# Patient Record
Sex: FEMALE | Race: WHITE | NOT HISPANIC OR LATINO | URBAN - METROPOLITAN AREA
[De-identification: names, ages, dates, MRNs, and addresses within clinical notes are randomized per-mention and may not be internally consistent; named-entity substitution may affect disease eponyms.]

---

## 2018-03-03 ENCOUNTER — DOCUMENTATION (OUTPATIENT)
Dept: ORTHOPEDICS | Facility: HOSPITAL | Age: 68
End: 2018-03-03

## 2018-03-03 PROBLEM — D64.9 ANEMIA: Status: ACTIVE | Noted: 2018-03-03

## 2018-03-03 PROBLEM — E78.5 DYSLIPIDEMIA: Status: ACTIVE | Noted: 2018-03-03

## 2018-03-03 PROBLEM — R73.03 PREDIABETES: Status: ACTIVE | Noted: 2018-03-03

## 2018-03-03 PROBLEM — Z96.652 HISTORY OF TOTAL KNEE ARTHROPLASTY, LEFT: Status: ACTIVE | Noted: 2018-03-03

## 2018-03-03 PROBLEM — K59.00 CONSTIPATION: Status: ACTIVE | Noted: 2018-03-03

## 2018-03-03 PROBLEM — R52 PAIN MANAGEMENT: Status: ACTIVE | Noted: 2018-03-03

## 2018-03-03 NOTE — PROGRESS NOTES
POD #1  LEFT TKA    VSS    LLE:  INCISION CDI  NVI  NO CALF TENDERNESS      S/P LEFT TKA    WBAT   PT/OT

## 2019-01-24 ENCOUNTER — OFFICE VISIT (OUTPATIENT)
Dept: CARDIOLOGY | Facility: CLINIC | Age: 69
End: 2019-01-24
Payer: MEDICARE

## 2019-01-24 VITALS
DIASTOLIC BLOOD PRESSURE: 82 MMHG | WEIGHT: 224 LBS | HEART RATE: 76 BPM | OXYGEN SATURATION: 95 % | BODY MASS INDEX: 38.45 KG/M2 | SYSTOLIC BLOOD PRESSURE: 132 MMHG

## 2019-01-24 DIAGNOSIS — I10 HYPERTENSION, UNSPECIFIED TYPE: Primary | ICD-10-CM

## 2019-01-24 PROCEDURE — 93000 ELECTROCARDIOGRAM COMPLETE: CPT | Performed by: INTERNAL MEDICINE

## 2019-01-24 PROCEDURE — 99203 OFFICE O/P NEW LOW 30 MIN: CPT | Performed by: INTERNAL MEDICINE

## 2019-01-24 RX ORDER — ATORVASTATIN CALCIUM 20 MG/1
20 TABLET, FILM COATED ORAL
COMMUNITY
Start: 2019-01-15 | End: 2019-01-24 | Stop reason: SDUPTHER

## 2019-01-24 RX ORDER — AMOXICILLIN 500 MG/1
CAPSULE ORAL
Refills: 6 | COMMUNITY
Start: 2018-12-10 | End: 2019-01-24 | Stop reason: ALTCHOICE

## 2019-01-24 ASSESSMENT — ENCOUNTER SYMPTOMS
WHEEZING: 0
FALLS: 0
BRUISES/BLEEDS EASILY: 0
SHORTNESS OF BREATH: 0
MYALGIAS: 0
FEVER: 0
ORTHOPNEA: 0
DYSPNEA ON EXERTION: 0
LIGHT-HEADEDNESS: 0
FOCAL WEAKNESS: 0
DIZZINESS: 0
BRIEF PARALYSIS: 0
ALTERED MENTAL STATUS: 0
HEMATURIA: 0
FREQUENCY: 0
DEPRESSION: 0
NEAR-SYNCOPE: 0
LOSS OF BALANCE: 0
VOMITING: 0
IRREGULAR HEARTBEAT: 0
DOUBLE VISION: 0
COUGH: 0
CLAUDICATION: 0
HEMATOCHEZIA: 0
NAUSEA: 0
DIAPHORESIS: 0
JOINT SWELLING: 0
BLURRED VISION: 0
PND: 0
SNORING: 0
SYNCOPE: 0
PALPITATIONS: 0
HEMATEMESIS: 0
ODYNOPHAGIA: 0
SPUTUM PRODUCTION: 0
SLEEP DISTURBANCES DUE TO BREATHING: 0
DIARRHEA: 0
HEADACHES: 0
WEAKNESS: 0
WEIGHT LOSS: 0
ABDOMINAL PAIN: 0
NIGHT SWEATS: 0
WEIGHT GAIN: 0
NERVOUS/ANXIOUS: 1
HEMOPTYSIS: 0
MUSCLE CRAMPS: 0

## 2019-01-24 NOTE — LETTER
2019     ALEIDA Toledo C  [address] [place] [address]    Patient: Taylor Sutton   YOB: 1950   Date of Visit: 2019       Dear Dr. Mullen:    Thank you for referring Taylor Sutton to me for evaluation. Below are my notes for this consultation.    If you have questions, please do not hesitate to call me. I look forward to following your patient along with you.         Sincerely,        Александр Serrato DO        CC: No Recipients  Александр Serrato DO  2019  4:22 PM  Sign at close encounter  Cardiology Consult Outpatient      Taylor Sutton 68 y.o. female who has a history of dyslipidemia on statin.  She has had elevated blood pressures for several weeks.  Her friend takes her blood pressure at home.  The patient denies chest pain, dyspnea, orthopnea, paroxysmal nocturnal dyspnea lower extremity edema.  She snores but she does not think she has sleep apnea.  She feels moderately well rested after an evening of sleep.  She understands she is overweight.  She has not exercised in months because of work.    Past medical history: Dyslipidemia       Past Surgical History:   Procedure Laterality Date   •  SECTION     • KNEE SURGERY         Patient has no known allergies.    Current Outpatient Prescriptions   Medication Sig Dispense Refill   • atorvastatin (LIPITOR) 20 mg tablet Take 20 mg by mouth nightly.       No current facility-administered medications for this visit.        Social History: No alcohol or tobacco     Social History   • Marital status: Single     Spouse name: N/A   • Number of children: N/A   • Years of education: N/A     Social History Main Topics   • Smoking status: Never Smoker   • Smokeless tobacco: Never Used   • Alcohol use No   • Drug use: Unknown   • Sexual activity: Not Asked     Other Topics Concern   • None     Social History Narrative   • None       History reviewed. No pertinent family history.    Review of Systems    Constitution: Negative for diaphoresis, fever, weakness, malaise/fatigue, night sweats, weight gain and weight loss.   HENT: Negative for odynophagia.    Eyes: Negative for blurred vision, double vision and visual disturbance.   Cardiovascular: Negative for chest pain, claudication, dyspnea on exertion, irregular heartbeat, leg swelling, near-syncope, orthopnea, palpitations, paroxysmal nocturnal dyspnea and syncope.   Respiratory: Negative for cough, hemoptysis, shortness of breath, sleep disturbances due to breathing, snoring, sputum production and wheezing.    Endocrine: Negative for polyuria.   Hematologic/Lymphatic: Negative for bleeding problem. Does not bruise/bleed easily.   Skin: Negative for rash.   Musculoskeletal: Negative for falls, joint pain, joint swelling, muscle cramps, muscle weakness and myalgias.   Gastrointestinal: Negative for abdominal pain, diarrhea, hematemesis, hematochezia, melena, nausea and vomiting.   Genitourinary: Negative for frequency, hematuria and nocturia.   Neurological: Negative for brief paralysis, dizziness, focal weakness, headaches, light-headedness and loss of balance.   Psychiatric/Behavioral: Negative for altered mental status and depression. 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        Objective     Vitals:    01/24/19 1547   BP: 132/82   Pulse:    SpO2:        Physical Exam   Constitutional: She is oriented to person, place, and time. She appears well-developed. No distress.   Obese   HENT:   Head: Normocephalic.   Eyes: Conjunctivae are normal. Pupils are equal, round, and reactive to light.   Neck: Neck supple. No JVD present. No tracheal deviation present. No thyromegaly present.   Cardiovascular: Normal rate, regular rhythm and normal heart sounds.  Exam reveals no gallop and no friction rub.    No murmur heard.  Pulmonary/Chest: Breath sounds normal. No  stridor. No respiratory distress. She has no wheezes. She has no rales. She exhibits no tenderness.   Abdominal: Bowel sounds are normal. She exhibits no distension and no mass. There is no tenderness. There is no rebound and no guarding.   Musculoskeletal: Normal range of motion. She exhibits no edema, tenderness or deformity.   Neurological: She is alert and oriented to person, place, and time. No cranial nerve deficit.   Skin: Skin is warm and dry. No rash noted. She is not diaphoretic. No erythema. No pallor.   Psychiatric: She has a normal mood and affect. Her behavior is normal. Judgment normal.          Labs   Lab Results   Component Value Date    WBC 8.11 03/03/2018    HGB 12.2 03/03/2018    HCT 37.9 03/03/2018     03/03/2018    ALT 18 02/26/2018    AST 17 02/26/2018     03/03/2018    K 3.8 03/03/2018     03/03/2018    CREATININE 0.4 (L) 03/03/2018    BUN 11 03/03/2018    CO2 28 03/03/2018    INR 0.9 02/26/2018    HGBA1C 5.9 (H) 02/26/2018       Imaging  Not applicable    ECG   sinus rhythm  Borderline low voltage      Assessment/Plan     Problem List Items Addressed This Visit     Hypertension - Primary      Blood pressure after 15 minutes of rest was 132/82 and 135/85 in the right arm.  This is not significantly elevated.  I do appreciate that she has elevated pressures at home as taken by her close friend.  Perhaps this is during a stressful time or perhaps the cough is a small cuff.  I suspect the patient does have pre-hypertension.  PLAN:  - I recommended exercise routine and diet.    - Patient does snore but does not think she has sleep apnea.  This might be something to pursue in the future with a sleep study.  -There are no other alarming features are concerns.  -Routine labs including CBC, CMP, TSH, CMP no need for extensive secondary workup at this time.  -With the patient keep a diary of 3-4 blood pressure per week taken at rest.  -Follow-up in 3 months         Relevant Orders     ECG 12 lead (Completed)    CBC and Differential    Lipid panel    Comprehensive metabolic panel    TSH w reflex FT4              Александр Sigala,   1/24/2019

## 2019-01-24 NOTE — PROGRESS NOTES
Cardiology Consult Outpatient      Taylor Sutton 68 y.o. female who has a history of dyslipidemia on statin.  She has had elevated blood pressures for several weeks.  Her friend takes her blood pressure at home.  The patient denies chest pain, dyspnea, orthopnea, paroxysmal nocturnal dyspnea lower extremity edema.  She snores but she does not think she has sleep apnea.  She feels moderately well rested after an evening of sleep.  She understands she is overweight.  She has not exercised in months because of work.    Past medical history: Dyslipidemia       Past Surgical History:   Procedure Laterality Date   •  SECTION     • KNEE SURGERY         Patient has no known allergies.    Current Outpatient Prescriptions   Medication Sig Dispense Refill   • atorvastatin (LIPITOR) 20 mg tablet Take 20 mg by mouth nightly.       No current facility-administered medications for this visit.        Social History: No alcohol or tobacco     Social History   • Marital status: Single     Spouse name: N/A   • Number of children: N/A   • Years of education: N/A     Social History Main Topics   • Smoking status: Never Smoker   • Smokeless tobacco: Never Used   • Alcohol use No   • Drug use: Unknown   • Sexual activity: Not Asked     Other Topics Concern   • None     Social History Narrative   • None       History reviewed. No pertinent family history.    Review of Systems   Constitution: Negative for diaphoresis, fever, weakness, malaise/fatigue, night sweats, weight gain and weight loss.   HENT: Negative for odynophagia.    Eyes: Negative for blurred vision, double vision and visual disturbance.   Cardiovascular: Negative for chest pain, claudication, dyspnea on exertion, irregular heartbeat, leg swelling, near-syncope, orthopnea, palpitations, paroxysmal nocturnal dyspnea and syncope.   Respiratory: Negative for cough, hemoptysis, shortness of breath, sleep disturbances due to breathing, snoring, sputum production and  wheezing.    Endocrine: Negative for polyuria.   Hematologic/Lymphatic: Negative for bleeding problem. Does not bruise/bleed easily.   Skin: Negative for rash.   Musculoskeletal: Negative for falls, joint pain, joint swelling, muscle cramps, muscle weakness and myalgias.   Gastrointestinal: Negative for abdominal pain, diarrhea, hematemesis, hematochezia, melena, nausea and vomiting.   Genitourinary: Negative for frequency, hematuria and nocturia.   Neurological: Negative for brief paralysis, dizziness, focal weakness, headaches, light-headedness and loss of balance.   Psychiatric/Behavioral: Negative for altered mental status and depression. 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        Objective     Vitals:    01/24/19 1547   BP: 132/82   Pulse:    SpO2:        Physical Exam   Constitutional: She is oriented to person, place, and time. She appears well-developed. No distress.   Obese   HENT:   Head: Normocephalic.   Eyes: Conjunctivae are normal. Pupils are equal, round, and reactive to light.   Neck: Neck supple. No JVD present. No tracheal deviation present. No thyromegaly present.   Cardiovascular: Normal rate, regular rhythm and normal heart sounds.  Exam reveals no gallop and no friction rub.    No murmur heard.  Pulmonary/Chest: Breath sounds normal. No stridor. No respiratory distress. She has no wheezes. She has no rales. She exhibits no tenderness.   Abdominal: Bowel sounds are normal. She exhibits no distension and no mass. There is no tenderness. There is no rebound and no guarding.   Musculoskeletal: Normal range of motion. She exhibits no edema, tenderness or deformity.   Neurological: She is alert and oriented to person, place, and time. No cranial nerve deficit.   Skin: Skin is warm and dry. No rash noted. She is not diaphoretic. No erythema. No pallor.   Psychiatric: She has a normal mood and  affect. Her behavior is normal. Judgment normal.          Labs   Lab Results   Component Value Date    WBC 8.11 03/03/2018    HGB 12.2 03/03/2018    HCT 37.9 03/03/2018     03/03/2018    ALT 18 02/26/2018    AST 17 02/26/2018     03/03/2018    K 3.8 03/03/2018     03/03/2018    CREATININE 0.4 (L) 03/03/2018    BUN 11 03/03/2018    CO2 28 03/03/2018    INR 0.9 02/26/2018    HGBA1C 5.9 (H) 02/26/2018       Imaging  Not applicable    ECG   sinus rhythm  Borderline low voltage      Assessment/Plan     Problem List Items Addressed This Visit     Hypertension - Primary      Blood pressure after 15 minutes of rest was 132/82 and 135/85 in the right arm.  This is not significantly elevated.  I do appreciate that she has elevated pressures at home as taken by her close friend.  Perhaps this is during a stressful time or perhaps the cough is a small cuff.  I suspect the patient does have pre-hypertension.  PLAN:  - I recommended exercise routine and diet.    - Patient does snore but does not think she has sleep apnea.  This might be something to pursue in the future with a sleep study.  -There are no other alarming features are concerns.  -Routine labs including CBC, CMP, TSH, CMP no need for extensive secondary workup at this time.  -With the patient keep a diary of 3-4 blood pressure per week taken at rest.  -Follow-up in 3 months         Relevant Orders    ECG 12 lead (Completed)    CBC and Differential    Lipid panel    Comprehensive metabolic panel    TSH w reflex FT4              Александр Sigala,   1/24/2019

## 2019-01-24 NOTE — ASSESSMENT & PLAN NOTE
Blood pressure after 15 minutes of rest was 132/82 and 135/85 in the right arm.  This is not significantly elevated.  I do appreciate that she has elevated pressures at home as taken by her close friend.  Perhaps this is during a stressful time or perhaps the cough is a small cuff.  I suspect the patient does have pre-hypertension..  - I recommended exercise routine and diet.    - Patient does snore but does not think she has sleep apnea.  This might be something to pursue in the future with a sleep study.  -There are no other alarming features are concerns.  -Routine labs including CBC, CMP, TSH, CMP no need for extensive secondary workup at this time.

## 2019-01-29 LAB
ALBUMIN SERPL-MCNC: 4.2 G/DL (ref 3.6–5.1)
ALBUMIN/GLOB SERPL: 1.7 (CALC) (ref 1–2.5)
ALP SERPL-CCNC: 107 U/L (ref 33–130)
ALT SERPL-CCNC: 11 U/L (ref 6–29)
AST SERPL-CCNC: 11 U/L (ref 10–35)
BASOPHILS # BLD AUTO: 30 CELLS/UL (ref 0–200)
BASOPHILS NFR BLD AUTO: 0.5 %
BILIRUB SERPL-MCNC: 0.8 MG/DL (ref 0.2–1.2)
BUN SERPL-MCNC: 15 MG/DL (ref 7–25)
BUN/CREAT SERPL: ABNORMAL (CALC) (ref 6–22)
CALCIUM SERPL-MCNC: 9.4 MG/DL (ref 8.6–10.4)
CHLORIDE SERPL-SCNC: 104 MMOL/L (ref 98–110)
CHOLEST SERPL-MCNC: 190 MG/DL
CHOLEST/HDLC SERPL: 3.3 (CALC)
CO2 SERPL-SCNC: 29 MMOL/L (ref 20–32)
CREAT SERPL-MCNC: 0.61 MG/DL (ref 0.5–0.99)
EOSINOPHIL # BLD AUTO: 89 CELLS/UL (ref 15–500)
EOSINOPHIL NFR BLD AUTO: 1.5 %
ERYTHROCYTE [DISTWIDTH] IN BLOOD BY AUTOMATED COUNT: 12.7 % (ref 11–15)
GLOBULIN SER CALC-MCNC: 2.5 G/DL (CALC) (ref 1.9–3.7)
GLUCOSE SERPL-MCNC: 108 MG/DL (ref 65–99)
HCT VFR BLD AUTO: 41 % (ref 35–45)
HDLC SERPL-MCNC: 58 MG/DL
HGB BLD-MCNC: 13.8 G/DL (ref 11.7–15.5)
LDLC SERPL CALC-MCNC: 111 MG/DL (CALC)
LYMPHOCYTES # BLD AUTO: 2277 CELLS/UL (ref 850–3900)
LYMPHOCYTES NFR BLD AUTO: 38.6 %
MCH RBC QN AUTO: 30.7 PG (ref 27–33)
MCHC RBC AUTO-ENTMCNC: 33.7 G/DL (ref 32–36)
MCV RBC AUTO: 91.1 FL (ref 80–100)
MONOCYTES # BLD AUTO: 437 CELLS/UL (ref 200–950)
MONOCYTES NFR BLD AUTO: 7.4 %
NEUTROPHILS # BLD AUTO: 3068 CELLS/UL (ref 1500–7800)
NEUTROPHILS NFR BLD AUTO: 52 %
NONHDLC SERPL-MCNC: 132 MG/DL (CALC)
PLATELET # BLD AUTO: 245 THOUSAND/UL (ref 140–400)
PMV BLD REES-ECKER: 11 FL (ref 7.5–12.5)
POTASSIUM SERPL-SCNC: 4.6 MMOL/L (ref 3.5–5.3)
PROT SERPL-MCNC: 6.7 G/DL (ref 6.1–8.1)
QUEST EGFR NON-AFR. AMERICAN: 94 ML/MIN/1.73M2
RBC # BLD AUTO: 4.5 MILLION/UL (ref 3.8–5.1)
SODIUM SERPL-SCNC: 141 MMOL/L (ref 135–146)
TRIGL SERPL-MCNC: 99 MG/DL
TSH SERPL-ACNC: 1.96 MIU/L (ref 0.4–4.5)
WBC # BLD AUTO: 5.9 THOUSAND/UL (ref 3.8–10.8)

## 2019-04-25 ENCOUNTER — OFFICE VISIT (OUTPATIENT)
Dept: CARDIOLOGY | Facility: CLINIC | Age: 69
End: 2019-04-25
Payer: MEDICARE

## 2019-04-25 VITALS
SYSTOLIC BLOOD PRESSURE: 112 MMHG | OXYGEN SATURATION: 96 % | DIASTOLIC BLOOD PRESSURE: 70 MMHG | HEART RATE: 73 BPM | WEIGHT: 230 LBS | BODY MASS INDEX: 39.48 KG/M2

## 2019-04-25 DIAGNOSIS — I10 HYPERTENSION, UNSPECIFIED TYPE: Primary | ICD-10-CM

## 2019-04-25 DIAGNOSIS — E78.5 DYSLIPIDEMIA: ICD-10-CM

## 2019-04-25 PROCEDURE — 99213 OFFICE O/P EST LOW 20 MIN: CPT | Performed by: INTERNAL MEDICINE

## 2019-04-25 RX ORDER — HYDROCHLOROTHIAZIDE 25 MG/1
25 TABLET ORAL DAILY
Qty: 90 TABLET | Refills: 3
Start: 2019-04-25 | End: 2019-11-21 | Stop reason: SDUPTHER

## 2019-04-25 RX ORDER — HYDROCHLOROTHIAZIDE 12.5 MG/1
25 TABLET ORAL DAILY
Qty: 90 TABLET | Refills: 3
Start: 2019-04-25 | End: 2019-04-25 | Stop reason: SDUPTHER

## 2019-04-25 ASSESSMENT — ENCOUNTER SYMPTOMS
VOMITING: 0
ABDOMINAL PAIN: 0
SPUTUM PRODUCTION: 0
DIARRHEA: 0
HEMOPTYSIS: 0
HEADACHES: 0
PND: 0
ODYNOPHAGIA: 0
WEAKNESS: 0
BLURRED VISION: 0
NERVOUS/ANXIOUS: 0
LIGHT-HEADEDNESS: 0
ORTHOPNEA: 0
NEAR-SYNCOPE: 0
WEIGHT LOSS: 0
MUSCLE CRAMPS: 0
LOSS OF BALANCE: 0
SYNCOPE: 0
WEIGHT GAIN: 0
BRUISES/BLEEDS EASILY: 0
CLAUDICATION: 0
DYSPNEA ON EXERTION: 0
DOUBLE VISION: 0
FOCAL WEAKNESS: 0
JOINT SWELLING: 0
SNORING: 0
FALLS: 0
IRREGULAR HEARTBEAT: 0
FREQUENCY: 0
DIZZINESS: 0
NIGHT SWEATS: 0
ALTERED MENTAL STATUS: 0
COUGH: 0
BRIEF PARALYSIS: 0
MYALGIAS: 0
HEMATOCHEZIA: 0
PALPITATIONS: 0
HEMATEMESIS: 0
DIAPHORESIS: 0
NAUSEA: 0
FEVER: 0
DEPRESSION: 0
SHORTNESS OF BREATH: 0
HEMATURIA: 0
SLEEP DISTURBANCES DUE TO BREATHING: 0
WHEEZING: 0

## 2019-04-25 NOTE — LETTER
2019     ALEIDA Toledo C  [address] [place] [address]    Patient: Taylor Sutton   YOB: 1950   Date of Visit: 2019       Dear Dr. Mullen:    I saw Taylor in the office today for follow-up evaluation.  Below are the notes from this encounter.  Her blood pressure is under very good control..    If you have questions, please do not hesitate to call me. I look forward to following your patient along with you.         Sincerely,        Александр Serrato DO        CC: No Recipients  Александр Serrato DO  2019  4:32 PM  Sign at close encounter     Cardiology Note       Reason for visit:   Chief Complaint   Patient presents with   • Follow-up      HPI    Taylor Sutton is a 68 y.o. female who has hypertension is feeling well.  She has no chest pain, dyspnea, orthopnea, proximal nocturnal dyspnea, lower limb edema.     History reviewed. No pertinent past medical history.    Past Surgical History:   Procedure Laterality Date   •  SECTION     • KNEE SURGERY         Patient has no known allergies.    Current Outpatient Prescriptions   Medication Sig Dispense Refill   • atorvastatin (LIPITOR) 20 mg tablet Take 20 mg by mouth nightly.     • hydrochlorothiazide (HYDRODIURIL) 12.5 mg tablet Take 2 tablets (25 mg total) by mouth daily. 90 tablet 3     No current facility-administered medications for this visit.        Social History     Social History   • Marital status: Single     Spouse name: N/A   • Number of children: N/A   • Years of education: N/A     Social History Main Topics   • Smoking status: Never Smoker   • Smokeless tobacco: Never Used   • Alcohol use No   • Drug use: Unknown   • Sexual activity: Not Asked     Other Topics Concern   • None     Social History Narrative   • None       History reviewed. No pertinent family history.      Review of Systems   Constitution: Negative for diaphoresis, fever, weakness, malaise/fatigue, night sweats, weight gain and  weight loss.   HENT: Negative for odynophagia.    Eyes: Negative for blurred vision, double vision and visual disturbance.   Cardiovascular: Negative for chest pain, claudication, dyspnea on exertion, irregular heartbeat, leg swelling, near-syncope, orthopnea, palpitations, paroxysmal nocturnal dyspnea and syncope.   Respiratory: Negative for cough, hemoptysis, shortness of breath, sleep disturbances due to breathing, snoring, sputum production and wheezing.    Endocrine: Negative for polyuria.   Hematologic/Lymphatic: Negative for bleeding problem. Does not bruise/bleed easily.   Skin: Negative for rash.   Musculoskeletal: Negative for falls, joint pain, joint swelling, muscle cramps, muscle weakness and myalgias.   Gastrointestinal: Negative for abdominal pain, diarrhea, hematemesis, hematochezia, melena, nausea and vomiting.   Genitourinary: Negative for frequency, hematuria and nocturia.   Neurological: Negative for brief paralysis, dizziness, focal weakness, headaches, light-headedness and loss of balance.   Psychiatric/Behavioral: Negative for altered mental status and depression. The patient is not nervous/anxious.       Objective    Vitals:    04/25/19 1626   BP: 112/70   Pulse:    SpO2:       Physical Exam   Constitutional: She is oriented to person, place, and time. She appears well-developed. No distress.   Overweight   HENT:   Head: Normocephalic.   Eyes: Pupils are equal, round, and reactive to light. Conjunctivae are normal.   Neck: Neck supple. No JVD present. No tracheal deviation present. No thyromegaly present.   Cardiovascular: Normal rate, regular rhythm and normal heart sounds.  Exam reveals no gallop and no friction rub.    No murmur heard.  Pulmonary/Chest: Breath sounds normal. No stridor. No respiratory distress. She has no wheezes. She has no rales. She exhibits no tenderness.   Abdominal: Bowel sounds are normal. She exhibits no distension and no mass. There is no tenderness. There is no  rebound and no guarding.   Musculoskeletal: Normal range of motion. She exhibits no edema, tenderness or deformity.   Neurological: She is alert and oriented to person, place, and time. No cranial nerve deficit.   Skin: Skin is warm and dry. No rash noted. She is not diaphoretic. No erythema. No pallor.   Psychiatric: She has a normal mood and affect. Her behavior is normal. Judgment normal.         Lab Results   Component Value Date    WBC 5.9 01/28/2019    HGB 13.8 01/28/2019     01/28/2019    CHOL 190 01/28/2019    TRIG 99 01/28/2019    HDL 58 01/28/2019    ALT 11 01/28/2019    AST 11 01/28/2019     01/28/2019    K 4.6 01/28/2019    CREATININE 0.61 01/28/2019    TSH 1.96 01/28/2019    INR 0.9 02/26/2018    HGBA1C 5.9 (H) 02/26/2018         Imaging  na    ECG   na   Assessment/Plan    Problem List Items Addressed This Visit        Circulatory    Hypertension - Primary    Overview     1/24/18  - blood pressure after 15 minutes of rest was 132/82 and 135/85 in the right arm.  This is not significantly elevated.  I do appreciate that she has elevated pressures at home as taken by her close friend.          Current Assessment & Plan     Excellent blood pressure control on monotherapy.  Today's blood pressure was 115/70.         Relevant Medications    hydrochlorothiazide (HYDRODIURIL) 25 mg tablet    Other Relevant Orders    Lipid panel    Comprehensive metabolic panel       Endocrine/Metabolic    Dyslipidemia    Relevant Orders    Lipid panel    Comprehensive metabolic panel                  Thank you for allowing me to participate in the care of this patient.  I hope this information is helpful.     Александр Sigala DO [facility]  4/25/2019  4:28 PM

## 2019-04-25 NOTE — PROGRESS NOTES
Cardiology Note       Reason for visit:   Chief Complaint   Patient presents with   • Follow-up      HPI    Taylor Sutton is a 68 y.o. female who has hypertension is feeling well.  She has no chest pain, dyspnea, orthopnea, proximal nocturnal dyspnea, lower limb edema.     History reviewed. No pertinent past medical history.    Past Surgical History:   Procedure Laterality Date   •  SECTION     • KNEE SURGERY         Patient has no known allergies.    Current Outpatient Prescriptions   Medication Sig Dispense Refill   • atorvastatin (LIPITOR) 20 mg tablet Take 20 mg by mouth nightly.     • hydrochlorothiazide (HYDRODIURIL) 12.5 mg tablet Take 2 tablets (25 mg total) by mouth daily. 90 tablet 3     No current facility-administered medications for this visit.        Social History     Social History   • Marital status: Single     Spouse name: N/A   • Number of children: N/A   • Years of education: N/A     Social History Main Topics   • Smoking status: Never Smoker   • Smokeless tobacco: Never Used   • Alcohol use No   • Drug use: Unknown   • Sexual activity: Not Asked     Other Topics Concern   • None     Social History Narrative   • None       History reviewed. No pertinent family history.      Review of Systems   Constitution: Negative for diaphoresis, fever, weakness, malaise/fatigue, night sweats, weight gain and weight loss.   HENT: Negative for odynophagia.    Eyes: Negative for blurred vision, double vision and visual disturbance.   Cardiovascular: Negative for chest pain, claudication, dyspnea on exertion, irregular heartbeat, leg swelling, near-syncope, orthopnea, palpitations, paroxysmal nocturnal dyspnea and syncope.   Respiratory: Negative for cough, hemoptysis, shortness of breath, sleep disturbances due to breathing, snoring, sputum production and wheezing.    Endocrine: Negative for polyuria.   Hematologic/Lymphatic: Negative for bleeding problem. Does not bruise/bleed easily.   Skin:  Negative for rash.   Musculoskeletal: Negative for falls, joint pain, joint swelling, muscle cramps, muscle weakness and myalgias.   Gastrointestinal: Negative for abdominal pain, diarrhea, hematemesis, hematochezia, melena, nausea and vomiting.   Genitourinary: Negative for frequency, hematuria and nocturia.   Neurological: Negative for brief paralysis, dizziness, focal weakness, headaches, light-headedness and loss of balance.   Psychiatric/Behavioral: Negative for altered mental status and depression. The patient is not nervous/anxious.       Objective    Vitals:    04/25/19 1626   BP: 112/70   Pulse:    SpO2:       Physical Exam   Constitutional: She is oriented to person, place, and time. She appears well-developed. No distress.   Overweight   HENT:   Head: Normocephalic.   Eyes: Pupils are equal, round, and reactive to light. Conjunctivae are normal.   Neck: Neck supple. No JVD present. No tracheal deviation present. No thyromegaly present.   Cardiovascular: Normal rate, regular rhythm and normal heart sounds.  Exam reveals no gallop and no friction rub.    No murmur heard.  Pulmonary/Chest: Breath sounds normal. No stridor. No respiratory distress. She has no wheezes. She has no rales. She exhibits no tenderness.   Abdominal: Bowel sounds are normal. She exhibits no distension and no mass. There is no tenderness. There is no rebound and no guarding.   Musculoskeletal: Normal range of motion. She exhibits no edema, tenderness or deformity.   Neurological: She is alert and oriented to person, place, and time. No cranial nerve deficit.   Skin: Skin is warm and dry. No rash noted. She is not diaphoretic. No erythema. No pallor.   Psychiatric: She has a normal mood and affect. Her behavior is normal. Judgment normal.         Lab Results   Component Value Date    WBC 5.9 01/28/2019    HGB 13.8 01/28/2019     01/28/2019    CHOL 190 01/28/2019    TRIG 99 01/28/2019    HDL 58 01/28/2019    ALT 11 01/28/2019     AST 11 01/28/2019     01/28/2019    K 4.6 01/28/2019    CREATININE 0.61 01/28/2019    TSH 1.96 01/28/2019    INR 0.9 02/26/2018    HGBA1C 5.9 (H) 02/26/2018         Imaging  na    ECG   na   Assessment/Plan    Problem List Items Addressed This Visit        Circulatory    Hypertension - Primary    Overview     1/24/18  - blood pressure after 15 minutes of rest was 132/82 and 135/85 in the right arm.  This is not significantly elevated.  I do appreciate that she has elevated pressures at home as taken by her close friend.          Current Assessment & Plan     Excellent blood pressure control on monotherapy.  Today's blood pressure was 115/70.         Relevant Medications    hydrochlorothiazide (HYDRODIURIL) 25 mg tablet    Other Relevant Orders    Lipid panel    Comprehensive metabolic panel       Endocrine/Metabolic    Dyslipidemia    Relevant Orders    Lipid panel    Comprehensive metabolic panel                  Thank you for allowing me to participate in the care of this patient.  I hope this information is helpful.     Александр Sigala DO [facility]  4/25/2019  4:28 PM

## 2019-09-03 ENCOUNTER — OFFICE VISIT (OUTPATIENT)
Dept: CARDIOLOGY | Facility: CLINIC | Age: 69
End: 2019-09-03
Payer: MEDICARE

## 2019-09-03 VITALS
HEART RATE: 78 BPM | DIASTOLIC BLOOD PRESSURE: 72 MMHG | WEIGHT: 230 LBS | SYSTOLIC BLOOD PRESSURE: 122 MMHG | OXYGEN SATURATION: 90 % | BODY MASS INDEX: 39.48 KG/M2

## 2019-09-03 DIAGNOSIS — I10 HYPERTENSION, UNSPECIFIED TYPE: Primary | ICD-10-CM

## 2019-09-03 DIAGNOSIS — R73.03 PREDIABETES: ICD-10-CM

## 2019-09-03 PROCEDURE — 99213 OFFICE O/P EST LOW 20 MIN: CPT | Performed by: INTERNAL MEDICINE

## 2019-09-03 ASSESSMENT — ENCOUNTER SYMPTOMS
WEIGHT GAIN: 0
CLAUDICATION: 0
BLURRED VISION: 0
DIAPHORESIS: 0
HEADACHES: 0
ORTHOPNEA: 0
HEMOPTYSIS: 0
IRREGULAR HEARTBEAT: 0
NIGHT SWEATS: 0
FREQUENCY: 0
FALLS: 0
DOUBLE VISION: 0
MUSCLE CRAMPS: 0
VOMITING: 0
NAUSEA: 0
LOSS OF BALANCE: 0
WEIGHT LOSS: 0
FOCAL WEAKNESS: 0
HEMATURIA: 0
SLEEP DISTURBANCES DUE TO BREATHING: 0
DYSPNEA ON EXERTION: 0
JOINT SWELLING: 0
SHORTNESS OF BREATH: 0
MYALGIAS: 0
WHEEZING: 0
PALPITATIONS: 0
DIARRHEA: 0
ALTERED MENTAL STATUS: 0
FEVER: 0
BRUISES/BLEEDS EASILY: 0
COUGH: 0
ODYNOPHAGIA: 0
PND: 0
HEMATEMESIS: 0
BRIEF PARALYSIS: 0
ABDOMINAL PAIN: 0
LIGHT-HEADEDNESS: 0
DIZZINESS: 0
DEPRESSION: 0
NERVOUS/ANXIOUS: 0
SYNCOPE: 0
WEAKNESS: 0
NEAR-SYNCOPE: 0
SNORING: 0
SPUTUM PRODUCTION: 0
HEMATOCHEZIA: 0

## 2019-09-03 NOTE — PROGRESS NOTES
Cardiology Note       Reason for visit:   Chief Complaint   Patient presents with   • Follow-up      HPI    Taylor Sutton is a 68 y.o. female feels well.  No chest pain dyspnea orthopnea paroxysmal nocturnal dyspnea or lower extremity edema.     History reviewed. No pertinent past medical history.    Past Surgical History:   Procedure Laterality Date   •  SECTION     • KNEE SURGERY         Patient has no known allergies.    Current Outpatient Prescriptions   Medication Sig Dispense Refill   • atorvastatin (LIPITOR) 20 mg tablet Take 20 mg by mouth nightly.     • hydrochlorothiazide (HYDRODIURIL) 25 mg tablet Take 1 tablet (25 mg total) by mouth daily. 90 tablet 3     No current facility-administered medications for this visit.        Social History     Social History   • Marital status: Single     Spouse name: N/A   • Number of children: N/A   • Years of education: N/A     Social History Main Topics   • Smoking status: Never Smoker   • Smokeless tobacco: Never Used   • Alcohol use No   • Drug use: Unknown   • Sexual activity: Not Asked     Other Topics Concern   • None     Social History Narrative   • None       History reviewed. No pertinent family history.      Review of Systems   Constitution: Negative for diaphoresis, fever, weakness, malaise/fatigue, night sweats, weight gain and weight loss.   HENT: Negative for odynophagia.    Eyes: Negative for blurred vision, double vision and visual disturbance.   Cardiovascular: Negative for chest pain, claudication, dyspnea on exertion, irregular heartbeat, leg swelling, near-syncope, orthopnea, palpitations, paroxysmal nocturnal dyspnea and syncope.   Respiratory: Negative for cough, hemoptysis, shortness of breath, sleep disturbances due to breathing, snoring, sputum production and wheezing.    Endocrine: Negative for polyuria.   Hematologic/Lymphatic: Negative for bleeding problem. Does not bruise/bleed easily.   Skin: Negative for rash.   Musculoskeletal:  Negative for falls, joint pain, joint swelling, muscle cramps, muscle weakness and myalgias.   Gastrointestinal: Negative for abdominal pain, diarrhea, hematemesis, hematochezia, melena, nausea and vomiting.   Genitourinary: Negative for frequency, hematuria and nocturia.   Neurological: Negative for brief paralysis, dizziness, focal weakness, headaches, light-headedness and loss of balance.   Psychiatric/Behavioral: Negative for altered mental status and depression. The patient is not nervous/anxious.       Objective    Vitals:    09/03/19 1306   BP: 122/72   Pulse: 70   SpO2:       Physical Exam      Lab Results   Component Value Date    WBC 5.9 01/28/2019    HGB 13.8 01/28/2019     01/28/2019    CHOL 190 01/28/2019    TRIG 99 01/28/2019    HDL 58 01/28/2019    ALT 11 01/28/2019    AST 11 01/28/2019     01/28/2019    K 4.6 01/28/2019    CREATININE 0.61 01/28/2019    TSH 1.96 01/28/2019    INR 0.9 02/26/2018    HGBA1C 5.9 (H) 02/26/2018         Imaging  n/a    ECG   n/a   Assessment/Plan    Problem List Items Addressed This Visit        Circulatory    Hypertension - Primary    Current Assessment & Plan     Well-controlled.  No change in therapy.  Follow-up in 6 months.         Relevant Orders    Lipid panel    Comprehensive metabolic panel       Other    Prediabetes    Relevant Orders    Lipid panel    Comprehensive metabolic panel            Follow-up 6 months          Thank you for allowing me to participate in the care of this patient.  I hope this information is helpful.     Александр Sigala DO [facility]  9/3/2019  5:36 PM

## 2019-09-09 LAB
ALBUMIN SERPL-MCNC: 4.2 G/DL (ref 3.6–5.1)
ALBUMIN/GLOB SERPL: 1.6 (CALC) (ref 1–2.5)
ALP SERPL-CCNC: 88 U/L (ref 33–130)
ALT SERPL-CCNC: 15 U/L (ref 6–29)
AST SERPL-CCNC: 14 U/L (ref 10–35)
BILIRUB SERPL-MCNC: 1.1 MG/DL (ref 0.2–1.2)
BUN SERPL-MCNC: 18 MG/DL (ref 7–25)
BUN/CREAT SERPL: ABNORMAL (CALC) (ref 6–22)
CALCIUM SERPL-MCNC: 9.4 MG/DL (ref 8.6–10.4)
CHLORIDE SERPL-SCNC: 102 MMOL/L (ref 98–110)
CHOLEST SERPL-MCNC: 187 MG/DL
CHOLEST/HDLC SERPL: 3.3 (CALC)
CO2 SERPL-SCNC: 32 MMOL/L (ref 20–32)
CREAT SERPL-MCNC: 0.71 MG/DL (ref 0.5–0.99)
GLOBULIN SER CALC-MCNC: 2.6 G/DL (CALC) (ref 1.9–3.7)
GLUCOSE SERPL-MCNC: 109 MG/DL (ref 65–99)
HDLC SERPL-MCNC: 56 MG/DL
LDLC SERPL CALC-MCNC: 106 MG/DL (CALC)
NONHDLC SERPL-MCNC: 131 MG/DL (CALC)
POTASSIUM SERPL-SCNC: 3.5 MMOL/L (ref 3.5–5.3)
PROT SERPL-MCNC: 6.8 G/DL (ref 6.1–8.1)
QUEST EGFR NON-AFR. AMERICAN: 89 ML/MIN/1.73M2
SODIUM SERPL-SCNC: 141 MMOL/L (ref 135–146)
TRIGL SERPL-MCNC: 132 MG/DL

## 2019-09-10 RX ORDER — ROSUVASTATIN CALCIUM 20 MG/1
20 TABLET, COATED ORAL DAILY
Qty: 90 TABLET | Refills: 1 | Status: SHIPPED | OUTPATIENT
Start: 2019-09-10 | End: 2019-12-30 | Stop reason: SDUPTHER

## 2019-09-10 NOTE — TELEPHONE ENCOUNTER
----- Message from Александр Serrato DO sent at 9/9/2019  4:35 PM EDT -----  Please call the patient regarding her abnormal results.  The bad cholesterol still mildly elevated at 106.  Her sugar is also elevated mildly at 109.  Instead of increasing the atorvastatin to make her LDL better, I want to switch her to Crestor 20 mg daily.  This will not raise her sugar as much as atorvastatin.  Her other labs look good including liver, kidney, electrolytes .    Summary, LDL still a bit high.  I want to switch her to Crestor 20 Milgram daily.

## 2019-09-10 NOTE — PROGRESS NOTES
Spoke with Mrs. Sutton, reg Lab test results, per Dr. Serrato, Patient agreed to start Crestor 20 mg, BB

## 2019-09-10 NOTE — TELEPHONE ENCOUNTER
Spoke with Mrs. Sutton, reg lab test results, per Ama Marinelli. Patient would like a script sent to her local [facility] for the  Crestor 20 mg one daily. BRAULIO

## 2019-11-21 RX ORDER — HYDROCHLOROTHIAZIDE 25 MG/1
25 TABLET ORAL DAILY
Qty: 90 TABLET | Refills: 3 | Status: SHIPPED | OUTPATIENT
Start: 2019-11-21

## 2019-12-30 RX ORDER — ROSUVASTATIN CALCIUM 20 MG/1
20 TABLET, COATED ORAL DAILY
Qty: 90 TABLET | Refills: 3 | Status: SHIPPED | OUTPATIENT
Start: 2019-12-30

## 2020-12-22 RX ORDER — ROSUVASTATIN CALCIUM 20 MG/1
20 TABLET, COATED ORAL DAILY
Qty: 90 TABLET | Refills: 3 | OUTPATIENT
Start: 2020-12-22 | End: 2021-06-20

## 2020-12-22 RX ORDER — ROSUVASTATIN CALCIUM 20 MG/1
20 TABLET, COATED ORAL DAILY
Qty: 90 TABLET | Refills: 0 | OUTPATIENT
Start: 2020-12-22 | End: 2021-03-22

## 2020-12-22 RX ORDER — HYDROCHLOROTHIAZIDE 25 MG/1
TABLET ORAL
Qty: 90 TABLET | Refills: 3 | OUTPATIENT
Start: 2020-12-22

## 2020-12-22 NOTE — TELEPHONE ENCOUNTER
Ama Marinelli Giant sent a request to have her  Rosuvastatin 20 mg refilled. You have not seen this Patient since 9/3/2019, nor has she had any  Recent labs done. I called her PCP and they don't have any recent labs. Do you still want to refill the Rosuvastatin? BB

## 2022-01-18 ENCOUNTER — CONSULT (OUTPATIENT)
Dept: PAIN MEDICINE | Facility: CLINIC | Age: 72
End: 2022-01-18
Payer: MEDICARE

## 2022-01-18 ENCOUNTER — APPOINTMENT (OUTPATIENT)
Dept: RADIOLOGY | Facility: CLINIC | Age: 72
End: 2022-01-18
Payer: MEDICARE

## 2022-01-18 VITALS
HEIGHT: 64 IN | SYSTOLIC BLOOD PRESSURE: 110 MMHG | DIASTOLIC BLOOD PRESSURE: 78 MMHG | HEART RATE: 82 BPM | BODY MASS INDEX: 38.07 KG/M2 | TEMPERATURE: 98.4 F | WEIGHT: 223 LBS

## 2022-01-18 DIAGNOSIS — M25.559 HIP PAIN: ICD-10-CM

## 2022-01-18 DIAGNOSIS — G24.9 DYSTONIA: Primary | ICD-10-CM

## 2022-01-18 PROBLEM — D64.9 ANEMIA: Status: ACTIVE | Noted: 2018-03-03

## 2022-01-18 PROBLEM — M17.10 OSTEOARTHRITIS OF KNEE, UNILATERAL: Status: ACTIVE | Noted: 2022-01-18

## 2022-01-18 PROBLEM — I10 ESSENTIAL HYPERTENSION: Status: ACTIVE | Noted: 2019-01-24

## 2022-01-18 PROBLEM — K59.00 CONSTIPATION: Status: ACTIVE | Noted: 2018-03-03

## 2022-01-18 PROBLEM — E78.5 DYSLIPIDEMIA: Status: ACTIVE | Noted: 2018-03-03

## 2022-01-18 PROBLEM — Z96.652 HISTORY OF TOTAL KNEE ARTHROPLASTY, LEFT: Status: ACTIVE | Noted: 2018-03-03

## 2022-01-18 PROBLEM — R73.03 PREDIABETES: Status: ACTIVE | Noted: 2018-03-03

## 2022-01-18 PROCEDURE — 99204 OFFICE O/P NEW MOD 45 MIN: CPT | Performed by: ANESTHESIOLOGY

## 2022-01-18 PROCEDURE — 73502 X-RAY EXAM HIP UNI 2-3 VIEWS: CPT

## 2022-01-18 RX ORDER — GABAPENTIN 300 MG/1
300 CAPSULE ORAL 3 TIMES DAILY
COMMUNITY
Start: 2021-12-20

## 2022-01-18 RX ORDER — ROSUVASTATIN CALCIUM 20 MG/1
20 TABLET, COATED ORAL DAILY
COMMUNITY
Start: 2021-12-14

## 2022-01-18 RX ORDER — HYDROCHLOROTHIAZIDE 25 MG/1
25 TABLET ORAL DAILY
COMMUNITY
Start: 2021-12-14

## 2022-01-18 NOTE — PATIENT INSTRUCTIONS
Chronic Pain   AMBULATORY CARE:   Chronic pain  is pain that does not get better for 3 months or longer  Chronic pain may hurt all the time, or come and go  Call your local emergency number or have someone else call (911 in the [address]) if:   · You are breathing slower than normal, or you have trouble breathing  · You cannot be awakened  · You have a seizure  Call your doctor if:   · Your heart feels like it is jumping or fluttering  · You cannot think clearly  · You have side effects from prescription pain medicine, such as itching, nausea, or vomiting  · You have trouble sleeping  · Your pain gets worse, even after you take medicine  · You don't think the medicine is working  · You have questions or concerns about your condition or care  Treatment for chronic pain  may need any of the following:  · Acetaminophen  decreases pain and fever  It is available without a doctor's order  Ask how much to take and how often to take it  Follow directions  Read the labels of all other medicines you are using to see if they also contain acetaminophen, or ask your doctor or pharmacist  Acetaminophen can cause liver damage if not taken correctly  Do not use more than 4 grams (4,000 milligrams) total of acetaminophen in one day  · NSAIDs , such as ibuprofen, help decrease swelling, pain, and fever  This medicine is available with or without a doctor's order  NSAIDs can cause stomach bleeding or kidney problems in certain people  If you take blood thinner medicine, always ask your healthcare provider if NSAIDs are safe for you  Always read the medicine label and follow directions  · Prescription pain medicine  called narcotics or opioids may be given for certain types of chronic pain  Ask your healthcare provider how to take this medicine safely  · Anesthetics  can be rubbed on your skin or injected into a nerve or muscle to numb an area      · Other medicines  may reduce pain, anxiety, muscle tension, or swelling  Manage your chronic pain:   · Apply heat  on the area in pain for 20 to 30 minutes every 2 hours for as many days as directed  Heat helps decrease pain and muscle spasms  · Apply ice  on the part of your body that hurts for 15 to 20 minutes every hour or as directed  Use an ice pack, or put crushed ice in a plastic bag  Cover it with a towel  Ice decreases pain and swelling, and helps prevent tissue damage  · Go to physical therapy as directed  A physical therapist teaches you exercises to help improve movement and strength, and to decrease pain  · Exercise for 30 minutes, 3 times a week  Regular physical activity can help decrease pain and improve your quality of life  Ask your healthcare provider about the best exercise plan for your type of pain  · Get enough sleep  Create a relaxing bedtime routine  Go to sleep and wake up at the same time every day  Avoid caffeine in the afternoon  · Talk with a counselor or therapist   A type of counseling called cognitive behavioral therapy (CBT) can help your chronic pain by changing the way you think about it  CBT can also improve your mood, sleep, and ability to move  What you must know if you take narcotic pain medicine:   · You may need to take a bowel movement softener  The most common side effect of prescription pain medicine is constipation  Bowel movement softeners are available over the counter  · Do not mix prescription pain medicines  This can cause an overdose of medicine, which can become life-threatening  Read labels  Make sure you know the ingredients in all of your medicines  · Do not drink alcohol  when you take prescription pain medicine  It is not safe to mix narcotics or opioids with alcohol or illegal drugs  · Prescription pain medicine may impair your ability to drive or work safely  They may also cause dizziness and increase your risk for falling       · Store prescription pain medicine in a safe location at home  Keep your medicine away from children and other people  Never share your medicine with anyone  Follow up with your healthcare provider as directed: You may be referred to a pain specialist  Write down your questions so you remember to ask them during your visits  © Copyright [employer] 2021 Information is for End User's use only and may not be sold, redistributed or otherwise used for commercial purposes  All illustrations and images included in CareNotes® are the copyrighted property of [employer]  or [employer]   The above information is an  only  It is not intended as medical advice for individual conditions or treatments  Talk to your doctor, nurse or pharmacist before following any medical regimen to see if it is safe and effective for you

## 2022-01-18 NOTE — PROGRESS NOTES
Assessment  1  Dystonia    2  Hip pain        Plan      I did have an in depth conversation with the patient today regarding the ongoing pain and agree that the patient may benefit from a iliopsoas injection, as she benefitted significantly from trigger point injections and other etiologies of his pain have been ruled out  We discussed the rationale for such an injection  The approach would be demonstrated, which will be anterior in nature to block the iliopsoas complex adjacent to the lesser trochanter  The procedure was discussed in great detail with the patient  Handouts were given  In the office today, we reviewed the nature of the patient's pathology, and the risks of the procedure were discussed with the patient, including, but not limited to bleeding, infection, tissue injury, allergic reaction and paralysis and he provided written and verbal consent  today  She does not obtain significant relief than hip x-ray will be performed with possible hip injection again for diagnostic and hopefully therapeutic purposes  My impressions and treatment recommendations were discussed in detail with the patient who verbalized understanding and had no further questions  Discharge instructions were provided  I personally saw and examined the patient and I agree with the above discussed plan of care  This note is created using dictation transcription  It may contain typographical errors, grammatical errors, improperly dictated words, background noise and other errors  Orders Placed This Encounter   Procedures    FL spine and pain procedure     Standing Status:   Future     Standing Expiration Date:   1/18/2026     Order Specific Question:   Reason for Exam:     Answer:   Right iliopsoas injection with 0 25% bupivacaine and pain diary     Order Specific Question:   Anticoagulant hold needed?      Answer:   no    XR hip/pelv 2-3 vws right if performed     Standing Status:   Future     Standing Expiration Date:   1/18/2026     Scheduling Instructions:      Bring along any outside films relating to this procedure   FL spine and pain procedure     Standing Status:   Future     Standing Expiration Date:   1/18/2026     Order Specific Question:   Reason for Exam:     Answer:   right hip injection     Order Specific Question:   Anticoagulant hold needed? Answer:   no     New Medications Ordered This Visit   Medications    gabapentin (NEURONTIN) 300 mg capsule     Sig: Take 300 mg by mouth 3 (three) times a day    hydrochlorothiazide (HYDRODIURIL) 25 mg tablet     Sig: Take 25 mg by mouth daily    rosuvastatin (CRESTOR) 20 MG tablet     Sig: Take 20 mg by mouth daily    Multiple Vitamins-Minerals (CENTRUM SILVER PO)     Sig: Take by mouth       History of Present Illness    Magdy Michelle is a 73 y o  female one year history of right groin anterior thigh pain  She underwent trigger point injections and physical therapy and epidural steroid injections without relief  Pain is moderate to severe she rates pain five to 6/10 on the visual analog scale interfering with daily living activities  Her pain is intermittent worse with cramping describes sharp she reports that most activities aggravate her symptoms while lying down decreases her pain  Physical therapy exercise chiropractic treatment provided moderate but short-term relief  I have personally reviewed and/or updated the patient's past medical history, past surgical history, family history, social history, current medications, allergies, and vital signs today  Review of Systems   Constitutional: Negative for fever and unexpected weight change  HENT: Negative for trouble swallowing  Eyes: Negative for visual disturbance  Respiratory: Negative for shortness of breath and wheezing  Cardiovascular: Negative for chest pain and palpitations  Gastrointestinal: Negative for constipation, diarrhea, nausea and vomiting  Endocrine: Negative for cold intolerance, heat intolerance and polydipsia  Genitourinary: Negative for difficulty urinating and frequency  Musculoskeletal: Positive for arthralgias and myalgias  Negative for gait problem and joint swelling  Skin: Negative for rash  Neurological: Negative for dizziness, seizures, syncope, weakness and headaches  Hematological: Does not bruise/bleed easily  Psychiatric/Behavioral: Positive for decreased concentration  Negative for dysphoric mood  All other systems reviewed and are negative  Patient Active Problem List   Diagnosis    Anemia    Constipation    Dyslipidemia    Essential hypertension    History of total knee arthroplasty, left    Osteoarthritis of knee, unilateral    Prediabetes       Past Medical History:   Diagnosis Date    Arthritis     Hyperlipidemia        Past Surgical History:   Procedure Laterality Date     SECTION         Family History   Problem Relation Age of Onset    Cancer Father        Social History     Occupational History    Not on file   Tobacco Use    Smoking status: Never Smoker    Smokeless tobacco: Never Used   Vaping Use    Vaping Use: Not on file   Substance and Sexual Activity    Alcohol use: Never    Drug use: Not Currently    Sexual activity: Not Currently       Current Outpatient Medications on File Prior to Visit   Medication Sig    gabapentin (NEURONTIN) 300 mg capsule Take 300 mg by mouth 3 (three) times a day    hydrochlorothiazide (HYDRODIURIL) 25 mg tablet Take 25 mg by mouth daily    Multiple Vitamins-Minerals (CENTRUM SILVER PO) Take by mouth    rosuvastatin (CRESTOR) 20 MG tablet Take 20 mg by mouth daily     No current facility-administered medications on file prior to visit         No Known Allergies    Physical Exam    /78 (BP Location: Left arm, Patient Position: Sitting, Cuff Size: Standard)   Pulse 82   Temp 98 4 °F (36 9 °C)   Ht 5' 4" (1 626 m)   Wt 101 kg (223 lb) BMI 38 28 kg/m²     Constitutional: normal, well developed, well nourished, alert, in no distress and non-toxic and no overt pain behavior  and obese  Eyes: anicteric  HEENT: grossly intact  Neck: supple, symmetric, trachea midline and no masses   Pulmonary:even and unlabored  Cardiovascular:No edema or pitting edema present  Skin:Normal without rashes or lesions and well hydrated  Psychiatric:Mood and affect appropriate  Neurologic:Cranial Nerves II-XII grossly intact  Musculoskeletal:normal, Difficulty going from sitting to standing sitting position, no obvious skin lesions or erythema lumbar sacral spine, mild tenderness in lumbar paravertebral, no sacroiliac or greater trochanteric tenderness, deep tendon reflexes diminished but symmetrical bilateral patella Achilles, no focal motor deficit appreciated lower limbs, negative bilateral straight leg raising    Imaging  MRI Lumbar Spine @ [facility] 4-23-21  Impression:  Multilevel degenerative changes of the lumbar spine, with up to mild foraminal and central canal narrowing as described  I have personally reviewed pertinent films in PACS and my interpretation is Multilevel lumbar spondylosis

## 2022-01-24 ENCOUNTER — TELEPHONE (OUTPATIENT)
Dept: PAIN MEDICINE | Facility: CLINIC | Age: 72
End: 2022-01-24

## 2022-01-24 DIAGNOSIS — G89.29 CHRONIC BILATERAL LOW BACK PAIN WITHOUT SCIATICA: Primary | ICD-10-CM

## 2022-01-24 DIAGNOSIS — M25.559 HIP PAIN: ICD-10-CM

## 2022-01-24 DIAGNOSIS — M54.50 CHRONIC BILATERAL LOW BACK PAIN WITHOUT SCIATICA: Primary | ICD-10-CM

## 2022-01-24 NOTE — TELEPHONE ENCOUNTER
Pt is calling in stating that she is not feeling well   Pt reports coughing all night and with a sore throat    Please advise as patient has a procedure today    Pt can be reached at [phone]

## 2022-01-26 NOTE — TELEPHONE ENCOUNTER
Pt called asking theres any appmts sooner before 2/7 or if not, can she come in at the earliest appmt- pt has a new job that she starts on Monday        [phone]

## 2022-01-27 ENCOUNTER — HOSPITAL ENCOUNTER (OUTPATIENT)
Dept: RADIOLOGY | Facility: CLINIC | Age: 72
Discharge: HOME | End: 2022-01-27
Attending: ANESTHESIOLOGY | Admitting: ANESTHESIOLOGY
Payer: MEDICARE

## 2022-01-27 VITALS
TEMPERATURE: 97.5 F | RESPIRATION RATE: 20 BRPM | OXYGEN SATURATION: 95 % | HEART RATE: 75 BPM | SYSTOLIC BLOOD PRESSURE: 133 MMHG | DIASTOLIC BLOOD PRESSURE: 83 MMHG

## 2022-01-27 DIAGNOSIS — M25.559 HIP PAIN: ICD-10-CM

## 2022-01-27 DIAGNOSIS — G24.9 DYSTONIA: ICD-10-CM

## 2022-01-27 PROCEDURE — 77002 NEEDLE LOCALIZATION BY XRAY: CPT

## 2022-01-27 PROCEDURE — 77002 NEEDLE LOCALIZATION BY XRAY: CPT | Performed by: ANESTHESIOLOGY

## 2022-01-27 PROCEDURE — 20551 NJX 1 TENDON ORIGIN/INSJ: CPT | Performed by: ANESTHESIOLOGY

## 2022-01-27 RX ORDER — BUPIVACAINE HCL/PF 2.5 MG/ML
10 VIAL (ML) INJECTION ONCE
Status: COMPLETED | OUTPATIENT
Start: 2022-01-27 | End: 2022-01-27

## 2022-01-27 RX ADMIN — BUPIVACAINE HYDROCHLORIDE 3.5 ML: 2.5 INJECTION, SOLUTION EPIDURAL; INFILTRATION; INTRACAUDAL at 14:31

## 2022-01-27 RX ADMIN — IOHEXOL 1 ML: 300 INJECTION, SOLUTION INTRAVENOUS at 14:31

## 2022-01-27 NOTE — DISCHARGE INSTRUCTIONS
1  Do not apply heat to any area that is numb  If you have discomfort or soreness at the injection site, you may apply ice today, 20 minutes on and 20 minutes off  Tomorrow you may use ice or warm, moist heat  Do not apply ice or heat directly to the skin  2  If you experience severe shortness of breath, go to the Emergency Room  3  You may have numbness for several hours from the local anesthetic  Please use caution and common sense, especially with weight-bearing activities  4  You may have an increase or change in the discomfort for 36-48 hours after your treatment  Apply ice and continue with any pain medicine you have been prescribed  5  Do not do anything strenuous today  You may shower, but no tub baths or hot tubs today  You may resume your normal activities tomorrow, but do not overdo it  Resume normal activities slowly when you are feeling better  6  If you experience redness, drainage or swelling at the injection site, or if you develop a fever above 100 degrees, please call [facility] at [phone] or go to the Emergency Room  7  Continue to take all routine medicines prescribed by your primary care physician unless otherwise instructed by our staff  Most blood thinners should be started again according to your regularly scheduled dosing  If you have any questions, please give our office a call  As no general anesthesia was used in today's procedure, you should not experience any side effects related to anesthesia  If you have a problem specifically related to your procedure, please call our office at [phone]  Problems not related to your procedure should be directed to your primary care physician

## 2022-01-27 NOTE — H&P
History of Present Illness: The patient is a 73 y o  female who presents with complaints of hip pain  Patient Active Problem List   Diagnosis    Anemia    Constipation    Dyslipidemia    Essential hypertension    History of total knee arthroplasty, left    Osteoarthritis of knee, unilateral    Prediabetes    Dystonia    Hip pain       Past Medical History:   Diagnosis Date    Arthritis     Hyperlipidemia        Past Surgical History:   Procedure Laterality Date     SECTION           Current Outpatient Medications:     gabapentin (NEURONTIN) 300 mg capsule, Take 300 mg by mouth 3 (three) times a day, Disp: , Rfl:     hydrochlorothiazide (HYDRODIURIL) 25 mg tablet, Take 25 mg by mouth daily, Disp: , Rfl:     Multiple Vitamins-Minerals (CENTRUM SILVER PO), Take by mouth, Disp: , Rfl:     rosuvastatin (CRESTOR) 20 MG tablet, Take 20 mg by mouth daily, Disp: , Rfl:     Current Facility-Administered Medications:     bupivacaine (PF) (MARCAINE) 0 25 % injection 10 mL, 10 mL, Perineural, Once, Jaret Zhao,     iohexol (OMNIPAQUE) 300 mg/mL injection 50 mL, 50 mL, Perineural, Once, Jaret Zhao,     No Known Allergies    Physical Exam:   General: Awake, Alert, Oriented x 3, Mood and affect appropriate  Respiratory: Respirations even and unlabored  Cardiovascular: Peripheral pulses intact; no edema  Musculoskeletal Exam:  Antalgic gait    ASA Score: III         Assessment:   1  Dystonia    2   Hip pain        Plan: Right iliopsoas injection with 0 25% bupivacaine and pain diary

## 2022-02-10 ENCOUNTER — HOSPITAL ENCOUNTER (OUTPATIENT)
Dept: RADIOLOGY | Facility: CLINIC | Age: 72
Discharge: HOME | End: 2022-02-10
Attending: ANESTHESIOLOGY | Admitting: ANESTHESIOLOGY
Payer: MEDICARE

## 2022-02-10 VITALS
RESPIRATION RATE: 20 BRPM | HEART RATE: 69 BPM | DIASTOLIC BLOOD PRESSURE: 72 MMHG | OXYGEN SATURATION: 95 % | TEMPERATURE: 97.2 F | SYSTOLIC BLOOD PRESSURE: 117 MMHG

## 2022-02-10 DIAGNOSIS — M25.559 HIP PAIN: ICD-10-CM

## 2022-02-10 PROCEDURE — 77002 NEEDLE LOCALIZATION BY XRAY: CPT | Performed by: ANESTHESIOLOGY

## 2022-02-10 PROCEDURE — 20610 DRAIN/INJ JOINT/BURSA W/O US: CPT | Performed by: ANESTHESIOLOGY

## 2022-02-10 PROCEDURE — 77002 NEEDLE LOCALIZATION BY XRAY: CPT

## 2022-02-10 RX ORDER — METHYLPREDNISOLONE ACETATE 80 MG/ML
80 INJECTION, SUSPENSION INTRA-ARTICULAR; INTRALESIONAL; INTRAMUSCULAR; PARENTERAL; SOFT TISSUE ONCE
Status: COMPLETED | OUTPATIENT
Start: 2022-02-10 | End: 2022-02-10

## 2022-02-10 RX ADMIN — METHYLPREDNISOLONE ACETATE 80 MG: 80 INJECTION, SUSPENSION INTRA-ARTICULAR; INTRALESIONAL; INTRAMUSCULAR; SOFT TISSUE at 09:59

## 2022-02-10 RX ADMIN — IOHEXOL 1 ML: 300 INJECTION, SOLUTION INTRAVENOUS at 09:59

## 2022-02-10 RX ADMIN — LIDOCAINE HYDROCHLORIDE 2 ML: 20 INJECTION, SOLUTION EPIDURAL; INFILTRATION; INTRACAUDAL at 09:59

## 2022-02-10 NOTE — DISCHARGE INSTRUCTIONS

## 2022-02-10 NOTE — H&P
History of Present Illness: The patient is a 73 y o  female who presents with complaints of hip pain  Patient Active Problem List   Diagnosis    Anemia    Constipation    Dyslipidemia    Essential hypertension    History of total knee arthroplasty, left    Osteoarthritis of knee, unilateral    Prediabetes    Dystonia    Hip pain       Past Medical History:   Diagnosis Date    Arthritis     Hyperlipidemia        Past Surgical History:   Procedure Laterality Date     SECTION           Current Outpatient Medications:     gabapentin (NEURONTIN) 300 mg capsule, Take 300 mg by mouth 3 (three) times a day, Disp: , Rfl:     hydrochlorothiazide (HYDRODIURIL) 25 mg tablet, Take 25 mg by mouth daily, Disp: , Rfl:     Multiple Vitamins-Minerals (CENTRUM SILVER PO), Take by mouth, Disp: , Rfl:     rosuvastatin (CRESTOR) 20 MG tablet, Take 20 mg by mouth daily, Disp: , Rfl:     Current Facility-Administered Medications:     iohexol (OMNIPAQUE) 300 mg/mL injection 50 mL, 50 mL, Intra-articular, Once, Jaret Zhao DO    lidocaine (PF) (XYLOCAINE-MPF) 2 % injection 5 mL, 5 mL, Intra-articular, Once, Jaret Zhao DO    methylPREDNISolone acetate (DEPO-MEDROL) injection 80 mg, 80 mg, Intra-articular, Once, Jaret Zhao DO    No Known Allergies    Physical Exam:   General: Awake, Alert, Oriented x 3, Mood and affect appropriate  Respiratory: Respirations even and unlabored  Cardiovascular: Peripheral pulses intact; no edema  Musculoskeletal Exam:  Antalgic gait    ASA Score: III         Assessment:   1   Hip pain        Plan: right hip injection

## 2022-02-17 ENCOUNTER — TELEPHONE (OUTPATIENT)
Dept: PAIN MEDICINE | Facility: CLINIC | Age: 72
End: 2022-02-17

## 2022-02-17 NOTE — TELEPHONE ENCOUNTER
Pt reports no improvement post inj   Pain level 5-6/10 (active)    S/p Right hip injection 2/10, F/u 3/17

## 2022-02-22 RX ORDER — PREDNISONE 10 MG/1
TABLET ORAL
Qty: 21 TABLET | Refills: 0 | Status: SHIPPED | OUTPATIENT
Start: 2022-02-22 | End: 2022-03-17

## 2022-02-22 NOTE — TELEPHONE ENCOUNTER
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  Pt stated that she cleaned an apt on saturday and woke up on sunday w/ significant pain in her R buttock, groin and down to her knee  Pt stated that she has weakness in her r leg and is unable to walk  Pt stated that she has percocet 5 - 325 on hand, from Dr Vanesa Wheeler, 1 tab q6 h  Per pt, she took 1/2 a pill yesterday with no improvement, took another pill later in the day - also with no relief  Advised pt, rest, ice/ heat, medications as directed / prescribe  OK to discuss otc topical medications with pharmacist  The writer does not anticipate relief with percocet if there was no relief yesterday  Possibly strained on Saturday - Anticipate improvement with time  The writer will d/w Dr Jennifer Mathias and cb if there is anything additional or different  CB if no improvement by Friday  Pt verbalized understanding and appreciation  Confirmed 2/17 fu ov

## 2022-02-22 NOTE — TELEPHONE ENCOUNTER
S/w pt, stated that she feels like she does not have weakness in her leg as much as she avoids pain from putting pressure on her leg  Offered oral steroid; Advised pt, anticipate prednisone will be prescribed as follows:  Day 1 - 6 pills / 2 pills am, 2 pills pm, 2 pills hs  Day 2 - 5 pills / 2 pills am, 2 pills pm, 1 pill hs  Day 3 - 4 pills / 2 pills am, 2 pills hs  Day 4 - 3 pills / 2 pills am, 1 pill hs  Day 5 - 2 pills / 1 pill am, 1 pill pm    Day 6 - 1 pill  Do not take NSAIDS with steroids; Advil, aleve, ibuprofen, Motrin, naproxen  Tylenol is ok  Call back w/ an update when the medication is complete  Pt stated that she did take 2 courses of oral steroids from her doctors at [facility] in the fall - possibly october  Advised pt, will d/w SL / DG and cb if there is any change in the plan as discussed  Advised pt, anticipate picking this rx up this evening  Pt verbalized understanding and appreciation  [address] [place] [address]

## 2022-02-22 NOTE — TELEPHONE ENCOUNTER
Pt called in to let DG know that she is unable to put any weight on her right leg and pain is going down the whole right side  Please be advised thank you    Pt can be reached @ [phone]

## 2022-03-17 ENCOUNTER — OFFICE VISIT (OUTPATIENT)
Dept: PAIN MEDICINE | Facility: CLINIC | Age: 72
End: 2022-03-17
Payer: MEDICARE

## 2022-03-17 VITALS
SYSTOLIC BLOOD PRESSURE: 120 MMHG | DIASTOLIC BLOOD PRESSURE: 82 MMHG | WEIGHT: 225 LBS | TEMPERATURE: 98.7 F | HEIGHT: 64 IN | BODY MASS INDEX: 38.41 KG/M2 | HEART RATE: 80 BPM

## 2022-03-17 DIAGNOSIS — G89.29 CHRONIC BILATERAL LOW BACK PAIN WITHOUT SCIATICA: ICD-10-CM

## 2022-03-17 DIAGNOSIS — M54.50 CHRONIC BILATERAL LOW BACK PAIN WITHOUT SCIATICA: ICD-10-CM

## 2022-03-17 DIAGNOSIS — M16.11 PRIMARY OSTEOARTHRITIS OF RIGHT HIP: ICD-10-CM

## 2022-03-17 DIAGNOSIS — G89.4 CHRONIC PAIN SYNDROME: Primary | ICD-10-CM

## 2022-03-17 DIAGNOSIS — M25.559 HIP PAIN: ICD-10-CM

## 2022-03-17 PROCEDURE — 99214 OFFICE O/P EST MOD 30 MIN: CPT | Performed by: NURSE PRACTITIONER

## 2022-03-17 RX ORDER — DULOXETIN HYDROCHLORIDE 20 MG/1
CAPSULE, DELAYED RELEASE ORAL
Qty: 60 CAPSULE | Refills: 1 | Status: SHIPPED | OUTPATIENT
Start: 2022-03-17 | End: 2022-04-27 | Stop reason: SDUPTHER

## 2022-03-17 NOTE — PATIENT INSTRUCTIONS
Duloxetine (By mouth)   Duloxetine ([id number]-[id number])  Treats depression, anxiety, diabetic peripheral neuropathy, fibromyalgia, and chronic muscle or bone pain  This medicine is an SSNRI  Brand Name(s): Cymbalta, izalma Sprinkle, Dilmaka   There may be other brand names for this medicine  When This Medicine Should Not Be Used: This medicine is not right for everyone  Do not use it if you had an allergic reaction to duloxetine  How to Use This Medicine:   Capsule, Delayed Release Capsule  · Take your medicine as directed  Your dose may need to be changed several times to find what works best for you  · Delayed-release capsule: Swallow the capsule whole  Do not crush, chew, break, or open it  Do not open the Cymbalta® delayed-release capsule and sprinkle the contents on food or in liquids  · If you have trouble swallowing the Jeanine Holler delayed release capsule:   ? You may open the capsule and sprinkle the contents over one tablespoon (15 mL) of applesauce  Swallow the mixture right away and do not save any of the mixture to use later  ? You may open the capsule and pour the contents to an all plastic catheter tip syringe and add 50 mL of water  Do not use other liquids  Gently shake it for 10 seconds, and then use it through a nasogastric tube  Rinse with additional water (about 15 mL) if needed  · This medicine should come with a Medication Guide  Ask your pharmacist for a copy if you do not have one  · Missed dose: Take a dose as soon as you remember  If it is almost time for your next dose, wait until then and take a regular dose  Do not take extra medicine to make up for a missed dose  · Store the medicine in a closed container at room temperature, away from heat, moisture, and direct light  Drugs and Foods to Avoid:   Ask your doctor or pharmacist before using any other medicine, including over-the-counter medicines, vitamins, and herbal products    · Do not take duloxetine if you have used an MAO inhibitor (MAOI) within the past 14 days  Do not start taking an MAO inhibitor within 5 days of stopping duloxetine  Ask your doctor if you are not sure if you take an MAOI, including linezolid or methylene blue injection  · Some medicines can affect how duloxetine works  Tell your doctor if you are using any of the following:  ? Buspirone, cimetidine, ciprofloxacin, enoxacin, fentanyl, fluvoxamine, lithium, Elyse's wort, theophylline, tramadol, tryptophan, warfarin  ? Amphetamines  ? Blood pressure medicine  ? Diuretic (water pill)  ? Medicine for heart rhythm problems (including flecainide, propafenone, quinidine)  ? Medicine to treat migraine headaches (including triptans)  ? NSAID pain or arthritis medicine (including aspirin, celecoxib, diclofenac, ibuprofen, naproxen)  ? Other medicine to treat depression or mood disorders (including amitriptyline, desipramine, fluoxetine, imipramine, nortriptyline, paroxetine)  ? Phenothiazine medicine (including thioridazine)  ? Stomach medicine (including famotidine, antacids containing aluminum or magnesium, PPIs)  · Tell your doctor if you use anything else that makes you sleepy  Some examples are allergy medicine, narcotic pain medicine, and alcohol  · Do not drink alcohol while you are using this medicine  Warnings While Using This Medicine:   · Tell your doctor if you are pregnant or breastfeeding, or if you have kidney disease, liver disease, bleeding problems, diabetes, digestion problems, glaucoma, heart disease, high or low blood pressure, or problems with urination  Tell your doctor if you smoke or you have a history of seizures, mental health problems (including bipolar disorder, wing), or drug or alcohol addiction  · This medicine may cause the following problems:   ? Serious liver problems  ? Serotonin syndrome, when used with certain medicines  ? Increased risk of bleeding problems  ?  Serious skin reactions, including erythema multiforme, Gonzalez-Korey syndrome  ? Low sodium levels in the blood  ? Sexual problems  · This medicine can increase thoughts of suicide  Tell your doctor right away if you start to feel depressed and have thoughts about hurting yourself  · This medicine may cause blurred vision, dizziness, drowsiness, trouble with thinking, or trouble with controlling body movements, which may lead to falls, fractures, or other injuries  Do not drive or do anything that could be dangerous until you know how this medicine affects you  Stand up slowly to avoid falls  · Do not stop using this medicine suddenly  Your doctor will need to slowly decrease your dose before you stop it completely  · Your doctor will check your progress and the effects of this medicine at regular visits  Keep all appointments  · Keep all medicine out of the reach of children  Never share your medicine with anyone    Possible Side Effects While Using This Medicine:   Call your doctor right away if you notice any of these side effects:  · Allergic reaction: Itching or hives, swelling in your face or hands, swelling or tingling in your mouth or throat, chest tightness, trouble breathing  · Anxiety, restlessness, fever, fast heartbeat, sweating, muscle spasms, diarrhea, seeing or hearing things that are not there  · Blistering, peeling, red skin rash  · Confusion, weakness, muscle twitching  · Dark urine or pale stools, nausea, vomiting, loss of appetite, stomach pain, yellow skin or eyes  · Decrease in how much or how often you urinate  · Decrease in sexual ability, desire, drive, or performance  · Eye pain, vision changes, seeing halos around lights  · Feeling more energetic than usual  · Lightheadedness, dizziness, fainting  · Unusual moods or behaviors, worsening depression, thoughts about hurting yourself, trouble sleeping  · Unusual bleeding or bruising  If you notice these less serious side effects, talk with your doctor:   · Decrease in appetite or weight  · Dry mouth, constipation, mild nausea  · Headache  · Unusual drowsiness, sleepiness, or tiredness  If you notice other side effects that you think are caused by this medicine, tell your doctor  Call your doctor for medical advice about side effects  You may report side effects to FDA at 3-108-FDA-4093    © Copyright [employer] 2022 Information is for End User's use only and may not be sold, redistributed or otherwise used for commercial purposes  The above information is an  only  It is not intended as medical advice for individual conditions or treatments  Talk to your doctor, nurse or pharmacist before following any medical regimen to see if it is safe and effective for you

## 2022-03-17 NOTE — PROGRESS NOTES
Assessment:  1  Chronic pain syndrome    2  Chronic bilateral low back pain without sciatica    3  Hip pain - Right    4  Primary osteoarthritis of right hip        Plan:  While the patient was in the office today, I did have a thorough conversation with the patient regarding their chronic pain syndrome, symptoms, medication regimen, and treatment plan  I discussed with the patient at this point time because of the overall steroid load we would have to hold off any repeat injections or prednisone tapers for at least another 2-3 months, if not longer  The patient was agreeable and verbalized an understanding  I discussed with the patient that 1 option at this point would be to proceed with an orthopedic consultation to discuss her possible surgical options with regards to a hip replacement surgery  However, the patient is not quite ready to consider a hip replacement at this time  I did discuss with the patient that another option would be to consider starting her on a neuropathic medications such as Cymbalta as I feel there is definitely significant underlying osteoarthritic, neuropathic, myofascial component to her pain and Cymbalta may provide better relief of all of her underlying etiology and symptoms  The patient denied being prescribed any anti-depressant and/or psychiatric medications  I reviewed with the patient that it may take 3-4 weeks for the medication's effects to be noticed and that it should never be abruptly stopped  Possible side effects include but are not limited to; vertigo, lethargy, nausea, worsening depression/anxiety, and confusion  I advised the patient to call our office if they experience any side effects  The patient verbalized an understanding  The patient will follow-up in 6-7 weeks for medication prescription refill and reevaluation  The patient was advised to contact the office should their symptoms worsen in the interim   The patient was agreeable and verbalized an understanding  History of Present Illness: The patient is a 73 y o  female last seen on 2/10/2022 who presents for a follow up office visit in regards to chronic pain syndrome, as the patient's pain has been ongoing for greater than a year, secondary to right hip osteoarthritis  The patient currently reports that since her last office visit there has been some improvement her right hip and groin pain as she is status post a right iliopsoas block injection on 2020 to and a right intra-articular hip joint injection on 2022, both with Dr Wilbur John  Patient has also completed a titrating dose of oral steroids in the past few weeks  However, the patient reports that although there is some improvement in her pain symptoms, she is still having issues as she does clean houses on the side and sometimes if she is too active she can barely walk for few days afterwards  The patient presents today for regular postprocedure follow-up visit and to discuss any other treatment plan options  I have personally reviewed and/or updated the patient's past medical history, past surgical history, family history, social history, current medications, allergies, and vital signs today  Review of Systems:    Review of Systems   Respiratory: Negative for shortness of breath  Cardiovascular: Negative for chest pain  Gastrointestinal: Positive for constipation and nausea  Negative for diarrhea and vomiting  Musculoskeletal: Positive for gait problem  Negative for arthralgias, joint swelling (joint stiffness) and myalgias  Skin: Negative for rash  Neurological: Positive for weakness  Negative for dizziness and seizures  All other systems reviewed and are negative          Past Medical History:   Diagnosis Date    Arthritis     Hyperlipidemia        Past Surgical History:   Procedure Laterality Date     SECTION         Family History   Problem Relation Age of Onset    Cancer Father Social History     Occupational History    Not on file   Tobacco Use    Smoking status: Never Smoker    Smokeless tobacco: Never Used   Vaping Use    Vaping Use: Not on file   Substance and Sexual Activity    Alcohol use: Never    Drug use: Not Currently    Sexual activity: Not Currently         Current Outpatient Medications:     gabapentin (NEURONTIN) 300 mg capsule, Take 300 mg by mouth 3 (three) times a day, Disp: , Rfl:     hydrochlorothiazide (HYDRODIURIL) 25 mg tablet, Take 25 mg by mouth daily, Disp: , Rfl:     Multiple Vitamins-Minerals (CENTRUM SILVER PO), Take by mouth, Disp: , Rfl:     rosuvastatin (CRESTOR) 20 MG tablet, Take 20 mg by mouth daily, Disp: , Rfl:     DULoxetine (CYMBALTA) 20 mg capsule, Take 1 PO HS x 2 weeks, then 2 PO HS , Disp: 60 capsule, Rfl: 1    No Known Allergies    Physical Exam:    /82 (BP Location: Left arm, Patient Position: Sitting, Cuff Size: Standard)   Pulse 80   Temp 98 7 °F (37 1 °C)   Ht 5' 4" (1 626 m)   Wt 102 kg (225 lb)   BMI 38 62 kg/m²     Constitutional:normal, well developed, well nourished, alert, in no distress and non-toxic and no overt pain behavior  and overweight  Eyes:anicteric  HEENT:grossly intact  Neck:supple, symmetric, trachea midline and no masses   Pulmonary:even and unlabored  Cardiovascular:No edema or pitting edema present  Skin:Normal without rashes or lesions and well hydrated  Psychiatric:Mood and affect appropriate  Neurologic:Cranial Nerves II-XII grossly intact  Musculoskeletal:The patient's gait is slightly antalgic, limping at times, but steady without the use of any assistive devices  Imaging  No orders to display         No orders of the defined types were placed in this encounter

## 2022-04-27 ENCOUNTER — OFFICE VISIT (OUTPATIENT)
Dept: PAIN MEDICINE | Facility: CLINIC | Age: 72
End: 2022-04-27
Payer: MEDICARE

## 2022-04-27 VITALS
TEMPERATURE: 99.4 F | BODY MASS INDEX: 38.07 KG/M2 | WEIGHT: 223 LBS | DIASTOLIC BLOOD PRESSURE: 76 MMHG | SYSTOLIC BLOOD PRESSURE: 118 MMHG | HEIGHT: 64 IN | HEART RATE: 83 BPM

## 2022-04-27 DIAGNOSIS — M16.11 PRIMARY OSTEOARTHRITIS OF RIGHT HIP: ICD-10-CM

## 2022-04-27 DIAGNOSIS — M25.559 HIP PAIN: ICD-10-CM

## 2022-04-27 DIAGNOSIS — G89.4 CHRONIC PAIN SYNDROME: Primary | ICD-10-CM

## 2022-04-27 PROCEDURE — 99214 OFFICE O/P EST MOD 30 MIN: CPT | Performed by: NURSE PRACTITIONER

## 2022-04-27 RX ORDER — DULOXETIN HYDROCHLORIDE 60 MG/1
CAPSULE, DELAYED RELEASE ORAL
Qty: 30 CAPSULE | Refills: 2 | Status: SHIPPED | OUTPATIENT
Start: 2022-04-27

## 2022-04-27 NOTE — PROGRESS NOTES
Assessment:  1  Chronic pain syndrome    2  Hip pain - Right    3  Primary osteoarthritis of right hip        Plan:  While the patient was in the office today, I did have a thorough conversation with the patient regarding their chronic pain syndrome, symptoms, medication regimen, and treatment plan  I discussed with the patient that it is completely up to her and driven by her with regards to whether not she can continue to tolerate the hip pain and if and when she is ready she should proceed with the orthopedic consultation with the [facility]  The patient was agreeable and verbalized an understanding  With regards to the Cymbalta, I did explained the patient that it is a good sign that she is noting some relief, without side effects, on a subtherapeutic dose and I do feel that it would be beneficial to further titrate the Cymbalta to 60 mg for the next 3 months and see how she does  I advised the patient that if they experience any side effects or issues with the changes in their medication regiment, they should give our office a call to discuss  I also advised the patient not to drive or operate machinery until they see how the changes in the medication regimen affects them  The patient was agreeable and verbalized an understanding  The patient will follow-up in 12 weeks for medication prescription refill and reevaluation  The patient was advised to contact the office should their symptoms worsen in the interim  The patient was agreeable and verbalized an understanding  History of Present Illness: The patient is a 73 y o  female last seen on 3/17/2022 who presents for a follow up office visit in regards to chronic pain syndrome, as the patient's pain has been ongoing for greater than a year, secondary to right hip osteoarthritis    The patient currently reports that since her last office visit she does feel there has been some improvement in her right hip pain since starting the Cymbalta and reports that as long as she is sitting or lying down she has no pain and although she does have pain as soon as she starts to stand or walk it is not quite as bad as it was but still pretty limiting and intense at times  The patient reports that since her last office visit she has continued to take some time to think about a hip replacement surgery and feels that most likely in the next 2-3 months she is going to follow up with the [facility] for consultation and to discuss hip replacement surgery  However, the patient does present today for the medication follow-up visit to discuss her medication regimen and treatment plan  Current pain medications includes:  Cymbalta 40 mg daily  The patient reports that this regimen is providing minimal to moderate pain relief  The patient is reporting no side effects from this pain medication regimen  I have personally reviewed and/or updated the patient's past medical history, past surgical history, family history, social history, current medications, allergies, and vital signs today  Review of Systems:    Review of Systems   Respiratory: Negative for shortness of breath  Cardiovascular: Negative for chest pain  Gastrointestinal: Negative for constipation, diarrhea, nausea and vomiting  Musculoskeletal: Positive for gait problem  Negative for arthralgias, joint swelling and myalgias  Skin: Negative for rash  Neurological: Negative for dizziness, seizures and weakness  All other systems reviewed and are negative          Past Medical History:   Diagnosis Date    Arthritis     Hyperlipidemia        Past Surgical History:   Procedure Laterality Date     SECTION         Family History   Problem Relation Age of Onset    Cancer Father        Social History     Occupational History    Not on file   Tobacco Use    Smoking status: Never Smoker    Smokeless tobacco: Never Used   Vaping Use    Vaping Use: Not on file Substance and Sexual Activity    Alcohol use: Never    Drug use: Not Currently    Sexual activity: Not Currently         Current Outpatient Medications:     DULoxetine (CYMBALTA) 60 mg delayed release capsule, Take 1 PO HS , Disp: 30 capsule, Rfl: 2    gabapentin (NEURONTIN) 300 mg capsule, Take 300 mg by mouth 3 (three) times a day, Disp: , Rfl:     hydrochlorothiazide (HYDRODIURIL) 25 mg tablet, Take 25 mg by mouth daily, Disp: , Rfl:     Multiple Vitamins-Minerals (CENTRUM SILVER PO), Take by mouth, Disp: , Rfl:     rosuvastatin (CRESTOR) 20 MG tablet, Take 20 mg by mouth daily, Disp: , Rfl:     No Known Allergies    Physical Exam:    /76 (BP Location: Left arm, Patient Position: Sitting, Cuff Size: Standard)   Pulse 83   Temp 99 4 °F (37 4 °C)   Ht 5' 4" (1 626 m)   Wt 101 kg (223 lb)   BMI 38 28 kg/m²     Constitutional:normal, well developed, well nourished, alert, in no distress and non-toxic and no overt pain behavior  Eyes:anicteric  HEENT:grossly intact  Neck:supple, symmetric, trachea midline and no masses   Pulmonary:even and unlabored  Cardiovascular:No edema or pitting edema present  Skin:Normal without rashes or lesions and well hydrated  Psychiatric:Mood and affect appropriate  Neurologic:Cranial Nerves II-XII grossly intact  Musculoskeletal:The patient's gait is slightly antalgic, but steady without the use of any assistive devices  Imaging  No orders to display         No orders of the defined types were placed in this encounter

## 2022-05-09 ENCOUNTER — TELEPHONE (OUTPATIENT)
Dept: PAIN MEDICINE | Facility: CLINIC | Age: 72
End: 2022-05-09

## 2022-05-09 DIAGNOSIS — M25.559 HIP PAIN: Primary | ICD-10-CM

## 2022-05-09 NOTE — TELEPHONE ENCOUNTER
S/w pt, advised of above  Provided contact info for [facility]  Pt verbalized understanding and appreciation  Will proceed as directed

## 2022-05-09 NOTE — TELEPHONE ENCOUNTER
Patient requesting referral to a orthopedic surgeon that does hips     Close to [place] if possible       Thank you

## 2022-05-09 NOTE — TELEPHONE ENCOUNTER
She can go downstairs to [facility] or we can recommend [facility] at [facility]  Let me know who she would like to see?

## 2022-06-01 ENCOUNTER — OFFICE VISIT (OUTPATIENT)
Dept: OBGYN CLINIC | Facility: CLINIC | Age: 72
End: 2022-06-01
Payer: MEDICARE

## 2022-06-01 VITALS — DIASTOLIC BLOOD PRESSURE: 82 MMHG | SYSTOLIC BLOOD PRESSURE: 130 MMHG

## 2022-06-01 DIAGNOSIS — M16.11 PRIMARY OSTEOARTHRITIS OF RIGHT HIP: Primary | ICD-10-CM

## 2022-06-01 PROCEDURE — 99203 OFFICE O/P NEW LOW 30 MIN: CPT | Performed by: ORTHOPAEDIC SURGERY

## 2022-06-01 NOTE — PROGRESS NOTES
Assessment:     1  Primary osteoarthritis of right hip        Plan:     Problem List Items Addressed This Visit        Musculoskeletal and Integument    Primary osteoarthritis of right hip - Primary     Findings consistent with right hip severe osteoarthritis  Imaging and prognosis reviewed  Patient has failed conservative measures intra articular hip injection of cortisone, physical therapy, oral medications, activity modification  Patients only option to alleviate hip arthritis is total hip replacement  Patient will be referred to Dr Hayley Basilio to discuss possible anterior total hip replacement  She can continue with low impact exercises, oral medications for pain control  All patient's questions were answered to her satisfaction  This note is created using dictation transcription  It may contain typographical errors, grammatical errors, improperly dictated words, background noise and other errors  Relevant Orders    Ambulatory Referral to Orthopedic Surgery          Subjective:     Patient ID: Fred De Dios is a 73 y o  female  Chief Complaint:  73 yr old female in for evaluation of her right hip pain  Referred by Leigh Neri  She states chronic right hip pain for years w/o injury or trauma gradually worsening  Pain is anterior(groin, lateral, posterior regions of hip on daily basis worse with weight bearing activities  She walks with limp  Pain can be sharp and acute in nature at times  Pain will refer down anterior thigh to her knee  She is losing motion in hip  She has tried intra articular hip injection with Dr Berna Oleary which really offered no significant relief  She has also done physical therapy and does maintain [facility]  She is using cymbalta which helps the pain some  Dr Berna Oleary is treating for chronic hip and back pain  Denies any numbness or tingling in right leg  Information on patient's intake form was reviewed      Allergy:  No Known Allergies  Medications:  all current active meds have been reviewed  Past Medical History:  Past Medical History:   Diagnosis Date    Arthritis     Hyperlipidemia      Past Surgical History:  Past Surgical History:   Procedure Laterality Date     SECTION       Family History:  Family History   Problem Relation Age of Onset    Cancer Father      Social History:  Social History     Substance and Sexual Activity   Alcohol Use Never     Social History     Substance and Sexual Activity   Drug Use Not Currently     Social History     Tobacco Use   Smoking Status Never Smoker   Smokeless Tobacco Never Used     Review of Systems   Constitutional: Negative for chills and fever  HENT: Negative for ear pain and sore throat  Eyes: Negative for pain and visual disturbance  Respiratory: Negative for cough and shortness of breath  Cardiovascular: Negative for chest pain and palpitations  Gastrointestinal: Negative for abdominal pain and vomiting  Genitourinary: Negative for dysuria and hematuria  Musculoskeletal: Positive for arthralgias (Right buttock and leg), back pain and gait problem (Antalgic)  Negative for joint swelling  Skin: Negative for color change and rash  Neurological: Negative for seizures and syncope  Psychiatric/Behavioral: Negative  All other systems reviewed and are negative  Objective:  BP Readings from Last 1 Encounters:   22 130/82      Wt Readings from Last 1 Encounters:   22 101 kg (223 lb)      BMI:   Estimated body mass index is 38 28 kg/m² as calculated from the following:    Height as of 22: 5' 4" (1 626 m)  Weight as of 22: 101 kg (223 lb)  BSA:   Estimated body surface area is 2 05 meters squared as calculated from the following:    Height as of 22: 5' 4" (1 626 m)  Weight as of 22: 101 kg (223 lb)  Physical Exam  Vitals and nursing note reviewed  Constitutional:       Appearance: She is well-developed  She is obese     HENT:      Head: Normocephalic and atraumatic  Right Ear: External ear normal       Left Ear: External ear normal    Eyes:      Extraocular Movements: Extraocular movements intact  Conjunctiva/sclera: Conjunctivae normal    Pulmonary:      Effort: Pulmonary effort is normal    Musculoskeletal:         General: Tenderness (right hip arthralgia ) present  Cervical back: Neck supple  Right knee: No effusion  Skin:     General: Skin is warm and dry  Neurological:      Mental Status: She is alert and oriented to person, place, and time  Deep Tendon Reflexes: Reflexes are normal and symmetric  Psychiatric:         Mood and Affect: Mood normal          Behavior: Behavior normal        Right Knee Exam     Other   Effusion: no effusion present      Right Hip Exam     Tenderness   The patient is experiencing tenderness in the greater trochanter (SI joint)  Range of Motion   Flexion: 110   External rotation: 30 (Pain)   Internal rotation: 5 (Pain)     Muscle Strength   The patient has normal right hip strength  Tests   KAVITA: positive    Other   Erythema: absent  Scars: absent  Sensation: normal  Pulse: present            I have personally reviewed pertinent films in PACS and my interpretation is xr right hip demonstrates moderate to severe degenerative changes with cystic changes in femoral head and superior acetabulum          Scribe Attestation    I,:  Moo Freeman am acting as a scribe while in the presence of the attending physician :       I,:  Ivon Marquez MD personally performed the services described in this documentation    as scribed in my presence :

## 2022-06-01 NOTE — ASSESSMENT & PLAN NOTE
Findings consistent with right hip severe osteoarthritis  Imaging and prognosis reviewed  Patient has failed conservative measures intra articular hip injection of cortisone, physical therapy, oral medications, activity modification  Patients only option to alleviate hip arthritis is total hip replacement  Patient will be referred to Dr Jr Beasley to discuss possible anterior total hip replacement  She can continue with low impact exercises, oral medications for pain control  All patient's questions were answered to her satisfaction  This note is created using dictation transcription  It may contain typographical errors, grammatical errors, improperly dictated words, background noise and other errors

## 2022-06-01 NOTE — LETTER
June 1, 2022     Yousif Harrison 24  [address]  [address] [address] [address]    Patient: Raheem Jackson   YOB: 1950   Date of Visit: 6/1/2022       Dear Dr Freddy Hernández:    Thank you for referring Pastor Harley to me for evaluation  Below are my notes for this consultation  If you have questions, please do not hesitate to call me  I look forward to following your patient along with you           Sincerely,        Bri Parra MD        CC: No Recipients

## 2022-07-05 ENCOUNTER — OFFICE VISIT (OUTPATIENT)
Dept: OBGYN CLINIC | Facility: CLINIC | Age: 72
End: 2022-07-05
Payer: MEDICARE

## 2022-07-05 VITALS
DIASTOLIC BLOOD PRESSURE: 80 MMHG | HEIGHT: 64 IN | BODY MASS INDEX: 37.05 KG/M2 | HEART RATE: 75 BPM | WEIGHT: 217 LBS | SYSTOLIC BLOOD PRESSURE: 122 MMHG

## 2022-07-05 DIAGNOSIS — Z01.818 PREOPERATIVE TESTING: ICD-10-CM

## 2022-07-05 DIAGNOSIS — M16.11 PRIMARY OSTEOARTHRITIS OF RIGHT HIP: Primary | ICD-10-CM

## 2022-07-05 DIAGNOSIS — E66.01 OBESITY, MORBID (HCC): ICD-10-CM

## 2022-07-05 PROCEDURE — 99214 OFFICE O/P EST MOD 30 MIN: CPT | Performed by: ORTHOPAEDIC SURGERY

## 2022-07-05 RX ORDER — ASCORBIC ACID 500 MG
500 TABLET ORAL 2 TIMES DAILY
Qty: 60 TABLET | Refills: 0 | Status: SHIPPED | OUTPATIENT
Start: 2022-07-05

## 2022-07-05 RX ORDER — MULTIVIT-MIN/IRON FUM/FOLIC AC 7.5 MG-4
1 TABLET ORAL DAILY
Qty: 30 TABLET | Refills: 0 | Status: SHIPPED | OUTPATIENT
Start: 2022-07-05

## 2022-07-05 RX ORDER — FERROUS SULFATE TAB EC 324 MG (65 MG FE EQUIVALENT) 324 (65 FE) MG
324 TABLET DELAYED RESPONSE ORAL
Qty: 60 TABLET | Refills: 0 | Status: SHIPPED | OUTPATIENT
Start: 2022-07-05

## 2022-07-05 RX ORDER — FOLIC ACID 1 MG/1
1 TABLET ORAL DAILY
Qty: 30 TABLET | Refills: 0 | Status: SHIPPED | OUTPATIENT
Start: 2022-07-05

## 2022-07-05 RX ORDER — CEFAZOLIN SODIUM 2 G/50ML
2000 SOLUTION INTRAVENOUS ONCE
Status: CANCELLED | OUTPATIENT
Start: 2022-08-18 | End: 2022-07-05

## 2022-07-05 RX ORDER — CHLORHEXIDINE GLUCONATE 0.12 MG/ML
15 RINSE ORAL ONCE
Status: CANCELLED | OUTPATIENT
Start: 2022-07-05 | End: 2022-07-05

## 2022-07-05 NOTE — PROGRESS NOTES
Assessment/Plan:  1  Primary osteoarthritis of right hip  Ambulatory Referral to Orthopedic Surgery   2  Preoperative testing         Scribe Attestation    I,:  Rosenda Lewis MA am acting as a scribe while in the presence of the attending physician :       I,:  Rissa Kang MD personally performed the services described in this documentation    as scribed in my presence  :             53-year-old female who presents to the office today for evaluation of right hip osteoarthritis  X-rays were reviewed  Patient has tried and failed non operative treatment in the form of OTC medications, formal therapy and a intra-articular steroid injection all of which did not provide her with significant pain relief  Surgical intervention was discussed in the form of a right anterior total hip arthroplasty  Risks and benefits were discussed at length  Risks of the surgery are inclusive of but not limited to bleeding, infection, nerve injury, blood clot, worsening of symptoms, not achieving the anticipated results, persistent stiffness, weakness, fracture, dislocation, leg length discrepancy and the need for additional surgery  The patient verbally stated they understood those risks and would like to proceed with the surgery  Surgical consent was signed in the office today  We did discuss Aspirin for DVT prophylaxis appx 5 weeks postoperatively  Patient will require PCP and cardiac clearance  I would also like her PCP to evaluate for vertigo  I will see her the day of surgery  Subjective:   Shashi Cleaning is a 73 y o  female who presents to the office today as a referral from my partner Dr Bryan Arriaga for evaluation of right hip pain  Patient states this has been ongoing for the past 2 years  She denies any known injury or trauma  Patient notes pain to her right buttock that radiates to her knee  She also notes groin pain  She notes increased pain with activity and lifting her leg   She has tried and failed non operative treatment in the form of OTC medications, formal therapy, and a intra-articular sterid injection  Patient underwent a intra-articular steroid injection on 2/10/22 without relief  She has been taking Gabapentin and Aleve as needed for pain  She denies prior surgery on her right hip  She denies any distal paraesthesias  Patient states she does ambulate with a limp  She denies a history of DVT  Review of Systems   Constitutional: Negative for chills and fever  HENT: Negative for drooling and sneezing  Eyes: Negative for redness  Respiratory: Negative for cough and wheezing  Gastrointestinal: Negative for nausea and vomiting  Musculoskeletal: Positive for arthralgias  Negative for joint swelling and myalgias  Neurological: Negative for weakness and numbness  Psychiatric/Behavioral: Negative for behavioral problems  The patient is not nervous/anxious            Past Medical History:   Diagnosis Date    Arthritis     Hyperlipidemia        Past Surgical History:   Procedure Laterality Date     SECTION         Family History   Problem Relation Age of Onset    Cancer Father        Social History     Occupational History    Not on file   Tobacco Use    Smoking status: Never Smoker    Smokeless tobacco: Never Used   Vaping Use    Vaping Use: Not on file   Substance and Sexual Activity    Alcohol use: Never    Drug use: Not Currently    Sexual activity: Not Currently         Current Outpatient Medications:     DULoxetine (CYMBALTA) 60 mg delayed release capsule, Take 1 PO HS , Disp: 30 capsule, Rfl: 2    gabapentin (NEURONTIN) 300 mg capsule, Take 300 mg by mouth 3 (three) times a day, Disp: , Rfl:     hydrochlorothiazide (HYDRODIURIL) 25 mg tablet, Take 25 mg by mouth daily, Disp: , Rfl:     Multiple Vitamins-Minerals (CENTRUM SILVER PO), Take by mouth, Disp: , Rfl:     rosuvastatin (CRESTOR) 20 MG tablet, Take 20 mg by mouth daily, Disp: , Rfl:     No Known Allergies    Objective:  Vitals:    07/05/22 0957   BP: 122/80   Pulse: 75       Right Hip Exam     Tenderness   The patient is experiencing tenderness in the greater trochanter  Other   Erythema: absent  Sensation: normal  Pulse: present    Comments:  + stinchfield  Pain with flexion and internal rotation  No pain with external rotation             Physical Exam  Constitutional:       Appearance: She is well-developed  HENT:      Head: Normocephalic and atraumatic  Eyes:      General:         Right eye: No discharge  Left eye: No discharge  Conjunctiva/sclera: Conjunctivae normal    Cardiovascular:      Rate and Rhythm: Normal rate  Pulmonary:      Effort: Pulmonary effort is normal  No respiratory distress  Musculoskeletal:      Cervical back: Normal range of motion and neck supple  Comments: As noted in HPI   Skin:     General: Skin is warm and dry  Neurological:      Mental Status: She is alert and oriented to person, place, and time  Psychiatric:         Behavior: Behavior normal          Thought Content: Thought content normal          Judgment: Judgment normal          I have personally reviewed pertinent films in PACS and my interpretation is as follows:X-rays right hip performed on 1/27/22 demonstrate moderate to severe right hip osteoarthritis

## 2022-07-06 ENCOUNTER — TELEPHONE (OUTPATIENT)
Dept: OBGYN CLINIC | Facility: HOSPITAL | Age: 72
End: 2022-07-06

## 2022-07-06 ENCOUNTER — OFFICE VISIT (OUTPATIENT)
Dept: CARDIOLOGY | Facility: CLINIC | Age: 72
End: 2022-07-06
Payer: MEDICARE

## 2022-07-06 ENCOUNTER — PREP FOR PROCEDURE (OUTPATIENT)
Dept: OBGYN CLINIC | Facility: CLINIC | Age: 72
End: 2022-07-06

## 2022-07-06 VITALS
HEIGHT: 66 IN | SYSTOLIC BLOOD PRESSURE: 122 MMHG | WEIGHT: 215.4 LBS | BODY MASS INDEX: 34.62 KG/M2 | DIASTOLIC BLOOD PRESSURE: 82 MMHG | HEART RATE: 75 BPM

## 2022-07-06 DIAGNOSIS — I10 PRIMARY HYPERTENSION: ICD-10-CM

## 2022-07-06 DIAGNOSIS — Z01.810 PREOPERATIVE CARDIOVASCULAR EXAMINATION: ICD-10-CM

## 2022-07-06 DIAGNOSIS — Z01.818 PREOPERATIVE CLEARANCE: Primary | ICD-10-CM

## 2022-07-06 DIAGNOSIS — E78.5 DYSLIPIDEMIA: ICD-10-CM

## 2022-07-06 PROCEDURE — G8754 DIAS BP LESS 90: HCPCS | Performed by: INTERNAL MEDICINE

## 2022-07-06 PROCEDURE — 99214 OFFICE O/P EST MOD 30 MIN: CPT | Performed by: INTERNAL MEDICINE

## 2022-07-06 PROCEDURE — G8752 SYS BP LESS 140: HCPCS | Performed by: INTERNAL MEDICINE

## 2022-07-06 PROCEDURE — 93000 ELECTROCARDIOGRAM COMPLETE: CPT | Performed by: INTERNAL MEDICINE

## 2022-07-06 RX ORDER — DULOXETIN HYDROCHLORIDE 20 MG/1
20 CAPSULE, DELAYED RELEASE ORAL DAILY
COMMUNITY

## 2022-07-06 RX ORDER — GABAPENTIN 300 MG/1
300 CAPSULE ORAL 2 TIMES DAILY
COMMUNITY

## 2022-07-06 ASSESSMENT — ENCOUNTER SYMPTOMS
WHEEZING: 0
WEAKNESS: 0
PND: 0
FREQUENCY: 0
COUGH: 0
BRUISES/BLEEDS EASILY: 0
BRIEF PARALYSIS: 0
VOMITING: 0
JOINT SWELLING: 0
DIAPHORESIS: 0
HEMOPTYSIS: 0
MUSCLE CRAMPS: 0
FALLS: 0
SNORING: 0
BLURRED VISION: 0
FOCAL WEAKNESS: 0
SHORTNESS OF BREATH: 0
DYSPNEA ON EXERTION: 0
ABDOMINAL PAIN: 0
DEPRESSION: 0
NERVOUS/ANXIOUS: 0
SLEEP DISTURBANCES DUE TO BREATHING: 0
MYALGIAS: 0
DIZZINESS: 0
SPUTUM PRODUCTION: 0
HEMATEMESIS: 0
NAUSEA: 0
HEADACHES: 0
IRREGULAR HEARTBEAT: 0
LOSS OF BALANCE: 0
NEAR-SYNCOPE: 0
HEMATURIA: 0
LIGHT-HEADEDNESS: 0
NIGHT SWEATS: 0
ODYNOPHAGIA: 0
DIARRHEA: 0
SYNCOPE: 0
CLAUDICATION: 0
ORTHOPNEA: 0
WEIGHT GAIN: 0
HEMATOCHEZIA: 0
PALPITATIONS: 0
FEVER: 0
DOUBLE VISION: 0
WEIGHT LOSS: 0
ALTERED MENTAL STATUS: 0

## 2022-07-06 NOTE — PROGRESS NOTES
Cardiology Note         Reason for visit:   Chief Complaint   Patient presents with   • Cardiac Clearance       HPI    Taylor Sutton is a 71 y.o. female returns the office for preoperative cardiovascular assessment form upcoming hip replacement surgery.  Other than hip pain climbing stairs is easy.  She uses the railing to help pull herself up.  She can do so without stopping and has no dyspnea or angina.  On a daily basis, she has no chest pain pressure squeezing tightness orthopnea dyspnea angina edema palpitations syncope or near syncope.     History reviewed. No pertinent past medical history.    Past Surgical History:   Procedure Laterality Date   •  SECTION     • KNEE SURGERY         Patient has no known allergies.    Current Outpatient Medications   Medication Sig Dispense Refill   • hydrochlorothiazide (HYDRODIURIL) 25 mg tablet Take 1 tablet (25 mg total) by mouth daily. 90 tablet 3   • rosuvastatin (CRESTOR) 20 mg tablet Take 1 tablet (20 mg total) by mouth daily. 90 tablet 3     No current facility-administered medications for this visit.       Social History     Socioeconomic History   • Marital status: Single     Spouse name: None   • Number of children: None   • Years of education: None   • Highest education level: None   Tobacco Use   • Smoking status: Never Smoker   • Smokeless tobacco: Never Used   Vaping Use   • Vaping Use: Never used   Substance and Sexual Activity   • Alcohol use: No       Family History   Problem Relation Age of Onset   • No Known Problems Biological Mother    • No Known Problems Biological Father          Review of Systems   Constitutional: Negative for diaphoresis, fever, malaise/fatigue, night sweats, weight gain and weight loss.   HENT: Negative for odynophagia.    Eyes: Negative for blurred vision, double vision and visual disturbance.   Cardiovascular: Negative for chest pain, claudication, dyspnea on exertion, irregular heartbeat, leg swelling, near-syncope,  orthopnea, palpitations, paroxysmal nocturnal dyspnea and syncope.   Respiratory: Negative for cough, hemoptysis, shortness of breath, sleep disturbances due to breathing, snoring, sputum production and wheezing.    Endocrine: Negative for polyuria.   Hematologic/Lymphatic: Negative for bleeding problem. Does not bruise/bleed easily.   Skin: Negative for rash.   Musculoskeletal: Positive for joint pain. Negative for falls, joint swelling, muscle cramps, muscle weakness and myalgias.   Gastrointestinal: Negative for abdominal pain, diarrhea, hematemesis, hematochezia, melena, nausea and vomiting.   Genitourinary: Negative for frequency, hematuria and nocturia.   Neurological: Negative for brief paralysis, dizziness, focal weakness, headaches, light-headedness, loss of balance and weakness.   Psychiatric/Behavioral: Negative for altered mental status and depression. The patient is not nervous/anxious.       Objective    Vitals:    07/06/22 1139   BP: 122/82   Pulse: 75      Physical Exam  Constitutional:       General: She is not in acute distress.     Appearance: She is well-developed. She is obese. She is not diaphoretic.   HENT:      Head: Normocephalic.   Eyes:      Conjunctiva/sclera: Conjunctivae normal.      Pupils: Pupils are equal, round, and reactive to light.   Neck:      Thyroid: No thyromegaly.      Vascular: No JVD.      Trachea: No tracheal deviation.   Cardiovascular:      Rate and Rhythm: Normal rate and regular rhythm.      Heart sounds: Normal heart sounds. No murmur heard.    No friction rub. No gallop.   Pulmonary:      Effort: No respiratory distress.      Breath sounds: Normal breath sounds. No stridor. No wheezing or rales.   Chest:      Chest wall: No tenderness.   Abdominal:      General: Bowel sounds are normal. There is no distension.      Palpations: There is no mass.      Tenderness: There is no abdominal tenderness. There is no guarding or rebound.   Musculoskeletal:         General: No  tenderness or deformity.      Cervical back: Neck supple.   Skin:     General: Skin is warm and dry.      Coloration: Skin is not pale.      Findings: No erythema or rash.   Neurological:      Mental Status: She is alert and oriented to person, place, and time.      Cranial Nerves: No cranial nerve deficit.   Psychiatric:         Behavior: Behavior normal.         Judgment: Judgment normal.           Lab Results   Component Value Date    WBC 5.9 01/28/2019    HGB 13.8 01/28/2019     01/28/2019    CHOL 187 09/09/2019    TRIG 132 09/09/2019    HDL 56 09/09/2019    LDLCALC 106 (H) 09/09/2019    ALT 15 09/09/2019    AST 14 09/09/2019     09/09/2019    K 3.5 09/09/2019    CREATININE 0.71 09/09/2019    TSH 1.96 01/28/2019    INR 0.9 02/26/2018    HGBA1C 5.9 (H) 02/26/2018               Imaging  n/a    ECG   Normal         Assessment/Plan    Problem List Items Addressed This Visit        Circulatory    Hypertension       Endocrine/Metabolic    Dyslipidemia       Other    Preoperative cardiovascular examination      Other Visit Diagnoses     Preoperative clearance    -  Primary    Relevant Orders    ECG 12 lead (Completed)        Conclusions and recommendations:    The patient is cleared for orthopedic surgery.  Juan risk of 0.2%.  Patient climb a flight of stairs without dyspnea or angina.  On a daily basis she can perform 5 METS or greater workload.  She has no cardiovascular symptoms.  She has a normal EKG.  I explained to the patient that preoperative cardiac intervention increases risk for 6 months and does not mitigate risk long-term.  For that reason, she is stable to proceed in light of today's assessment.  She does not have her medication list with her but will call us later.  If she is on an ACE inhibitor, she needs to hold this the evening before and the morning of surgery.  If she is on a beta-blocker, she needs to continue this uninterrupted through surgery.    Blood pressure is excellent today.             I personally spent  35 minutes on this date of service performing the following activities : obtaining history, performing examination, entering orders, documenting, obtaining / reviewing records and providing counseling and education.       Александр Sigala DO  [facility], [facility]AC[facility]I  7/6/2022  12:13 PM

## 2022-07-06 NOTE — LETTER
2022     Sonali Sears MD  [address] [address]    Patient: Taylor Sutton  YOB: 1950  Date of Visit: 2022      Dear Dr. Sears:    Thank you for referring Taylor Sutton to me for preoperative evaluation.  She is cleared for surgery.  Risk and recommendations detailed below.     If you have questions, please do not hesitate to call me. I look forward to following your patient along with you.         Sincerely,        Александр Serrato DO, FACC, FACOI        CC: No Recipients  Александр Serrato DO  2022 12:17 PM  Sign when Signing Visit     Cardiology Note         Reason for visit:   Chief Complaint   Patient presents with   • Cardiac Clearance       HPI    Taylor Sutton is a 71 y.o. female returns the office for preoperative cardiovascular assessment form upcoming hip replacement surgery.  Other than hip pain climbing stairs is easy.  She uses the railing to help pull herself up.  She can do so without stopping and has no dyspnea or angina.  On a daily basis, she has no chest pain pressure squeezing tightness orthopnea dyspnea angina edema palpitations syncope or near syncope.     History reviewed. No pertinent past medical history.    Past Surgical History:   Procedure Laterality Date   •  SECTION     • KNEE SURGERY         Patient has no known allergies.    Current Outpatient Medications   Medication Sig Dispense Refill   • hydrochlorothiazide (HYDRODIURIL) 25 mg tablet Take 1 tablet (25 mg total) by mouth daily. 90 tablet 3   • rosuvastatin (CRESTOR) 20 mg tablet Take 1 tablet (20 mg total) by mouth daily. 90 tablet 3     No current facility-administered medications for this visit.       Social History     Socioeconomic History   • Marital status: Single     Spouse name: None   • Number of children: None   • Years of education: None   • Highest education level: None   Tobacco Use   • Smoking status: Never Smoker   • Smokeless tobacco: Never Used    Vaping Use   • Vaping Use: Never used   Substance and Sexual Activity   • Alcohol use: No       Family History   Problem Relation Age of Onset   • No Known Problems Biological Mother    • No Known Problems Biological Father          Review of Systems   Constitutional: Negative for diaphoresis, fever, malaise/fatigue, night sweats, weight gain and weight loss.   HENT: Negative for odynophagia.    Eyes: Negative for blurred vision, double vision and visual disturbance.   Cardiovascular: Negative for chest pain, claudication, dyspnea on exertion, irregular heartbeat, leg swelling, near-syncope, orthopnea, palpitations, paroxysmal nocturnal dyspnea and syncope.   Respiratory: Negative for cough, hemoptysis, shortness of breath, sleep disturbances due to breathing, snoring, sputum production and wheezing.    Endocrine: Negative for polyuria.   Hematologic/Lymphatic: Negative for bleeding problem. Does not bruise/bleed easily.   Skin: Negative for rash.   Musculoskeletal: Positive for joint pain. Negative for falls, joint swelling, muscle cramps, muscle weakness and myalgias.   Gastrointestinal: Negative for abdominal pain, diarrhea, hematemesis, hematochezia, melena, nausea and vomiting.   Genitourinary: Negative for frequency, hematuria and nocturia.   Neurological: Negative for brief paralysis, dizziness, focal weakness, headaches, light-headedness, loss of balance and weakness.   Psychiatric/Behavioral: Negative for altered mental status and depression. The patient is not nervous/anxious.       Objective    Vitals:    07/06/22 1139   BP: 122/82   Pulse: 75      Physical Exam  Constitutional:       General: She is not in acute distress.     Appearance: She is well-developed. She is obese. She is not diaphoretic.   HENT:      Head: Normocephalic.   Eyes:      Conjunctiva/sclera: Conjunctivae normal.      Pupils: Pupils are equal, round, and reactive to light.   Neck:      Thyroid: No thyromegaly.      Vascular: No  JVD.      Trachea: No tracheal deviation.   Cardiovascular:      Rate and Rhythm: Normal rate and regular rhythm.      Heart sounds: Normal heart sounds. No murmur heard.    No friction rub. No gallop.   Pulmonary:      Effort: No respiratory distress.      Breath sounds: Normal breath sounds. No stridor. No wheezing or rales.   Chest:      Chest wall: No tenderness.   Abdominal:      General: Bowel sounds are normal. There is no distension.      Palpations: There is no mass.      Tenderness: There is no abdominal tenderness. There is no guarding or rebound.   Musculoskeletal:         General: No tenderness or deformity.      Cervical back: Neck supple.   Skin:     General: Skin is warm and dry.      Coloration: Skin is not pale.      Findings: No erythema or rash.   Neurological:      Mental Status: She is alert and oriented to person, place, and time.      Cranial Nerves: No cranial nerve deficit.   Psychiatric:         Behavior: Behavior normal.         Judgment: Judgment normal.           Lab Results   Component Value Date    WBC 5.9 01/28/2019    HGB 13.8 01/28/2019     01/28/2019    CHOL 187 09/09/2019    TRIG 132 09/09/2019    HDL 56 09/09/2019    LDLCALC 106 (H) 09/09/2019    ALT 15 09/09/2019    AST 14 09/09/2019     09/09/2019    K 3.5 09/09/2019    CREATININE 0.71 09/09/2019    TSH 1.96 01/28/2019    INR 0.9 02/26/2018    HGBA1C 5.9 (H) 02/26/2018               Imaging  n/a    ECG   Normal         Assessment/Plan    Problem List Items Addressed This Visit        Circulatory    Hypertension       Endocrine/Metabolic    Dyslipidemia       Other    Preoperative cardiovascular examination      Other Visit Diagnoses     Preoperative clearance    -  Primary    Relevant Orders    ECG 12 lead (Completed)        Conclusions and recommendations:    The patient is cleared for orthopedic surgery.  Juan risk of 0.2%.  Patient climb a flight of stairs without dyspnea or angina.  On a daily basis she can  perform 5 METS or greater workload.  She has no cardiovascular symptoms.  She has a normal EKG.  I explained to the patient that preoperative cardiac intervention increases risk for 6 months and does not mitigate risk long-term.  For that reason, she is stable to proceed in light of today's assessment.  She does not have her medication list with her but will call us later.  If she is on an ACE inhibitor, she needs to hold this the evening before and the morning of surgery.  If she is on a beta-blocker, she needs to continue this uninterrupted through surgery.    Blood pressure is excellent today.            I personally spent  35 minutes on this date of service performing the following activities : obtaining history, performing examination, entering orders, documenting, obtaining / reviewing records and providing counseling and education.       Александр Sigala,   [facility], FACOI  7/6/2022  12:13 PM

## 2022-07-12 ENCOUNTER — TELEPHONE (OUTPATIENT)
Dept: OBGYN CLINIC | Facility: HOSPITAL | Age: 72
End: 2022-07-12

## 2022-07-12 NOTE — TELEPHONE ENCOUNTER
DR Magdalena Andujar  RE: Pt Rx    Patient asked for PT RX to be faxed to [facility]:     Please fax PT RX to    Fax# [phone]

## 2022-07-20 ENCOUNTER — TELEPHONE (OUTPATIENT)
Dept: OBGYN CLINIC | Facility: HOSPITAL | Age: 72
End: 2022-07-20

## 2022-07-25 PROBLEM — E66.9 OBESITY (BMI 35.0-39.9 WITHOUT COMORBIDITY): Status: ACTIVE | Noted: 2022-07-25

## 2022-07-25 NOTE — PROGRESS NOTES
ADDENDUM 8/10/2022: Follow up recommended BMP reviewed today and potassium now normal on added supplement  [id number] Alejandra Marrufo for surgery  Internal Medicine Pre-Operative Evaluation:     Reason for Visit: Pre-operative Evaluation for Risk Stratification and Optimization    Patient ID: Tiny Rosales is a 73 y o  female  Surgery: Arthroplasty of right Hip  Referring Provider: Dr Linda Palm      Primary osteoarthritis right Hip   Failed conservative measures   Electing to undergo total joint arthroplasty    Pre-operative Optimization for planned surgery   Patient is surgically optimized for planned procedure   Patient meets preoperative quality goals as noted below   Recommendations as listed in PLAN section below  Natan Acosta 2909 surgical nurse  navigator with any questions regarding preoperative plan or schedule   Follow up visit with [facility] medical team 1 week after discharge from surgery as scheduled    HTN   Stable   Cont current medications as directed   Monitor post operative BP    Hold hctz the day prior to surgery and the morning of surgery    Hypokalemia  · Recommend KCL 20meq daily  · Repeat bmp prior to surgery    Chronic pain  · Continue cymbalta and gabapentin as prescribed    Hyperlipidemia   Low cholesterol diet   Continue statin therapy        Patient Active Problem List   Diagnosis    Anemia    Constipation    Dyslipidemia    Essential hypertension    History of total knee arthroplasty, left    Osteoarthritis of knee, unilateral    Prediabetes    Dystonia    Hip pain    Primary osteoarthritis of right hip    Chronic bilateral low back pain without sciatica    Chronic pain syndrome    Obesity (BMI 35 0-39 9 without comorbidity)        Plan:     1  Further preoperative workup as follows:   - none no further testing required may proceed with surgery    2   Medication Management/Recommendations:   - continue all current medicines through morning of surgery with sip of water except the following:  - hold diuretic / water pill  24 hours prior to surgery  - hold aspirin 7 days prior to surgery  - avoid use of NSAID such as motrin, advil, aleve for 7 days prior to surgery  - hold all OTC herbal or nutritional supplements 7 days before surgery    3  Patient requires further consultation with:   No Consults Required    4  Discharge Planning / Barriers to Discharge  none identified - patients has post discharge therapy plan in place, transportation arranged for discharge day, adequate family support at home to assist with discharge to home  Recommendations to Proceed withSurgery    No contraindications to planned surgery      Subjective:           History of Present Illness:     Catherine Seymour is a 73 y o  female who presents to the office today for a preoperative consultation at the request of surgeon  The patient understands this is an elective procedure and not emergent  They are electing to undergo planned procedure with an understanding that all surgery has inherent risk  They have worked with their surgeon and failed conservative treatment measures  Today they present for preoperative risk assessment and recommendations for optimization in preparation for surgery  Pt seen in surgical optimization center for upcoming proposed surgery  They have failed previous conservative measures and have elected surgical intervention  Pt meets presurgical lab and BMI optimization goals  Her potassium is below goal and at this point would recommend adding potassium chloride 20meq daily and having repeat bmp prior to surgery  Upon interview questioning patient is able to perform greater than 4 METs workload in daily life without any reported cardio-pulmonary symptoms  Pt sees Dr Ronald Chong for chronic pain  May benefit from APS consultation during acute stay  ROS: No TIA's or unusual headaches, no dysphagia  No prolonged cough  No dyspnea or chest pain on exertion    No abdominal pain, change in bowel habits, black or bloody stools  No urinary tract or BPH symptoms  Positive reported pain in arthritic joint  Positive difficulty with gait  No skin rashes or issues  Objective:      /74   Pulse 88   Ht 5' 5" (1 651 m)   Wt 98 4 kg (217 lb)   SpO2 98%   BMI 36 11 kg/m²       General Appearance: no distress, conversive  HEENT: PERRLA, conjuctiva normal; oropharynx clear; mucous membranes moist;   Neck:  Supple, no lymphadenopathy or thyromegaly  Lungs: CTA, normal respiratory effort, no retractions, expiratory effort normal  CV: regular rate and rhythm , PMI normal   ABD: soft non tender, no masses , no hepatic or splenomegaly  EXT: DP pulses intact, no lymphadenopathy, no edema  Skin: normal turgor, normal texture, no rash  Psych: affect normal, mood normal  Neuro: AAOx3        The following portions of the patient's history were reviewed and updated as appropriate: allergies, current medications, past family history, past medical history, past social history, past surgical history and problem list      Past History:       Past Medical History:   Diagnosis Date    Arthritis     Balance disorder     Hyperlipidemia     Vertigo     Past Surgical History:   Procedure Laterality Date     SECTION      COLONOSCOPY      HYSTERECTOMY      HYSTEROSCOPY W/ ENDOMETRIAL ABLATION            Social History     Tobacco Use    Smoking status: Never Smoker    Smokeless tobacco: Never Used   Vaping Use    Vaping Use: Never used   Substance Use Topics    Alcohol use: Never    Drug use: Not Currently     Family History   Problem Relation Age of Onset    Cancer Father           Allergies:     No Known Allergies     Current Medications:     Current Outpatient Medications   Medication Instructions    ascorbic acid (VITAMIN C) 500 mg, Oral, 2 times daily    DULoxetine (CYMBALTA) 60 mg delayed release capsule Take 1 PO HS      ferrous sulfate 324 mg, Oral, 2 times daily before meals    folic acid (FOLVITE) 1 mg, Oral, Daily    gabapentin (NEURONTIN) 300 mg, Oral, 3 times daily    hydrochlorothiazide (HYDRODIURIL) 25 mg, Oral, Daily    Multiple Vitamins-Minerals (CENTRUM SILVER PO) Oral    Multiple Vitamins-Minerals (multivitamin with minerals) tablet 1 tablet, Oral, Daily    rosuvastatin (CRESTOR) 20 mg, Oral, Daily    VITAMIN E PO Oral             PRE-OP WORKSHEET DATA    Assessment of Pre-Operative Risks     MLJ Quality Hard Stops:  BMI (<40) : Estimated body mass index is 36 11 kg/m² as calculated from the following:    Height as of this encounter: 5' 5" (1 651 m)  Weight as of this encounter: 98 4 kg (217 lb)  Hgb ( >11): Lab Results   Component Value Date    HGB 14 1 07/27/2022     HbA1c (<7 0) :   Lab Results   Component Value Date    HGBA1C 6 0 (H) 07/27/2022     GFR (>60) (Less then 45 = Nephrology consult):  Estimated Creatinine Clearance: 104 6 mL/min (A) (by C-G formula based on SCr of 0 59 mg/dL (L))  Active Decompensated Chronic Conditions which would delay surgery  No acutely decompensated medical issues such as recent CVA, MI, new onset arrhythmia, severe aortic stenosis, CHF, uncontrolled COPD       Exercise Capacity: (if less the 4 mets consider functional assessment via cardiac stress testing or consultation)    · Able to walk 2 blocks without symptoms?: Yes  · Able to walk 1 flights without symptoms?: Yes    Assessment of intra and post operative respiratory, hemodynamic and thrombotic risks     Prior Anesthesia Reactions: No     Personal history of venous thromboembolic disease? No    History of steroid use > 5 mg for >2 weeks within last year? No    Cardiac Risk Estimation: per the Revised Cardiac Risk Index (Circ  [id number], 1999),     The patient's risk factors for cardiac complications include :  none    Jose Prudent has a in hospital cardiac risk of RCI RISK CLASS I (0 risk factors, risk of major cardiac compl  appr   0 5%) based on RCRI calculator          Pre-Op Data Reviewed:       Laboratory Results: I have personally reviewed the pertinent laboratory results/reports     EKG:I have personally reviewed pertinent reports    I personally reviewed and interpreted available tracings in the electronic medical record    OLD RECORDS: reviewed old records in the chart review section if EHR on day of visit      Previous cardiopulmonary studies within the past year:  · Echocardiogram: no  · Cardiac Catheterization: no  · Stress Test: no      Time of visit including pre-visit chart review, visit and post-visit coordination of plan and care , review of pre-surgical lab work, preparation and time spent documenting note in electronic medical record, time spent face-to-face in physical examination answering patient questions: 60 minutes       Linh Frank, [address] [place], [place] [address]

## 2022-07-25 NOTE — PATIENT INSTRUCTIONS
Contact surgical nurse  navigator with any questions regarding preoperative plan or schedule  Follow up visit with [facility] medical team 1 week after discharge from surgery    Stop all over the counter supplements, herbal, naturopathic  medications for 1 week prior to surgery UNLESS prescribed by your surgeon  Hold NSAIDS (i e  advil, alleve, motrin, ibuprofen, celebrex) minimum 7 days prior to surgery  Hold Asprin minimum 7 days prior to surgery  Recommend using Tylenol ( acetaminophen ) 500mg every eight hours during the first week post discharge in conjunction with any additional pain medicine prescribed by your surgeon  Use bowel medicines prescribed by your surgeon ( colace) daily post op during the first 1-2 weeks to avoid post operative constipation issues  Call [phone] with any post discharge concerns or medical issues  Hold hctz the day prior to surgery and morning of surgery  Start potassium supplement taking 20meq daily as soon as you lick up from pharmacy  Obtain repeat BMP(lab) to check potassium in 1 week from today

## 2022-07-25 NOTE — H&P (VIEW-ONLY)
ADDENDUM 8/10/2022: Follow up recommended BMP reviewed today and potassium now normal on added supplement  Mary Ortez for surgery  Internal Medicine Pre-Operative Evaluation:     Reason for Visit: Pre-operative Evaluation for Risk Stratification and Optimization    Patient ID: Rafael El is a 73 y o  female  Surgery: Arthroplasty of right Hip  Referring Provider: Dr Keven Dixon      Primary osteoarthritis right Hip   Failed conservative measures   Electing to undergo total joint arthroplasty    Pre-operative Optimization for planned surgery   Patient is surgically optimized for planned procedure   Patient meets preoperative quality goals as noted below   Recommendations as listed in PLAN section below  Natan Acosta 3004 surgical nurse  navigator with any questions regarding preoperative plan or schedule   Follow up visit with [facility] medical team 1 week after discharge from surgery as scheduled    HTN   Stable   Cont current medications as directed   Monitor post operative BP    Hold hctz the day prior to surgery and the morning of surgery    Hypokalemia  · Recommend KCL 20meq daily  · Repeat bmp prior to surgery    Chronic pain  · Continue cymbalta and gabapentin as prescribed    Hyperlipidemia   Low cholesterol diet   Continue statin therapy        Patient Active Problem List   Diagnosis    Anemia    Constipation    Dyslipidemia    Essential hypertension    History of total knee arthroplasty, left    Osteoarthritis of knee, unilateral    Prediabetes    Dystonia    Hip pain    Primary osteoarthritis of right hip    Chronic bilateral low back pain without sciatica    Chronic pain syndrome    Obesity (BMI 35 0-39 9 without comorbidity)        Plan:     1  Further preoperative workup as follows:   - none no further testing required may proceed with surgery    2   Medication Management/Recommendations:   - continue all current medicines through morning of surgery with sip of water except the following:  - hold diuretic / water pill  24 hours prior to surgery  - hold aspirin 7 days prior to surgery  - avoid use of NSAID such as motrin, advil, aleve for 7 days prior to surgery  - hold all OTC herbal or nutritional supplements 7 days before surgery    3  Patient requires further consultation with:   No Consults Required    4  Discharge Planning / Barriers to Discharge  none identified - patients has post discharge therapy plan in place, transportation arranged for discharge day, adequate family support at home to assist with discharge to home  Recommendations to Proceed withSurgery    No contraindications to planned surgery      Subjective:           History of Present Illness:     Ely Vela is a 73 y o  female who presents to the office today for a preoperative consultation at the request of surgeon  The patient understands this is an elective procedure and not emergent  They are electing to undergo planned procedure with an understanding that all surgery has inherent risk  They have worked with their surgeon and failed conservative treatment measures  Today they present for preoperative risk assessment and recommendations for optimization in preparation for surgery  Pt seen in surgical optimization center for upcoming proposed surgery  They have failed previous conservative measures and have elected surgical intervention  Pt meets presurgical lab and BMI optimization goals  Her potassium is below goal and at this point would recommend adding potassium chloride 20meq daily and having repeat bmp prior to surgery  Upon interview questioning patient is able to perform greater than 4 METs workload in daily life without any reported cardio-pulmonary symptoms  Pt sees Dr Fernandez Echavarria for chronic pain  May benefit from APS consultation during acute stay  ROS: No TIA's or unusual headaches, no dysphagia  No prolonged cough  No dyspnea or chest pain on exertion    No abdominal pain, change in bowel habits, black or bloody stools  No urinary tract or BPH symptoms  Positive reported pain in arthritic joint  Positive difficulty with gait  No skin rashes or issues  Objective:      /74   Pulse 88   Ht 5' 5" (1 651 m)   Wt 98 4 kg (217 lb)   SpO2 98%   BMI 36 11 kg/m²       General Appearance: no distress, conversive  HEENT: PERRLA, conjuctiva normal; oropharynx clear; mucous membranes moist;   Neck:  Supple, no lymphadenopathy or thyromegaly  Lungs: CTA, normal respiratory effort, no retractions, expiratory effort normal  CV: regular rate and rhythm , PMI normal   ABD: soft non tender, no masses , no hepatic or splenomegaly  EXT: DP pulses intact, no lymphadenopathy, no edema  Skin: normal turgor, normal texture, no rash  Psych: affect normal, mood normal  Neuro: AAOx3        The following portions of the patient's history were reviewed and updated as appropriate: allergies, current medications, past family history, past medical history, past social history, past surgical history and problem list      Past History:       Past Medical History:   Diagnosis Date    Arthritis     Balance disorder     Hyperlipidemia     Vertigo     Past Surgical History:   Procedure Laterality Date     SECTION      COLONOSCOPY      HYSTERECTOMY      HYSTEROSCOPY W/ ENDOMETRIAL ABLATION            Social History     Tobacco Use    Smoking status: Never Smoker    Smokeless tobacco: Never Used   Vaping Use    Vaping Use: Never used   Substance Use Topics    Alcohol use: Never    Drug use: Not Currently     Family History   Problem Relation Age of Onset    Cancer Father           Allergies:     No Known Allergies     Current Medications:     Current Outpatient Medications   Medication Instructions    ascorbic acid (VITAMIN C) 500 mg, Oral, 2 times daily    DULoxetine (CYMBALTA) 60 mg delayed release capsule Take 1 PO HS      ferrous sulfate 324 mg, Oral, 2 times daily before meals    folic acid (FOLVITE) 1 mg, Oral, Daily    gabapentin (NEURONTIN) 300 mg, Oral, 3 times daily    hydrochlorothiazide (HYDRODIURIL) 25 mg, Oral, Daily    Multiple Vitamins-Minerals (CENTRUM SILVER PO) Oral    Multiple Vitamins-Minerals (multivitamin with minerals) tablet 1 tablet, Oral, Daily    rosuvastatin (CRESTOR) 20 mg, Oral, Daily    VITAMIN E PO Oral             PRE-OP WORKSHEET DATA    Assessment of Pre-Operative Risks     MLJ Quality Hard Stops:  BMI (<40) : Estimated body mass index is 36 11 kg/m² as calculated from the following:    Height as of this encounter: 5' 5" (1 651 m)  Weight as of this encounter: 98 4 kg (217 lb)  Hgb ( >11): Lab Results   Component Value Date    HGB 14 1 07/27/2022     HbA1c (<7 0) :   Lab Results   Component Value Date    HGBA1C 6 0 (H) 07/27/2022     GFR (>60) (Less then 45 = Nephrology consult):  Estimated Creatinine Clearance: 104 6 mL/min (A) (by C-G formula based on SCr of 0 59 mg/dL (L))  Active Decompensated Chronic Conditions which would delay surgery  No acutely decompensated medical issues such as recent CVA, MI, new onset arrhythmia, severe aortic stenosis, CHF, uncontrolled COPD       Exercise Capacity: (if less the 4 mets consider functional assessment via cardiac stress testing or consultation)    · Able to walk 2 blocks without symptoms?: Yes  · Able to walk 1 flights without symptoms?: Yes    Assessment of intra and post operative respiratory, hemodynamic and thrombotic risks     Prior Anesthesia Reactions: No     Personal history of venous thromboembolic disease? No    History of steroid use > 5 mg for >2 weeks within last year? No    Cardiac Risk Estimation: per the Revised Cardiac Risk Index (Circ  [id number], 1999),     The patient's risk factors for cardiac complications include :  none    Mell Campos has a in hospital cardiac risk of RCI RISK CLASS I (0 risk factors, risk of major cardiac compl  appr   0 5%) based on RCRI calculator          Pre-Op Data Reviewed:       Laboratory Results: I have personally reviewed the pertinent laboratory results/reports     EKG:I have personally reviewed pertinent reports    I personally reviewed and interpreted available tracings in the electronic medical record    OLD RECORDS: reviewed old records in the chart review section if EHR on day of visit      Previous cardiopulmonary studies within the past year:  · Echocardiogram: no  · Cardiac Catheterization: no  · Stress Test: no      Time of visit including pre-visit chart review, visit and post-visit coordination of plan and care , review of pre-surgical lab work, preparation and time spent documenting note in electronic medical record, time spent face-to-face in physical examination answering patient questions: 60 minutes       Valentina Graves, [address] [place] [address] Drive

## 2022-07-26 NOTE — TELEPHONE ENCOUNTER
Preoperative Elective Admission Assessment- spoke to patient    Medicare- SLEA  CHW- declined  Living Situation:    Who does pt live with: spouse  What kind of home: ranch  How do they enter the home: Garage  How many levels in home: 1 level    # of steps to enter home: 2-3 to enter with grab bar   Sleeping arrangement: first/entry floor  Where is Bathroom: Step in tub , with seat and bars      First Floor Setup:   Is there a bathroom: Yes  Where would pt sleep: bed     DME: frame walker and cane, transfer chair and shower chair  Patient's Current Level of Function: Ambulates: Independently and ADLs: Independent    Post-op Caregiver: spouse  Caregiver Name and phone number for Inpatient discharge needs: Chad Gomes- spouse, [phone]  Currently receive any HHC/aides/community supports: No     Post-op Transport: spouse  To/from hospital: spouse  To/from PT 2-3x/week: spouse  Uses community transport now: No     Outpatient Physical Therapy Site:  Site: [facility]- out of network, open till 8pm, recommended 8/22 appt   pre and post-op appts scheduled? No     Medication Management: self and pillbox  Preferred Pharmacy for Post-op Medications: [facility]   Blood Management Vitamin Regimen: Pt confirms taking as prescribed  Post-op anticoagulant: to be determined by surgical team postoperatively     DC Plan: Pt plans to be discharged home  Pt educated that our goal, if at all possible, is to appropriately discharge patient based off their post-op function while striving to maintain maximal independence  If possible, the goal is to discharge patient to home and for them to attend outpatient physical therapy  Barriers to DC identified preoperatively: none identified    BMI: 37 25      [employer]: Declined e-mail      Patient Education:  Pt educated on post-op pain, early mobilization (POD0), Average inpt LOS, OP PT goal   Patient educated that our goal is to appropriately discharge patient based off their post-op function while striving to maintain maximal independence  The goal is to discharge patient to home and for them to attend outpatient physical therapy

## 2022-07-27 ENCOUNTER — RA CDI HCC (OUTPATIENT)
Dept: OTHER | Facility: HOSPITAL | Age: 72
End: 2022-07-27

## 2022-07-27 ENCOUNTER — APPOINTMENT (OUTPATIENT)
Dept: LAB | Facility: CLINIC | Age: 72
End: 2022-07-27
Payer: MEDICARE

## 2022-07-27 ENCOUNTER — APPOINTMENT (OUTPATIENT)
Dept: LAB | Facility: HOSPITAL | Age: 72
End: 2022-07-27
Attending: ORTHOPAEDIC SURGERY
Payer: MEDICARE

## 2022-07-27 ENCOUNTER — OFFICE VISIT (OUTPATIENT)
Dept: LAB | Facility: CLINIC | Age: 72
End: 2022-07-27
Payer: MEDICARE

## 2022-07-27 DIAGNOSIS — M16.11 PRIMARY OSTEOARTHRITIS OF RIGHT HIP: ICD-10-CM

## 2022-07-27 DIAGNOSIS — M16.11 PRIMARY OSTEOARTHRITIS OF ONE HIP, RIGHT: ICD-10-CM

## 2022-07-27 DIAGNOSIS — Z01.818 PREOPERATIVE TESTING: ICD-10-CM

## 2022-07-27 LAB
ALBUMIN SERPL BCP-MCNC: 3.6 G/DL (ref 3.5–5)
ALP SERPL-CCNC: 85 U/L (ref 46–116)
ALT SERPL W P-5'-P-CCNC: 20 U/L (ref 12–78)
ANION GAP SERPL CALCULATED.3IONS-SCNC: 4 MMOL/L (ref 4–13)
APTT PPP: 30 SECONDS (ref 23–37)
AST SERPL W P-5'-P-CCNC: 14 U/L (ref 5–45)
BASOPHILS # BLD AUTO: 0.06 THOUSANDS/ΜL (ref 0–0.1)
BASOPHILS NFR BLD AUTO: 1 % (ref 0–1)
BILIRUB SERPL-MCNC: 0.74 MG/DL (ref 0.2–1)
BUN SERPL-MCNC: 14 MG/DL (ref 5–25)
CALCIUM SERPL-MCNC: 9.4 MG/DL (ref 8.3–10.1)
CHLORIDE SERPL-SCNC: 105 MMOL/L (ref 96–108)
CO2 SERPL-SCNC: 31 MMOL/L (ref 21–32)
CREAT SERPL-MCNC: 0.59 MG/DL (ref 0.6–1.3)
CRP SERPL QL: <3 MG/L
EOSINOPHIL # BLD AUTO: 0.05 THOUSAND/ΜL (ref 0–0.61)
EOSINOPHIL NFR BLD AUTO: 1 % (ref 0–6)
ERYTHROCYTE [DISTWIDTH] IN BLOOD BY AUTOMATED COUNT: 13.6 % (ref 11.6–15.1)
EST. AVERAGE GLUCOSE BLD GHB EST-MCNC: 126 MG/DL
FERRITIN SERPL-MCNC: 127 NG/ML (ref 8–388)
GFR SERPL CREATININE-BSD FRML MDRD: 93 ML/MIN/1.73SQ M
GLUCOSE SERPL-MCNC: 107 MG/DL (ref 65–140)
HBA1C MFR BLD: 6 %
HCT VFR BLD AUTO: 43.1 % (ref 34.8–46.1)
HGB BLD-MCNC: 14.1 G/DL (ref 11.5–15.4)
IMM GRANULOCYTES # BLD AUTO: 0.01 THOUSAND/UL (ref 0–0.2)
IMM GRANULOCYTES NFR BLD AUTO: 0 % (ref 0–2)
INR PPP: 0.89 (ref 0.84–1.19)
IRON SATN MFR SERPL: 36 % (ref 15–50)
IRON SERPL-MCNC: 105 UG/DL (ref 50–170)
LYMPHOCYTES # BLD AUTO: 2.16 THOUSANDS/ΜL (ref 0.6–4.47)
LYMPHOCYTES NFR BLD AUTO: 36 % (ref 14–44)
MCH RBC QN AUTO: 31.1 PG (ref 26.8–34.3)
MCHC RBC AUTO-ENTMCNC: 32.7 G/DL (ref 31.4–37.4)
MCV RBC AUTO: 95 FL (ref 82–98)
MONOCYTES # BLD AUTO: 0.33 THOUSAND/ΜL (ref 0.17–1.22)
MONOCYTES NFR BLD AUTO: 6 % (ref 4–12)
NEUTROPHILS # BLD AUTO: 3.34 THOUSANDS/ΜL (ref 1.85–7.62)
NEUTS SEG NFR BLD AUTO: 56 % (ref 43–75)
NRBC BLD AUTO-RTO: 0 /100 WBCS
PLATELET # BLD AUTO: 263 THOUSANDS/UL (ref 149–390)
PMV BLD AUTO: 11.3 FL (ref 8.9–12.7)
POTASSIUM SERPL-SCNC: 3.3 MMOL/L (ref 3.5–5.3)
PROT SERPL-MCNC: 7.3 G/DL (ref 6.4–8.4)
PROTHROMBIN TIME: 12.2 SECONDS (ref 11.6–14.5)
RBC # BLD AUTO: 4.53 MILLION/UL (ref 3.81–5.12)
SODIUM SERPL-SCNC: 140 MMOL/L (ref 135–147)
TIBC SERPL-MCNC: 291 UG/DL (ref 250–450)
WBC # BLD AUTO: 5.95 THOUSAND/UL (ref 4.31–10.16)

## 2022-07-27 PROCEDURE — 93005 ELECTROCARDIOGRAM TRACING: CPT

## 2022-07-27 PROCEDURE — 83036 HEMOGLOBIN GLYCOSYLATED A1C: CPT

## 2022-07-27 PROCEDURE — 80053 COMPREHEN METABOLIC PANEL: CPT

## 2022-07-27 PROCEDURE — 82728 ASSAY OF FERRITIN: CPT

## 2022-07-27 PROCEDURE — 36415 COLL VENOUS BLD VENIPUNCTURE: CPT

## 2022-07-27 PROCEDURE — 86850 RBC ANTIBODY SCREEN: CPT

## 2022-07-27 PROCEDURE — 85025 COMPLETE CBC W/AUTO DIFF WBC: CPT

## 2022-07-27 PROCEDURE — 83540 ASSAY OF IRON: CPT

## 2022-07-27 PROCEDURE — 86140 C-REACTIVE PROTEIN: CPT

## 2022-07-27 PROCEDURE — 86901 BLOOD TYPING SEROLOGIC RH(D): CPT

## 2022-07-27 PROCEDURE — 86900 BLOOD TYPING SEROLOGIC ABO: CPT

## 2022-07-27 PROCEDURE — 83550 IRON BINDING TEST: CPT

## 2022-07-27 PROCEDURE — 85730 THROMBOPLASTIN TIME PARTIAL: CPT

## 2022-07-27 PROCEDURE — 85610 PROTHROMBIN TIME: CPT

## 2022-07-27 NOTE — PROGRESS NOTES
[address]  coding opportunities       Chart reviewed, no opportunity found: CHART REVIEWED, NO OPPORTUNITY FOUND        Patients Insurance     Medicare Insurance: Medicare

## 2022-07-28 LAB
ABO GROUP BLD: NORMAL
ATRIAL RATE: 72 BPM
BLD GP AB SCN SERPL QL: NEGATIVE
P AXIS: 34 DEGREES
PR INTERVAL: 118 MS
QRS AXIS: 34 DEGREES
QRSD INTERVAL: 64 MS
QT INTERVAL: 390 MS
QTC INTERVAL: 427 MS
RH BLD: POSITIVE
SPECIMEN EXPIRATION DATE: NORMAL
T WAVE AXIS: 38 DEGREES
VENTRICULAR RATE: 72 BPM

## 2022-07-28 PROCEDURE — 93010 ELECTROCARDIOGRAM REPORT: CPT | Performed by: INTERNAL MEDICINE

## 2022-07-28 NOTE — PRE-PROCEDURE INSTRUCTIONS
Pre-Surgery Instructions:   Medication Instructions    ascorbic acid (VITAMIN C) 500 MG tablet Hold day of surgery   DULoxetine (CYMBALTA) 60 mg delayed release capsule Take night before surgery    ferrous sulfate 324 (65 Fe) mg Hold day of surgery   folic acid (FOLVITE) 1 mg tablet Hold day of surgery   gabapentin (NEURONTIN) 300 mg capsule Take day of surgery   hydrochlorothiazide (HYDRODIURIL) 25 mg tablet Hold day of surgery   Multiple Vitamins-Minerals (multivitamin with minerals) tablet Hold day of surgery   rosuvastatin (CRESTOR) 20 MG tablet Take day of surgery  Covid screening negative as per patient  Fully vaccinated  Reviewed showering and medication instructions  Take medications morning of surgery with a small sip of water  Patient verbalized understanding  Advised NPO after MN and ASC will call with scheduled surgical time  Reviewed [employer] total joint program booklet and incentive spirometry education  Patient verbalized understanding

## 2022-07-29 ENCOUNTER — ANESTHESIA EVENT (OUTPATIENT)
Dept: PERIOP | Facility: HOSPITAL | Age: 72
End: 2022-07-29
Payer: MEDICARE

## 2022-08-02 ENCOUNTER — OFFICE VISIT (OUTPATIENT)
Dept: INTERNAL MEDICINE CLINIC | Facility: CLINIC | Age: 72
End: 2022-08-02
Payer: MEDICARE

## 2022-08-02 VITALS
SYSTOLIC BLOOD PRESSURE: 132 MMHG | DIASTOLIC BLOOD PRESSURE: 74 MMHG | BODY MASS INDEX: 36.15 KG/M2 | HEIGHT: 65 IN | OXYGEN SATURATION: 98 % | HEART RATE: 88 BPM | WEIGHT: 217 LBS

## 2022-08-02 DIAGNOSIS — E87.6 HYPOKALEMIA: ICD-10-CM

## 2022-08-02 DIAGNOSIS — E66.9 OBESITY (BMI 35.0-39.9 WITHOUT COMORBIDITY): ICD-10-CM

## 2022-08-02 DIAGNOSIS — E78.5 DYSLIPIDEMIA: ICD-10-CM

## 2022-08-02 DIAGNOSIS — I10 ESSENTIAL HYPERTENSION: ICD-10-CM

## 2022-08-02 DIAGNOSIS — Z01.818 PREOPERATIVE TESTING: ICD-10-CM

## 2022-08-02 DIAGNOSIS — M16.11 PRIMARY OSTEOARTHRITIS OF RIGHT HIP: Primary | ICD-10-CM

## 2022-08-02 DIAGNOSIS — G89.4 CHRONIC PAIN SYNDROME: ICD-10-CM

## 2022-08-02 DIAGNOSIS — Z96.652 HISTORY OF TOTAL KNEE ARTHROPLASTY, LEFT: ICD-10-CM

## 2022-08-02 PROCEDURE — 99214 OFFICE O/P EST MOD 30 MIN: CPT | Performed by: INTERNAL MEDICINE

## 2022-08-02 RX ORDER — POTASSIUM CHLORIDE 20 MEQ/1
20 TABLET, EXTENDED RELEASE ORAL DAILY
Qty: 30 TABLET | Refills: 5 | Status: SHIPPED | OUTPATIENT
Start: 2022-08-02

## 2022-08-10 ENCOUNTER — APPOINTMENT (OUTPATIENT)
Dept: LAB | Facility: CLINIC | Age: 72
End: 2022-08-10
Payer: MEDICARE

## 2022-08-10 DIAGNOSIS — E87.6 HYPOKALEMIA: ICD-10-CM

## 2022-08-10 LAB
ANION GAP SERPL CALCULATED.3IONS-SCNC: 4 MMOL/L (ref 4–13)
BUN SERPL-MCNC: 15 MG/DL (ref 5–25)
CALCIUM SERPL-MCNC: 8.9 MG/DL (ref 8.3–10.1)
CHLORIDE SERPL-SCNC: 105 MMOL/L (ref 96–108)
CO2 SERPL-SCNC: 31 MMOL/L (ref 21–32)
CREAT SERPL-MCNC: 0.68 MG/DL (ref 0.6–1.3)
GFR SERPL CREATININE-BSD FRML MDRD: 89 ML/MIN/1.73SQ M
GLUCOSE P FAST SERPL-MCNC: 118 MG/DL (ref 65–99)
POTASSIUM SERPL-SCNC: 3.5 MMOL/L (ref 3.5–5.3)
SODIUM SERPL-SCNC: 140 MMOL/L (ref 135–147)

## 2022-08-10 PROCEDURE — 36415 COLL VENOUS BLD VENIPUNCTURE: CPT

## 2022-08-10 PROCEDURE — 80048 BASIC METABOLIC PNL TOTAL CA: CPT

## 2022-08-17 ENCOUNTER — TELEPHONE (OUTPATIENT)
Dept: OBGYN CLINIC | Facility: HOSPITAL | Age: 72
End: 2022-08-17

## 2022-08-17 NOTE — TELEPHONE ENCOUNTER
Stephen Gess, is it still body wash (most of body, except head & private area) then dry off & use wipes, let air dry?

## 2022-08-17 NOTE — TELEPHONE ENCOUNTER
Dr Chi Otero    Patient needs instruction on when/how to use the charles cloths        # [phone]

## 2022-08-17 NOTE — TELEPHONE ENCOUNTER
Pt contacted  We discussed she referenced the Handout given at time of scheduling, with the following instructions: Follow these steps the evening before your surgery:   Take your antiseptic soap shower and completely dry yourself with a clean towel  Sleep with clean sheets on your bed and a clean set of clothing   Follow these steps the morning of your surgery:   Take your antiseptic soap shower and completely dry yourself with a clean towel  Use one CHG cloth on your surgical joint making sure to wipe vigorously for 3 minutes  Allow the area to dry completely, do not rinse     Use the second CHG cloth on your surgical joint making sure to wipe vigorously

## 2022-08-18 ENCOUNTER — APPOINTMENT (OUTPATIENT)
Dept: RADIOLOGY | Facility: HOSPITAL | Age: 72
End: 2022-08-18
Payer: MEDICARE

## 2022-08-18 ENCOUNTER — ANESTHESIA (OUTPATIENT)
Dept: PERIOP | Facility: HOSPITAL | Age: 72
End: 2022-08-18
Payer: MEDICARE

## 2022-08-18 ENCOUNTER — HOSPITAL ENCOUNTER (OUTPATIENT)
Facility: HOSPITAL | Age: 72
Setting detail: OUTPATIENT SURGERY
Discharge: HOME | End: 2022-08-18
Attending: ORTHOPAEDIC SURGERY | Admitting: ORTHOPAEDIC SURGERY
Payer: MEDICARE

## 2022-08-18 VITALS
SYSTOLIC BLOOD PRESSURE: 146 MMHG | BODY MASS INDEX: 34.95 KG/M2 | TEMPERATURE: 97.5 F | RESPIRATION RATE: 18 BRPM | WEIGHT: 210 LBS | OXYGEN SATURATION: 98 % | DIASTOLIC BLOOD PRESSURE: 69 MMHG | HEART RATE: 74 BPM

## 2022-08-18 DIAGNOSIS — Z96.641 STATUS POST TOTAL HIP REPLACEMENT, RIGHT: Primary | ICD-10-CM

## 2022-08-18 PROCEDURE — C1776 JOINT DEVICE (IMPLANTABLE): HCPCS | Performed by: ORTHOPAEDIC SURGERY

## 2022-08-18 PROCEDURE — 97167 OT EVAL HIGH COMPLEX 60 MIN: CPT

## 2022-08-18 PROCEDURE — 97163 PT EVAL HIGH COMPLEX 45 MIN: CPT

## 2022-08-18 PROCEDURE — 97535 SELF CARE MNGMENT TRAINING: CPT

## 2022-08-18 PROCEDURE — 73501 X-RAY EXAM HIP UNI 1 VIEW: CPT

## 2022-08-18 PROCEDURE — 27130 TOTAL HIP ARTHROPLASTY: CPT | Performed by: ORTHOPAEDIC SURGERY

## 2022-08-18 DEVICE — ACTIS DUOFIX HIP PROSTHESIS (FEMORAL STEM 12/14 TAPER CEMENTLESS SIZE 6 STD COLLAR)  CE
Type: IMPLANTABLE DEVICE | Site: TROCHANTER | Status: FUNCTIONAL
Brand: ACTIS

## 2022-08-18 DEVICE — PINNACLE GRIPTION ACETABULAR SHELL SECTOR 50MM OD
Type: IMPLANTABLE DEVICE | Site: HIP | Status: FUNCTIONAL
Brand: PINNACLE GRIPTION

## 2022-08-18 DEVICE — PINNACLE HIP SOLUTIONS ALTRX POLYETHYLENE ACETABULAR LINER NEUTRAL 32MM ID 50MM OD
Type: IMPLANTABLE DEVICE | Site: HIP | Status: FUNCTIONAL
Brand: PINNACLE ALTRX

## 2022-08-18 DEVICE — BIOLOX DELTA CERAMIC FEMORAL HEAD 32MM DIA +1 12/14 TAPER
Type: IMPLANTABLE DEVICE | Site: HIP | Status: FUNCTIONAL
Brand: BIOLOX DELTA

## 2022-08-18 RX ORDER — PROPOFOL 10 MG/ML
INJECTION, EMULSION INTRAVENOUS CONTINUOUS PRN
Status: DISCONTINUED | OUTPATIENT
Start: 2022-08-18 | End: 2022-08-18

## 2022-08-18 RX ORDER — SODIUM CHLORIDE, SODIUM LACTATE, POTASSIUM CHLORIDE, CALCIUM CHLORIDE 600; 310; 30; 20 MG/100ML; MG/100ML; MG/100ML; MG/100ML
INJECTION, SOLUTION INTRAVENOUS CONTINUOUS PRN
Status: DISCONTINUED | OUTPATIENT
Start: 2022-08-18 | End: 2022-08-18

## 2022-08-18 RX ORDER — MAGNESIUM HYDROXIDE 1200 MG/15ML
LIQUID ORAL AS NEEDED
Status: DISCONTINUED | OUTPATIENT
Start: 2022-08-18 | End: 2022-08-18 | Stop reason: HOSPADM

## 2022-08-18 RX ORDER — DEXAMETHASONE SODIUM PHOSPHATE 10 MG/ML
INJECTION, SOLUTION INTRAMUSCULAR; INTRAVENOUS AS NEEDED
Status: DISCONTINUED | OUTPATIENT
Start: 2022-08-18 | End: 2022-08-18

## 2022-08-18 RX ORDER — PROMETHAZINE HYDROCHLORIDE 25 MG/ML
25 INJECTION, SOLUTION INTRAMUSCULAR; INTRAVENOUS ONCE AS NEEDED
Status: DISCONTINUED | OUTPATIENT
Start: 2022-08-18 | End: 2022-08-18 | Stop reason: HOSPADM

## 2022-08-18 RX ORDER — SENNOSIDES 8.6 MG
650 CAPSULE ORAL EVERY 8 HOURS PRN
Qty: 30 TABLET | Refills: 0 | Status: SHIPPED | OUTPATIENT
Start: 2022-08-18 | End: 2022-09-17

## 2022-08-18 RX ORDER — FENTANYL CITRATE 50 UG/ML
INJECTION, SOLUTION INTRAMUSCULAR; INTRAVENOUS AS NEEDED
Status: DISCONTINUED | OUTPATIENT
Start: 2022-08-18 | End: 2022-08-18

## 2022-08-18 RX ORDER — CEFAZOLIN SODIUM 2 G/50ML
2000 SOLUTION INTRAVENOUS ONCE
Status: COMPLETED | OUTPATIENT
Start: 2022-08-18 | End: 2022-08-18

## 2022-08-18 RX ORDER — GABAPENTIN 300 MG/1
300 CAPSULE ORAL 3 TIMES DAILY
Status: CANCELLED | OUTPATIENT
Start: 2022-08-18

## 2022-08-18 RX ORDER — FERROUS SULFATE 325(65) MG
325 TABLET ORAL 2 TIMES DAILY WITH MEALS
Refills: 0 | Status: CANCELLED | OUTPATIENT
Start: 2022-08-18

## 2022-08-18 RX ORDER — HYDROCHLOROTHIAZIDE 25 MG/1
25 TABLET ORAL DAILY
Status: CANCELLED | OUTPATIENT
Start: 2022-08-20

## 2022-08-18 RX ORDER — ENOXAPARIN SODIUM 100 MG/ML
40 INJECTION SUBCUTANEOUS DAILY
Status: CANCELLED | OUTPATIENT
Start: 2022-08-18

## 2022-08-18 RX ORDER — MIDAZOLAM HYDROCHLORIDE 2 MG/2ML
INJECTION, SOLUTION INTRAMUSCULAR; INTRAVENOUS AS NEEDED
Status: DISCONTINUED | OUTPATIENT
Start: 2022-08-18 | End: 2022-08-18

## 2022-08-18 RX ORDER — HYDROMORPHONE HCL/PF 1 MG/ML
0.25 SYRINGE (ML) INJECTION
Status: DISCONTINUED | OUTPATIENT
Start: 2022-08-18 | End: 2022-08-18 | Stop reason: HOSPADM

## 2022-08-18 RX ORDER — DOCUSATE SODIUM 100 MG/1
100 CAPSULE, LIQUID FILLED ORAL 2 TIMES DAILY
Status: CANCELLED | OUTPATIENT
Start: 2022-08-18

## 2022-08-18 RX ORDER — CALCIUM CARBONATE 200(500)MG
1000 TABLET,CHEWABLE ORAL DAILY PRN
Status: CANCELLED | OUTPATIENT
Start: 2022-08-18

## 2022-08-18 RX ORDER — ASPIRIN 81 MG/1
81 TABLET ORAL 2 TIMES DAILY
Qty: 70 TABLET | Refills: 0 | Status: SHIPPED | OUTPATIENT
Start: 2022-08-18 | End: 2022-09-22

## 2022-08-18 RX ORDER — KETOROLAC TROMETHAMINE 30 MG/ML
30 INJECTION, SOLUTION INTRAMUSCULAR; INTRAVENOUS ONCE
Status: COMPLETED | OUTPATIENT
Start: 2022-08-18 | End: 2022-08-18

## 2022-08-18 RX ORDER — SODIUM CHLORIDE, SODIUM LACTATE, POTASSIUM CHLORIDE, CALCIUM CHLORIDE 600; 310; 30; 20 MG/100ML; MG/100ML; MG/100ML; MG/100ML
75 INJECTION, SOLUTION INTRAVENOUS CONTINUOUS
Status: CANCELLED | OUTPATIENT
Start: 2022-08-18 | End: 2022-08-19

## 2022-08-18 RX ORDER — LIDOCAINE HYDROCHLORIDE AND EPINEPHRINE 10; 10 MG/ML; UG/ML
INJECTION, SOLUTION INFILTRATION; PERINEURAL AS NEEDED
Status: DISCONTINUED | OUTPATIENT
Start: 2022-08-18 | End: 2022-08-18 | Stop reason: HOSPADM

## 2022-08-18 RX ORDER — OXYCODONE HYDROCHLORIDE 5 MG/1
TABLET ORAL
Qty: 30 TABLET | Refills: 0 | Status: SHIPPED | OUTPATIENT
Start: 2022-08-18

## 2022-08-18 RX ORDER — ACETAMINOPHEN 325 MG/1
650 TABLET ORAL EVERY 6 HOURS PRN
Status: CANCELLED | OUTPATIENT
Start: 2022-08-18

## 2022-08-18 RX ORDER — OXYCODONE HYDROCHLORIDE 5 MG/1
5 TABLET ORAL EVERY 4 HOURS PRN
Status: DISCONTINUED | OUTPATIENT
Start: 2022-08-18 | End: 2022-08-18 | Stop reason: HOSPADM

## 2022-08-18 RX ORDER — HYDROMORPHONE HCL/PF 1 MG/ML
0.5 SYRINGE (ML) INJECTION EVERY 2 HOUR PRN
Status: CANCELLED | OUTPATIENT
Start: 2022-08-18 | End: 2022-08-20

## 2022-08-18 RX ORDER — ONDANSETRON 2 MG/ML
INJECTION INTRAMUSCULAR; INTRAVENOUS AS NEEDED
Status: DISCONTINUED | OUTPATIENT
Start: 2022-08-18 | End: 2022-08-18

## 2022-08-18 RX ORDER — OXYCODONE HYDROCHLORIDE 5 MG/1
10 TABLET ORAL EVERY 4 HOURS PRN
Status: DISCONTINUED | OUTPATIENT
Start: 2022-08-18 | End: 2022-08-18 | Stop reason: HOSPADM

## 2022-08-18 RX ORDER — FENTANYL CITRATE/PF 50 MCG/ML
25 SYRINGE (ML) INJECTION
Status: COMPLETED | OUTPATIENT
Start: 2022-08-18 | End: 2022-08-18

## 2022-08-18 RX ORDER — ONDANSETRON 2 MG/ML
4 INJECTION INTRAMUSCULAR; INTRAVENOUS EVERY 6 HOURS PRN
Status: CANCELLED | OUTPATIENT
Start: 2022-08-18

## 2022-08-18 RX ORDER — FOLIC ACID 1 MG/1
1 TABLET ORAL DAILY
Status: CANCELLED | OUTPATIENT
Start: 2022-08-18

## 2022-08-18 RX ORDER — BUPIVACAINE HYDROCHLORIDE 7.5 MG/ML
INJECTION, SOLUTION INTRASPINAL AS NEEDED
Status: DISCONTINUED | OUTPATIENT
Start: 2022-08-18 | End: 2022-08-18

## 2022-08-18 RX ORDER — SENNOSIDES 8.6 MG
1 TABLET ORAL DAILY
Status: CANCELLED | OUTPATIENT
Start: 2022-08-18

## 2022-08-18 RX ORDER — CEFAZOLIN SODIUM 1 G/50ML
1000 SOLUTION INTRAVENOUS EVERY 8 HOURS
Status: CANCELLED | OUTPATIENT
Start: 2022-08-18 | End: 2022-08-19

## 2022-08-18 RX ORDER — ASCORBIC ACID 500 MG
500 TABLET ORAL 2 TIMES DAILY
Status: CANCELLED | OUTPATIENT
Start: 2022-08-18

## 2022-08-18 RX ORDER — METOCLOPRAMIDE HYDROCHLORIDE 5 MG/ML
10 INJECTION INTRAMUSCULAR; INTRAVENOUS ONCE AS NEEDED
Status: DISCONTINUED | OUTPATIENT
Start: 2022-08-18 | End: 2022-08-18 | Stop reason: HOSPADM

## 2022-08-18 RX ORDER — DULOXETIN HYDROCHLORIDE 60 MG/1
60 CAPSULE, DELAYED RELEASE ORAL DAILY
Status: CANCELLED | OUTPATIENT
Start: 2022-08-18

## 2022-08-18 RX ORDER — DOCUSATE SODIUM 100 MG/1
100 CAPSULE, LIQUID FILLED ORAL 2 TIMES DAILY
Qty: 10 CAPSULE | Refills: 0 | Status: SHIPPED | OUTPATIENT
Start: 2022-08-18

## 2022-08-18 RX ORDER — CHLORHEXIDINE GLUCONATE 0.12 MG/ML
15 RINSE ORAL ONCE
Status: COMPLETED | OUTPATIENT
Start: 2022-08-18 | End: 2022-08-18

## 2022-08-18 RX ADMIN — SODIUM CHLORIDE, SODIUM LACTATE, POTASSIUM CHLORIDE, AND CALCIUM CHLORIDE: .6; .31; .03; .02 INJECTION, SOLUTION INTRAVENOUS at 09:40

## 2022-08-18 RX ADMIN — OXYCODONE HYDROCHLORIDE 10 MG: 5 TABLET ORAL at 12:21

## 2022-08-18 RX ADMIN — FENTANYL CITRATE 25 MCG: 50 INJECTION, SOLUTION INTRAMUSCULAR; INTRAVENOUS at 12:11

## 2022-08-18 RX ADMIN — FENTANYL CITRATE 25 MCG: 50 INJECTION, SOLUTION INTRAMUSCULAR; INTRAVENOUS at 12:05

## 2022-08-18 RX ADMIN — FENTANYL CITRATE 25 MCG: 50 INJECTION, SOLUTION INTRAMUSCULAR; INTRAVENOUS at 11:58

## 2022-08-18 RX ADMIN — FENTANYL CITRATE 25 MCG: 50 INJECTION, SOLUTION INTRAMUSCULAR; INTRAVENOUS at 12:15

## 2022-08-18 RX ADMIN — TRANEXAMIC ACID 1000 MG: 1 INJECTION, SOLUTION INTRAVENOUS at 09:56

## 2022-08-18 RX ADMIN — MIDAZOLAM HYDROCHLORIDE 2 MG: 1 INJECTION, SOLUTION INTRAMUSCULAR; INTRAVENOUS at 09:40

## 2022-08-18 RX ADMIN — CHLORHEXIDINE GLUCONATE 0.12% ORAL RINSE 15 ML: 1.2 LIQUID ORAL at 08:55

## 2022-08-18 RX ADMIN — CEFAZOLIN SODIUM 2000 MG: 2 SOLUTION INTRAVENOUS at 09:56

## 2022-08-18 RX ADMIN — ONDANSETRON 4 MG: 2 INJECTION INTRAMUSCULAR; INTRAVENOUS at 11:21

## 2022-08-18 RX ADMIN — KETOROLAC TROMETHAMINE 30 MG: 30 INJECTION, SOLUTION INTRAMUSCULAR at 13:18

## 2022-08-18 RX ADMIN — PROPOFOL 600 MCG/KG/MIN: 10 INJECTION, EMULSION INTRAVENOUS at 09:50

## 2022-08-18 RX ADMIN — FENTANYL CITRATE 15 MCG: 50 INJECTION, SOLUTION INTRAMUSCULAR; INTRAVENOUS at 09:48

## 2022-08-18 RX ADMIN — BUPIVACAINE HYDROCHLORIDE IN DEXTROSE 1.4 ML: 7.5 INJECTION, SOLUTION SUBARACHNOID at 09:48

## 2022-08-18 RX ADMIN — DEXAMETHASONE SODIUM PHOSPHATE 10 MG: 10 INJECTION, SOLUTION INTRAMUSCULAR; INTRAVENOUS at 09:55

## 2022-08-18 NOTE — ANESTHESIA PREPROCEDURE EVALUATION
Procedure:  ARTHROPLASTY HIP TOTAL ANTERIOR and all associated procedures (Right Hip)    Relevant Problems   CARDIO   (+) Essential hypertension      HEMATOLOGY   (+) Anemia      MUSCULOSKELETAL   (+) Chronic bilateral low back pain without sciatica   (+) Osteoarthritis of knee, unilateral   (+) Primary osteoarthritis of right hip      NEURO/PSYCH   (+) Chronic bilateral low back pain without sciatica   (+) Chronic pain syndrome      Other   (+) Dyslipidemia   (+) Obesity (BMI 35 0-39 9 without comorbidity)   (+) Prediabetes             Anesthesia Plan  ASA Score- 3     Anesthesia Type- spinal with ASA Monitors  Additional Monitors:   Airway Plan:           Plan Factors-    Chart reviewed  Induction- intravenous  Postoperative Plan-     Informed Consent- Anesthetic plan and risks discussed with patient  I personally reviewed this patient with the CRNA  Discussed and agreed on the Anesthesia Plan with the CRNA  Silvia Braden

## 2022-08-18 NOTE — OCCUPATIONAL THERAPY NOTE
Occupational Therapy Evaluation      Trev Jansen    2022    Patient Active Problem List   Diagnosis    Anemia    Constipation    Dyslipidemia    Essential hypertension    History of total knee arthroplasty, left    Osteoarthritis of knee, unilateral    Prediabetes    Dystonia    Hip pain    Primary osteoarthritis of right hip    Chronic bilateral low back pain without sciatica    Chronic pain syndrome    Obesity (BMI 35 0-39 9 without comorbidity)    Status post total hip replacement, right       Past Medical History:   Diagnosis Date    Arthritis     Balance disorder     Hyperlipidemia     Vertigo        Past Surgical History:   Procedure Laterality Date     SECTION      COLONOSCOPY      HYSTERECTOMY      HYSTEROSCOPY W/ ENDOMETRIAL ABLATION          22 1338   OT Last Visit   OT Visit Date 22   Note Type   Note type Evaluation  (+ treatment)   Restrictions/Precautions   Weight Bearing Precautions Per Order Yes   RLE Weight Bearing Per Order WBAT  (s/p anterior THR by Dr Kelechi Shaffer)   Other Precautions WBS;THR;Pain; Fall Risk;Multiple lines;Telemetry   Pain Assessment   Pain Assessment Tool 0-10   Pain Score 4   Pain Location/Orientation Orientation: Right;Location: Hip   Pain Radiating Towards lower back   Pain Onset/Description Onset: Ongoing;Frequency: Constant/Continuous   Effect of Pain on Daily Activities limits activity tolerance, comfort   Hospital Pain Intervention(s) Cold applied; Ambulation/increased activity; Elevated;Repositioned   Multiple Pain Sites No   Home Living   Type of [address] [facility] One level;Stairs to enter with rails; Performs ADLs on one level; Able to live on main level with bedroom/bathroom; Laundry in basement  (2-3  ANGELITA c grab bar through garage )   Bathroom Shower/Tub Tub/shower unit   [address] [place] [address] Dr ferguson;Commode;Grab bars in shower  (BSC not used at baseline)   [address] Walker;Cane;Grab bars; Other (Comment)  (transport chair, No AD used at baseline)   Prior Function   Level of [place] Independent with ADLs and functional mobility   Lives With Spouse   Receives Help From Family; Neighbor   ADL Assistance Independent   IADLs Independent  (shares home management tasks c spouse)   Falls in the last 6 months 1 to 4  (3 falls, mechanical  Pt reports 1 fall yesterday while negoiating steps)   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)   Comments (+) driving, works part time as caregiver however reports taking time off for sx  Pt reports limitations d/t hip pain, however is able to complete ADL/IADL tasks with increased time  Spouse and neighbor to assist PRN upon D/C   Lifestyle   Autonomy PTA, pt reports being (I) with ADL/IADLs  Lives c spouse in 1 story home c 2-3 ANGELITA  (-) AD, (+) working (+) driving   Reciprocal Relationships spouse, neighbor   Service to Others works part time as caregiver   ADL   [address] 7  Independent   Grooming Assistance 7  [address] 7  [address] [facility] 5  [facility] 66  7  [address] [facility] 5  [address]  5  [phone] [facility] 5  Supervision/Setup   Additional Comments Pt limited by pain, decreased activity tolerance, decreased hip flexion and balance   Bed Mobility   Supine to Sit 3  Moderate assistance   Additional items Assist x 1; Increased time required;HOB elevated;LE management; Bedrails   Sit to Supine   (DNT: pt seated at EOB at end of session)   Additional Comments denied lightheadness/dizziness c positional changes, VS WNL   Transfers   Sit to Stand 4  Minimal assistance  (CGA)   Additional items Increased time required;Verbal cues   Stand to Sit   (CGA)   Additional items Verbal cues; Increased time required   Additional Comments VC for proper hand placement with good application and carryover  Functional Mobility   Functional Mobility 5  Supervision  (CGA progressing to supervision)   Additional Comments functional mobility household distance, increased time required with decreased WBing tolerance on R hip, however with further mobility, progressed to supervision  No LOB, appropriate RW management   Additional items Rolling walker   Balance   Static Sitting Good   Dynamic Sitting Fair +   Static Standing Fair +  (c RW)   Dynamic Standing Fair  (c RW)   Activity Tolerance   Activity Tolerance Patient limited by pain   Medical Staff Made Aware Pt benefited from co-evaluation of skilled OT and PT therapists in order to most appropriately address functional deficits d/t extensive assistance required for safe functional mobility, decreased activity tolerance, and regression from functioning level prior to admission and/or onset of present illness  OT/PT objectives were addressed separately; please see PT note for specific goal areas targeted  Nurse Made Aware SUNITHA Foster pre/ post session   RUE Assessment   RUE Assessment WFL  (MMT 4+/5 based on functional assessment)   LUE Assessment   LUE Assessment WFL  (MMT 4+/5 based on functional assessment)   Hand Function   Gross Motor Coordination Functional   Fine Motor Coordination Functional   Cognition   Overall Cognitive Status WFL   Arousal/Participation Alert   Attention Within functional limits   Orientation Level Oriented X4   Memory Within functional limits   Following Commands Follows all commands and directions without difficulty   Comments Pt agreeable to OT session   Assessment   Limitation Decreased ADL status; Decreased UE strength;Decreased endurance;Decreased self-care trans;Decreased high-level ADLs   Prognosis Good   Assessment Patient is a 73 y o  female seen for OT evaluation + treatment at [address] following admission on 8/18/2022  s/p Status post total hip replacement, right   Comorbidities impacting functional performance include: anemia, constipation, dyslipidemia, HTN, OA of R hip  Patient presents with active orders for OT eval and treat and Activity as tolerated   Performed at least 2 patient identifiers during session including name and wristband  PTA, pt reports being (I) with ADL/IADLs  Lives c spouse in 1 story home c 2-3 ANGELITA  (-) AD, (+) working (+) driving  Upon initial evaluation, patient is independent for UB ADLs, supervision for LB ADLs, and supervision for transfers and functional mobility within room and bathroom with RW  However, following tx session, pt progressed to modified independent c dressing + functional mobility  Based on functional eval, patient presents A&Ox4 with intact  attention, intact  safety awareness, and intact  short term and long term memory  Occupational performance is affected by the following deficits: decreased standing tolerance for self care tasks , decreased dynamic balance impacting functional reach, decreased activity tolerance  and (+) pain  Based on these findings, functional performance deficits, and medical acuity pt has been identified as a high complexity evaluation  Personal factors impacting performance at the time of evaluation include: decreased (+) Hx of falls  and steps to enter home  Patient would benefit from OT services to maximize level of functional independence and safety in self-care and transfers  Occupational areas to address include:bathing/showering, toileting, dressing , bed mobility , functional mobility, transfer to all surfaces and fall prevention   From OT standpoint, recommendation at time of D/C would be return to previous environment with no rehabilitation needs  Goals   Patient Goals to return to work soon, increase ADL independence  Plan   Treatment Interventions ADL retraining;Functional transfer training; Endurance training;Patient/family training;Equipment evaluation/education; Compensatory technique education   Goal Expiration Date 08/28/22   OT Treatment Day 0   OT Frequency 2-3x/wk   Additional Treatment Session   Start Time 1400   End Time 1413   Treatment Assessment Pt participated in tx session following IE for ADL training and to further challenge activity tolerance  Pt completed dressing seated at EOB  Education for compensatory techniques for pain/ decreased hip flexibility during LB dressing, pt able to verbalize and demonstrate understanding  Completed UB+ LB dressing with modified independence; able to thread BLE through underwear and pants without assistance or steadying  Donned/doffed B/L socks + shoes without assistance needed  Additional sit<>stand transfers completed mod (I) with use of RW  Pt education for safe sitting surfaces and placement of RW during LB dressing, as well as proper body mechanics for safe car transfers  Encouraged pt to continue c regular OOB mobility throughout healing process for pain management and to increase activity tolerance  Pt and spouse verbalized understanding  Following tx session, pt demonstrates adequate functional independence and safety for POD#0 D/C  Additional Treatment Day 1   Recommendation   OT Discharge Recommendation No rehabilitation needs   Additional Comments  Pt resting at EOB at end of session with all needs met   Additional Comments 2 The patient's raw score on the AM-PAC Daily Activity inpatient short form is 24, standardized score is 57 54, greater than 39 4  Patients at this level are likely to benefit from discharge to home  Please refer to the recommendation of the Occupational Therapist for safe discharge planning     AM-PAC Daily Activity Inpatient   Lower Body Dressing 4   Bathing 4   Toileting 4   Upper Body Dressing 4   Grooming 4   Eating 4   Daily Activity Raw Score 24   Daily Activity Standardized Score (Calc for Raw Score >=11) 57 54   AM-PAC Applied Cognition Inpatient   Following a Speech/Presentation 4   Understanding Ordinary Conversation 4   Taking Medications 4   Remembering Where Things Are Placed or Put Away 4   Remembering List of 4-5 Errands 4   Taking Care of Complicated Tasks 4   Applied Cognition Raw Score 24   Applied Cognition Standardized Score 62 21       Pt will complete LB ADLs Mod independent   with use of AE as needed for increased ADL independence within 10 days  Pt will complete toileting Mod independent   with use of DME for increased ADL independence within 10 days  Pt will demonstrate proper body mechanics to complete transfers and functional mobility with Mod independent  and use of AD for increased safety and functional mobility within 10 days  Pt will demonstrate standing tolerance of 5 with Mod independent  and use of AD for increased endurance and activity tolerance within 10 days  Pt will demonstrate OOB sitting tolerance of 2-4 hr/day for increased activity tolerance and engagement within 10 days       Pt will demonstrate proper body mechanics and fall prevention strategies during 100% of tx sessions for increased safety awareness during ADL/IADLs  Jonatan Garland, OT

## 2022-08-18 NOTE — ANESTHESIA PROCEDURE NOTES
Spinal Block    Patient location during procedure: OR  Reason for block: procedure for pain and at surgeon's request  Staffing  Performed: CRNA   Anesthesiologist: Cally Reina DO  Resident/CRNA: Kori Robledo CRNA  Preanesthetic Checklist  Completed: patient identified, IV checked, site marked, risks and benefits discussed, surgical consent, monitors and equipment checked, pre-op evaluation and timeout performed  Spinal Block  Patient position: sitting  Prep: ChloraPrep  Patient monitoring: cardiac monitor and frequent blood pressure checks  Approach: midline  Location: L3-4  Injection technique: single-shot  Needle  Needle type: pencil-tip   Needle gauge: 25 G  Needle length: 10 cm  Assessment  Sensory level: T4  Injection Assessment:  negative aspiration for heme, no paresthesia on injection and positive aspiration for clear CSF    Post-procedure:  adhesive bandage applied, pressure dressing applied, secured with tape, site cleaned and sterile dressing applied

## 2022-08-18 NOTE — ANESTHESIA POSTPROCEDURE EVALUATION
Post-Op Assessment Note    CV Status:  Stable  Pain Score: 0    Pain management: adequate     Mental Status:  Alert and awake   Hydration Status:  Euvolemic   PONV Controlled:  Controlled   Airway Patency:  Patent      Post Op Vitals Reviewed: Yes      Staff: Anesthesiologist, CRNA         No complications documented      BP   129/79   Temp   97 6   Pulse 74   Resp   18   SpO2   95

## 2022-08-18 NOTE — DISCHARGE INSTRUCTIONS
Discharge Instructions - Orthopedics  Beck Gave 73 y o  female MRN: [id number]  Unit/Bed#: [facility] OR MAIN    Weight Bearing Status:                                           Weight Bearing as tolerated to the right lower extremity  Anterior total hip precautions    DVT prophylaxis:  Complete course of Aspirin 81 mg twice daily x 35 days from date of surgery as directed    Pain:  Start oxycodone 5 mg every 6 hrs after last dose taken after surgery for 1 day  Transition to oxycodone 5 mg every 6 hours as needed    Showering Instructions:   Do not shower until 5 days from date of surgery  Do not soak your incision(Tubs, Jacuzzi's, pools, ext)    Dressing Instructions: May remove dressing 5 days from date of surgery or may leave dressing in place until follow up office visit 2 weeks from surgery date  Keep dressing/incision clean, dry and intact until follow up appointment  Do not apply any creams or ointments/lotions to your incisions including antibiotic ointment, peroxide    Driving Instructions:  No driving until cleared by Orthopaedic Surgery  PT/OT:  Continue PT/OT on outpatient basis as directed    Appt Instructions: If you do not have your appointment, please call the clinic at [phone]  Otherwise followup as scheduled    Contact the office sooner if you experience any increased numbness/tingling in the extremities

## 2022-08-18 NOTE — INTERVAL H&P NOTE
H&P reviewed  After examining the patient I find no changes in the patients condition since the H&P had been written      Vitals:    08/18/22 0859   BP: 137/86   Pulse: 86   Resp: 16   Temp: 98 6 °F (37 °C)   SpO2: 98%   Patient seen and examined  To the OR for right anterior MUSA  Pros and cons of op and non-op intervention discussed with patient  We will follow up postoperatively

## 2022-08-18 NOTE — OP NOTE
[id number]                                                                                                        [facility] OR MAIN    PreOp Dx: right hip DJD    Postop Dx: right hip DJD    Procedure: right anterior total hip arthroplasty    Surgeon: Mar Lee MD    Assistants: Romy Salamanca MD, Luis Reddy PA-C    Fluids:     EBL:     Drains: none    Specimens: none    Complications: none    Condition: stable,Transferred to postanesthesia care unit  Implants: Depuy      Acetabulum size 50    Acetabular poly 50/32 neutral    Femur Actis, size 6    Femoral head size 32/+1 ceramic    73 y o  female , presents at this, time, secondary to failed conservative treatment of right hip DJD for right total hip arthroplasty  Patient states that the left lower extremity is longer than the right  The patient was told of all the pros and cons of operative and nonoperative intervention  Some of the complications of operative intervention include infection, bleeding, scarring, nerve injury, vascular injury,leg length discrepancy, hip dislocation, continued pain, decreased range of motion, DVT, PE death, loss of limb, fracture, need for further surgery, not obtain an results  The patient wished  Patient did consent for operative intervention for this pathology  Patient was brought to the operating room  Patient was anesthetized as anesthesia team  Patient was prepped and draped in normal sterile fashion  After this was done, we did conduct a time out to make sure correct  Patient was in the room, prepped marked and draped  Implants were in the room, Rep  For the implants were present, DVT prophylaxis and antibiotics were dressed  An anterior approach was used  Incision was made 2 cm lateral and 1 cm distal to the ASIS and extended this incision distally   After going through skin, fatty tissue, fascia dimitri was identified and incised  The fascial layer Was incised going towards the ASIS And extending it distally to incorporate our entire incision  We were able to go through the internervous planes until we were able to identify the anterior perforating vessels  Once this was done with able to obtain hemostasis  Able to excise the pre-capsular, fatty tissue  After this was done, we were able to do all releases, which included, the inferior portion of the femoral neck going towards the lesser trochanteric region, the iliocapsularis, and also, the piriformis recess  We then were able to make our femoral neck cut  Femoral head was removed  Labrum was excised  Fatty tissue within the acetabulum was also excised  At this, time  We did remain to be appropriate theta angle and anteversion  We did place a trial acetabular component, and with the aid of radiographic imaging  It was noted that all was appropriate  At this, time, we were able to place, our true acetabular component  Our theta angle and anteversion were noted to be appropriate  At this time, we are able to place, our true acetabular poly-ethylene  At this, time  We did release on the medial aspect of the greater trochanteric region  We externally rotated  The femur as well as extending at the hip and adducting the femur to obtain appropriate visualization of the proximal femur  We were able to use our box osteotome, canal finder, and rasp in order to make sure there, we avoided any varus, placement of our implant  We did broach to appropriate size, making sure we had correct version  Calcar planer was then used  Trial femoral neck and head were placed  Patient was placed through provocative ranges of motion to make sure that stability was obtained  Radiographic imaging confirmed appropriate leg lengths  At this time  We did remove a trial femoral neck and head  After the hip was dislocated   All broach handle was placed within a trial femoral component to make sure that the implant was still stable  Once this was confirmed, we did remove the trial femoral component  Copious irrigation was used at the operative site  True femoral implant was placed  Trial femoral head was placed once more in order to confirm appropriate leg lengths and appropriate stability  Once as noted to be appropriate, we placed a true femoral head  We did check for stability, leg lengths and final radiographs were taken  Irrigation was used once more at the operative site  Fascial layer was reapproximated with barbed sutures  2-0 Vicryl sutures were used for the subcutaneous layer  Monocryl sutures were utilized for the subcuticular closure  The wounds were cleaned and dried  Exofin sealant was used to augment the closure  4 x 4, ABDs, and Tegaderm was placed   Patient was subsequently awakened noted to be in stable condition and transferred to postanesthesia care unit

## 2022-08-18 NOTE — PHYSICAL THERAPY NOTE
PHYSICAL THERAPY EVALUATION  DATE: 22  TIME: 5623-8498    NAME:  Angeli Armstrong  AGE:   73 y o   Mrn:   [id number]  Length Of Stay: 0    ADMIT DX:  Primary osteoarthritis of right hip [M16 11]    Past Medical History:   Diagnosis Date    Arthritis     Balance disorder     Hyperlipidemia     Vertigo      Past Surgical History:   Procedure Laterality Date     SECTION      COLONOSCOPY      HYSTERECTOMY      HYSTEROSCOPY W/ ENDOMETRIAL ABLATION         Performed at least 2 patient identifiers during session: Name, [place], and ID bracelet     22 3212   PT Last Visit   PT Visit Date 22   Note Type   Note type Evaluation   Pain Assessment   Pain Assessment Tool 0-10   Pain Score 4  (4/10 at beginning of session, improves to 2/10 at end of session)   Pain Location/Orientation Orientation: Right;Location: Hip   Pain Onset/Description Onset: Ongoing; Descriptor: Sore   Effect of Pain on Daily Activities limits gait mechanics and mobility tolerance   Patient's Stated Pain Goal No pain   Hospital Pain Intervention(s) Cold applied;Repositioned; Ambulation/increased activity; Emotional support   Multiple Pain Sites No   Restrictions/Precautions   Weight Bearing Precautions Per Order Yes   RLE Weight Bearing Per Order WBAT  (s/p R anterior MUSA by Dr Michelle Reynoso 2022)   Other Precautions WBS;THR;Fall Risk;Pain   Home Living   Type of [address] [place] [address] One level;Stairs to enter with rails; Laundry in basement  (2-3 ANGELITA w/ GB through garage, then FFSU)   Bathroom Shower/Tub Tub/shower unit   [employer] Grab bars in shower; Shower chair;Commode  (toilet armrest attachment; BSC not utilized at baseline)   Bathroom Accessibility Accessible via walker   Home Equipment Walker;Cane;Other (Comment)  (transport WC)   Additional Comments Pt utilizes no AD at baseline     Prior Function   Level of [place] Independent with ADLs and functional mobility   Lives With Spouse   Receives Help From Family; [address] in the last 6 months 1 to 4  (pt reports 3 falls in 6months; states 1 fall day prior to surgery while negotiating basement steps)   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)   Comments 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  Ambulates with no AD  Is independent with ADLs and IADLs  +drives  Spouse and neighbor to assist post up on d/c  General   Additional Pertinent History Pt presents POD0 s/p R anterior MUSA by Dr Gemma Mcardle 8/18/2022  WBAT R LE  Family/Caregiver Present Yes  (spouse present t/o session)   Cognition   Overall Cognitive Status WFL   Arousal/Participation Alert   Attention Within functional limits   Orientation Level Oriented X4   Memory Within functional limits   Following Commands Follows all commands and directions without difficulty   Subjective   Subjective "This is much better than I expected "   RUE Assessment   RUE Assessment WFL   LUE Assessment   LUE Assessment WFL   RLE Assessment   RLE Assessment X  (2/5 at hip, 3/5 at knee, 3+/5 at ankle -- post op R anterior MUSA)   LLE Assessment   LLE Assessment WFL   Coordination   Movements are Fluid and Coordinated 1   Sensation WFL   Light Touch   RLE Light Touch Grossly intact   LLE Light Touch Grossly intact   Proprioception   RLE Proprioception Grossly intact   LLE Proprioception Grossly Intact   Bed Mobility   Supine to Sit 3  Moderate assistance   Additional items Assist x 1;HOB elevated; Bedrails; Increased time required;Verbal cues;LE management   Sit to Supine Unable to assess   Additional Comments Pt with increased difficulty with bed mobility, therapist discussed option of sleeping in recliner until more comfortable and able with bed mobility  Pt was left seated at EOB with spouse and OT present in room     Transfers   Sit to Stand 5  Supervision  (CGA progressed to S w/ increased reps t/o session) Additional items Assist x 1; Increased time required;Verbal cues   Stand to Sit 5  Supervision  (CGA progressed to S w/ increased reps t/o session)   Additional items Assist x 1;Verbal cues; Increased time required   Additional Comments Cues for hand and foot placement for safe transfer techniques and optimal body mechanics  Pt with good application and carry over t/o session  Ambulation/Elevation   Gait pattern Antalgic;Decreased R stance; Short stride   Gait Assistance 5  Supervision  (CGA progressed to S with increased amb distance)   Additional items Assist x 1;Verbal cues   Assistive Device Rolling walker   Distance 120ft, 20ft   Stair Management Assistance 5  Supervision  (CGA progressed to S with increased reps)   Additional items Assist x 1;Verbal cues; Increased time required   Stair Management Technique Nonreciprocal  (on standard height single portable step)   Number of Stairs 7  (4 step up/down forward, then 3 steps up fwd and down backwards)   Balance   Static Sitting Good   Dynamic Sitting Fair +   Static Standing Fair +  (w/ UE support on Rw)   Dynamic Standing Fair +  (w/ UE support on Rw)   Ambulatory Fair +  (w/ UE support on Rw)   Endurance Deficit   Endurance Deficit No   Activity Tolerance   Activity Tolerance Patient limited by pain   Medical Staff Made Aware Spoke with CM, OT   Nurse Made Aware Spoke with SUNITHA Foster pre/post session   Assessment   Prognosis Good   Problem List Decreased strength;Decreased range of motion; Impaired balance;Decreased mobility; Decreased skin integrity;Orthopedic restrictions;Pain;Obesity   Assessment Pt seen for PT evaluation POD #0 s/p elective R anterior MUSA by Dr Jenna Cristina on 8/18/2022  Pt cleared by SUNITHA Gabriel for evaluation  Comorbidities affecting pt's fnxl performance include: arthritis, L TKA, anemia, HTN, obesity, chronic pain synrome, chronic back pain   PTA, pt was independent with functional mobility without assistive device, independent with ADLS, independent with IADLS, living with her spouse in a 1 level home with 3 steps to enter and works part time  Pt demonstrates impairments in R LE strength and ROM, balance, pain, gait mechanics, ambulatory tolerance  Pt was educated on importance of frequent mobility & exercises, WBing and fall precautions, & implications of not maintaining precautions  Pt verbalizes good understanding, will continue to assess carry over  Pt scores 17/24 on AM-PAC objective tool w/ a standardized score of 39 67, indicating pt demonstrates functional capacity for return to home self care vs with increased supports upon d/c  Barthel Index outcome tool was used & pt scores 70 indicating moderate limitations of fnxl mobility/ADLS  Pt is at risk of falls d/t multiple comorbidities, h/o falls, impaired balance, use of ambulatory aid, varying levels of pain  and acuity of medical illness  Pt will benefit from continued BID PT treatment in order to address impairments, decrease risk of falls, maximize independence w/ fnxl mobility, & ensure safety w/ mobility for transition to next level of care  Pt would most appropriately benefit from outpatient PT services  Pt safe and cleared for POD0 d/c to home with OPPT and family/friends support  Barriers to Discharge None   Goals   Patient Goals to return to work   STG Expiration Date 08/23/22   Short Term Goal #1 Patient PT goals established in order to maximize mobility & safety, and progress to next level of PT care   Pt will: complete all bed mobility independently in flat bed in order to promote independence & simulate a home environment; complete all transfers w/ RW at [facility] level in order to increase safety &  independence; ambulate >250ft with RW at [facility] level in order to increase safety with household and community functional mobility; negotiate 3 stairs with HR assist at [facility] level in order to independently access her home; improve R LE strength to >/= 4/5 MMT t/o in order to increase safety & decrease risk of falls; demonstrate independence w/ 3-5 LE strengthening exercises to facilitate independence w/ HEP; improve AM-PAC score to >/= 22/24 in order to increase independence with mobility and decrease burden of care; improve Barthel Index score to >/= 80/100 in order to increase independence and decrease risk of falls  PT Treatment Day 0   Plan   Treatment/Interventions Functional transfer training;LE strengthening/ROM; Elevations; Therapeutic exercise; Endurance training;Patient/family training;Bed mobility;Gait training;Spoke to nursing;Spoke to case management   PT Frequency Twice a day   Recommendation   PT Discharge Recommendation Home with outpatient rehabilitation   AM-PAC Basic Mobility Inpatient   Turning in Bed Without Bedrails 3   Lying on Back to Sitting on Edge of Flat Bed 2   Moving Bed to Chair 3   Standing Up From Chair 3   Walk in Room 3   Climb 3-5 Stairs 3   Basic Mobility Inpatient Raw Score 17   Basic Mobility Standardized Score 39 67   Highest Level Of Mobility   -[facility] Goal 5: Stand one or more mins   JH-HLM Achieved 7: Walk 25 feet or more   Modified Jo Scale   Modified Rabun Scale 3   Barthel Index   Feeding 10   Bathing 0   Grooming Score 5   Dressing Score 5   Bladder Score 10   Bowels Score 10   Toilet Use Score 5   Transfers (Bed/Chair) Score 10   Mobility (Level Surface) Score 10   Stairs Score 5   Barthel Index Score 70     The patient's AM-PAC Basic Mobility Inpatient Short Form Raw Score is 17  A Raw score of greater than 16 suggests the patient may benefit from discharge to home  Please also refer to the recommendation of the Physical Therapist for safe discharge planning        Dimitri Jasso PT, DPT   Available via [email or web address]  [facility] # [id number]  [place] License - [id number]  1/21/6480

## 2022-08-19 ENCOUNTER — TELEPHONE (OUTPATIENT)
Dept: OBGYN CLINIC | Facility: HOSPITAL | Age: 72
End: 2022-08-19

## 2022-08-19 NOTE — TELEPHONE ENCOUNTER
Patient was contacted this morning to complete a postoperative follow-up call assessment  She reports being sore but is ambulating with a rolling walker, she has outpatient physical therapy out of network scheduled for Monday  She reports swelling is a little swollen, and we discussed that she should continue icing after increased activity for 7-10 days  She does report that she has been icing 20 minutes on, she also reports her dressing is dry with no drainage  We reviewed her after visit summary medication list   She reports taking oxycodone 5 mg every 6 hours for pain, and just took it this morning  She is also taking Tylenol, 3 times a day and we discussed 1000 mg  She confirmed taking ASA 81mg BID and colace 100mg BID (denies yet having a BM)  She denies any chest pain, shortness of breath, dizziness, fever, or calf pain  She denies any questions or concerns at this time  Patient was advised to contact our team with any questions or issues arise at any point  No further action required at this time, patient is ready for CM handoff

## 2022-08-25 ENCOUNTER — OFFICE VISIT (OUTPATIENT)
Dept: INTERNAL MEDICINE CLINIC | Facility: CLINIC | Age: 72
End: 2022-08-25
Payer: MEDICARE

## 2022-08-25 VITALS
SYSTOLIC BLOOD PRESSURE: 124 MMHG | BODY MASS INDEX: 34.99 KG/M2 | DIASTOLIC BLOOD PRESSURE: 78 MMHG | WEIGHT: 210 LBS | HEIGHT: 65 IN

## 2022-08-25 DIAGNOSIS — D64.9 ANEMIA, UNSPECIFIED TYPE: Primary | ICD-10-CM

## 2022-08-25 DIAGNOSIS — I10 ESSENTIAL HYPERTENSION: ICD-10-CM

## 2022-08-25 DIAGNOSIS — E78.5 DYSLIPIDEMIA: ICD-10-CM

## 2022-08-25 DIAGNOSIS — M16.11 PRIMARY OSTEOARTHRITIS OF RIGHT HIP: ICD-10-CM

## 2022-08-25 DIAGNOSIS — E66.9 OBESITY (BMI 35.0-39.9 WITHOUT COMORBIDITY): ICD-10-CM

## 2022-08-25 DIAGNOSIS — Z96.641 STATUS POST TOTAL HIP REPLACEMENT, RIGHT: ICD-10-CM

## 2022-08-25 PROCEDURE — 99214 OFFICE O/P EST MOD 30 MIN: CPT | Performed by: INTERNAL MEDICINE

## 2022-08-25 RX ORDER — FERROUS SULFATE 325(65) MG
1 TABLET ORAL
COMMUNITY
Start: 2022-07-05

## 2022-08-25 RX ORDER — AMOXICILLIN 500 MG/1
CAPSULE ORAL
COMMUNITY
Start: 2022-05-23

## 2022-08-25 NOTE — PROGRESS NOTES
SOC-INTERNAL MEDICINE POST-OPERATIVE VISIT      NAME: Memo Armstrong  AGE: 73 y o  SEX: female  : 1950     DATE: 2022     Internal Medicine Pre-Operative Evaluation:     Reason for Visit: Post-operative follow up SOC-IM     Surgery: Arthroplasty of right Hip  Surgeon: Dr Lars Cacramo  Primary Care  Provider: Mainor Messer MD    Interval History     Pt is seen for post operative follow up for elective arthroplasty  Post operatively they are doing well, they are utilizing pain meds prescribed by their surgical team effectively  We discussed non pharmacologic measures such as elevating when sitting, applying ice in 20 minute intervals with barrier in between  I have also encouraged them to get up at least every 30 minutes and ambulate short distances to prevent edema and blood clots  They verify taking their post operative DVT prophylaxis as directed by their surgeon  The patient has had BM since discharge  She did not however have one until day #8  She had to manually disimpact herself  She will cont to take stool softeners and miralax  I also encouraged plenty of hydration, again ambulating short distances every 30 minutes as well as high fiber diet  They are taking DVT prophylaxis medications as directed without any issues    They continue to participate in therapy without any issues and as directed by their surgeon  They have resumed all of their pre operative medications as instructed  They have scheduled f/u with orthopedics as well as with their PCP           Assessment  Plan:     Status Post Total Joint Arthroplasty  · doing well post right Hip  arthroplasty  · Following surgeon recommended DVT prophylaxis  · attending physical therapy sessions and progressing  · current pain scale reported 5/10  · follow up appointment scheduled for incision check and surgical follow up with orthopedics  · medication reconciliation performed  · patient to resume follow up care with their PCP for ongoing care of their pre-surgical co-morbidities    HTN  · Stable  · Back on all BP meds  · No dizziness    Depression  · stable  Patient's additional co morbidities as below which will have ongoing medical management per the patients PCP:    Patient Active Problem List   Diagnosis    Anemia    Constipation    Dyslipidemia    Essential hypertension    History of total knee arthroplasty, left    Osteoarthritis of knee, unilateral    Prediabetes    Dystonia    Hip pain    Primary osteoarthritis of right hip    Chronic bilateral low back pain without sciatica    Chronic pain syndrome    Obesity (BMI 35 0-39 9 without comorbidity)    Status post total hip replacement, right         Review of Systems:      No TIA's or unusual headaches, no dysphagia  No prolonged cough  No dyspnea or chest pain on exertion  No abdominal pain, change in bowel habits, black or bloody stools  No urinary tract or BPH symptoms  Pt with mild pain in surgical joint  Current Medications:     Current Outpatient Medications   Medication Instructions    acetaminophen (TYLENOL) 650 mg, Oral, Every 8 hours PRN    ascorbic acid (VITAMIN C) 500 mg, Oral, 2 times daily    aspirin (ECOTRIN LOW STRENGTH) 81 mg, Oral, 2 times daily, Please do not start taking until after total joint arthroplasty    docusate sodium (COLACE) 100 mg, Oral, 2 times daily    DULoxetine (CYMBALTA) 60 mg delayed release capsule Take 1 PO HS      ferrous sulfate 324 mg, Oral, 2 times daily before meals    folic acid (FOLVITE) 1 mg, Oral, Daily    gabapentin (NEURONTIN) 300 mg, Oral, 3 times daily    hydrochlorothiazide (HYDRODIURIL) 25 mg, Oral, Daily    Multiple Vitamins-Minerals (CENTRUM SILVER PO) Oral    Multiple Vitamins-Minerals (multivitamin with minerals) tablet 1 tablet, Oral, Daily    oxyCODONE (ROXICODONE) 5 immediate release tablet Take 1 tablets every 6 hrs as needed for pain control    potassium chloride (K-DUR,KLOR-CON) 20 mEq tablet 20 mEq, Oral, Daily    rosuvastatin (CRESTOR) 20 mg, Oral, Daily    VITAMIN E PO Oral          Past History:       Past Medical History:   Diagnosis Date    Arthritis     Balance disorder     Hyperlipidemia     Vertigo     Past Surgical History:   Procedure Laterality Date     SECTION      COLONOSCOPY      HYSTERECTOMY      HYSTEROSCOPY W/ ENDOMETRIAL ABLATION      IN TOTAL HIP ARTHROPLASTY Right 2022    Procedure: ARTHROPLASTY HIP TOTAL ANTERIOR and all associated procedures;  Surgeon: Huseyin Aragon MD;  Location: [facility];  Service: Orthopedics         Physical Exam:      There were no vitals taken for this visit      General Appearance: no distress, conversive  HEENT: PERRLA, conjuctiva normal; oropharynx clear; mucous membranes moist;   Neck:  Supple, no lymphadenopathy or thyromegaly  Lungs: CTA, normal respiratory effort, no retractions, expiratory effort normal  CV: regular rate and rhythm , PMI normal   ABD: soft non tender, no masses , no hepatic or splenomegaly  EXT: DP pulses intact, no lymphadenopathy, no edema ;  right HIP dressing in place  Skin: normal turgor, normal texture, no rash  Psych: affect normal, mood normal  Neuro: AAOx3      Time of visit including pre-visit chart review, visit and post-visit coordination of plan and care , review of pre-surgical lab work, preparation and time spent documenting note in electronic medical record, time spent face-to-face in physical examination answering patient questions: 30 minutes       Angie Erwin, [address]

## 2022-08-25 NOTE — PATIENT INSTRUCTIONS
Follow up with orthopedic surgery as scheduled for incision and post operative care  Follow up with PCP for ongoing medical care as previous to surgery  You are released form care from the surgical optimization medical team however we remain available should you have any questions in the future  Call [phone] with any future concerns or questions related to your surgery

## 2022-09-09 ENCOUNTER — OFFICE VISIT (OUTPATIENT)
Dept: OBGYN CLINIC | Facility: CLINIC | Age: 72
End: 2022-09-09

## 2022-09-09 ENCOUNTER — TELEPHONE (OUTPATIENT)
Dept: OBGYN CLINIC | Facility: CLINIC | Age: 72
End: 2022-09-09

## 2022-09-09 ENCOUNTER — APPOINTMENT (OUTPATIENT)
Dept: RADIOLOGY | Facility: AMBULARY SURGERY CENTER | Age: 72
End: 2022-09-09
Payer: MEDICARE

## 2022-09-09 VITALS
BODY MASS INDEX: 34.99 KG/M2 | DIASTOLIC BLOOD PRESSURE: 80 MMHG | WEIGHT: 210 LBS | HEIGHT: 65 IN | HEART RATE: 87 BPM | SYSTOLIC BLOOD PRESSURE: 127 MMHG

## 2022-09-09 DIAGNOSIS — M25.561 RIGHT KNEE PAIN, UNSPECIFIED CHRONICITY: Primary | ICD-10-CM

## 2022-09-09 DIAGNOSIS — M25.561 RIGHT KNEE PAIN, UNSPECIFIED CHRONICITY: ICD-10-CM

## 2022-09-09 DIAGNOSIS — Z96.641 S/P HIP REPLACEMENT, RIGHT: ICD-10-CM

## 2022-09-09 PROCEDURE — 99024 POSTOP FOLLOW-UP VISIT: CPT | Performed by: PHYSICIAN ASSISTANT

## 2022-09-09 PROCEDURE — 73562 X-RAY EXAM OF KNEE 3: CPT

## 2022-09-09 PROCEDURE — 73502 X-RAY EXAM HIP UNI 2-3 VIEWS: CPT

## 2022-09-09 NOTE — LETTER
September 9, 2022     Patient: Sharan Gomez  YOB: 1950  Date of Visit: 9/9/2022       To Whom it May Concern:    Zigmund Drain is under my professional care  Genogabriel Crabtree was seen in my office on 9/9/2022  Please the excuse the patient from work from 8/16/2022 until further follow up with Orthopedic surgery  If you have any questions or concerns, please don't hesitate to call           Sincerely,          Osei Russ PA-C        CC: Sharan Gomez

## 2022-09-09 NOTE — PROGRESS NOTES
73 y o female presents for Three weeks postoperative visit status post right  Anterior MUSA  Patient states she is doing well regarding her right hip she did fall in her right knee several days ago she states having some pain in her right knee which is mildly improving  Patient is ambulate without the assistance of a cane or walker doing very well physical therapy denies any limitations in her ambulatory status states she only occasionally needs x-rays strength Tylenol she denies any calf pain chest pain shortness breath numbness tingling      Review of Systems  Review of systems negative unless otherwise specified in HPI    Past Medical History  Past Medical History:   Diagnosis Date    Arthritis     Balance disorder     Hyperlipidemia     Vertigo        Past Surgical History  Past Surgical History:   Procedure Laterality Date     SECTION      COLONOSCOPY      HYSTERECTOMY      HYSTEROSCOPY W/ ENDOMETRIAL ABLATION      MT TOTAL HIP ARTHROPLASTY Right 2022    Procedure: ARTHROPLASTY HIP TOTAL ANTERIOR and all associated procedures;  Surgeon: Magui Figueroa MD;  Location:  MAIN OR;  Service: Orthopedics       Current Medications  Current Outpatient Medications on File Prior to Visit   Medication Sig Dispense Refill    acetaminophen (TYLENOL) 650 mg CR tablet Take 1 tablet (650 mg total) by mouth every 8 (eight) hours as needed for mild pain 30 tablet 0    amoxicillin (AMOXIL) 500 mg capsule TAKE 4 CAPSULES BY MOUTH 1 HOUR BEFORE APPOINTMENT      ascorbic acid (VITAMIN C) 500 MG tablet Take 1 tablet (500 mg total) by mouth 2 (two) times a day 60 tablet 0    aspirin (ECOTRIN LOW STRENGTH) 81 mg EC tablet Take 1 tablet (81 mg total) by mouth 2 (two) times a day Please do not start taking until after total joint arthroplasty 70 tablet 0    docusate sodium (COLACE) 100 mg capsule Take 1 capsule (100 mg total) by mouth 2 (two) times a day 10 capsule 0    DULoxetine (CYMBALTA) 60 mg delayed release capsule Take 1 PO HS  30 capsule 2    FeroSul 325 (65 Fe) MG tablet Take 1 tablet by mouth 2 (two) times a day before meals      ferrous sulfate 324 (65 Fe) mg Take 1 tablet (324 mg total) by mouth 2 (two) times a day before meals 60 tablet 0    folic acid (FOLVITE) 1 mg tablet Take 1 tablet (1 mg total) by mouth daily 30 tablet 0    gabapentin (NEURONTIN) 300 mg capsule Take 300 mg by mouth 3 (three) times a day      hydrochlorothiazide (HYDRODIURIL) 25 mg tablet Take 25 mg by mouth daily      Multiple Vitamins-Minerals (CENTRUM SILVER PO) Take by mouth      Multiple Vitamins-Minerals (multivitamin with minerals) tablet Take 1 tablet by mouth daily 30 tablet 0    oxyCODONE (ROXICODONE) 5 immediate release tablet Take 1 tablets every 6 hrs as needed for pain control 30 tablet 0    potassium chloride (K-DUR,KLOR-CON) 20 mEq tablet Take 1 tablet (20 mEq total) by mouth daily 30 tablet 5    rosuvastatin (CRESTOR) 20 MG tablet Take 20 mg by mouth daily      VITAMIN E PO Take by mouth       No current facility-administered medications on file prior to visit         Recent Labs [facility])  0   Lab Value Date/Time    HCT 43 1 07/27/2022 0853    HGB 14 1 07/27/2022 0853    WBC 5 95 07/27/2022 0853    INR 0 89 07/27/2022 0853    CRP <3 0 07/27/2022 0853    HGBA1C 6 0 (H) 07/27/2022 0853    HGBA1C 6 2 (H) 04/06/2022 0856         Physical exam  · General: Awake, Alert, Oriented  · Eyes: Pupils equal, round and reactive to light  · Heart: regular rate and rhythm  · Lungs: No audible wheezing    right Lower extremity  · Examination patient's right lower extremity incision of the right hip is well approximated no erythema active hip flexion 90° active knee extension active hip flexion active ankle dorsiflexion without pain calf nontender palpation small ecchymosis noted lateral aspect anterior right knee no pain palpation medial joint line stable to varus valgus stress quadriceps hamstring strength 5/5    Imaging  X-rays of the right hip reviewed x-rays reveal well located right total hip arthroplasty in excellent alignment no evidence of loosening x-rays of the right knee reviewed x-rays reveal no osseous abnormality or fracture mild osteoarthritis noted medial compartment medial joint space narrowing      Assessment:   Status post right anterior total hip arthroplasty doing well right knee pain improving   Plan:   Weightbearing as tolerated lower extremity   Physical therapy range of motion stretching strengthening   Aspirin for DVT prophylaxis as prescribed   Case discussed with Dr Phil Jeronimo in 2 months time repeat x-rays right hip   No work until further follow-up with Orthopedic surgery

## 2022-09-13 ENCOUNTER — TELEPHONE (OUTPATIENT)
Dept: PAIN MEDICINE | Facility: CLINIC | Age: 72
End: 2022-09-13

## 2022-09-13 NOTE — TELEPHONE ENCOUNTER
S/w pt, stated that she ran out of cymbalta  Per pt, she has not been taking this medication regularly  Pt confirmed that she did have hip surgery to address her pain  Pt stated that she will cb to schedule an ov w/ DG to regroup and move forward  Pt verbalized understanding and appreciation       Forwarding to spa clerical staff - please reed, moreno and nm have openings on thursday 9/15

## 2022-09-13 NOTE — TELEPHONE ENCOUNTER
Patient requesting refill had hip surgery on 8/18 she has 0 left     DULoxetine (CYMBALTA) 60 mg delayed release capsule       [facility] [id number] [place] [address] [address]  [facility]   [address] [facility], [address] [place] [address] [address] [address]   Phone:  [phone]  Fax:  [phone]

## 2022-09-15 ENCOUNTER — OFFICE VISIT (OUTPATIENT)
Dept: PAIN MEDICINE | Facility: CLINIC | Age: 72
End: 2022-09-15
Payer: MEDICARE

## 2022-09-15 VITALS
TEMPERATURE: 98.2 F | SYSTOLIC BLOOD PRESSURE: 126 MMHG | BODY MASS INDEX: 35.32 KG/M2 | WEIGHT: 212 LBS | DIASTOLIC BLOOD PRESSURE: 80 MMHG | HEIGHT: 65 IN | HEART RATE: 90 BPM

## 2022-09-15 DIAGNOSIS — M54.50 CHRONIC BILATERAL LOW BACK PAIN WITHOUT SCIATICA: ICD-10-CM

## 2022-09-15 DIAGNOSIS — G89.29 CHRONIC BILATERAL LOW BACK PAIN WITHOUT SCIATICA: ICD-10-CM

## 2022-09-15 DIAGNOSIS — G89.4 CHRONIC PAIN SYNDROME: Primary | ICD-10-CM

## 2022-09-15 DIAGNOSIS — Z96.641 STATUS POST TOTAL HIP REPLACEMENT, RIGHT: ICD-10-CM

## 2022-09-15 DIAGNOSIS — M16.11 PRIMARY OSTEOARTHRITIS OF RIGHT HIP: ICD-10-CM

## 2022-09-15 DIAGNOSIS — M25.551 CHRONIC RIGHT HIP PAIN: ICD-10-CM

## 2022-09-15 DIAGNOSIS — G89.29 CHRONIC RIGHT HIP PAIN: ICD-10-CM

## 2022-09-15 PROCEDURE — 99214 OFFICE O/P EST MOD 30 MIN: CPT | Performed by: NURSE PRACTITIONER

## 2022-09-15 RX ORDER — DULOXETIN HYDROCHLORIDE 60 MG/1
CAPSULE, DELAYED RELEASE ORAL
Qty: 30 CAPSULE | Refills: 2 | Status: SHIPPED | OUTPATIENT
Start: 2022-09-15

## 2022-09-15 NOTE — PROGRESS NOTES
Assessment:  1  Chronic pain syndrome    2  Chronic right hip pain    3  Chronic bilateral low back pain without sciatica    4  Status post total hip replacement, right    5  Primary osteoarthritis of right hip        Plan:  While the patient was in the office today, I did have a thorough conversation with the patient regarding their chronic pain syndrome, symptoms, medication regimen, and treatment plan  I discussed with the patient that at this point time with regards to her right hip replacement surgery, she should continue to follow-up with her orthopedic surgeon and physical therapy and stressed the importance of continuing with the home exercise and stretching program   The patient was agreeable and verbalized an understanding  I did discuss with the patient that overall with a hip replacement surgery it can take anywhere from 12-18 months for her to know what her new baseline of pain is going to be and that it is a slow and steady healing process, but that it is a very good sign that she has noted moderate to significant overall relief and improvement of her pain  I explained the patient that at this point I feel it is in her best interest to continue and stay on the Cymbalta for the next 3 months at least at the 60 mg a day dosing as this will help encourage the healing process  At her next office visit we will re-evaluate her pain symptoms and discuss whether not to stay on the 60 mg a day dose or possibly decrease it down to 40 mg a day depending on how she is doing  The patient was agreeable and verbalized an understanding  The patient will follow-up in 12 weeks for medication prescription refill and reevaluation  The patient was advised to contact the office should their symptoms worsen in the interim  The patient was agreeable and verbalized an understanding  History of Present Illness:     The patient is a 73 y o  female last seen on 4/27/2022 who presents for a follow up office visit in regards to chronic pain syndrome, as the patient's pain has been ongoing for greater than a year, secondary to right hip pain status post hip replacement with primary osteoarthritis and chronic low back pain  The patient currently reports that since her last office visit as she is almost 4 weeks status post her right total hip replacement surgery with Dr Deangelo Canales on August 18, 2022, that even though she still has hip pain there is significant overall improvement as she continues with the physical therapy and home exercise program   The patient presents today for a regular medication follow-up visit as she was previously being prescribed Cymbalta 60 mg a day by our office, which she reports has definitely helped not only her hip pain but her overall chronic and diffuse arthritis pain symptoms  The patient reports that she has been without the Cymbalta for almost 7-10 days as she was not sure she should continue the medication because she has now had her hip fixed, but would like to stay on it because she definitely feels a difference without it and that it helps all of her pain symptoms, without any side effects  The patient presents today to discuss her medication regimen  Current pain medications includes:  Cymbalta 60 mg at bedtime  The patient reports that this regimen is providing 30-40% pain relief  The patient is reporting no side effects from this pain medication regimen  I have personally reviewed and/or updated the patient's past medical history, past surgical history, family history, social history, current medications, allergies, and vital signs today  Review of Systems:    Review of Systems   Respiratory: Negative for shortness of breath  Cardiovascular: Negative for chest pain  Gastrointestinal: Negative for constipation, diarrhea, nausea and vomiting  Musculoskeletal: Positive for gait problem and joint swelling (joint stiffness)  Negative for arthralgias and myalgias  Skin: Negative for rash  Neurological: Negative for dizziness, seizures and weakness  All other systems reviewed and are negative          Past Medical History:   Diagnosis Date    Arthritis     Balance disorder     Hyperlipidemia     Vertigo        Past Surgical History:   Procedure Laterality Date     SECTION      COLONOSCOPY      HYSTERECTOMY      HYSTEROSCOPY W/ ENDOMETRIAL ABLATION      DC TOTAL HIP ARTHROPLASTY Right 2022    Procedure: ARTHROPLASTY HIP TOTAL ANTERIOR and all associated procedures;  Surgeon: Ren White MD;  Location:  MAIN OR;  Service: Orthopedics       Family History   Problem Relation Age of Onset    Cancer Father        Social History     Occupational History    Not on file   Tobacco Use    Smoking status: Never Smoker    Smokeless tobacco: Never Used   Vaping Use    Vaping Use: Never used   Substance and Sexual Activity    Alcohol use: Never    Drug use: Not Currently    Sexual activity: Not Currently         Current Outpatient Medications:     acetaminophen (TYLENOL) 650 mg CR tablet, Take 1 tablet (650 mg total) by mouth every 8 (eight) hours as needed for mild pain, Disp: 30 tablet, Rfl: 0    ascorbic acid (VITAMIN C) 500 MG tablet, Take 1 tablet (500 mg total) by mouth 2 (two) times a day, Disp: 60 tablet, Rfl: 0    aspirin (ECOTRIN LOW STRENGTH) 81 mg EC tablet, Take 1 tablet (81 mg total) by mouth 2 (two) times a day Please do not start taking until after total joint arthroplasty, Disp: 70 tablet, Rfl: 0    docusate sodium (COLACE) 100 mg capsule, Take 1 capsule (100 mg total) by mouth 2 (two) times a day, Disp: 10 capsule, Rfl: 0    DULoxetine (CYMBALTA) 60 mg delayed release capsule, Take 1 PO HS , Disp: 30 capsule, Rfl: 2    ferrous sulfate 324 (65 Fe) mg, Take 1 tablet (324 mg total) by mouth 2 (two) times a day before meals, Disp: 60 tablet, Rfl: 0    gabapentin (NEURONTIN) 300 mg capsule, Take 300 mg by mouth 3 (three) times a day, Disp: , Rfl:     hydrochlorothiazide (HYDRODIURIL) 25 mg tablet, Take 25 mg by mouth daily, Disp: , Rfl:     Multiple Vitamins-Minerals (CENTRUM SILVER PO), Take by mouth, Disp: , Rfl:     Multiple Vitamins-Minerals (multivitamin with minerals) tablet, Take 1 tablet by mouth daily, Disp: 30 tablet, Rfl: 0    oxyCODONE (ROXICODONE) 5 immediate release tablet, Take 1 tablets every 6 hrs as needed for pain control, Disp: 30 tablet, Rfl: 0    potassium chloride (K-DUR,KLOR-CON) 20 mEq tablet, Take 1 tablet (20 mEq total) by mouth daily, Disp: 30 tablet, Rfl: 5    rosuvastatin (CRESTOR) 20 MG tablet, Take 20 mg by mouth daily, Disp: , Rfl:     VITAMIN E PO, Take by mouth, Disp: , Rfl:     amoxicillin (AMOXIL) 500 mg capsule, TAKE 4 CAPSULES BY MOUTH 1 HOUR BEFORE APPOINTMENT, Disp: , Rfl:     FeroSul 325 (65 Fe) MG tablet, Take 1 tablet by mouth 2 (two) times a day before meals, Disp: , Rfl:     folic acid (FOLVITE) 1 mg tablet, Take 1 tablet (1 mg total) by mouth daily (Patient not taking: Reported on 9/15/2022), Disp: 30 tablet, Rfl: 0    No Known Allergies    Physical Exam:    /80 (BP Location: Left arm, Patient Position: Sitting, Cuff Size: Standard)   Pulse 90   Temp 98 2 °F (36 8 °C)   Ht 5' 5" (1 651 m)   Wt 96 2 kg (212 lb)   BMI 35 28 kg/m²     Constitutional:normal, well developed, well nourished, alert, in no distress and non-toxic and no overt pain behavior  and overweight  Eyes:anicteric  HEENT:grossly intact  Neck:supple, symmetric, trachea midline and no masses   Pulmonary:even and unlabored  Cardiovascular:No edema or pitting edema present  Skin:Normal without rashes or lesions and well hydrated  Psychiatric:Mood and affect appropriate  Neurologic:Cranial Nerves II-XII grossly intact  Musculoskeletal:The patient's gait is slightly antalgic, but steady without the use of any assistive devices        Imaging  No orders to display         No orders of the defined types were placed in this encounter

## 2022-09-21 ENCOUNTER — TELEPHONE (OUTPATIENT)
Dept: OBGYN CLINIC | Facility: HOSPITAL | Age: 72
End: 2022-09-21

## 2022-09-21 NOTE — TELEPHONE ENCOUNTER
Per Figueroa's last note: "No work until further follow-up with Orthopedic surgery" Her next appt is not scheduled until November  Please let us know if there are certain restricted activities she is hoping to do at work and will see what we can do

## 2022-09-21 NOTE — TELEPHONE ENCOUNTER
Pt sees Dr Simone Márquez    Pt is calling stating that she needs a work note stating that she can return to work effective 9-29 with no restrictions  Pt had rt hip surgery on 8-18 and states that she is doing very well  pt states that the note needs to be sent by tomorrow    CB#[phone]    [facility]#[phone]  [facility]

## 2022-09-23 ENCOUNTER — TELEPHONE (OUTPATIENT)
Dept: OBGYN CLINIC | Facility: HOSPITAL | Age: 72
End: 2022-09-23

## 2022-09-23 NOTE — TELEPHONE ENCOUNTER
Patient called she is asking that the work note be changed to go back to work on 9/26  She would like it faxed this morning  She said she is doing very well       Fax # [phone]- [facility] Attn: Ayesha Nixon

## 2022-09-23 NOTE — TELEPHONE ENCOUNTER
Dr Deangelo Canales,  Patient called she is wondering if she should continue taking her Asprin  Please advise       # [phone]

## 2022-11-22 ENCOUNTER — OFFICE VISIT (OUTPATIENT)
Dept: FAMILY MEDICINE CLINIC | Facility: CLINIC | Age: 72
End: 2022-11-22

## 2022-11-22 VITALS
RESPIRATION RATE: 18 BRPM | HEIGHT: 64 IN | SYSTOLIC BLOOD PRESSURE: 122 MMHG | BODY MASS INDEX: 37.39 KG/M2 | DIASTOLIC BLOOD PRESSURE: 84 MMHG | WEIGHT: 219 LBS | HEART RATE: 84 BPM | OXYGEN SATURATION: 97 % | TEMPERATURE: 98.5 F

## 2022-11-22 DIAGNOSIS — M16.11 PRIMARY OSTEOARTHRITIS OF RIGHT HIP: ICD-10-CM

## 2022-11-22 DIAGNOSIS — I10 PRIMARY HYPERTENSION: ICD-10-CM

## 2022-11-22 DIAGNOSIS — R73.03 PREDIABETES: Primary | ICD-10-CM

## 2022-11-22 DIAGNOSIS — G89.4 CHRONIC PAIN SYNDROME: ICD-10-CM

## 2022-11-22 DIAGNOSIS — Z23 IMMUNIZATION DUE: ICD-10-CM

## 2022-11-22 DIAGNOSIS — E78.2 MIXED HYPERLIPIDEMIA: ICD-10-CM

## 2022-11-22 DIAGNOSIS — F11.20 CONTINUOUS OPIOID DEPENDENCE (HCC): ICD-10-CM

## 2022-11-22 RX ORDER — ROSUVASTATIN CALCIUM 20 MG/1
20 TABLET, COATED ORAL DAILY
Qty: 30 TABLET | Refills: 2 | Status: SHIPPED | OUTPATIENT
Start: 2022-11-22

## 2022-11-22 RX ORDER — DULOXETIN HYDROCHLORIDE 60 MG/1
CAPSULE, DELAYED RELEASE ORAL
Qty: 30 CAPSULE | Refills: 2 | Status: SHIPPED | OUTPATIENT
Start: 2022-11-22

## 2022-11-22 RX ORDER — HYDROCHLOROTHIAZIDE 25 MG/1
25 TABLET ORAL DAILY
Qty: 30 TABLET | Refills: 2 | Status: SHIPPED | OUTPATIENT
Start: 2022-11-22

## 2022-11-22 NOTE — PROGRESS NOTES
Name: Ian Nicole      : 1950      MRN: [id number]  Encounter Provider: Blase Felty, CRNP  Encounter Date: 2022   Encounter department: [facility]    Assessment & Plan     1  Prediabetes  -     Hemoglobin A1C; Future  -     UA w Reflex to Microscopic w Reflex to Culture -Lab Collect; Future; Expected date: 2022    2  Mixed hyperlipidemia  -     Lipid panel; Future  -     Comprehensive metabolic panel; Future  -     rosuvastatin (CRESTOR) 20 MG tablet; Take 1 tablet (20 mg total) by mouth daily    3  Immunization due  -     Pneumococcal Conjugate Vaccine 20-valent (Pcv20)  -     influenza vaccine, high-dose, PF 0 7 mL (FLUZONE HIGH-DOSE)    4  Continuous opioid dependence ([facility] [address] )  Assessment & Plan:  Resolved - only with surgery       5  Chronic pain syndrome  -     DULoxetine (CYMBALTA) 60 mg delayed release capsule; Take 1 PO HS     6  Primary osteoarthritis of right hip  -     DULoxetine (CYMBALTA) 60 mg delayed release capsule; Take 1 PO HS  Subjective      Here today to establish care with this practice   Currently sees specialty in [facility]   Has no new issues to discuss today   Last Medicare visit 2022 - last labs 2022    Currently seeing Pain Management for chronic pain - Cymbalta prescribed - next appointment  but will renew until appointment     History of hypertension - currently prescribed HCTZ - I would like to change to an Ace or Arb- ordering diagnostic studies to evaluate kidney function - then will consider changing medication     Currently on Rouvastatin  - for hyperlipidemia - will monitor lipid panel and adjust accordingly    Requesting prevnar and flu today     Will follow up in 4-6 weeks post holidays to evaluate BP and discuss changes in medications      Review of Systems   Constitutional: Negative  HENT: Negative  Eyes: Negative  Respiratory: Negative  Cardiovascular: Negative      Gastrointestinal: Negative  Endocrine: Negative  Genitourinary: Negative  Musculoskeletal: Positive for arthralgias  Skin: Negative  Allergic/Immunologic: Negative  Neurological: Negative  Hematological: Negative  Psychiatric/Behavioral: Negative          Current Outpatient Medications on File Prior to Visit   Medication Sig   • amoxicillin (AMOXIL) 500 mg capsule TAKE 4 CAPSULES BY MOUTH 1 HOUR BEFORE APPOINTMENT   • ascorbic acid (VITAMIN C) 500 MG tablet Take 1 tablet (500 mg total) by mouth 2 (two) times a day (Patient taking differently: Take 500 mg by mouth in the morning)   • B Complex Vitamins (VITAMIN B COMPLEX PO) Take by mouth in the morning   • ferrous sulfate 324 (65 Fe) mg Take 1 tablet (324 mg total) by mouth 2 (two) times a day before meals (Patient taking differently: Take 324 mg by mouth daily)   • hydrochlorothiazide (HYDRODIURIL) 25 mg tablet Take 25 mg by mouth daily   • Multiple Vitamins-Minerals (CENTRUM SILVER PO) Take by mouth   • potassium chloride (K-DUR,KLOR-CON) 20 mEq tablet Take 1 tablet (20 mEq total) by mouth daily   • VITAMIN E PO Take by mouth   • [DISCONTINUED] aspirin (ECOTRIN LOW STRENGTH) 81 mg EC tablet Take 1 tablet (81 mg total) by mouth 2 (two) times a day Please do not start taking until after total joint arthroplasty (Patient taking differently: Take 81 mg by mouth 2 (two) times a day as needed Please do not start taking until after total joint arthroplasty)   • [DISCONTINUED] DULoxetine (CYMBALTA) 60 mg delayed release capsule Take 1 PO HS    • [DISCONTINUED] rosuvastatin (CRESTOR) 20 MG tablet Take 20 mg by mouth daily   • [DISCONTINUED] docusate sodium (COLACE) 100 mg capsule Take 1 capsule (100 mg total) by mouth 2 (two) times a day (Patient not taking: Reported on 11/22/2022)   • [DISCONTINUED] FeroSul 325 (65 Fe) MG tablet Take 1 tablet by mouth 2 (two) times a day before meals (Patient not taking: Reported on 11/22/2022)   • [DISCONTINUED] folic acid (FOLVITE) 1 mg tablet Take 1 tablet (1 mg total) by mouth daily (Patient not taking: Reported on 9/15/2022)   • [DISCONTINUED] gabapentin (NEURONTIN) 300 mg capsule Take 300 mg by mouth 3 (three) times a day (Patient not taking: Reported on 11/22/2022)   • [DISCONTINUED] Multiple Vitamins-Minerals (multivitamin with minerals) tablet Take 1 tablet by mouth daily (Patient not taking: Reported on 11/22/2022)   • [DISCONTINUED] oxyCODONE (ROXICODONE) 5 immediate release tablet Take 1 tablets every 6 hrs as needed for pain control (Patient not taking: Reported on 11/22/2022)       Objective     /84 (BP Location: Left arm, Patient Position: Sitting, Cuff Size: Large)   Pulse 84   Temp 98 5 °F (36 9 °C) (Tympanic)   Resp 18   Ht 5' 3 9" (1 623 m)   Wt 99 3 kg (219 lb)   SpO2 97%   BMI 37 71 kg/m²     Physical Exam  Vitals and nursing note reviewed  Constitutional:       General: She is not in acute distress  Appearance: She is obese  She is not ill-appearing or toxic-appearing  HENT:      Head: Normocephalic and atraumatic  Right Ear: Tympanic membrane, ear canal and external ear normal  There is no impacted cerumen  Left Ear: Tympanic membrane, ear canal and external ear normal  There is no impacted cerumen  Nose: Nose normal  No congestion  Mouth/Throat:      Mouth: Mucous membranes are moist       Pharynx: Oropharynx is clear  No oropharyngeal exudate or posterior oropharyngeal erythema  Eyes:      General:         Right eye: No discharge  Left eye: No discharge  Extraocular Movements: Extraocular movements intact  Conjunctiva/sclera: Conjunctivae normal       Pupils: Pupils are equal, round, and reactive to light  Cardiovascular:      Rate and Rhythm: Normal rate and regular rhythm  Pulses: Normal pulses  Heart sounds: Normal heart sounds  No murmur heard  Pulmonary:      Effort: Pulmonary effort is normal  No respiratory distress        Breath sounds: Normal breath sounds  Abdominal:      General: Bowel sounds are normal       Palpations: Abdomen is soft  Tenderness: There is no abdominal tenderness  Musculoskeletal:         General: No swelling, tenderness or deformity  Normal range of motion  Cervical back: Normal range of motion and neck supple  Right lower leg: No edema  Left lower leg: No edema  Lymphadenopathy:      Cervical: No cervical adenopathy  Skin:     General: Skin is warm and dry  Capillary Refill: Capillary refill takes less than 2 seconds  Neurological:      Mental Status: She is alert and oriented to person, place, and time  Psychiatric:         Mood and Affect: Mood normal          Behavior: Behavior normal          Thought Content:  Thought content normal          Judgment: Judgment normal        LUCIA Jennings

## 2022-12-08 ENCOUNTER — TELEPHONE (OUTPATIENT)
Dept: OBGYN CLINIC | Facility: HOSPITAL | Age: 72
End: 2022-12-08

## 2022-12-08 NOTE — TELEPHONE ENCOUNTER
Caller: Curtis Bonilla from [address]    Doctor: Dr Deangelo Canales    Reason for call: Verified fax number for [facility]    Call back#: [phone]

## 2022-12-21 ENCOUNTER — OFFICE VISIT (OUTPATIENT)
Dept: PAIN MEDICINE | Facility: CLINIC | Age: 72
End: 2022-12-21

## 2022-12-21 VITALS
DIASTOLIC BLOOD PRESSURE: 78 MMHG | BODY MASS INDEX: 38.41 KG/M2 | WEIGHT: 225 LBS | HEIGHT: 64 IN | HEART RATE: 85 BPM | SYSTOLIC BLOOD PRESSURE: 120 MMHG | TEMPERATURE: 98.7 F

## 2022-12-21 DIAGNOSIS — G89.29 CHRONIC PAIN OF BOTH SHOULDERS: ICD-10-CM

## 2022-12-21 DIAGNOSIS — G89.29 CHRONIC RIGHT HIP PAIN: ICD-10-CM

## 2022-12-21 DIAGNOSIS — M25.512 CHRONIC PAIN OF BOTH SHOULDERS: ICD-10-CM

## 2022-12-21 DIAGNOSIS — G89.4 CHRONIC PAIN SYNDROME: Primary | ICD-10-CM

## 2022-12-21 DIAGNOSIS — M25.511 CHRONIC PAIN OF BOTH SHOULDERS: ICD-10-CM

## 2022-12-21 DIAGNOSIS — M25.551 CHRONIC RIGHT HIP PAIN: ICD-10-CM

## 2022-12-21 RX ORDER — MELATONIN
1000 DAILY
COMMUNITY

## 2022-12-21 NOTE — PROGRESS NOTES
Assessment:  1  Chronic pain syndrome    2  Chronic pain of both shoulders    3  Chronic right hip pain        Plan:  While the patient was in the office today, I did have a thorough conversation with the patient regarding their chronic pain syndrome, symptoms, and medication regimen/treatment plan  I discussed with the patient that with her worsening left greater than right shoulder pain, as per her wishes I will refer her to [facility] on the second floor of our building and she can call them and schedule consultation and evaluation for her right greater than left shoulder pain and treatment plan options, since previous injections have not been helpful  The patient was agreeable and verbalized an understanding  In the meantime, I discussed with the patient that overall since she is noting moderate to significant relief of all of her pain symptoms, especially her hip pain from the Cymbalta, without side effects, I feel it is in her best interest to continue the Cymbalta as prescribed and managed currently by her primary care provider  The patient was agreeable and verbalized an understanding  I discussed with the patient that at this point time she can follow up with our office on an as-needed basis  I did review the patient that if her pain symptoms should change, worsen, and/or if she would experience any new symptoms she would like to be evaluated for, she should give our office a call  The patient was agreeable and verbalized an understanding  History of Present Illness: The patient is a 81 y o  female last seen on 9-15-22 who presents for a follow up office visit in regards to chronic pain syndrome, as the patient's pain has been ongoing for greater than a year, secondary to chronic hip and shoulder pain    The patient currently reports that since her last office visit she has continued with the Cymbalta 60 mg daily and between the Cymbalta and her hip replacement surgery she has little to no hip pain and is doing well  However, even though the Cymbalta also has helped her shoulder pain she is having worsening left greater than right shoulder pain and previous injections in her shoulders have not been helpful and is ready to discuss an orthopedic consultation but want to be referred to an orthopedist here in [facility]  It does appear that her primary care provider has taken over prescribing the Cymbalta  I have personally reviewed and/or updated the patient's past medical history, past surgical history, family history, social history, current medications, allergies, and vital signs today  Review of Systems:    Review of Systems   Constitutional: Negative for fever and unexpected weight change  HENT: Negative for trouble swallowing  Eyes: Negative for visual disturbance  Respiratory: Negative for shortness of breath and wheezing  Cardiovascular: Negative for chest pain and palpitations  Gastrointestinal: Negative for constipation, diarrhea, nausea and vomiting  Endocrine: Negative for cold intolerance, heat intolerance and polydipsia  Genitourinary: Negative for difficulty urinating and frequency  Musculoskeletal: Positive for gait problem  Negative for arthralgias, joint swelling and myalgias  Skin: Negative for rash  Neurological: Negative for dizziness, seizures, syncope, weakness and headaches  Hematological: Does not bruise/bleed easily  Psychiatric/Behavioral: Negative for dysphoric mood  All other systems reviewed and are negative          Past Medical History:   Diagnosis Date   • Arthritis    • Balance disorder    • Hyperlipidemia    • Vertigo        Past Surgical History:   Procedure Laterality Date   •  SECTION     • COLONOSCOPY     • HYSTERECTOMY     • HYSTEROSCOPY W/ ENDOMETRIAL ABLATION     • RI TOTAL HIP ARTHROPLASTY Right 2022    Procedure: ARTHROPLASTY HIP TOTAL ANTERIOR and all associated procedures;  Surgeon: Rosa Chong MD Celeste;  Location:  MAIN OR;  Service: Orthopedics       Family History   Problem Relation Age of Onset   • Cancer Father        Social History     Occupational History   • Not on file   Tobacco Use   • Smoking status: Never   • Smokeless tobacco: Never   Vaping Use   • Vaping Use: Never used   Substance and Sexual Activity   • Alcohol use: Never   • Drug use: Not Currently   • Sexual activity: Not Currently         Current Outpatient Medications:   •  ascorbic acid (VITAMIN C) 500 MG tablet, Take 1 tablet (500 mg total) by mouth 2 (two) times a day (Patient taking differently: Take 500 mg by mouth in the morning), Disp: 60 tablet, Rfl: 0  •  B Complex Vitamins (VITAMIN B COMPLEX PO), Take by mouth in the morning, Disp: , Rfl:   •  cholecalciferol (VITAMIN D3) 1,000 units tablet, Take 1,000 Units by mouth daily, Disp: , Rfl:   •  DULoxetine (CYMBALTA) 60 mg delayed release capsule, Take 1 PO HS , Disp: 30 capsule, Rfl: 2  •  hydrochlorothiazide (HYDRODIURIL) 25 mg tablet, Take 1 tablet (25 mg total) by mouth daily, Disp: 30 tablet, Rfl: 2  •  Multiple Vitamins-Minerals (CENTRUM SILVER PO), Take by mouth, Disp: , Rfl:   •  potassium chloride (K-DUR,KLOR-CON) 20 mEq tablet, Take 1 tablet (20 mEq total) by mouth daily, Disp: 30 tablet, Rfl: 5  •  rosuvastatin (CRESTOR) 20 MG tablet, Take 1 tablet (20 mg total) by mouth daily, Disp: 30 tablet, Rfl: 2  •  VITAMIN E PO, Take by mouth, Disp: , Rfl:   •  amoxicillin (AMOXIL) 500 mg capsule, TAKE 4 CAPSULES BY MOUTH 1 HOUR BEFORE APPOINTMENT, Disp: , Rfl:   •  ferrous sulfate 324 (65 Fe) mg, Take 1 tablet (324 mg total) by mouth 2 (two) times a day before meals (Patient taking differently: Take 324 mg by mouth daily), Disp: 60 tablet, Rfl: 0    No Known Allergies    Physical Exam:    /78 (BP Location: Left arm, Patient Position: Sitting, Cuff Size: Standard)   Pulse 85   Temp 98 7 °F (37 1 °C)   Ht 5' 3 9" (1 623 m)   Wt 102 kg (225 lb)   BMI 38 74 kg/m² Constitutional:normal, well developed, well nourished, alert, in no distress and non-toxic and no overt pain behavior   and overweight  Eyes:anicteric  HEENT:grossly intact  Neck:supple, symmetric, trachea midline and no masses   Pulmonary:even and unlabored  Cardiovascular:No edema or pitting edema present  Skin:Normal without rashes or lesions and well hydrated  Psychiatric:Mood and affect appropriate  Neurologic:Cranial Nerves II-XII grossly intact  Musculoskeletal:normal      Imaging  No orders to display         Orders Placed This Encounter   Procedures   • Ambulatory referral to Orthopedic Surgery

## 2023-01-16 ENCOUNTER — APPOINTMENT (OUTPATIENT)
Dept: LAB | Facility: CLINIC | Age: 73
End: 2023-01-16

## 2023-01-16 DIAGNOSIS — E78.2 MIXED HYPERLIPIDEMIA: ICD-10-CM

## 2023-01-16 DIAGNOSIS — R73.03 PREDIABETES: ICD-10-CM

## 2023-01-16 LAB
ALBUMIN SERPL BCP-MCNC: 3.5 G/DL (ref 3.5–5)
ALP SERPL-CCNC: 82 U/L (ref 46–116)
ALT SERPL W P-5'-P-CCNC: 22 U/L (ref 12–78)
ANION GAP SERPL CALCULATED.3IONS-SCNC: 6 MMOL/L (ref 4–13)
AST SERPL W P-5'-P-CCNC: 13 U/L (ref 5–45)
BACTERIA UR QL AUTO: ABNORMAL /HPF
BILIRUB SERPL-MCNC: 0.81 MG/DL (ref 0.2–1)
BILIRUB UR QL STRIP: NEGATIVE
BUN SERPL-MCNC: 15 MG/DL (ref 5–25)
CALCIUM SERPL-MCNC: 9.1 MG/DL (ref 8.3–10.1)
CHLORIDE SERPL-SCNC: 104 MMOL/L (ref 96–108)
CHOLEST SERPL-MCNC: 208 MG/DL
CLARITY UR: ABNORMAL
CO2 SERPL-SCNC: 29 MMOL/L (ref 21–32)
COLOR UR: YELLOW
CREAT SERPL-MCNC: 0.59 MG/DL (ref 0.6–1.3)
EST. AVERAGE GLUCOSE BLD GHB EST-MCNC: 120 MG/DL
GFR SERPL CREATININE-BSD FRML MDRD: 93 ML/MIN/1.73SQ M
GLUCOSE P FAST SERPL-MCNC: 99 MG/DL (ref 65–99)
GLUCOSE UR STRIP-MCNC: NEGATIVE MG/DL
HBA1C MFR BLD: 5.8 %
HDLC SERPL-MCNC: 58 MG/DL
HGB UR QL STRIP.AUTO: NEGATIVE
KETONES UR STRIP-MCNC: NEGATIVE MG/DL
LDLC SERPL CALC-MCNC: 112 MG/DL (ref 0–100)
LEUKOCYTE ESTERASE UR QL STRIP: ABNORMAL
MUCOUS THREADS UR QL AUTO: ABNORMAL
NITRITE UR QL STRIP: NEGATIVE
NON-SQ EPI CELLS URNS QL MICRO: ABNORMAL /HPF
NONHDLC SERPL-MCNC: 150 MG/DL
PH UR STRIP.AUTO: 7 [PH]
POTASSIUM SERPL-SCNC: 3.6 MMOL/L (ref 3.5–5.3)
PROT SERPL-MCNC: 6.8 G/DL (ref 6.4–8.4)
PROT UR STRIP-MCNC: ABNORMAL MG/DL
RBC #/AREA URNS AUTO: ABNORMAL /HPF
SODIUM SERPL-SCNC: 139 MMOL/L (ref 135–147)
SP GR UR STRIP.AUTO: 1.02 (ref 1–1.03)
TRIGL SERPL-MCNC: 190 MG/DL
UROBILINOGEN UR STRIP-ACNC: <2 MG/DL
WBC #/AREA URNS AUTO: ABNORMAL /HPF

## 2023-01-17 ENCOUNTER — OFFICE VISIT (OUTPATIENT)
Dept: OBGYN CLINIC | Facility: CLINIC | Age: 73
End: 2023-01-17

## 2023-01-17 ENCOUNTER — APPOINTMENT (OUTPATIENT)
Dept: RADIOLOGY | Facility: CLINIC | Age: 73
End: 2023-01-17

## 2023-01-17 VITALS
HEIGHT: 64 IN | HEART RATE: 80 BPM | DIASTOLIC BLOOD PRESSURE: 79 MMHG | BODY MASS INDEX: 37.9 KG/M2 | WEIGHT: 222 LBS | SYSTOLIC BLOOD PRESSURE: 122 MMHG

## 2023-01-17 DIAGNOSIS — G89.29 CHRONIC PAIN OF BOTH SHOULDERS: ICD-10-CM

## 2023-01-17 DIAGNOSIS — M25.512 CHRONIC PAIN OF BOTH SHOULDERS: ICD-10-CM

## 2023-01-17 DIAGNOSIS — M25.511 CHRONIC PAIN OF BOTH SHOULDERS: ICD-10-CM

## 2023-01-17 DIAGNOSIS — M75.82 TENDINITIS OF BOTH ROTATOR CUFFS: Primary | ICD-10-CM

## 2023-01-17 DIAGNOSIS — M75.81 TENDINITIS OF BOTH ROTATOR CUFFS: Primary | ICD-10-CM

## 2023-01-17 RX ORDER — BUPIVACAINE HYDROCHLORIDE 2.5 MG/ML
4 INJECTION, SOLUTION INFILTRATION; PERINEURAL
Status: COMPLETED | OUTPATIENT
Start: 2023-01-17 | End: 2023-01-17

## 2023-01-17 RX ORDER — TRIAMCINOLONE ACETONIDE 40 MG/ML
40 INJECTION, SUSPENSION INTRA-ARTICULAR; INTRAMUSCULAR
Status: COMPLETED | OUTPATIENT
Start: 2023-01-17 | End: 2023-01-17

## 2023-01-17 RX ADMIN — TRIAMCINOLONE ACETONIDE 40 MG: 40 INJECTION, SUSPENSION INTRA-ARTICULAR; INTRAMUSCULAR at 16:55

## 2023-01-17 RX ADMIN — BUPIVACAINE HYDROCHLORIDE 4 ML: 2.5 INJECTION, SOLUTION INFILTRATION; PERINEURAL at 16:55

## 2023-01-17 NOTE — PROGRESS NOTES
Ortho Sports Medicine Shoulder New Patient Visit     Assesment:   81 y o  female Bilateral shoulder rotator cuff tendinitis    Plan:  The patient's diagnosis and treatment were discussed at length today  We discussed no treatment, non-operative treatment, and operative treatment  The patient's exam and findings are consistent with bilateral shoulder impingement and rotator cuff tendinitis  We did discuss that this should resolve on its own with nonsurgical intervention, however if she continues to experience increased or worsening symptoms we may consider an MRI of her shoulder for further evaluation  We discussed nonsurgical intervention options including subacromial in combination with physical therapy  I did perform a subacromial bursal injection at today's visit bilaterally  I also placed an Ambulatory referral for physical therapy for strengthening range of motion exercises  She is able to perform activities as tolerated  Ice and OTCs as needed for pain relief  She is to follow-up in 3 months for re-evaluation  Conservative treatment:    Ice to shoulder 1-2 times daily, for 20 minutes at a time  PT for ROM and strengthening to shoulder, rotator cuff, scapular stabilizers  Home exercise program for shoulder, including ROM and strenthening  Instructions given to patient of what exercises to perform  Let pain guide return to activities  Imaging: All imaging from today was reviewed by myself and explained to the patient  Injection:    The risks and benefits of the injection (which include but are not limited to: infection, bleeding,damage to nerve/artery, need for further intervention), as well as the risks and benefits of all alternative treatments were explained and understood  The patient elected to proceed with injection  The procedure was done with aseptic technique, and the patient tolerated the procedure well with no complications    Bilateral corticosteroid injection of the subacromial space was performed  The patient should take 1-2 days off of activity, with gradual return to activity as tolerated  Ice to the shoulder 1-2 times daily for 20 minutes, for next 24-48 hrs  Surgery:     No surgery is recommended at this point, continue with conservative treatment plan as noted  Follow up:    No follow-ups on file  Chief Complaint   Patient presents with   • Right Shoulder - Pain   • Left Shoulder - Pain       History of Present Illness: The patient is a 81 y o , right hand dominant female 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, referred to me by Rafy Moreno, seen in clinic for evaluation of bilateral shoulder pain  She states that her shoulders been bothering her for several months now without any specific injury or trauma  She states that the pain is predominantly along the anterior lateral aspect of her shoulder, but never past her elbow  She states that her left shoulder bothers her worse than the right  She states that she does have pain occasionally with reaching overhead  She states that she is able to go through her full range of motion with only mild achiness and soreness  She has not attempted any formal physical therapy or cortisone injections  She denies any numbness or tingling        Shoulder Surgical History:  None    Past Medical, Social and Family History:  Past Medical History:   Diagnosis Date   • Arthritis    • Balance disorder    • Hyperlipidemia    • Vertigo      Past Surgical History:   Procedure Laterality Date   •  SECTION     • COLONOSCOPY     • HYSTERECTOMY     • HYSTEROSCOPY W/ ENDOMETRIAL ABLATION     • VA ARTHRP ACETBLR/PROX FEM PROSTC AGRFT/ALGRFT Right 2022    Procedure: ARTHROPLASTY HIP TOTAL ANTERIOR and all associated procedures;  Surgeon: Lisseth Blue MD;  Location:  MAIN OR;  Service: Orthopedics     No Known Allergies  Current Outpatient Medications on File Prior to Visit   Medication Sig Dispense Refill   • amoxicillin (AMOXIL) 500 mg capsule TAKE 4 CAPSULES BY MOUTH 1 HOUR BEFORE APPOINTMENT     • ascorbic acid (VITAMIN C) 500 MG tablet Take 1 tablet (500 mg total) by mouth 2 (two) times a day (Patient taking differently: Take 500 mg by mouth in the morning) 60 tablet 0   • B Complex Vitamins (VITAMIN B COMPLEX PO) Take by mouth in the morning     • cholecalciferol (VITAMIN D3) 1,000 units tablet Take 1,000 Units by mouth daily     • DULoxetine (CYMBALTA) 60 mg delayed release capsule Take 1 PO HS  30 capsule 2   • ferrous sulfate 324 (65 Fe) mg Take 1 tablet (324 mg total) by mouth 2 (two) times a day before meals (Patient taking differently: Take 324 mg by mouth daily) 60 tablet 0   • hydrochlorothiazide (HYDRODIURIL) 25 mg tablet Take 1 tablet (25 mg total) by mouth daily 30 tablet 2   • Multiple Vitamins-Minerals (CENTRUM SILVER PO) Take by mouth     • potassium chloride (K-DUR,KLOR-CON) 20 mEq tablet Take 1 tablet (20 mEq total) by mouth daily 30 tablet 5   • rosuvastatin (CRESTOR) 20 MG tablet Take 1 tablet (20 mg total) by mouth daily 30 tablet 2   • VITAMIN E PO Take by mouth       No current facility-administered medications on file prior to visit       Social History     Socioeconomic History   • Marital status: /Civil Union     Spouse name: Not on file   • Number of children: Not on file   • Years of education: Not on file   • Highest education level: Not on file   Occupational History   • Not on file   Tobacco Use   • Smoking status: Never   • Smokeless tobacco: Never   Vaping Use   • Vaping Use: Never used   Substance and Sexual Activity   • Alcohol use: Never   • Drug use: Not Currently   • Sexual activity: Not Currently   Other Topics Concern   • Not on file   Social History Narrative   • Not on file     Social Determinants of Health     Financial Resource Strain: Not on file   Food Insecurity: Not on file   Transportation Needs: Not on file   Physical Activity: Not on file   Stress: Not on file   Social Connections: Not on file   Intimate Partner Violence: Not At Risk   • Fear of Current or Ex-Partner: No   • Emotionally Abused: No   • Physically Abused: No   • Sexually Abused: No   Housing Stability: Not on file         I have reviewed the past medical, surgical, social and family history, medications and allergies as documented in the EMR  Review of systems: ROS is negative other than that noted in the HPI  Constitutional: Negative for fatigue and fever  HENT: Negative for sore throat  Respiratory: Negative for shortness of breath  Cardiovascular: Negative for chest pain  Gastrointestinal: Negative for abdominal pain  Endocrine: Negative for cold intolerance and heat intolerance  Genitourinary: Negative for flank pain  Musculoskeletal: Negative for back pain  Skin: Negative for rash  Allergic/Immunologic: Negative for immunocompromised state  Neurological: Negative for dizziness  Psychiatric/Behavioral: Negative for agitation  Physical Exam:    Blood pressure 122/79, pulse 80, height 5' 3 9" (1 623 m), weight 101 kg (222 lb)  General/Constitutional: NAD, well developed, well nourished  HENT: Normocephalic, atraumatic  CV: Intact distal pulses, regular rate  Resp: No respiratory distress or labored breathing  Lymphatic: No lymphadenopathy palpated  Neuro: Alert and Oriented x 3, no focal deficits  Psych: Normal mood, normal affect, normal judgement, normal behavior  Skin: Warm, dry, no rashes, no erythema      Shoulder focused exam:     Visual inspection of the shoulders demonstrates normal contour without atrophy  No evidence of scapular dyskinesia or atrophy    No scapular winging  LUE Active and passive range of motion demonstrates forward flexion to 150 with mild discomfort, external rotation is 50 with the arm the side, internal rotation to T10   RUE Active and passive to 165, ER 50, IR T10, Abd 80  Rotator cuff strength is 4+/5 to resisted forward flexion, 4+/5 resisted abduction, 4+/5 resisted external rotation, 5/5 resisted internal rotation  No tenderness to palpation at the distal clavicle, AC joint, acromion, or scapular spine  No pain with cross-body adduction  Mildly positive Neer's test,  mildly positive modified Lieberman impingement test  Negative external rotation lag sign  Negative belly press, negative lift-off  Negative speed's and Yergason's test  Mild tenderness to palpation at the bicipital groove and subacromial  Negative Lakewood's test        UE NV Exam: +2 Radial pulses bilaterally  Sensation intact to light touch C5-T1 bilaterally, Radial/median/ulnar nerve motor intact      Bilateral elbow, wrist, and and forearm ROM full, painless with passive ROM, no ttp or crepitance throughout extremities below shoulder joint    Cervical ROM is full without pain, numbness or tingling      Shoulder Imaging    X-rays of the bilateral shoulder were reviewed, which demonstrate mild degenerative changes with type III acromion noted  No acute osseous abnormalities  I have reviewed the radiology report and do not [facility], but will check the result once the reading is performed  Large joint arthrocentesis: bilateral subacromial bursa  Universal Protocol:  Consent: Verbal consent obtained  Risks and benefits: risks, benefits and alternatives were discussed  Consent given by: patient  Time out: Immediately prior to procedure a "time out" was called to verify the correct patient, procedure, equipment, support staff and site/side marked as required    Timeout called at: 1/17/2023 4:55 PM   Patient understanding: patient states understanding of the procedure being performed  Patient consent: the patient's understanding of the procedure matches consent given  Site marked: the operative site was marked  Patient identity confirmed: verbally with patient    Supporting Documentation  Indications: pain and diagnostic evaluation   Procedure Details  Location: shoulder - bilateral subacromial bursa  Needle size: 22 G  Ultrasound guidance: no  Approach: posterior    Medications (Right): 4 mL bupivacaine 0 25 %; 40 mg triamcinolone acetonide 40 mg/mLMedications (Left): 4 mL bupivacaine 0 25 %; 40 mg triamcinolone acetonide 40 mg/mL   Patient tolerance: patient tolerated the procedure well with no immediate complications  Dressing:  Sterile dressing applied          Scribe Attestation    I,:  Tila Dean am acting as a scribe while in the presence of the attending physician :       I,:  Milla Dalton DO personally performed the services described in this documentation    as scribed in my presence :

## 2023-02-13 DIAGNOSIS — I10 PRIMARY HYPERTENSION: ICD-10-CM

## 2023-02-13 DIAGNOSIS — M16.11 PRIMARY OSTEOARTHRITIS OF RIGHT HIP: ICD-10-CM

## 2023-02-13 DIAGNOSIS — G89.4 CHRONIC PAIN SYNDROME: ICD-10-CM

## 2023-02-13 RX ORDER — HYDROCHLOROTHIAZIDE 25 MG/1
TABLET ORAL
Qty: 30 TABLET | Refills: 2 | Status: SHIPPED | OUTPATIENT
Start: 2023-02-13

## 2023-02-13 RX ORDER — DULOXETIN HYDROCHLORIDE 60 MG/1
CAPSULE, DELAYED RELEASE ORAL
Qty: 30 CAPSULE | Refills: 2 | Status: SHIPPED | OUTPATIENT
Start: 2023-02-13

## 2023-02-14 ENCOUNTER — OFFICE VISIT (OUTPATIENT)
Dept: FAMILY MEDICINE CLINIC | Facility: CLINIC | Age: 73
End: 2023-02-14

## 2023-02-14 VITALS
HEART RATE: 92 BPM | DIASTOLIC BLOOD PRESSURE: 88 MMHG | BODY MASS INDEX: 38.82 KG/M2 | WEIGHT: 227.4 LBS | HEIGHT: 64 IN | RESPIRATION RATE: 17 BRPM | OXYGEN SATURATION: 99 % | SYSTOLIC BLOOD PRESSURE: 132 MMHG | TEMPERATURE: 98.6 F

## 2023-02-14 DIAGNOSIS — R41.3 MEMORY DEFICIT: ICD-10-CM

## 2023-02-14 DIAGNOSIS — I25.10 CORONARY ARTERY DISEASE INVOLVING NATIVE HEART WITHOUT ANGINA PECTORIS, UNSPECIFIED VESSEL OR LESION TYPE: Primary | ICD-10-CM

## 2023-02-14 NOTE — PROGRESS NOTES
Name: Prachi Rene      : 1950      MRN: [id number]  Encounter Provider: LUCIA Torres  Encounter Date: 2023   Encounter department: [facility]    Assessment & Plan     1  Coronary artery disease involving native heart without angina pectoris, unspecified vessel or lesion type  -     Ambulatory Referral to Cardiology; Future    2  Memory deficit  -     Ambulatory Referral to Senior Care; Future           Subjective      Here today to review labs - all were reviewed and heart healthy diet and moderate exercise     Requesting referral to cardiology - admits has seen one in the past -"to make sure there is nothing wrong" denies CP or SOB but will refer to cardiology at her request     Requests referral to neurology - admits to issues with forgetfulness - and memory issues  - will schedule min mental and referral to Senior Care for continued evaluation   ADDENDUM: Folstein Mini mental exam completed 2023 - scored  - scanned into chart     Review of Systems   Constitutional: Negative  HENT: Negative  Eyes: Negative  Respiratory: Negative  Cardiovascular: Negative  Gastrointestinal: Negative  Endocrine: Negative  Genitourinary: Negative  Musculoskeletal: Negative  Skin: Negative  Neurological: Negative  Hematological: Negative  Psychiatric/Behavioral: Negative          Current Outpatient Medications on File Prior to Visit   Medication Sig   • amoxicillin (AMOXIL) 500 mg capsule TAKE 4 CAPSULES BY MOUTH 1 HOUR BEFORE APPOINTMENT   • ascorbic acid (VITAMIN C) 500 MG tablet Take 1 tablet (500 mg total) by mouth 2 (two) times a day (Patient taking differently: Take 500 mg by mouth in the morning)   • B Complex Vitamins (VITAMIN B COMPLEX PO) Take by mouth in the morning   • cholecalciferol (VITAMIN D3) 1,000 units tablet Take 1,000 Units by mouth daily   • DULoxetine (CYMBALTA) 60 mg delayed release capsule TAKE 1 CAPSULE BY MOUTH DAILY AT BEDTIME   • ferrous sulfate 324 (65 Fe) mg Take 1 tablet (324 mg total) by mouth 2 (two) times a day before meals (Patient taking differently: Take 324 mg by mouth daily)   • hydrochlorothiazide (HYDRODIURIL) 25 mg tablet TAKE ONE TABLET BY MOUTH EVERY DAY   • Multiple Vitamins-Minerals (CENTRUM SILVER PO) Take by mouth   • potassium chloride (K-DUR,KLOR-CON) 20 mEq tablet Take 1 tablet (20 mEq total) by mouth daily   • rosuvastatin (CRESTOR) 20 MG tablet Take 1 tablet (20 mg total) by mouth daily   • VITAMIN E PO Take by mouth       Objective     /88 (BP Location: Left arm, Patient Position: Sitting, Cuff Size: Large)   Pulse 92   Temp 98 6 °F (37 °C) (Tympanic)   Resp 17   Ht 5' 3 9" (1 623 m)   Wt 103 kg (227 lb 6 4 oz)   SpO2 99%   BMI 39 16 kg/m²     Physical Exam  Vitals and nursing note reviewed  Constitutional:       Appearance: She is obese  HENT:      Head: Normocephalic and atraumatic  Nose: Nose normal  No congestion  Eyes:      Extraocular Movements: Extraocular movements intact  Conjunctiva/sclera: Conjunctivae normal    Cardiovascular:      Rate and Rhythm: Normal rate and regular rhythm  Pulses: Normal pulses  Heart sounds: Normal heart sounds  Pulmonary:      Effort: Pulmonary effort is normal  No respiratory distress  Breath sounds: Normal breath sounds  Musculoskeletal:         General: Normal range of motion  Cervical back: Normal range of motion  Skin:     General: Skin is warm and dry  Neurological:      Mental Status: She is alert and oriented to person, place, and time  Psychiatric:         Mood and Affect: Mood normal          Behavior: Behavior normal          Thought Content:  Thought content normal          Judgment: Judgment normal        LUCIA Torres

## 2023-02-15 DIAGNOSIS — E78.2 MIXED HYPERLIPIDEMIA: ICD-10-CM

## 2023-02-15 RX ORDER — ROSUVASTATIN CALCIUM 20 MG/1
TABLET, COATED ORAL
Qty: 30 TABLET | Refills: 2 | Status: SHIPPED | OUTPATIENT
Start: 2023-02-15

## 2023-02-16 ENCOUNTER — CLINICAL SUPPORT (OUTPATIENT)
Dept: FAMILY MEDICINE CLINIC | Facility: CLINIC | Age: 73
End: 2023-02-16

## 2023-02-16 DIAGNOSIS — R41.3 MEMORY DEFICIT: Primary | ICD-10-CM

## 2023-02-27 ENCOUNTER — TELEPHONE (OUTPATIENT)
Age: 73
End: 2023-02-27

## 2023-02-27 NOTE — TELEPHONE ENCOUNTER
[facility]  [address] [facility], [address] [place] [address], [address] [place] [address]    [phone]    Telephone Intake: Geriatric Assessment     Referral source: Dawna Johnston, [address] who is scheduling/relationship to pt: self  Caller's phone number: [phone]    Reason for referral: Patient concerns , Family member concerns and Provider concerns regarding memory concerns  If there are behavioral concerns, is the pt prescribed medications to manage these? no   If so, how many? none   Has the patient ever had an inpatient psychiatric hospitalization? No,   What is the goal of the visit? initial assessment and diagnosis     Has the patient been seen by a Neurologist or Geriatrician? No   If yes, is this appointment for a second opinion? No  Has the patient ever been diagnosed with dementia? No      Preferred language? English  Highest education level? 11th Grade  Does the patient wear glasses? Yes   Does the patient use hearing aids? No     Is there a living will/healthcare POA in place/If so, who? No,     Does the pt/caregiver have access for a virtual visit (computer/smart phone with audio/video)? No    Caller was informed:   Please make sure the pt is accompanied by someone who knows them well / caregiver / family member to participate in this appointment  Who will accompany the pt (name and relationship)? Spouse, [employer] number of person accompanying pt: [phone]  Please make sure the pt attends all appointments, including the assessment, care conference, follow-up, whether in-person or virtual     Office packet mailed out to: [id number] [facility] [id number]    Added to wait list for sooner appointments?  Yes     NOTE FOR : Please route to provider for chart review prior to scheduling and let the caller know that this phone intake will be reviewed IF -  • Pt was recently hospitalized  • Pt is prescribed medications for behavior management or has a history of psychiatric hospitalization  • Pt plans to attend alone

## 2023-03-01 ENCOUNTER — TELEPHONE (OUTPATIENT)
Dept: OBGYN CLINIC | Facility: HOSPITAL | Age: 73
End: 2023-03-01

## 2023-03-01 NOTE — TELEPHONE ENCOUNTER
Patient called in asking if she needs to continue taking potassium   It looks like you prescribed it prior to her Right [facility]USA in August with Dr Namita Alberto

## 2023-03-21 ENCOUNTER — OFFICE VISIT (OUTPATIENT)
Dept: OBGYN CLINIC | Facility: CLINIC | Age: 73
End: 2023-03-21

## 2023-03-21 VITALS
WEIGHT: 233.6 LBS | SYSTOLIC BLOOD PRESSURE: 137 MMHG | BODY MASS INDEX: 39.88 KG/M2 | HEART RATE: 87 BPM | HEIGHT: 64 IN | DIASTOLIC BLOOD PRESSURE: 87 MMHG

## 2023-03-21 DIAGNOSIS — M25.50 PAIN IN JOINT, MULTIPLE SITES: ICD-10-CM

## 2023-03-21 DIAGNOSIS — M19.012 PRIMARY OSTEOARTHRITIS OF LEFT SHOULDER: Primary | ICD-10-CM

## 2023-03-21 RX ORDER — GABAPENTIN 300 MG/1
1 CAPSULE ORAL EVERY 6 HOURS PRN
COMMUNITY

## 2023-03-21 RX ORDER — OXYCODONE HYDROCHLORIDE AND ACETAMINOPHEN 5; 325 MG/1; MG/1
1 TABLET ORAL EVERY 6 HOURS PRN
COMMUNITY

## 2023-03-21 NOTE — PROGRESS NOTES
Ortho Sports Medicine Shoulder Follow Up Visit     Assesment:   81 y o  female Bilateral shoulder rotator cuff tendinitis and moderate osteoarthritis     Plan: The patient's findings and exam are consistent with bilateral shoulder rotator cuff tendinitis and moderate osteoarthritis  She is doing overall well as this time  She states she only has one more visit with physical therapy and is interested in doing water therapy at the [facility] and I dicussed with her that water therapy would be a good form of therapy  I referred her to receive an US guided glenohumeral injection as her last subacromial injection was minimally alleviating her symptoms may be more secondary to her osteoarthritis rather than impingement/rotator cuff tendinitis  She will plan to continue with home directed therapy program as well as swimming exercises and aquatic therapy and will schedule appointment with Dr Yaritza Blandon for ultrasound-guided injection several weeks out  I advised her to cancel the appointment if she notices a significant improvement or pain prior to that visit  She also mentioned that she is having multiple symmetric joint complaints in the bilateral hands knees wrists and other joints and requested rheumatology referral, which was provided for the patient today  Her mother has a history of rheumatoid arthritis and she has never been formally evaluated  Conservative treatment:    Ice to shoulder 1-2 times daily, for 20 minutes at a time  PT for ROM and strengthening to shoulder, rotator cuff, scapular stabilizers  Home exercise program for shoulder, including ROM and strenthening  Instructions given to patient of what exercises to perform  Use pain as a guide to return to activities  Imaging:    No imaging was available for review today        Injection:    The risks and benefits of the injection (which include but are not limited to: infection, bleeding,damage to nerve/artery, need for further intervention), as well as the risks and benefits of all alternative treatments were explained and understood  The patient elected to proceed with injection  The procedure was done with aseptic technique, and the patient tolerated the procedure well with no complications  We will consult IR for image guided injection of glenohumeral joint  Surgery:     No surgery is recommended at this point, continue with conservative treatment plan as noted  Follow up:    No follow-ups on file  Chief Complaint   Patient presents with   • Right Shoulder - Follow-up   • Left Shoulder - Follow-up         History of Present Illness: The patient is returns for follow up of bilateral shoulder rotator cuff tendinitis  Since the prior visit, She reports significant improvement  Pain is improved by rest, ice, NSAIDS, physical therapy and injection  Pain is aggravated by overhead activity, reaching back and lifting   Symptoms include clicking  The patient denies weakness  The patient has tried rest, ice, NSAIDS, physical therapy and injection  She states she only got mind relief with the injection she got on 22      Shoulder Surgical History:  None    Past Medical, Social and Family History:  Past Medical History:   Diagnosis Date   • Arthritis    • Balance disorder    • Hyperlipidemia    • Vertigo      Past Surgical History:   Procedure Laterality Date   •  SECTION     • COLONOSCOPY     • HYSTERECTOMY     • HYSTEROSCOPY W/ ENDOMETRIAL ABLATION     • IN ARTHRP ACETBLR/PROX FEM PROSTC AGRFT/ALGRFT Right 2022    Procedure: ARTHROPLASTY HIP TOTAL ANTERIOR and all associated procedures;  Surgeon: Deion Swann MD;  Location:  MAIN OR;  Service: Orthopedics     No Known Allergies  Current Outpatient Medications on File Prior to Visit   Medication Sig Dispense Refill   • amoxicillin (AMOXIL) 500 mg capsule TAKE 4 CAPSULES BY MOUTH 1 HOUR BEFORE APPOINTMENT     • ascorbic acid (VITAMIN C) 500 MG tablet Take 1 tablet (500 mg total) by mouth 2 (two) times a day (Patient taking differently: Take 500 mg by mouth in the morning) 60 tablet 0   • B Complex Vitamins (VITAMIN B COMPLEX PO) Take by mouth in the morning     • cholecalciferol (VITAMIN D3) 1,000 units tablet Take 1,000 Units by mouth daily     • DULoxetine (CYMBALTA) 60 mg delayed release capsule TAKE 1 CAPSULE BY MOUTH DAILY AT BEDTIME 30 capsule 2   • ferrous sulfate 324 (65 Fe) mg Take 1 tablet (324 mg total) by mouth 2 (two) times a day before meals (Patient taking differently: Take 324 mg by mouth daily) 60 tablet 0   • gabapentin (NEURONTIN) 300 mg capsule Take 1 capsule by mouth every 6 (six) hours as needed     • hydrochlorothiazide (HYDRODIURIL) 25 mg tablet TAKE ONE TABLET BY MOUTH EVERY DAY 30 tablet 2   • Multiple Vitamins-Minerals (CENTRUM SILVER PO) Take by mouth     • oxyCODONE-acetaminophen (PERCOCET) 5-325 mg per tablet Take 1 tablet by mouth every 6 (six) hours as needed     • potassium chloride (K-DUR,KLOR-CON) 20 mEq tablet Take 1 tablet (20 mEq total) by mouth daily 30 tablet 5   • rosuvastatin (CRESTOR) 20 MG tablet TAKE ONE TABLET BY MOUTH EVERY DAY 30 tablet 2   • VITAMIN E PO Take by mouth       No current facility-administered medications on file prior to visit       Social History     Socioeconomic History   • Marital status: /Civil Union     Spouse name: Not on file   • Number of children: Not on file   • Years of education: Not on file   • Highest education level: Not on file   Occupational History   • Not on file   Tobacco Use   • Smoking status: Never   • Smokeless tobacco: Never   Vaping Use   • Vaping Use: Never used   Substance and Sexual Activity   • Alcohol use: Never   • Drug use: Not Currently   • Sexual activity: Not Currently   Other Topics Concern   • Not on file   Social History Narrative   • Not on file     Social Determinants of Health     Financial Resource Strain: Not on file   Food Insecurity: Not on file Transportation Needs: Not on file   Physical Activity: Not on file   Stress: Not on file   Social Connections: Not on file   Intimate Partner Violence: Not At Risk   • Fear of Current or Ex-Partner: No   • Emotionally Abused: No   • Physically Abused: No   • Sexually Abused: No   Housing Stability: Not on file       I have reviewed the past medical, surgical, social and family history, medications and allergies as documented in the EMR  Review of systems: ROS is negative other than that noted in the HPI  Constitutional: Negative for fatigue and fever  Physical Exam:    Blood pressure 137/87, pulse 87, height 5' 3 9" (1 623 m), weight 106 kg (233 lb 9 6 oz)  General/Constitutional: NAD, well developed, well nourished  HENT: Normocephalic, atraumatic  CV: Intact distal pulses, regular rate  Resp: No respiratory distress or labored breathing  Lymphatic: No lymphadenopathy palpated  Neuro: Alert and Oriented x 3, no focal deficits  Psych: Normal mood, normal affect, normal judgement, normal behavior  Skin: Warm, dry, no rashes, no erythema      Shoulder focused exam:        Visual inspection of the shoulder demonstrates normal contour without atrophy  No evidence of scapular dyskinesia or atrophy    No scapular winging  Active and passive range of motion demonstrates forward flexion to 170, abduction to 90,  external rotation is 80 with the arm the side   Rotator cuff strength is 5/5 to resisted forward flexion, 5/5 resisted abduction, 5/5 resisted external rotation, 5/5 resisted internal rotation  No tenderness to palpation at the distal clavicle, AC joint, acromion, or scapular spine  No pain with cross-body adduction  Negative Neer's test, Negative modified Lieberman impingement test  Negative external rotation lag sign  Negative belly press, negative lift-off  Negative speed's and Yergason's test  No tenderness to palpation at the bicipital groove  Negative [place]'s test    UE NV Exam: +2 Radial pulses bilaterally  Sensation intact to light touch C5-T1 bilaterally, Radial/median/ulnar nerve motor intact    Cervical ROM is full without pain, numbness or tingling      Shoulder Imaging    No imaging was performed today      Scribe Attestation    I,:   am acting as a scribe while in the presence of the attending physician :       I,:   personally performed the services described in this documentation    as scribed in my presence :

## 2023-03-28 ENCOUNTER — TELEPHONE (OUTPATIENT)
Age: 73
End: 2023-03-28

## 2023-03-28 NOTE — TELEPHONE ENCOUNTER
Left message with patient to call back to reschedule Assessment & Care Conference due to change in providers schedule

## 2023-04-24 ENCOUNTER — TELEPHONE (OUTPATIENT)
Dept: OBGYN CLINIC | Facility: MEDICAL CENTER | Age: 73
End: 2023-04-24

## 2023-04-24 NOTE — TELEPHONE ENCOUNTER
One shoulder at a time  It is not visco  It is steroid  But still under ultrasound guidance   thanks

## 2023-04-24 NOTE — TELEPHONE ENCOUNTER
Caller: Soledad Pena     Doctor: Dr Jocelyn Marques    Reason for call: on 5/17 Soledadnish Pena is having a visco injection in her left shoulder  She is now requesting to also have the right shoulder done at the same time?  Please advise     Call back#: [phone]

## 2023-04-25 NOTE — TELEPHONE ENCOUNTER
Called and notified pt and informed of Dr Bhanu Almanzar Better  Informed that she is receiving a steroid injection and only give so much of that medication into the body at a time  Pt had no further questions or concerns

## 2023-05-17 ENCOUNTER — CONSULT (OUTPATIENT)
Dept: CARDIOLOGY CLINIC | Facility: CLINIC | Age: 73
End: 2023-05-17

## 2023-05-17 VITALS
BODY MASS INDEX: 40.8 KG/M2 | HEART RATE: 82 BPM | DIASTOLIC BLOOD PRESSURE: 62 MMHG | SYSTOLIC BLOOD PRESSURE: 126 MMHG | WEIGHT: 239 LBS | HEIGHT: 64 IN

## 2023-05-17 DIAGNOSIS — I10 ESSENTIAL HYPERTENSION: Primary | ICD-10-CM

## 2023-05-17 DIAGNOSIS — E78.5 DYSLIPIDEMIA: ICD-10-CM

## 2023-05-17 DIAGNOSIS — E78.2 MIXED HYPERLIPIDEMIA: ICD-10-CM

## 2023-05-17 PROBLEM — I25.10 CORONARY ARTERY DISEASE INVOLVING NATIVE HEART WITHOUT ANGINA PECTORIS: Status: ACTIVE | Noted: 2023-05-17

## 2023-05-17 RX ORDER — ROSUVASTATIN CALCIUM 40 MG/1
40 TABLET, COATED ORAL DAILY
Qty: 90 TABLET | Refills: 3 | Status: SHIPPED | OUTPATIENT
Start: 2023-05-17

## 2023-05-17 NOTE — PROGRESS NOTES
[address] [facility]  Office Consultation  Millie Galvan 81 y o  female MRN: [id number]        Chief Complaint    Chief Complaint   Patient presents with   • Heart Problem     NP - transfer care from [facility]  Prior hx of elevated chol & BP  No cardiac sx  Referring Provider: LUCIA Pacheco    Impression & Plan:    1  Dyslipidemia  Her LDL is 112 mg/dL on rosuvastatin 20 mg daily  She is tolerating well  Increase to 40 mg daily for better LDL lowering    - Ambulatory Referral to Cardiology    2  Essential hypertension  At goal on current therapy  Continue HCTZ 25 mg daily with potassium supplementation 20 meq daily  3  Mixed hyperlipidemia  - rosuvastatin (CRESTOR) 40 MG tablet; Take 1 tablet (40 mg total) by mouth daily  Dispense: 90 tablet; Refill: 3    She is overall low risk for ASCVD  She is asymptomatic, no further cardiac testing indicated at this time  The 10-year ASCVD risk score (Charli PLASCENCIA, et al , 2019) is: 12 2%    Values used to calculate the score:      Age: 81 years      Sex: Female      Is Non- : No      Diabetic: No      Tobacco smoker: No      Systolic Blood Pressure: 157 mmHg      Is BP treated: Yes      HDL Cholesterol: 58 mg/dL      Total Cholesterol: 208 mg/dL    We will see Lurdes Armstrong back in 12 months for routine follow-up  HPI: Millie Galvan is a 81y o  year old female with HTN presenting today with no complaints  She goes to the  regularly, swims and cycles  No chest pain, chest pressure, or dyspnea with exertion  No lightheadedness or dizziness  She does have mild swelling in her feet, developing within the last year  It has not progressed  There is no orthopnea or PND  Cardiac testing:         EKG reviewed personally:   EKG 7/27/2022 reviewed, --normal sinus rhythm, 72 bpm, normal axis, normal intervals  Low voltage  Nonspecific ST/T  Abnormal study          Relevant Laboratory Studies:  (Laboratory studies personally reviewed)  Lipid panel 2023 reviewed-- mg/dL  CMP 2023 reviewed--WNL  CBC 2022 reviewed--WNL  HbA1c 2023 reviewed--5 8%    Review of Systems   Constitutional: Negative for activity change and fatigue  HENT: Negative for facial swelling  Eyes: Negative for visual disturbance  Respiratory: Negative for chest tightness and shortness of breath  Cardiovascular: Negative for chest pain, palpitations and leg swelling  Gastrointestinal: Negative for nausea and vomiting  Musculoskeletal: Negative for myalgias  Skin: Negative for pallor  Allergic/Immunologic: Negative for immunocompromised state  Neurological: Negative for dizziness, syncope and light-headedness  Psychiatric/Behavioral: Negative for agitation and confusion  The patient is not nervous/anxious  Past Medical History:   Diagnosis Date   • Arthritis    • Balance disorder    • Hyperlipidemia    • Vertigo      Past Surgical History:   Procedure Laterality Date   •  SECTION     • COLONOSCOPY     • HYSTERECTOMY     • HYSTEROSCOPY W/ ENDOMETRIAL ABLATION     • [place] ARTHRP ACETBLR/PROX FEM PROSTC AGRFT/ALGRFT Right 2022    Procedure: ARTHROPLASTY HIP TOTAL ANTERIOR and all associated procedures;  Surgeon: Sarah Galvin MD;  Location: [facility];  Service: Orthopedics     Social History     Substance and Sexual Activity   Alcohol Use Never     Social History     Substance and Sexual Activity   Drug Use Not Currently     Social History     Tobacco Use   Smoking Status Never   Smokeless Tobacco Never     Family History   Problem Relation Age of Onset   • Cancer Father        Allergies:  No Known Allergies    Medications (as of START of this encounter):    Outpatient Medications Prior to Visit   Medication Sig Dispense Refill   • amoxicillin (AMOXIL) 500 mg capsule TAKE 4 CAPSULES BY MOUTH 1 HOUR BEFORE APPOINTMENT     • ascorbic acid (VITAMIN C) 500 MG tablet Take 1 tablet (500 mg total) by mouth 2 (two) times a day (Patient taking differently: Take 500 mg by mouth in the morning) 60 tablet 0   • B Complex Vitamins (VITAMIN B COMPLEX PO) Take by mouth in the morning     • cholecalciferol (VITAMIN D3) 1,000 units tablet Take 1,000 Units by mouth daily     • DULoxetine (CYMBALTA) 60 mg delayed release capsule TAKE 1 CAPSULE BY MOUTH DAILY AT BEDTIME 30 capsule 2   • ferrous sulfate 324 (65 Fe) mg Take 1 tablet (324 mg total) by mouth 2 (two) times a day before meals (Patient taking differently: Take 324 mg by mouth daily) 60 tablet 0   • gabapentin (NEURONTIN) 300 mg capsule Take 1 capsule by mouth every 6 (six) hours as needed     • hydrochlorothiazide (HYDRODIURIL) 25 mg tablet TAKE ONE TABLET BY MOUTH EVERY DAY 30 tablet 2   • Multiple Vitamins-Minerals (CENTRUM SILVER PO) Take by mouth     • oxyCODONE-acetaminophen (PERCOCET) 5-325 mg per tablet Take 1 tablet by mouth every 6 (six) hours as needed     • potassium chloride (K-DUR,KLOR-CON) 20 mEq tablet Take 1 tablet (20 mEq total) by mouth daily 30 tablet 5   • VITAMIN E PO Take by mouth     • rosuvastatin (CRESTOR) 20 MG tablet TAKE ONE TABLET BY MOUTH EVERY DAY 30 tablet 2     No facility-administered medications prior to visit  Vitals:    05/17/23 1353   BP: 126/62   Pulse: 82     Weight (last 2 days)     Date/Time Weight    05/17/23 1353 108 (239)          General: Liz Garcia is a well appearing female, in no acute distress, sitting comfortably  HEENT: moist mucous membranes, EOMI  Neck:  No JVD, supple, trachea midline   Cardiovascular: unremarkable S1/S2, regular rate and rhythm, no murmurs, rubs or gallops   Pulmonary: normal respiratory effort, CTAB   Abdomen: soft and nondistended  Extremities: trace lower extremity edema  Warm and well perfused extremities     Neuro: no focal motor deficits, AAOx3 (person, place, time)  Psych: Normal mood and affect, cooperative        Time Spent:  Total time (face-to-face and non-face-to-face) "spent on today's visit was 40 minutes  This includes preparation for the visits (i e  reviewing test results), performance of a medically appropriate history and examination, and orders for medications, tests or other procedures  This time is exclusive of procedures performed and time spent teaching  Jas Osborne MD    This note was completed in part utilizing "[employer] [facility] recognition software  Grammatical errors, random word insertion, spelling mistakes, occasional wrong word or \"sound-alike\" substitutions and incomplete sentences may be an occasional consequence of the system secondary to software limitations, ambient noise and hardware issues  At the time of dictation, efforts were made to edit, clarify and /or correct errors  Please read the chart carefully and recognize, using context, where substitutions have occurred  If you have any questions or concerns about the context, text or information contained within the body of this dictation, please contact myself, the provider, for further clarification      "

## 2023-05-21 DIAGNOSIS — M16.11 PRIMARY OSTEOARTHRITIS OF RIGHT HIP: ICD-10-CM

## 2023-05-21 DIAGNOSIS — G89.4 CHRONIC PAIN SYNDROME: ICD-10-CM

## 2023-05-21 DIAGNOSIS — I10 PRIMARY HYPERTENSION: ICD-10-CM

## 2023-05-21 RX ORDER — DULOXETIN HYDROCHLORIDE 60 MG/1
CAPSULE, DELAYED RELEASE ORAL
Qty: 30 CAPSULE | Refills: 2 | Status: SHIPPED | OUTPATIENT
Start: 2023-05-21

## 2023-05-21 RX ORDER — HYDROCHLOROTHIAZIDE 25 MG/1
TABLET ORAL
Qty: 30 TABLET | Refills: 2 | Status: SHIPPED | OUTPATIENT
Start: 2023-05-21

## 2023-05-23 ENCOUNTER — PROCEDURE VISIT (OUTPATIENT)
Dept: OBGYN CLINIC | Facility: CLINIC | Age: 73
End: 2023-05-23

## 2023-05-23 VITALS
DIASTOLIC BLOOD PRESSURE: 82 MMHG | WEIGHT: 236 LBS | SYSTOLIC BLOOD PRESSURE: 120 MMHG | BODY MASS INDEX: 40.29 KG/M2 | HEIGHT: 64 IN

## 2023-05-23 DIAGNOSIS — G89.29 CHRONIC PAIN OF BOTH SHOULDERS: ICD-10-CM

## 2023-05-23 DIAGNOSIS — M25.512 CHRONIC PAIN OF BOTH SHOULDERS: ICD-10-CM

## 2023-05-23 DIAGNOSIS — M25.511 CHRONIC PAIN OF BOTH SHOULDERS: ICD-10-CM

## 2023-05-23 DIAGNOSIS — M19.011 OSTEOARTHRITIS OF RIGHT GLENOHUMERAL JOINT: Primary | ICD-10-CM

## 2023-05-23 DIAGNOSIS — M75.81 RIGHT ROTATOR CUFF TENDINITIS: ICD-10-CM

## 2023-05-23 RX ORDER — LIDOCAINE HYDROCHLORIDE 10 MG/ML
4 INJECTION, SOLUTION INFILTRATION; PERINEURAL
Status: COMPLETED | OUTPATIENT
Start: 2023-05-23 | End: 2023-05-23

## 2023-05-23 RX ORDER — TRIAMCINOLONE ACETONIDE 40 MG/ML
40 INJECTION, SUSPENSION INTRA-ARTICULAR; INTRAMUSCULAR
Status: COMPLETED | OUTPATIENT
Start: 2023-05-23 | End: 2023-05-23

## 2023-05-23 RX ADMIN — LIDOCAINE HYDROCHLORIDE 4 ML: 10 INJECTION, SOLUTION INFILTRATION; PERINEURAL at 10:01

## 2023-05-23 RX ADMIN — TRIAMCINOLONE ACETONIDE 40 MG: 40 INJECTION, SUSPENSION INTRA-ARTICULAR; INTRAMUSCULAR at 10:01

## 2023-05-23 NOTE — PATIENT INSTRUCTIONS
Right shoulder glenohumeral joint corticosteroid injection administered under ultrasound guidance today  Continue with home exercise program as prescribed previously by physical therapy    Red flags and risks of injection include but are not limited to infection <0 072% as referenced in some sources, nerve or artery penetration, and if steroids are used-skin dimpling <1%, hypo-pigmentation <1%,  or even damage to the bone as referenced in some sources  contraindications include but are not limited to active infection, prosthetic joint, fracture, allergy to steroid if used or anesthetics such as lidocaine, and uncontrolled blood thinner medications such as coumadin/warfarin  Educated if any symptoms including fevers, chills, swelling, or worsening symptoms occur then to call office or go to hospital for immediate care if physician unavailable due to possible infection or other complication which is a serious medical problem  Patient expressed understanding and agreed to proceed with procedure

## 2023-05-23 NOTE — PROGRESS NOTES
1  Osteoarthritis of right glenohumeral joint  Large joint arthrocentesis: R glenohumeral      2  Chronic pain of both shoulders  Large joint arthrocentesis: R glenohumeral      3  Right rotator cuff tendinitis          Orders Placed This Encounter   Procedures   • Large joint arthrocentesis: R glenohumeral        Imaging Studies (I personally reviewed images in PACS and report):  Right shoulder x-ray 1/17/2023: Mild glenohumeral and acromioclavicular osteoarthritis with no acute osseous abnormality  IMPRESSION:  Right shoulder glenohumeral arthritis  Right rotator cuff syndrome      Repeat X-ray next visit: None    Return for Follow-up every 3 months as needed for repeat glenohumeral joint injections with ultrasound guidance  Patient Instructions   Right shoulder glenohumeral joint corticosteroid injection administered under ultrasound guidance today  Continue with home exercise program as prescribed previously by physical therapy    Red flags and risks of injection include but are not limited to infection <0 072% as referenced in some sources, nerve or artery penetration, and if steroids are used-skin dimpling <1%, hypo-pigmentation <1%,  or even damage to the bone as referenced in some sources  contraindications include but are not limited to active infection, prosthetic joint, fracture, allergy to steroid if used or anesthetics such as lidocaine, and uncontrolled blood thinner medications such as coumadin/warfarin  Educated if any symptoms including fevers, chills, swelling, or worsening symptoms occur then to call office or go to hospital for immediate care if physician unavailable due to possible infection or other complication which is a serious medical problem  Patient expressed understanding and agreed to proceed with procedure  CHIEF COMPLAINT:  Right shoulder pain    HPI:  Anibal Thomas is a 81 y o  female  who presents for ultrasound-guided injection of the glenohumeral joint  "Patient was referred from our orthopedic surgical colleague Dr Farhad Del Valle for injection of the left shoulder, but patient presents today with pain worse on the right  She has a history of bilateral mild to moderate glenohumeral joint osteoarthritis and rotator cuff tendinitis for which she has received landmark guided subacromial corticosteroid injections  Visit 2023 : Today, patient reports continued shoulder pain that is currently poorly controlled  She has a history of bilateral shoulder pain and states that her right shoulder, which is her dominant arm, has been worse in recent weeks without new/recent acute injury  She reports discomfort when utilizing her right arm above her head  She endorses minimal palliation with NSAIDs  She states her most recent landmark guided subacromial injection only provided minimal relief            Review of Systems      Following history reviewed and update:    Past Medical History:   Diagnosis Date   • Arthritis    • Balance disorder    • Hyperlipidemia    • Vertigo      Past Surgical History:   Procedure Laterality Date   •  SECTION     • COLONOSCOPY     • HYSTERECTOMY     • HYSTEROSCOPY W/ ENDOMETRIAL ABLATION     • [place] ARTHRP ACETBLR/PROX FEM PROSTC AGRFT/ALGRFT Right 2022    Procedure: ARTHROPLASTY HIP TOTAL ANTERIOR and all associated procedures;  Surgeon: Nora Glover MD;  Location: [facility];  Service: Orthopedics     Social History   Social History     Substance and Sexual Activity   Alcohol Use Never     Social History     Substance and Sexual Activity   Drug Use Not Currently     Social History     Tobacco Use   Smoking Status Never   Smokeless Tobacco Never     Family History   Problem Relation Age of Onset   • Cancer Father      No Known Allergies       Physical Exam  /82   Ht 5' 4\" (1 626 m)   Wt 107 kg (236 lb)   BMI 40 51 kg/m²     Constitutional:  see vital signs  Gen: well-developed, normocephalic/atraumatic, " well-groomed  Eyes: No inflammation or discharge of conjunctiva or lids; sclera clear   Pharynx: no inflammation, lesion, or mass of lips  Neck: supple, no masses, non-distended  MSK: no inflammation, lesion, mass, or clubbing of nails and digits except for other than mentioned below  SKIN: no visible rashes or skin lesions  Pulmonary/Chest: Effort normal  No respiratory distress  NEURO: cranial nerves grossly intact  PSYCH:  Alert and oriented to person, place, and time; recent and remote memory intact; mood normal, no depression, anxiety, or agitation, judgment and insight good and intact     Ortho Exam  RIGHT SHOULDER:  Erythema: no  Swelling: no  Increased Warmth: no    Tenderness: +++ Posterior joint line, + subacromial space    ROM  Touchdown sign: intact  External Rotation: Intact  Internal Rotation: Intact    Strength  Abduction: 5/5  ER: 5/5  IR: 5/5    Drop-Arm: negative  Emptycan: negative  Belly Press:   Lift-off Test:    Lieberman: negative  Neer: Equivocal  Cross-Arm:   Speeds:       Large joint arthrocentesis: R glenohumeral  [place] Protocol:  Consent: Verbal consent obtained  Risks and benefits: risks, benefits and alternatives were discussed  Consent given by: patient  Patient understanding: patient states understanding of the procedure being performed  Site marked: the operative site was marked  Radiology Images displayed and confirmed   If images not available, report reviewed: imaging studies available  Required items: required blood products, implants, devices, and special equipment available  Patient identity confirmed: verbally with patient    Supporting Documentation  Indications: pain   Procedure Details  Location: shoulder - R glenohumeral  Preparation: Patient was prepped and draped in the usual sterile fashion  Needle size: 22 G  Ultrasound guidance: yes  Approach: posterior  Medications administered: 4 mL lidocaine 1 %; 40 mg triamcinolone acetonide 40 mg/mL    Patient tolerance: patient tolerated the procedure well with no immediate complications  Dressing:  Sterile dressing applied

## 2023-05-25 ENCOUNTER — OFFICE VISIT (OUTPATIENT)
Dept: FAMILY MEDICINE CLINIC | Facility: CLINIC | Age: 73
End: 2023-05-25

## 2023-05-25 VITALS
SYSTOLIC BLOOD PRESSURE: 140 MMHG | HEIGHT: 64 IN | TEMPERATURE: 99 F | OXYGEN SATURATION: 94 % | HEART RATE: 75 BPM | RESPIRATION RATE: 16 BRPM | WEIGHT: 236.8 LBS | DIASTOLIC BLOOD PRESSURE: 88 MMHG | BODY MASS INDEX: 40.43 KG/M2

## 2023-05-25 DIAGNOSIS — R26.89 IMPAIRMENT OF BALANCE: Primary | ICD-10-CM

## 2023-05-25 DIAGNOSIS — Z12.11 SCREENING FOR MALIGNANT NEOPLASM OF COLON: ICD-10-CM

## 2023-05-25 PROBLEM — E66.01 OBESITY, MORBID (HCC): Status: ACTIVE | Noted: 2023-05-25

## 2023-05-26 NOTE — PROGRESS NOTES
Name: Jeannette Eckert      : 1950      MRN: [id number]  Encounter Provider: Sina Tabor PA-C  Encounter Date: 2023   Encounter department: [facility]    Assessment & Plan     1  Impairment of balance  Comments:  Chronic issue for years  Orders:  -     Ambulatory Referral to Physical Therapy; Future    2  Screening for malignant neoplasm of colon  -     Cologuard    Patient to call with any questions, concerns persistent or worsening symptoms  Follow with physical therapy for evaluation  Subjective      HPI   73-year-old female here today with acute on chronic impairment of balance issues  Patient states have gotten somewhat worse over the past week  Patient states typically in the morning when she wakes up she sits up slowly and when she attempts to get out of bed feels like someone is pushing her more to the left side  Patient denies any falls or trauma  Patient also states feels unsteady on her feet when she walks across soft surfaces such as grass  Patient with multiple joint surgeries including knees  States may have some weakness due her knee surgeries which may be causing balance issues as well  Review of Systems  Denies fever, chills, headaches, vertigo or dizziness  Denies dyspnea, chest pain, cough, abdominal pain or GI symptoms    Current Outpatient Medications on File Prior to Visit   Medication Sig   • amoxicillin (AMOXIL) 500 mg capsule TAKE 4 CAPSULES BY MOUTH 1 HOUR BEFORE APPOINTMENT   • ascorbic acid (VITAMIN C) 500 MG tablet Take 1 tablet (500 mg total) by mouth 2 (two) times a day (Patient taking differently: Take 500 mg by mouth in the morning)   • B Complex Vitamins (VITAMIN B COMPLEX PO) Take by mouth in the morning   • cholecalciferol (VITAMIN D3) 1,000 units tablet Take 1,000 Units by mouth daily   • DULoxetine (CYMBALTA) 60 mg delayed release capsule TAKE ONE CAPSULE BY MOUTH AT BEDTIME   • hydrochlorothiazide (HYDRODIURIL) "25 mg tablet TAKE ONE TABLET BY MOUTH EVERY DAY   • Multiple Vitamins-Minerals (CENTRUM SILVER PO) Take by mouth   • potassium chloride (K-DUR,KLOR-CON) 20 mEq tablet Take 1 tablet (20 mEq total) by mouth daily   • rosuvastatin (CRESTOR) 40 MG tablet Take 1 tablet (40 mg total) by mouth daily   • VITAMIN E PO Take by mouth   • ferrous sulfate 324 (65 Fe) mg Take 1 tablet (324 mg total) by mouth 2 (two) times a day before meals (Patient not taking: Reported on 5/25/2023)   • gabapentin (NEURONTIN) 300 mg capsule Take 1 capsule by mouth every 6 (six) hours as needed   • oxyCODONE-acetaminophen (PERCOCET) 5-325 mg per tablet Take 1 tablet by mouth every 6 (six) hours as needed (Patient not taking: Reported on 5/25/2023)       Objective     /88 (BP Location: Left arm, Patient Position: Sitting, Cuff Size: Large)   Pulse 75   Temp 99 °F (37 2 °C) (Tympanic)   Resp 16   Ht 5' 4\" (1 626 m)   Wt 107 kg (236 lb 12 8 oz)   SpO2 94%   BMI 40 65 kg/m²     Physical Exam  Vitals and nursing note reviewed  Constitutional:       General: She is not in acute distress  Appearance: She is not ill-appearing, toxic-appearing or diaphoretic  HENT:      Head: Normocephalic  Nose: Nose normal       Mouth/Throat:      Mouth: Mucous membranes are moist       Pharynx: Oropharynx is clear  Eyes:      General: No scleral icterus  Conjunctiva/sclera: Conjunctivae normal    Cardiovascular:      Rate and Rhythm: Normal rate and regular rhythm  Pulses: Normal pulses  Heart sounds: Normal heart sounds  Pulmonary:      Effort: Pulmonary effort is normal       Breath sounds: Normal breath sounds  Abdominal:      General: Bowel sounds are normal       Palpations: Abdomen is soft  Tenderness: There is no abdominal tenderness  Musculoskeletal:         General: No signs of injury  Skin:     General: Skin is warm and dry  Capillary Refill: Capillary refill takes less than 2 seconds   " Neurological:      Mental Status: She is alert and oriented to person, place, and time  GCS: GCS eye subscore is 4  GCS verbal subscore is 5  GCS motor subscore is 6  Cranial Nerves: No cranial nerve deficit, dysarthria or facial asymmetry  Motor: Motor function is intact        Coordination: Coordination abnormal    Psychiatric:         Mood and Affect: Mood normal        Krystal Hogan PA-C

## 2023-06-07 NOTE — PROGRESS NOTES
Chief Complaint: Left shoulder pain    HPI:    Crow Shelton is a 81year old Female who presents today for new evaluation and treatment of left shoulder pain  Description of symptoms: Patient presents for evaluation of chronic left shoulder pain  Previously seen in this regard by our sports orthopedic surgical colleague Dr Karla Palacios on 3/21/2023  Patient carries prior diagnoses of bilateral glenohumeral joint osteoarthritis, for which she received ultrasound-guided corticosteroid injection on the right on 5/23/2023  Previously, patient did not receive significant improvement in her discomfort with landmark guided subacromial corticosteroid injections  Today, patient reports continued uncontrolled left shoulder pain made worse with activity  She denies any new injuries since time of last visit  She states that she has been experiencing excellent relief in her right shoulder pain following ultrasound-guided corticosteroid injection administered at last visit  Summary of treatment to-date: As outlined above and in previous notes  I have personally reviewed pertinent films in PACS      Patient Active Problem List   Diagnosis   • Anemia   • Constipation   • Dyslipidemia   • Essential hypertension   • History of total knee arthroplasty, left   • Osteoarthritis of knee, unilateral   • Prediabetes   • Dystonia   • Chronic right hip pain   • Primary osteoarthritis of right hip   • Chronic bilateral low back pain without sciatica   • Chronic pain syndrome   • Obesity (BMI 35 0-39 9 without comorbidity)   • Status post total hip replacement, right   • Continuous opioid dependence (HCC)   • Chronic pain of both shoulders   • Obesity, morbid (HCC)        Current Outpatient Medications on File Prior to Visit   Medication Sig Dispense Refill   • amoxicillin (AMOXIL) 500 mg capsule TAKE 4 CAPSULES BY MOUTH 1 HOUR BEFORE APPOINTMENT     • ascorbic acid (VITAMIN C) 500 MG tablet Take 1 tablet (500 mg total) by mouth 2 (two) times a day (Patient taking differently: Take 500 mg by mouth in the morning) 60 tablet 0   • B Complex Vitamins (VITAMIN B COMPLEX PO) Take by mouth in the morning     • cholecalciferol (VITAMIN D3) 1,000 units tablet Take 1,000 Units by mouth daily     • DULoxetine (CYMBALTA) 60 mg delayed release capsule TAKE ONE CAPSULE BY MOUTH AT BEDTIME 30 capsule 2   • ferrous sulfate 324 (65 Fe) mg Take 1 tablet (324 mg total) by mouth 2 (two) times a day before meals (Patient not taking: Reported on 5/25/2023) 60 tablet 0   • gabapentin (NEURONTIN) 300 mg capsule Take 1 capsule by mouth every 6 (six) hours as needed     • hydrochlorothiazide (HYDRODIURIL) 25 mg tablet TAKE ONE TABLET BY MOUTH EVERY DAY 30 tablet 2   • Multiple Vitamins-Minerals (CENTRUM SILVER PO) Take by mouth     • oxyCODONE-acetaminophen (PERCOCET) 5-325 mg per tablet Take 1 tablet by mouth every 6 (six) hours as needed (Patient not taking: Reported on 5/25/2023)     • potassium chloride (K-DUR,KLOR-CON) 20 mEq tablet Take 1 tablet (20 mEq total) by mouth daily 30 tablet 5   • rosuvastatin (CRESTOR) 40 MG tablet Take 1 tablet (40 mg total) by mouth daily 90 tablet 3   • VITAMIN E PO Take by mouth       No current facility-administered medications on file prior to visit          No Known Allergies     Tobacco Use: Low Risk  (6/9/2023)    Patient History    • Smoking Tobacco Use: Never    • Smokeless Tobacco Use: Never    • Passive Exposure: Not on file        Social Determinants of Health     Tobacco Use: Low Risk  (6/9/2023)    Patient History    • Smoking Tobacco Use: Never    • Smokeless Tobacco Use: Never    • Passive Exposure: Not on file   Alcohol Use: Not on file   Financial Resource Strain: Not on file   Food Insecurity: Not on file   Transportation Needs: Not on file   Physical Activity: Not on file   Stress: Not on file   Social Connections: Not on file   Intimate Partner Violence: Not At Risk (5/25/2023)    Humiliation, Afraid, Rape, and Kick questionnaire    • Fear of Current or Ex-Partner: No    • Emotionally Abused: No    • Physically Abused: No    • Sexually Abused: No   Depression: Not at risk (5/25/2023)    PHQ-2    • PHQ-2 Score: 0   Housing Stability: Not on file        Review of Systems     Body mass index is 39 99 kg/m²  Physical Exam  Vitals and nursing note reviewed  Constitutional:       General: She is not in acute distress  Appearance: Normal appearance  She is obese  She is not ill-appearing, toxic-appearing or diaphoretic  HENT:      Head: Normocephalic and atraumatic  Eyes:      General: No scleral icterus  Conjunctiva/sclera: Conjunctivae normal    Cardiovascular:      Pulses: Normal pulses  Pulmonary:      Effort: Pulmonary effort is normal  No respiratory distress  Skin:     General: Skin is warm and dry  Capillary Refill: Capillary refill takes less than 2 seconds  Neurological:      Mental Status: She is alert and oriented to person, place, and time  Ortho Exam:  LEFT SHOULDER:  Erythema: no  Swelling: no  Increased Warmth: no    Tenderness: Posterior joint line    ROM  Touchdown sign: intact  External Rotation: intact  Internal Rotation: intact    Strength  Abduction: 5/5  ER: 5/5  IR: 5/5        Large joint arthrocentesis: L glenohumeral  Universal Protocol:  Consent: Verbal consent obtained  Risks and benefits: risks, benefits and alternatives were discussed  Consent given by: patient  Patient understanding: patient states understanding of the procedure being performed  Site marked: the operative site was marked  Radiology Images displayed and confirmed   If images not available, report reviewed: imaging studies available  Required items: required blood products, implants, devices, and special equipment available  Patient identity confirmed: verbally with patient    Supporting Documentation  Indications: pain   Procedure Details  Location: shoulder - L glenohumeral  Preparation: "Patient was prepped and draped in the usual sterile fashion  Needle size: 22 G  Ultrasound guidance: yes  Approach: posterior  Medications administered: 4 mL bupivacaine 0 25 %; 40 mg triamcinolone acetonide 40 mg/mL    Patient tolerance: patient tolerated the procedure well with no immediate complications  Dressing:  Sterile dressing applied             Assessment:     Diagnosis ICD-10-CM Associated Orders   1  Primary osteoarthritis of left shoulder  M19 012 Large joint arthrocentesis: L glenohumeral     Ambulatory Referral to Sports Medicine           Plan: We reviewed our current clinical assessment with Todd Nguyễn  We discussed management options, reviewed natural history, and engaged in shared decision-making  Patient received ultrasound-guided left glenohumeral joint corticosteroid injection as outlined above  Patient endorsed understanding and acceptance of the above plan  Follow-Up:    As needed        Portions of the record may have been created with voice recognition software  Occasional wrong word or \"sound alike\" substitutions may have occurred due to the inherent limitations of voice recognition software  Please review the chart carefully and recognize, using context, where substitutions/typographical errors may have occurred               "

## 2023-06-09 ENCOUNTER — OFFICE VISIT (OUTPATIENT)
Dept: OBGYN CLINIC | Facility: OTHER | Age: 73
End: 2023-06-09
Payer: MEDICARE

## 2023-06-09 ENCOUNTER — TELEPHONE (OUTPATIENT)
Dept: FAMILY MEDICINE CLINIC | Facility: CLINIC | Age: 73
End: 2023-06-09

## 2023-06-09 VITALS
WEIGHT: 233 LBS | HEIGHT: 64 IN | BODY MASS INDEX: 39.78 KG/M2 | DIASTOLIC BLOOD PRESSURE: 85 MMHG | SYSTOLIC BLOOD PRESSURE: 145 MMHG | HEART RATE: 82 BPM

## 2023-06-09 DIAGNOSIS — M19.012 PRIMARY OSTEOARTHRITIS OF LEFT SHOULDER: Primary | ICD-10-CM

## 2023-06-09 PROCEDURE — 99213 OFFICE O/P EST LOW 20 MIN: CPT | Performed by: ORTHOPAEDIC SURGERY

## 2023-06-09 PROCEDURE — 20611 DRAIN/INJ JOINT/BURSA W/US: CPT | Performed by: ORTHOPAEDIC SURGERY

## 2023-06-09 RX ORDER — BUPIVACAINE HYDROCHLORIDE 2.5 MG/ML
4 INJECTION, SOLUTION INFILTRATION; PERINEURAL
Status: COMPLETED | OUTPATIENT
Start: 2023-06-09 | End: 2023-06-09

## 2023-06-09 RX ORDER — TRIAMCINOLONE ACETONIDE 40 MG/ML
40 INJECTION, SUSPENSION INTRA-ARTICULAR; INTRAMUSCULAR
Status: COMPLETED | OUTPATIENT
Start: 2023-06-09 | End: 2023-06-09

## 2023-06-09 RX ADMIN — TRIAMCINOLONE ACETONIDE 40 MG: 40 INJECTION, SUSPENSION INTRA-ARTICULAR; INTRAMUSCULAR at 11:15

## 2023-06-09 RX ADMIN — BUPIVACAINE HYDROCHLORIDE 4 ML: 2.5 INJECTION, SOLUTION INFILTRATION; PERINEURAL at 11:15

## 2023-06-09 NOTE — PATIENT INSTRUCTIONS
Red flags and risks of injection include but are not limited to infection <0 072% as referenced in some sources, nerve or artery penetration, and if steroids are used-skin dimpling <1%, hypo-pigmentation <1%,  or even damage to the bone as referenced in some sources  contraindications include but are not limited to active infection, prosthetic joint, fracture, allergy to steroid if used or anesthetics such as lidocaine, and uncontrolled blood thinner medications such as coumadin/warfarin  Educated if any symptoms including fevers, chills, swelling, or worsening symptoms occur then to call office or go to hospital for immediate care if physician unavailable due to possible infection or other complication which is a serious medical problem  Patient expressed understanding and agreed to proceed with procedure

## 2023-06-22 ENCOUNTER — TELEPHONE (OUTPATIENT)
Dept: ADMINISTRATIVE | Facility: OTHER | Age: 73
End: 2023-06-22

## 2023-06-22 DIAGNOSIS — R19.5 POSITIVE COLORECTAL CANCER SCREENING USING COLOGUARD TEST: Primary | ICD-10-CM

## 2023-06-22 LAB — COLOGUARD RESULT REPORTABLE: POSITIVE

## 2023-06-22 NOTE — TELEPHONE ENCOUNTER
----- Message from Paty Esparza sent at 6/22/2023  9:22 AM EDT -----  Regarding: colorectal  06/22/23 9:23 AM    Hello, our patient Isidro Belcher has had CRC: Cologuascott completed/performed  Please assist in updating the patient chart by pulling the document from lab Tab within Chart Review  The date of service is 06/14/23       Thank you,  Paty MCKEON [facility] AT [facility]

## 2023-06-23 NOTE — TELEPHONE ENCOUNTER
Upon review of the In Basket request we hm is up to date    Any additional questions or concerns should be emailed to [employer] via the appropriate education email address, please do not reply via In Basket      Thank you  Eddie Frias MA

## 2023-07-12 ENCOUNTER — TELEPHONE (OUTPATIENT)
Dept: OBGYN CLINIC | Facility: HOSPITAL | Age: 73
End: 2023-07-12

## 2023-07-12 NOTE — TELEPHONE ENCOUNTER
Caller: Patient- Fer Larkin    Doctor: Dr Amanda Santos    Reason for call: Patient is calling in stating that she received an injection with Dr Demarcus Jones on 5/23/23 to her right shoulder and is currently experiencing a lot of pain and decreased mobility. 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 Please advise and reach out to patient on what further she is able to do.     Call back#: [phone]

## 2023-07-20 ENCOUNTER — HOSPITAL ENCOUNTER (EMERGENCY)
Facility: HOSPITAL | Age: 73
Discharge: HOME | End: 2023-07-21
Attending: EMERGENCY MEDICINE | Admitting: EMERGENCY MEDICINE
Payer: MEDICARE

## 2023-07-20 ENCOUNTER — APPOINTMENT (OUTPATIENT)
Dept: RADIOLOGY | Facility: HOSPITAL | Age: 73
End: 2023-07-20
Payer: MEDICARE

## 2023-07-20 VITALS
RESPIRATION RATE: 18 BRPM | TEMPERATURE: 98.6 F | HEIGHT: 64 IN | WEIGHT: 230 LBS | BODY MASS INDEX: 39.27 KG/M2 | SYSTOLIC BLOOD PRESSURE: 137 MMHG | HEART RATE: 84 BPM | DIASTOLIC BLOOD PRESSURE: 84 MMHG | OXYGEN SATURATION: 100 %

## 2023-07-20 DIAGNOSIS — E87.6 HYPOKALEMIA: Primary | ICD-10-CM

## 2023-07-20 LAB
ALBUMIN SERPL BCP-MCNC: 4.1 G/DL (ref 3.5–5)
ALP SERPL-CCNC: 68 U/L (ref 34–104)
ALT SERPL W P-5'-P-CCNC: 18 U/L (ref 7–52)
ANION GAP SERPL CALCULATED.3IONS-SCNC: 8 MMOL/L
AST SERPL W P-5'-P-CCNC: 16 U/L (ref 13–39)
BASOPHILS # BLD AUTO: 0.06 THOUSANDS/ÂΜL (ref 0–0.1)
BASOPHILS NFR BLD AUTO: 1 % (ref 0–1)
BILIRUB SERPL-MCNC: 0.73 MG/DL (ref 0.2–1)
BUN SERPL-MCNC: 19 MG/DL (ref 5–25)
CALCIUM SERPL-MCNC: 9.3 MG/DL (ref 8.4–10.2)
CARDIAC TROPONIN I PNL SERPL HS: 3 NG/L
CHLORIDE SERPL-SCNC: 101 MMOL/L (ref 96–108)
CO2 SERPL-SCNC: 31 MMOL/L (ref 21–32)
CREAT SERPL-MCNC: 0.6 MG/DL (ref 0.6–1.3)
EOSINOPHIL # BLD AUTO: 0.12 THOUSAND/ÂΜL (ref 0–0.61)
EOSINOPHIL NFR BLD AUTO: 1 % (ref 0–6)
ERYTHROCYTE [DISTWIDTH] IN BLOOD BY AUTOMATED COUNT: 13.9 % (ref 11.6–15.1)
GFR SERPL CREATININE-BSD FRML MDRD: 92 ML/MIN/1.73SQ M
GLUCOSE SERPL-MCNC: 157 MG/DL (ref 65–140)
HCT VFR BLD AUTO: 39.9 % (ref 34.8–46.1)
HGB BLD-MCNC: 13.2 G/DL (ref 11.5–15.4)
IMM GRANULOCYTES # BLD AUTO: 0.02 THOUSAND/UL (ref 0–0.2)
IMM GRANULOCYTES NFR BLD AUTO: 0 % (ref 0–2)
LYMPHOCYTES # BLD AUTO: 3.05 THOUSANDS/ÂΜL (ref 0.6–4.47)
LYMPHOCYTES NFR BLD AUTO: 35 % (ref 14–44)
MCH RBC QN AUTO: 30.6 PG (ref 26.8–34.3)
MCHC RBC AUTO-ENTMCNC: 33.1 G/DL (ref 31.4–37.4)
MCV RBC AUTO: 92 FL (ref 82–98)
MONOCYTES # BLD AUTO: 0.71 THOUSAND/ÂΜL (ref 0.17–1.22)
MONOCYTES NFR BLD AUTO: 8 % (ref 4–12)
NEUTROPHILS # BLD AUTO: 4.69 THOUSANDS/ÂΜL (ref 1.85–7.62)
NEUTS SEG NFR BLD AUTO: 55 % (ref 43–75)
NRBC BLD AUTO-RTO: 0 /100 WBCS
PLATELET # BLD AUTO: 237 THOUSANDS/UL (ref 149–390)
PMV BLD AUTO: 10.1 FL (ref 8.9–12.7)
POTASSIUM SERPL-SCNC: 2.9 MMOL/L (ref 3.5–5.3)
PROT SERPL-MCNC: 7 G/DL (ref 6.4–8.4)
RBC # BLD AUTO: 4.32 MILLION/UL (ref 3.81–5.12)
SODIUM SERPL-SCNC: 140 MMOL/L (ref 135–147)
WBC # BLD AUTO: 8.65 THOUSAND/UL (ref 4.31–10.16)

## 2023-07-20 PROCEDURE — 93005 ELECTROCARDIOGRAM TRACING: CPT

## 2023-07-20 PROCEDURE — 80053 COMPREHEN METABOLIC PANEL: CPT | Performed by: EMERGENCY MEDICINE

## 2023-07-20 PROCEDURE — 71046 X-RAY EXAM CHEST 2 VIEWS: CPT

## 2023-07-20 PROCEDURE — 99285 EMERGENCY DEPT VISIT HI MDM: CPT

## 2023-07-20 PROCEDURE — 96368 THER/DIAG CONCURRENT INF: CPT

## 2023-07-20 PROCEDURE — 36415 COLL VENOUS BLD VENIPUNCTURE: CPT

## 2023-07-20 PROCEDURE — 96366 THER/PROPH/DIAG IV INF ADDON: CPT

## 2023-07-20 PROCEDURE — 84484 ASSAY OF TROPONIN QUANT: CPT | Performed by: EMERGENCY MEDICINE

## 2023-07-20 PROCEDURE — 85025 COMPLETE CBC W/AUTO DIFF WBC: CPT | Performed by: EMERGENCY MEDICINE

## 2023-07-20 PROCEDURE — 96365 THER/PROPH/DIAG IV INF INIT: CPT

## 2023-07-20 RX ORDER — POTASSIUM CHLORIDE 14.9 MG/ML
20 INJECTION INTRAVENOUS ONCE
Status: COMPLETED | OUTPATIENT
Start: 2023-07-20 | End: 2023-07-21

## 2023-07-20 RX ORDER — MAGNESIUM SULFATE HEPTAHYDRATE 40 MG/ML
2 INJECTION, SOLUTION INTRAVENOUS ONCE
Status: COMPLETED | OUTPATIENT
Start: 2023-07-20 | End: 2023-07-21

## 2023-07-20 RX ORDER — POTASSIUM CHLORIDE 20 MEQ/1
40 TABLET, EXTENDED RELEASE ORAL ONCE
Status: COMPLETED | OUTPATIENT
Start: 2023-07-20 | End: 2023-07-20

## 2023-07-20 RX ADMIN — POTASSIUM CHLORIDE 40 MEQ: 1500 TABLET, EXTENDED RELEASE ORAL at 21:58

## 2023-07-20 RX ADMIN — MAGNESIUM SULFATE HEPTAHYDRATE 2 G: 2 INJECTION, SOLUTION INTRAVENOUS at 22:03

## 2023-07-20 RX ADMIN — POTASSIUM CHLORIDE 20 MEQ: 14.9 INJECTION, SOLUTION INTRAVENOUS at 22:01

## 2023-07-21 LAB
ATRIAL RATE: 72 BPM
P AXIS: 36 DEGREES
PR INTERVAL: 152 MS
QRS AXIS: 19 DEGREES
QRSD INTERVAL: 72 MS
QT INTERVAL: 368 MS
QTC INTERVAL: 402 MS
T WAVE AXIS: -2 DEGREES
VENTRICULAR RATE: 72 BPM

## 2023-07-21 PROCEDURE — 93010 ELECTROCARDIOGRAM REPORT: CPT | Performed by: INTERNAL MEDICINE

## 2023-07-21 PROCEDURE — 99284 EMERGENCY DEPT VISIT MOD MDM: CPT | Performed by: EMERGENCY MEDICINE

## 2023-07-21 NOTE — ED PROVIDER NOTES
History  Chief Complaint   Patient presents with   • Extremity Weakness     Patient presents to the ED with c/o bilateral leg weakness that began at 1800 tonight while swimming at the [facility], states she was having a hard time getting out of the pool and had to use the side of the pool for guidance, states symptoms have improved and states she has a hx of leg weakness. 81 yof presents with B/L LE weakness that occurred earlier today. Patient reports she was in the pool doing water aerobics for approximately 1 hour. At the end of the session, she had difficulty getting out of the pool and walking up the steps. Patient states symptoms resolved spontaneously and she feels better at this time. Denies any unilateral or localizing symptoms. Able to ambulate without difficulty. No further complaints. Prior to Admission Medications   Prescriptions Last Dose Informant Patient Reported? Taking?    B Complex Vitamins (VITAMIN B COMPLEX PO)  Self Yes No   Sig: Take by mouth in the morning   DULoxetine (CYMBALTA) 60 mg delayed release capsule   No No   Sig: TAKE ONE CAPSULE BY MOUTH AT BEDTIME   Multiple Vitamins-Minerals (CENTRUM SILVER PO)  Self Yes No   Sig: Take by mouth   VITAMIN E PO  Self Yes No   Sig: Take by mouth   amoxicillin (AMOXIL) 500 mg capsule  Self Yes No   Sig: TAKE 4 CAPSULES BY MOUTH 1 HOUR BEFORE APPOINTMENT   ascorbic acid (VITAMIN C) 500 MG tablet  Self No No   Sig: Take 1 tablet (500 mg total) by mouth 2 (two) times a day   Patient taking differently: Take 500 mg by mouth in the morning   cholecalciferol (VITAMIN D3) 1,000 units tablet  Self Yes No   Sig: Take 1,000 Units by mouth daily   ferrous sulfate 324 (65 Fe) mg  Self No No   Sig: Take 1 tablet (324 mg total) by mouth 2 (two) times a day before meals   Patient not taking: Reported on 5/25/2023   gabapentin (NEURONTIN) 300 mg capsule  Self Yes No   Sig: Take 1 capsule by mouth every 6 (six) hours as needed   hydrochlorothiazide (HYDRODIURIL) 25 mg tablet   No No   Sig: TAKE ONE TABLET BY MOUTH EVERY DAY   oxyCODONE-acetaminophen (PERCOCET) 5-325 mg per tablet  Self Yes No   Sig: Take 1 tablet by mouth every 6 (six) hours as needed   Patient not taking: Reported on 2023   potassium chloride (K-DUR,KLOR-CON) 20 mEq tablet  Self No No   Sig: Take 1 tablet (20 mEq total) by mouth daily   rosuvastatin (CRESTOR) 40 MG tablet   No No   Sig: Take 1 tablet (40 mg total) by mouth daily      Facility-Administered Medications: None       Past Medical History:   Diagnosis Date   • Arthritis    • Balance disorder    • Hyperlipidemia    • Vertigo        Past Surgical History:   Procedure Laterality Date   •  SECTION     • COLONOSCOPY     • HYSTERECTOMY     • HYSTEROSCOPY W/ ENDOMETRIAL ABLATION     • [place] ARTHRP ACETBLR/PROX FEM PROSTC AGRFT/ALGRFT Right 2022    Procedure: ARTHROPLASTY HIP TOTAL ANTERIOR and all associated procedures;  Surgeon: Lit Kimball MD;  Location:  MAIN OR;  Service: Orthopedics       Family History   Problem Relation Age of Onset   • Cancer Father      I have reviewed and agree with the history as documented. E-Cigarette/Vaping   • E-Cigarette Use Never User      E-Cigarette/Vaping Substances   • Nicotine No    • THC No    • CBD No    • Flavoring No    • Other No    • Unknown No      Social History     Tobacco Use   • Smoking status: Never   • Smokeless tobacco: Never   Vaping Use   • Vaping Use: Never used   Substance Use Topics   • Alcohol use: Never   • Drug use: Not Currently       Review of Systems   Neurological: Positive for weakness. Physical Exam  Physical Exam  Vitals and nursing note reviewed. Constitutional:       Appearance: She is well-developed. HENT:      Head: Normocephalic and atraumatic. Right Ear: External ear normal.      Left Ear: External ear normal.      Nose: Nose normal.   Eyes:      General: No scleral icterus.   Cardiovascular:      Rate and Rhythm: Normal rate.   Pulmonary:      Effort: Pulmonary effort is normal. No respiratory distress. Abdominal:      General: There is no distension. Musculoskeletal:         General: No deformity. Normal range of motion. Cervical back: Normal range of motion. Comments: 5/5 strength bilateral upper and lower extremities   Skin:     Findings: No rash. Neurological:      General: No focal deficit present. Mental Status: She is alert and oriented to person, place, and time. Psychiatric:         Mood and Affect: Mood normal.         Vital Signs  ED Triage Vitals [07/20/23 2040]   Temperature Pulse Respirations Blood Pressure SpO2   98.6 °F (37 °C) 84 18 137/84 100 %      Temp Source Heart Rate Source Patient Position - Orthostatic VS BP Location FiO2 (%)   Temporal Monitor Sitting Right arm --      Pain Score       No Pain           Vitals:    07/20/23 2040   BP: 137/84   Pulse: 84   Patient Position - Orthostatic VS: Sitting         Visual Acuity      ED Medications  Medications   potassium chloride (K-DUR,KLOR-CON) CR tablet 40 mEq (40 mEq Oral Given 7/20/23 2158)   potassium chloride 20 mEq IVPB (premix) (0 mEq Intravenous Stopped 7/21/23 0001)   magnesium sulfate 2 g/50 mL IVPB (premix) 2 g (0 g Intravenous Stopped 7/21/23 0001)       Diagnostic Studies  Results Reviewed     Procedure Component Value Units Date/Time    [place] draw [[id number]] Collected: 07/20/23 2049    Lab Status: In process Specimen: Blood Updated: 07/20/23 2201    Narrative: The following orders were created for panel order [place] draw.   Procedure                               Abnormality         Status                     ---------                               -----------         ------                     Ron Chu Top on RPBU[[id number]]                           Final result               Gold top on MEEN[[id number]]                                                              Please view results for these tests on the individual orders.     HS Troponin 0hr (reflex protocol) [[id number]]  (Normal) Collected: 07/20/23 2049    Lab Status: Final result Specimen: Blood from Arm, Right Updated: 07/20/23 2118     hs TnI 0hr 3 ng/L     Comprehensive metabolic panel [[id number]]  (Abnormal) Collected: 07/20/23 2049    Lab Status: Final result Specimen: Blood from Arm, Right Updated: 07/20/23 2110     Sodium 140 mmol/L      Potassium 2.9 mmol/L      Chloride 101 mmol/L      CO2 31 mmol/L      ANION GAP 8 mmol/L      BUN 19 mg/dL      Creatinine 0.60 mg/dL      Glucose 157 mg/dL      Calcium 9.3 mg/dL      AST 16 U/L      ALT 18 U/L      Alkaline Phosphatase 68 U/L      Total Protein 7.0 g/dL      Albumin 4.1 g/dL      Total Bilirubin 0.73 mg/dL      eGFR 92 ml/min/1.73sq m     Narrative:      [facility] guidelines for Chronic Kidney Disease (CKD):   •  Stage 1 with normal or high GFR (GFR > 90 mL/min/1.73 square meters)  •  Stage 2 Mild CKD (GFR = 60-89 mL/min/1.73 square meters)  •  Stage 3A Moderate CKD (GFR = 45-59 mL/min/1.73 square meters)  •  Stage 3B Moderate CKD (GFR = 30-44 mL/min/1.73 square meters)  •  Stage 4 Severe CKD (GFR = 15-29 mL/min/1.73 square meters)  •  Stage 5 End Stage CKD (GFR <15 mL/min/1.73 square meters)  Note: GFR calculation is accurate only with a steady state creatinine    CBC and differential [[id number]] Collected: 07/20/23 2049    Lab Status: Final result Specimen: Blood from Arm, Right Updated: 07/20/23 2055     WBC 8.65 Thousand/uL      RBC 4.32 Million/uL      Hemoglobin 13.2 g/dL      Hematocrit 39.9 %      MCV 92 fL      MCH 30.6 pg      MCHC 33.1 g/dL      RDW 13.9 %      MPV 10.1 fL      Platelets 375 Thousands/uL      nRBC 0 /100 WBCs      Neutrophils Relative 55 %      Immat GRANS % 0 %      Lymphocytes Relative 35 %      Monocytes Relative 8 %      Eosinophils Relative 1 %      Basophils Relative 1 %      Neutrophils Absolute 4.69 Thousands/µL      Immature Grans Absolute 0.02 Thousand/uL      Lymphocytes Absolute 3.05 Thousands/µL      Monocytes Absolute 0.71 Thousand/µL      Eosinophils Absolute 0.12 Thousand/µL      Basophils Absolute 0.06 Thousands/µL                  XR chest pa & lateral    (Results Pending)              Procedures  Procedures         ED Course                               SBIRT 22yo+    Flowsheet Row Most Recent Value   Initial Alcohol Screen: US AUDIT-C     1. How often do you have a drink containing alcohol? 0 Filed at: 07/20/2023 2041   2. How many drinks containing alcohol do you have on a typical day you are drinking? 0 Filed at: 07/20/2023 2041   3a. Male UNDER 65: How often do you have five or more drinks on one occasion? 0 Filed at: 07/20/2023 2041   3b. FEMALE Any Age, or MALE 65+: How often do you have 4 or more drinks on one occassion? 0 Filed at: 07/20/2023 2041   Audit-C Score 0 Filed at: 07/20/2023 2041   ARUN: How many times in the past year have you. .. Used an illegal drug or used a prescription medication for non-medical reasons? Never Filed at: 07/20/2023 2041                    Medical Decision Making  72-year-old female with lower extremity weakness now resolved. Labs reveal mild hypokalemia. Will replete with oral and IV in addition to magnesium. Advised to follow-up with PCP for recheck of labs. return precautions discussed    Amount and/or Complexity of Data Reviewed  Labs: ordered. Risk  Prescription drug management. Disposition  Final diagnoses:   Hypokalemia     Time reflects when diagnosis was documented in both MDM as applicable and the Disposition within this note     Time User Action Codes Description Comment    7/20/2023 11:58 PM Gianna Rordiguez Add [E87.6] Hypokalemia       ED Disposition     ED Disposition   Discharge    Condition   Stable    Date/Time   Thu Jul 20, 2023 11:58 PM    [facility] discharge to home/self care.                Follow-up Information     Follow up With Specialties Details Why Contact Info Additional Sweta, [address], Nurse Practitioner In 2 days  [address]  [address]  [facility] [phone]        [facility] [address] [facility] Emergency Department Emergency Medicine  If symptoms worsen [address] [facility] [address]  [address] [facility] Emergency Department, [address] [facility] [place], [place], [address]          Discharge Medication List as of 7/20/2023 11:58 PM      CONTINUE these medications which have NOT CHANGED    Details   amoxicillin (AMOXIL) 500 mg capsule TAKE 4 CAPSULES BY MOUTH 1 HOUR BEFORE APPOINTMENT, Historical Med      ascorbic acid (VITAMIN C) 500 MG tablet Take 1 tablet (500 mg total) by mouth 2 (two) times a day, Starting Tue 7/5/2022, Normal      B Complex Vitamins (VITAMIN B COMPLEX PO) Take by mouth in the morning, Historical Med      cholecalciferol (VITAMIN D3) 1,000 units tablet Take 1,000 Units by mouth daily, Historical Med      DULoxetine (CYMBALTA) 60 mg delayed release capsule TAKE ONE CAPSULE BY MOUTH AT BEDTIME, Normal      ferrous sulfate 324 (65 Fe) mg Take 1 tablet (324 mg total) by mouth 2 (two) times a day before meals, Starting Tue 7/5/2022, Normal      gabapentin (NEURONTIN) 300 mg capsule Take 1 capsule by mouth every 6 (six) hours as needed, Historical Med      hydrochlorothiazide (HYDRODIURIL) 25 mg tablet TAKE ONE TABLET BY MOUTH EVERY DAY, Normal      Multiple Vitamins-Minerals (CENTRUM SILVER PO) Take by mouth, Historical Med      oxyCODONE-acetaminophen (PERCOCET) 5-325 mg per tablet Take 1 tablet by mouth every 6 (six) hours as needed, Historical Med      potassium chloride (K-DUR,KLOR-CON) 20 mEq tablet Take 1 tablet (20 mEq total) by mouth daily, Starting Tue 8/2/2022, Normal      rosuvastatin (CRESTOR) 40 MG tablet Take 1 tablet (40 mg total) by mouth daily, Starting Wed 5/17/2023, Normal      VITAMIN E PO Take by mouth, Historical Med             No discharge procedures on file.     PDMP Review       Value Time User    PDMP Reviewed  Yes 11/21/2022  3:18 PM Carl Staley, [address] [place] [address]          ED Provider  Electronically Signed by           Paula Rogel DO  07/21/23 8459

## 2023-07-24 NOTE — TELEPHONE ENCOUNTER
Called and spoke w/pt and she states that she is having continued pain in both shoulders after receiving injections by Dr Charlene Avila. Her left shoulder is now worse as she had a fall today. She is requesting an appt. Appt given 8/1/23 w/Dr Beth Piper. She will ice left shoulder, take OTC pain meds tylenol and NSAIDs as per label instructions. She is seeing her PCP tomorrow.

## 2023-07-24 NOTE — TELEPHONE ENCOUNTER
Caller: Patient    Doctor: Cher Davis    Reason for call: Patient asked to speak w/someone re:shoulder.  Kandy Gautam will return the call    Call back#: [phone]

## 2023-07-25 ENCOUNTER — OFFICE VISIT (OUTPATIENT)
Dept: FAMILY MEDICINE CLINIC | Facility: CLINIC | Age: 73
End: 2023-07-25
Payer: MEDICARE

## 2023-07-25 VITALS
SYSTOLIC BLOOD PRESSURE: 130 MMHG | RESPIRATION RATE: 17 BRPM | HEART RATE: 89 BPM | WEIGHT: 236.2 LBS | DIASTOLIC BLOOD PRESSURE: 76 MMHG | BODY MASS INDEX: 40.33 KG/M2 | OXYGEN SATURATION: 94 % | TEMPERATURE: 99.2 F | HEIGHT: 64 IN

## 2023-07-25 DIAGNOSIS — G89.29 CHRONIC LEFT SHOULDER PAIN: ICD-10-CM

## 2023-07-25 DIAGNOSIS — M54.16 LUMBAR RADICULOPATHY: ICD-10-CM

## 2023-07-25 DIAGNOSIS — E87.6 HYPOKALEMIA: ICD-10-CM

## 2023-07-25 DIAGNOSIS — R29.898 WEAKNESS OF BOTH LOWER EXTREMITIES: ICD-10-CM

## 2023-07-25 DIAGNOSIS — M25.512 CHRONIC LEFT SHOULDER PAIN: ICD-10-CM

## 2023-07-25 DIAGNOSIS — E83.42 HYPOMAGNESEMIA: Primary | ICD-10-CM

## 2023-07-25 DIAGNOSIS — E66.01 OBESITY, MORBID (HCC): ICD-10-CM

## 2023-07-25 PROCEDURE — 99214 OFFICE O/P EST MOD 30 MIN: CPT | Performed by: FAMILY MEDICINE

## 2023-07-25 RX ORDER — POTASSIUM CHLORIDE 20 MEQ/1
20 TABLET, EXTENDED RELEASE ORAL DAILY
Qty: 30 TABLET | Refills: 5 | Status: SHIPPED | OUTPATIENT
Start: 2023-07-25

## 2023-07-25 RX ORDER — CALCIUM CARBONATE 300MG(750)
400 TABLET,CHEWABLE ORAL DAILY
Qty: 30 TABLET | Refills: 1 | Status: SHIPPED | OUTPATIENT
Start: 2023-07-25

## 2023-07-25 NOTE — TELEPHONE ENCOUNTER
Called and spoke w/pt and informed of Dr Blaine bueno. She is seeing her PCP at 4pm today for evaluation.

## 2023-07-25 NOTE — PROGRESS NOTES
Assessment/Plan:     Diagnoses and all orders for this visit:    Hypomagnesemia  -     Magnesium 400 MG TABS; Take 1 tablet (400 mg total) by mouth in the morning  -     Magnesium; Future    Hypokalemia  -     potassium chloride (K-DUR,KLOR-CON) 20 mEq tablet; Take 1 tablet (20 mEq total) by mouth daily  -     Basic metabolic panel; Future    Chronic left shoulder pain  -     XR shoulder 2+ vw left; Future  -     Ambulatory Referral to Physical Therapy; Future    Lumbar radiculopathy  -     XR spine lumbar minimum 4 views non injury; Future  -     Ambulatory Referral to Physical Therapy; Future      Obesity, morbid ([address]) discussed diet exercise and weight loss    Weakness of both lower extremities  -     Ambulatory Referral to Physical Therapy; Future      Etiology is unclear  Reviewed hospital records  We will refill potassium and magnesium  Her hydrochlorothiazide could be lowering her potassium so we will hold that for now we will need to monitor her blood pressure and any sort of swelling issues patient referred to physical therapy  X-ray ordered of her low back and shoulder  We will follow-up in 2 to 3 months or sooner if needed      Subjective:     Chief Complaint   Patient presents with   • ER Follow up     [address] [facility] ED 7/20 hypokalemia; extremity weakness. Pt reports a trip and fall yesterday. Pt reports she needs an Rx for magnesium and potassium. Patient ID: Harjinder Fuentes is a 81 y.o. female.     Patient presents today for follow-up of ER visit  She needs meds refilled  She was in ER due to weakness in her legs she was found to have low potassium  She is still having pain in her shoulder as well as her low back  No other complaints today  She has recently had a shot in her right shoulder and still having tenderness        The following portions of the patient's history were reviewed and updated as appropriate: allergies, current medications, past family history, past medical history, past social history, past surgical history and problem list.    Review of Systems   Constitutional: Negative. HENT: Negative. Eyes: Negative. Respiratory: Negative. Cardiovascular: Negative. Gastrointestinal: Negative. Endocrine: Negative. Genitourinary: Negative. Musculoskeletal: Negative. Skin: Negative. Allergic/Immunologic: Negative. Neurological: Negative. Hematological: Negative. Psychiatric/Behavioral: Negative. All other systems reviewed and are negative. Objective:    Vitals:    07/25/23 1610   BP: 130/76   BP Location: Left arm   Patient Position: Sitting   Cuff Size: Large   Pulse: 89   Resp: 17   Temp: 99.2 °F (37.3 °C)   TempSrc: Tympanic   SpO2: 94%   Weight: 107 kg (236 lb 3.2 oz)   Height: 5' 4" (1.626 m)          Physical Exam  Vitals and nursing note reviewed. Constitutional:       Appearance: She is well-developed. HENT:      Head: Normocephalic and atraumatic. Right Ear: External ear normal.      Left Ear: External ear normal.   Eyes:      Conjunctiva/sclera: Conjunctivae normal.      Pupils: Pupils are equal, round, and reactive to light. Cardiovascular:      Rate and Rhythm: Normal rate and regular rhythm. Heart sounds: Normal heart sounds. Pulmonary:      Effort: Pulmonary effort is normal.      Breath sounds: Normal breath sounds. Abdominal:      General: Bowel sounds are normal.      Palpations: Abdomen is soft. Musculoskeletal:         General: Normal range of motion. Cervical back: Normal range of motion. Skin:     General: Skin is warm and dry. Neurological:      Mental Status: She is alert and oriented to person, place, and time. Deep Tendon Reflexes: Reflexes are normal and symmetric. Psychiatric:         Behavior: Behavior normal.         Thought Content:  Thought content normal.         Judgment: Judgment normal.

## 2023-07-27 ENCOUNTER — HOSPITAL ENCOUNTER (EMERGENCY)
Facility: HOSPITAL | Age: 73
Discharge: HOME | End: 2023-07-27
Attending: EMERGENCY MEDICINE
Payer: MEDICARE

## 2023-07-27 ENCOUNTER — APPOINTMENT (EMERGENCY)
Dept: RADIOLOGY | Facility: HOSPITAL | Age: 73
End: 2023-07-27
Payer: MEDICARE

## 2023-07-27 ENCOUNTER — APPOINTMENT (EMERGENCY)
Dept: CT IMAGING | Facility: HOSPITAL | Age: 73
End: 2023-07-27
Payer: MEDICARE

## 2023-07-27 VITALS
DIASTOLIC BLOOD PRESSURE: 67 MMHG | TEMPERATURE: 97.3 F | WEIGHT: 235 LBS | SYSTOLIC BLOOD PRESSURE: 141 MMHG | BODY MASS INDEX: 40.12 KG/M2 | OXYGEN SATURATION: 97 % | HEIGHT: 64 IN | RESPIRATION RATE: 18 BRPM | HEART RATE: 78 BPM

## 2023-07-27 DIAGNOSIS — M25.512 LEFT SHOULDER PAIN: ICD-10-CM

## 2023-07-27 DIAGNOSIS — R58 ECCHYMOSIS: ICD-10-CM
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
## 2023-07-27 LAB
ABO GROUP BLD: NORMAL
ALBUMIN SERPL BCP-MCNC: 4.3 G/DL (ref 3.5–5)
ALP SERPL-CCNC: 75 U/L (ref 34–104)
ALT SERPL W P-5'-P-CCNC: 17 U/L (ref 7–52)
ANION GAP SERPL CALCULATED.3IONS-SCNC: 8 MMOL/L
AST SERPL W P-5'-P-CCNC: 16 U/L (ref 13–39)
BASOPHILS # BLD AUTO: 0.06 THOUSANDS/ÂΜL (ref 0–0.1)
BASOPHILS NFR BLD AUTO: 1 % (ref 0–1)
BILIRUB SERPL-MCNC: 0.95 MG/DL (ref 0.2–1)
BLD GP AB SCN SERPL QL: NEGATIVE
BUN SERPL-MCNC: 19 MG/DL (ref 5–25)
CALCIUM SERPL-MCNC: 9.7 MG/DL (ref 8.4–10.2)
CHLORIDE SERPL-SCNC: 101 MMOL/L (ref 96–108)
CO2 SERPL-SCNC: 31 MMOL/L (ref 21–32)
CREAT SERPL-MCNC: 0.55 MG/DL (ref 0.6–1.3)
EOSINOPHIL # BLD AUTO: 0.15 THOUSAND/ÂΜL (ref 0–0.61)
EOSINOPHIL NFR BLD AUTO: 2 % (ref 0–6)
ERYTHROCYTE [DISTWIDTH] IN BLOOD BY AUTOMATED COUNT: 14.1 % (ref 11.6–15.1)
GFR SERPL CREATININE-BSD FRML MDRD: 95 ML/MIN/1.73SQ M
GLUCOSE SERPL-MCNC: 103 MG/DL (ref 65–140)
HCT VFR BLD AUTO: 40.7 % (ref 34.8–46.1)
HGB BLD-MCNC: 13.1 G/DL (ref 11.5–15.4)
IMM GRANULOCYTES # BLD AUTO: 0.02 THOUSAND/UL (ref 0–0.2)
IMM GRANULOCYTES NFR BLD AUTO: 0 % (ref 0–2)
LYMPHOCYTES # BLD AUTO: 3.49 THOUSANDS/ÂΜL (ref 0.6–4.47)
LYMPHOCYTES NFR BLD AUTO: 41 % (ref 14–44)
MCH RBC QN AUTO: 30 PG (ref 26.8–34.3)
MCHC RBC AUTO-ENTMCNC: 32.2 G/DL (ref 31.4–37.4)
MCV RBC AUTO: 93 FL (ref 82–98)
MONOCYTES # BLD AUTO: 0.71 THOUSAND/ÂΜL (ref 0.17–1.22)
MONOCYTES NFR BLD AUTO: 8 % (ref 4–12)
NEUTROPHILS # BLD AUTO: 4.01 THOUSANDS/ÂΜL (ref 1.85–7.62)
NEUTS SEG NFR BLD AUTO: 48 % (ref 43–75)
NRBC BLD AUTO-RTO: 0 /100 WBCS
PLATELET # BLD AUTO: 270 THOUSANDS/UL (ref 149–390)
PMV BLD AUTO: 10.2 FL (ref 8.9–12.7)
POTASSIUM SERPL-SCNC: 3.4 MMOL/L (ref 3.5–5.3)
PROT SERPL-MCNC: 7 G/DL (ref 6.4–8.4)
RBC # BLD AUTO: 4.37 MILLION/UL (ref 3.81–5.12)
RH BLD: POSITIVE
SODIUM SERPL-SCNC: 140 MMOL/L (ref 135–147)
SPECIMEN EXPIRATION DATE: NORMAL
WBC # BLD AUTO: 8.44 THOUSAND/UL (ref 4.31–10.16)

## 2023-07-27 PROCEDURE — 74177 CT ABD & PELVIS W/CONTRAST: CPT

## 2023-07-27 PROCEDURE — 70450 CT HEAD/BRAIN W/O DYE: CPT

## 2023-07-27 PROCEDURE — 86850 RBC ANTIBODY SCREEN: CPT | Performed by: EMERGENCY MEDICINE

## 2023-07-27 PROCEDURE — 72125 CT NECK SPINE W/O DYE: CPT

## 2023-07-27 PROCEDURE — 73030 X-RAY EXAM OF SHOULDER: CPT

## 2023-07-27 PROCEDURE — 80053 COMPREHEN METABOLIC PANEL: CPT | Performed by: EMERGENCY MEDICINE

## 2023-07-27 PROCEDURE — 85025 COMPLETE CBC W/AUTO DIFF WBC: CPT | Performed by: EMERGENCY MEDICINE

## 2023-07-27 PROCEDURE — 86900 BLOOD TYPING SEROLOGIC ABO: CPT | Performed by: EMERGENCY MEDICINE

## 2023-07-27 PROCEDURE — 71260 CT THORAX DX C+: CPT

## 2023-07-27 PROCEDURE — 36415 COLL VENOUS BLD VENIPUNCTURE: CPT | Performed by: EMERGENCY MEDICINE

## 2023-07-27 PROCEDURE — 71045 X-RAY EXAM CHEST 1 VIEW: CPT

## 2023-07-27 PROCEDURE — 86901 BLOOD TYPING SEROLOGIC RH(D): CPT | Performed by: EMERGENCY MEDICINE

## 2023-07-27 RX ORDER — ACETAMINOPHEN 325 MG/1
975 TABLET ORAL ONCE
Status: COMPLETED | OUTPATIENT
Start: 2023-07-27 | End: 2023-07-27

## 2023-07-27 RX ORDER — KETOROLAC TROMETHAMINE 30 MG/ML
15 INJECTION, SOLUTION INTRAMUSCULAR; INTRAVENOUS ONCE
Status: COMPLETED | OUTPATIENT
Start: 2023-07-27 | End: 2023-07-27

## 2023-07-27 RX ADMIN — IOHEXOL 100 ML: 350 INJECTION, SOLUTION INTRAVENOUS at 16:50

## 2023-07-27 RX ADMIN — ACETAMINOPHEN 975 MG: 325 TABLET ORAL at 17:47

## 2023-07-27 RX ADMIN — KETOROLAC TROMETHAMINE 15 MG: 30 INJECTION, SOLUTION INTRAMUSCULAR at 17:48

## 2023-07-27 NOTE — ED PROVIDER NOTES
Emergency Department Trauma Note  Maricruz Garcia 81 y.o. female MRN: [id number]  Unit/Bed#: ED 04/ED 04 Encounter: [id number]      Trauma Alert: Trauma Acuity: Trauma Evaluation  Model of Arrival: Mode of Arrival: Direct from scene via    Trauma Team: Current Providers  Attending Provider: Zoe Becerra DO  Registered Nurse: Marky Smith RN  Consultants:     None      History of Present Illness     Chief Complaint:   Chief Complaint   Patient presents with   • Fall     Patient presents to the ED with c/o trip and fall on Monday with headstrike on concrete. States back pain and shoulder pain as well      HPI:  Maricruz Garcia is a 81 y.o. female who presents with pain in multiple locations s/p fall on 7/24. Patient reports that she tripped and fell on Monday on to the concrete. She hit her head, chest, left shoulder and back. Denies any LOC. States that she has chronic shoulder pain, but the left shoulder has been worse since the fall. Pain is throughout the entire shoulder, worse with movement, moderate in intensity, non-radiating. Also complains of mild headache, constant, throbbing, throughout the entire head without any associated nausea, vomiting, changes in vision. Reports that she has bruises on the upper back that are very tender. Denies any numbness, weakness, loss of bowel or bladder control, low back pain. Mechanism:Details of Incident: trip and fall Injury Date: 07/24/23        HPI  Review of Systems   Constitutional: Negative for chills and fever. Eyes: Negative for photophobia and visual disturbance. Respiratory: Negative for shortness of breath. Cardiovascular: Negative for chest pain and palpitations. Gastrointestinal: Negative for abdominal pain, constipation, diarrhea, nausea and vomiting. Musculoskeletal: Positive for back pain (upper back). Negative for neck pain and neck stiffness. Skin: Positive for wound. Neurological: Positive for headaches.  Negative for dizziness, weakness, light-headedness and numbness. Psychiatric/Behavioral: Negative for confusion. Historical Information     Immunizations:   Immunization History   Administered Date(s) Administered   • COVID-19 MODERNA VACC 0.5 ML IM 2021, 2021, 2021   • COVID-19 Pfizer Vac BIVALENT Armaan-sucrose 12 Yr+ IM (BOOSTER ONLY) 2022   • INFLUENZA 12/10/2016, 10/15/2020, 10/06/2021   • Influenza, high dose seasonal 0.7 mL 2022   • Pneumococcal Conjugate 13-Valent 05/15/2018   • Pneumococcal Conjugate Vaccine 20-valent (Pcv20), Polysace 2022   • Pneumococcal Polysaccharide PPV23 2019   • Tuberculin Skin Test-PPD Intradermal 2021       Past Medical History:   Diagnosis Date   • Arthritis    • Balance disorder    • Hyperlipidemia    • Vertigo        Family History   Problem Relation Age of Onset   • Cancer Father      Past Surgical History:   Procedure Laterality Date   •  SECTION     • COLONOSCOPY     • HYSTERECTOMY     • HYSTEROSCOPY W/ ENDOMETRIAL ABLATION     • CA ARTHRP ACETBLR/PROX FEM PROSTC AGRFT/ALGRFT Right 2022    Procedure: ARTHROPLASTY HIP TOTAL ANTERIOR and all associated procedures;  Surgeon: William Valencia MD;  Location:  MAIN OR;  Service: Orthopedics     Social History     Tobacco Use   • Smoking status: Never   • Smokeless tobacco: Never   Vaping Use   • Vaping Use: Never used   Substance Use Topics   • Alcohol use: Never   • Drug use: Not Currently     E-Cigarette/Vaping   • E-Cigarette Use Never User      E-Cigarette/Vaping Substances   • Nicotine No    • THC No    • CBD No    • Flavoring No    • Other No    • Unknown No        Family History: non-contributory    Meds/Allergies   Prior to Admission Medications   Prescriptions Last Dose Informant Patient Reported? Taking?    B Complex Vitamins (VITAMIN B COMPLEX PO)  Self Yes No   Sig: Take by mouth in the morning   DULoxetine (CYMBALTA) 60 mg delayed release capsule  Self No No Sig: TAKE ONE CAPSULE BY MOUTH AT BEDTIME   Magnesium 400 MG TABS   No No   Sig: Take 1 tablet (400 mg total) by mouth in the morning   Multiple Vitamins-Minerals (CENTRUM SILVER PO)  Self Yes No   Sig: Take by mouth   VITAMIN E PO  Self Yes No   Sig: Take by mouth   amoxicillin (AMOXIL) 500 mg capsule  Self Yes No   Sig: TAKE 4 CAPSULES BY MOUTH 1 HOUR BEFORE APPOINTMENT   ascorbic acid (VITAMIN C) 500 MG tablet  Self No No   Sig: Take 1 tablet (500 mg total) by mouth 2 (two) times a day   Patient not taking: Reported on 7/25/2023   cholecalciferol (VITAMIN D3) 1,000 units tablet  Self Yes No   Sig: Take 1,000 Units by mouth daily   ferrous sulfate 324 (65 Fe) mg  Self No No   Sig: Take 1 tablet (324 mg total) by mouth 2 (two) times a day before meals   Patient not taking: Reported on 5/25/2023   gabapentin (NEURONTIN) 300 mg capsule  Self Yes No   Sig: Take 1 capsule by mouth every 6 (six) hours as needed   potassium chloride (K-DUR,KLOR-CON) 20 mEq tablet   No No   Sig: Take 1 tablet (20 mEq total) by mouth daily   rosuvastatin (CRESTOR) 40 MG tablet  Self No No   Sig: Take 1 tablet (40 mg total) by mouth daily      Facility-Administered Medications: None       No Known Allergies    PHYSICAL EXAM    PE limited by: N/A    Objective   Vitals:   First set: Temperature: (!) 97.3 °F (36.3 °C) (07/27/23 1636)  Pulse: 94 (07/27/23 1636)  Respirations: 18 (07/27/23 1636)  Blood Pressure: (!) 179/91 (07/27/23 1636)  SpO2: 95 % (07/27/23 1636)    Primary Survey:   (A) Airway: intact  (B) Breathing: CTABL  (C) Circulation: Pulses:   normal  (D) Disabliity:  GCS Total:  15  (E) Expose:  Completed    Secondary Survey: (Click on Physical Exam tab above)  Physical Exam  Vitals and nursing note reviewed. Constitutional:       General: She is not in acute distress. Appearance: Normal appearance. She is not ill-appearing, toxic-appearing or diaphoretic. HENT:      Head: Normocephalic and atraumatic.       Mouth/Throat: Mouth: Mucous membranes are moist.   Eyes:      Extraocular Movements: Extraocular movements intact. Conjunctiva/sclera: Conjunctivae normal.      Pupils: Pupils are equal, round, and reactive to light. Cardiovascular:      Rate and Rhythm: Normal rate and regular rhythm. Pulmonary:      Effort: Pulmonary effort is normal. No respiratory distress. Breath sounds: Normal breath sounds. No stridor. No wheezing, rhonchi or rales. Chest:      Chest wall: Tenderness (tender over the 2 ecchymosis present. ) present. Abdominal:      General: Bowel sounds are normal. There is no distension. Palpations: Abdomen is soft. Tenderness: There is no abdominal tenderness. There is no right CVA tenderness, left CVA tenderness, guarding or rebound. Musculoskeletal:      Left shoulder: Tenderness present. No swelling, deformity, effusion, laceration, bony tenderness or crepitus. Normal range of motion. Normal strength. Normal pulse. Left upper arm: Normal. No swelling, edema, deformity, lacerations, tenderness or bony tenderness. Arms:       Right lower leg: No edema. Left lower leg: No edema. Comments: No midline c-t-l-spine step offs, tenderness, deformities  Pelvis stable   Tender over ecchymosis to the left thoracic spine   Skin:     General: Skin is warm and dry. Neurological:      General: No focal deficit present. Mental Status: She is alert and oriented to person, place, and time. Mental status is at baseline. Psychiatric:         Mood and Affect: Mood normal.         Behavior: Behavior normal.         Cervical spine cleared by clinical criteria?  No (imaging required)      Invasive Devices     Peripheral Intravenous Line  Duration           Peripheral IV 07/27/23 Right Antecubital <1 day                Lab Results:   Results Reviewed     Procedure Component Value Units Date/Time    Comprehensive metabolic panel [[id number]]  (Abnormal) Collected: 07/27/23 1639    Lab Status: Final result Specimen: Blood from Arm, Right Updated: 07/27/23 1703     Sodium 140 mmol/L      Potassium 3.4 mmol/L      Chloride 101 mmol/L      CO2 31 mmol/L      ANION GAP 8 mmol/L      BUN 19 mg/dL      Creatinine 0.55 mg/dL      Glucose 103 mg/dL      Calcium 9.7 mg/dL      AST 16 U/L      ALT 17 U/L      Alkaline Phosphatase 75 U/L      Total Protein 7.0 g/dL      Albumin 4.3 g/dL      Total Bilirubin 0.95 mg/dL      eGFR 95 ml/min/1.73sq m     Narrative:      National Kidney Disease Foundation guidelines for Chronic Kidney Disease (CKD):   •  Stage 1 with normal or high GFR (GFR > 90 mL/min/1.73 square meters)  •  Stage 2 Mild CKD (GFR = 60-89 mL/min/1.73 square meters)  •  Stage 3A Moderate CKD (GFR = 45-59 mL/min/1.73 square meters)  •  Stage 3B Moderate CKD (GFR = 30-44 mL/min/1.73 square meters)  •  Stage 4 Severe CKD (GFR = 15-29 mL/min/1.73 square meters)  •  Stage 5 End Stage CKD (GFR <15 mL/min/1.73 square meters)  Note: GFR calculation is accurate only with a steady state creatinine    CBC and differential [[id number]] Collected: 07/27/23 1639    Lab Status: Final result Specimen: Blood from Arm, Right Updated: 07/27/23 1645     WBC 8.44 Thousand/uL      RBC 4.37 Million/uL      Hemoglobin 13.1 g/dL      Hematocrit 40.7 %      MCV 93 fL      MCH 30.0 pg      MCHC 32.2 g/dL      RDW 14.1 %      MPV 10.2 fL      Platelets 080 Thousands/uL      nRBC 0 /100 WBCs      Neutrophils Relative 48 %      Immat GRANS % 0 %      Lymphocytes Relative 41 %      Monocytes Relative 8 %      Eosinophils Relative 2 %      Basophils Relative 1 %      Neutrophils Absolute 4.01 Thousands/µL      Immature Grans Absolute 0.02 Thousand/uL      Lymphocytes Absolute 3.49 Thousands/µL      Monocytes Absolute 0.71 Thousand/µL      Eosinophils Absolute 0.15 Thousand/µL      Basophils Absolute 0.06 Thousands/µL                  Imaging Studies:   Direct to CT:  Yes  XR shoulder 2+ views LEFT   ED Interpretation by Ana Babin Kelsey Velasquez DO (07/27 1749)   No acute fracture as interpreted by me independently       Final Result by Saman Solomon MD (07/27 1755)      No acute osseous abnormality. Findings are concordant with preliminary interpretation provided in the Emergency Department. Workstation performed: [id number]         TRAUMA - CT head wo contrast   Final Result by Amanda Valdez MD (07/27 1725)      No acute intracranial abnormality. Workstation performed: [id number]         TRAUMA - CT spine cervical wo contrast   Final Result by Amanda Valdez MD (07/27 1730)      No cervical spine fracture or traumatic malalignment. Workstation performed: [id number]         TRAUMA - CT chest abdomen pelvis w contrast   Final Result by Amanda Valdez MD (07/27 1740)      No signs of acute injury in the chest, abdomen or pelvis. The study was marked in [facility] for immediate notification. Workstation performed: [id number]         CT recon only thoracolumbar   Final Result by Amanda Valdez MD (07/27 1741)      No fracture or traumatic subluxation. Workstation performed: [id number]         XR Trauma chest portable   Final Result by Amanda Valdez MD (07/27 1742)      No acute cardiopulmonary disease. Workstation performed: [id number]               Procedures  Procedures         ED Course           Medical Decision Making  Assessment and plan:  CT scan of the head, cervical spine, chest/abdomen/pelvis and x-ray of the left shoulder to evaluate for traumatic injuries. Treat symptomatically with Tylenol/Toradol. Amount and/or Complexity of Data Reviewed  Labs: ordered. Radiology: ordered and independent interpretation performed. Risk  OTC drugs. Prescription drug management.                   Disposition  [facility] Transfer: No  Final diagnoses:   Fall, initial encounter Ecchymosis   Left shoulder pain (acute on chronic)     Time reflects when diagnosis was documented in both MDM as applicable and the Disposition within this note     Time User Action Codes Description Comment    7/27/2023  5:44 PM Yanetyesi Duarte Add [W19. ZVJW] Fall, initial encounter     7/27/2023  5:44 PM Yanet Putty Add [R58] Ecchymosis     7/27/2023  6:19 PM Yanet Putty Add [Y39.673] Left shoulder pain     7/27/2023  6:20 PM Yanet Putty Modify [M01.219] Left shoulder pain (acute on chronic)       ED Disposition     ED Disposition   Discharge    Condition   Stable    Date/Time   Thu Jul 27, 2023  5:44 PM    Comment   Jacinto Armstrong discharge to home/self care.                Follow-up Information     Follow up With Specialties Details Why Contact Info Additional Sweta, Domingo Toy Lowry, Nurse Practitioner Schedule an appointment as soon as possible for a visit in 3 days for re-evaluation [address]   [address]  [facility] [phone]        [address] [facility] Emergency Department Emergency Medicine Go to  As needed, If symptoms worsen, for re-evaluation [address] [facility] [phone]  [address] [facility], [address] [facility], [place], [address] [facility],[address]        Patient's Medications   Discharge Prescriptions    No medications on file         PDMP Review       Value Time User    PDMP Reviewed  Yes 7/25/2023  4:54 PM Aliyah Trejo DO          ED Provider  Electronically Signed by         Yanet Duarte DO  07/27/23 1828

## 2023-07-27 NOTE — Clinical Note
Dank Cason was seen and treated in our emergency department on 7/27/2023. Diagnosis:     Bradley Nine  . She may return on this date: 07/31/2023         If you have any questions or concerns, please don't hesitate to call.       Latonia Bautista, DO    ______________________________           _______________          _______________  Hospital Representative                              Date                                Time

## 2023-08-01 ENCOUNTER — OFFICE VISIT (OUTPATIENT)
Dept: OBGYN CLINIC | Facility: CLINIC | Age: 73
End: 2023-08-01
Payer: MEDICARE

## 2023-08-01 VITALS
WEIGHT: 241 LBS | DIASTOLIC BLOOD PRESSURE: 79 MMHG | SYSTOLIC BLOOD PRESSURE: 126 MMHG | HEIGHT: 64 IN | HEART RATE: 87 BPM | BODY MASS INDEX: 41.15 KG/M2

## 2023-08-01 DIAGNOSIS — M25.512 LEFT SHOULDER PAIN: ICD-10-CM

## 2023-08-01 PROCEDURE — 99214 OFFICE O/P EST MOD 30 MIN: CPT | Performed by: STUDENT IN AN ORGANIZED HEALTH CARE EDUCATION/TRAINING PROGRAM

## 2023-08-01 RX ORDER — CELECOXIB 100 MG/1
100 CAPSULE ORAL 2 TIMES DAILY
Qty: 60 CAPSULE | Refills: 0 | Status: SHIPPED | OUTPATIENT
Start: 2023-08-01

## 2023-08-01 NOTE — PROGRESS NOTES
Ortho Sports Medicine Shoulder Follow Up Visit     Assesment:   81 y.o. female Bilateral shoulder rotator cuff tendinitis and moderate osteoarthritis; increased after recent fall    Plan: I explained to the patient that she has symptoms consistent with rotator cuff tendinitis, increased after her recent fall. Her range of motion and strength are relatively preserved today. Perform subacromial corticosteroid injection and recommend pain control with anti-inflammatory medications and Tylenol. I called in Celebrex for her as she states the Aleve is not helping her pain at all. Lastly, I discussed the possibility of an MRI in the future if she has ongoing discomfort despite conservative treatment measures including physical therapy, injection treatment, activity modification, and rest.  She is scheduled to begin physical therapy for her bilateral shoulders next week. Regarding her left shoulder, I feel she has suffered a scapular/rib contusion as her primary complaint is along the lateral border of the lower scapula and thoracic ribs. I explained that she can continue with activity modification and rest.  She is to follow-up in 8 to 10 weeks or as needed. I did advise her to reach out to the office sooner for symptoms fail to improve. Conservative treatment:    Ice to shoulder 1-2 times daily, for 20 minutes at a time. PT for ROM and strengthening to shoulder, rotator cuff, scapular stabilizers. Home exercise program for shoulder, including ROM and strenthening. Instructions given to patient of what exercises to perform. Use pain as a guide to return to activities. Imaging:    No imaging was available for review today. Injection:    The risks and benefits of the injection (which include but are not limited to: infection, bleeding,damage to nerve/artery, need for further intervention), as well as the risks and benefits of all alternative treatments were explained and understood.   The patient elected to proceed with injection. The procedure was done with aseptic technique, and the patient tolerated the procedure well with no complications. We will consult IR for image guided injection of glenohumeral joint. Surgery:     No surgery is recommended at this point, continue with conservative treatment plan as noted. Follow up:    No follow-ups on file. Chief Complaint   Patient presents with   • Right Shoulder - Pain   • Left Shoulder - Pain         History of Present Illness: The patient is returns for follow up of bilateral shoulder rotator cuff tendinitis. Since the prior visit, She reports significant improvement. Pain is improved by rest, ice, NSAIDS, physical therapy and injection. Pain is aggravated by overhead activity, reaching back and lifting . Symptoms include clicking. The patient denies weakness. The patient has tried rest, ice, NSAIDS, physical therapy and injection. She states she only got mind relief with the injection she got on 22.     Shoulder Surgical History:  None    Past Medical, Social and Family History:  Past Medical History:   Diagnosis Date   • Arthritis    • Balance disorder    • Hyperlipidemia    • Vertigo      Past Surgical History:   Procedure Laterality Date   •  SECTION     • COLONOSCOPY     • HYSTERECTOMY     • HYSTEROSCOPY W/ ENDOMETRIAL ABLATION     • [place] ARTHRP ACETBLR/PROX FEM PROSTC AGRFT/ALGRFT Right 2022    Procedure: ARTHROPLASTY HIP TOTAL ANTERIOR and all associated procedures;  Surgeon: Facundo Garcia MD;  Location:  MAIN OR;  Service: Orthopedics     No Known Allergies  Current Outpatient Medications on File Prior to Visit   Medication Sig Dispense Refill   • amoxicillin (AMOXIL) 500 mg capsule TAKE 4 CAPSULES BY MOUTH 1 HOUR BEFORE APPOINTMENT     • B Complex Vitamins (VITAMIN B COMPLEX PO) Take by mouth in the morning     • cholecalciferol (VITAMIN D3) 1,000 units tablet Take 1,000 Units by mouth daily • DULoxetine (CYMBALTA) 60 mg delayed release capsule TAKE ONE CAPSULE BY MOUTH AT BEDTIME 30 capsule 2   • gabapentin (NEURONTIN) 300 mg capsule Take 1 capsule by mouth every 6 (six) hours as needed     • Magnesium 400 MG TABS Take 1 tablet (400 mg total) by mouth in the morning 30 tablet 1   • Multiple Vitamins-Minerals (CENTRUM SILVER PO) Take by mouth     • potassium chloride (K-DUR,KLOR-CON) 20 mEq tablet Take 1 tablet (20 mEq total) by mouth daily 30 tablet 5   • rosuvastatin (CRESTOR) 40 MG tablet Take 1 tablet (40 mg total) by mouth daily 90 tablet 3   • VITAMIN E PO Take by mouth     • ascorbic acid (VITAMIN C) 500 MG tablet Take 1 tablet (500 mg total) by mouth 2 (two) times a day (Patient not taking: Reported on 7/25/2023) 60 tablet 0   • ferrous sulfate 324 (65 Fe) mg Take 1 tablet (324 mg total) by mouth 2 (two) times a day before meals (Patient not taking: Reported on 5/25/2023) 60 tablet 0     No current facility-administered medications on file prior to visit.      Social History     Socioeconomic History   • Marital status: /Civil Union     Spouse name: Not on file   • Number of children: Not on file   • Years of education: Not on file   • Highest education level: Not on file   Occupational History   • Not on file   Tobacco Use   • Smoking status: Never   • Smokeless tobacco: Never   Vaping Use   • Vaping Use: Never used   Substance and Sexual Activity   • Alcohol use: Never   • Drug use: Not Currently   • Sexual activity: Not Currently   Other Topics Concern   • Not on file   Social History Narrative   • Not on file     Social Determinants of Health     Financial Resource Strain: Not on file   Food Insecurity: Not on file   Transportation Needs: Not on file   Physical Activity: Not on file   Stress: Not on file   Social Connections: Not on file   Intimate Partner Violence: Not At Risk (5/25/2023)    Humiliation, Afraid, Rape, and Kick questionnaire    • Fear of Current or Ex-Partner: No • Emotionally Abused: No    • Physically Abused: No    • Sexually Abused: No   Housing Stability: Not on file       I have reviewed the past medical, surgical, social and family history, medications and allergies as documented in the EMR. Review of systems: ROS is negative other than that noted in the HPI. Constitutional: Negative for fatigue and fever. Physical Exam:    Blood pressure 126/79, pulse 87, height 5' 4" (1.626 m), weight 109 kg (241 lb). General/Constitutional: NAD, well developed, well nourished  HENT: Normocephalic, atraumatic  CV: Intact distal pulses, regular rate  Resp: No respiratory distress or labored breathing  Lymphatic: No lymphadenopathy palpated  Neuro: Alert and Oriented x 3, no focal deficits  Psych: Normal mood, normal affect, normal judgement, normal behavior  Skin: Warm, dry, no rashes, no erythema      Shoulder focused exam:        Visual inspection of the bilateral shoulder demonstrates normal contour without atrophy  No evidence of scapular dyskinesia or atrophy. No scapular winging  Active and passive range of motion demonstrates forward flexion to 170, abduction to 90,  external rotation is 80 with the arm the side   Rotator cuff strength is 4+/5 to resisted forward flexion, 4+/5 resisted abduction, 5/5 resisted external rotation, 5/5 resisted internal rotation  No tenderness to palpation at the distal clavicle, AC joint, acromion, or scapular spine  No pain with cross-body adduction  Negative Neer's test, Negative modified Lieberman impingement test  Negative external rotation lag sign  Negative belly press, negative lift-off  Negative speed's and Yergason's test  No tenderness to palpation at the bicipital groove  Negative Erie's test    Left-sided tenderness palpation over the lower scapula/thoracic rib cage. No significant step-off or deformity.   No crepitus throughout inhalation exhalation    UE NV Exam: +2 Radial pulses bilaterally  Sensation intact to light touch C5-T1 bilaterally, Radial/median/ulnar nerve motor intact    Cervical ROM is full without pain, numbness or tingling      Shoulder Imaging    Radiographs of the left shoulder obtained in the emergency room demonstrate no evidence of fracture. No acute osseous injuries.       Scribe Attestation    I,:  Rahel Reis am acting as a scribe while in the presence of the attending physician.:       I,:  Lakesha Sanon,  personally performed the services described in this documentation    as scribed in my presence.:

## 2023-08-08 ENCOUNTER — EVALUATION (OUTPATIENT)
Dept: PHYSICAL THERAPY | Facility: CLINIC | Age: 73
End: 2023-08-08
Payer: MEDICARE

## 2023-08-08 DIAGNOSIS — M25.512 CHRONIC LEFT SHOULDER PAIN: ICD-10-CM

## 2023-08-08 DIAGNOSIS — G89.29 CHRONIC LEFT SHOULDER PAIN: ICD-10-CM

## 2023-08-08 DIAGNOSIS — R26.89 BALANCE DISORDER: ICD-10-CM

## 2023-08-08 DIAGNOSIS — M54.16 LUMBAR RADICULOPATHY: ICD-10-CM

## 2023-08-08 DIAGNOSIS — R29.898 WEAKNESS OF BOTH LOWER EXTREMITIES: ICD-10-CM

## 2023-08-08 DIAGNOSIS — G89.29 CHRONIC RIGHT SHOULDER PAIN: Primary | ICD-10-CM

## 2023-08-08 DIAGNOSIS — M25.511 CHRONIC RIGHT SHOULDER PAIN: Primary | ICD-10-CM

## 2023-08-08 PROCEDURE — 97162 PT EVAL MOD COMPLEX 30 MIN: CPT | Performed by: PHYSICAL THERAPIST

## 2023-08-08 NOTE — PROGRESS NOTES
PT Evaluation     Today's date: 2023  Patient name: Edith Matthews  : 1950  MRN: [id number]  Referring provider: Shanita Qiu DO  Dx:   Encounter Diagnosis     ICD-10-CM    1. Chronic right shoulder pain  M25.511     G89.29       2. Chronic left shoulder pain  M25.512 Ambulatory Referral to Physical Therapy    G89.29       3. Lumbar radiculopathy  M54.16 Ambulatory Referral to Physical Therapy      4. Weakness of both lower extremities  R29.898 Ambulatory Referral to Physical Therapy      5. Balance disorder  R26.89                      Assessment  Assessment details: Patient is a 81 y.o. female with prescription for multiple diagnoses including left shoulder pain, lumbar radiculopathy, and lower extremity weakness. Of greatest concern currently is frequent falls which was the emphasis of evaluation today. Shoulder and low back issues will be assessed at later date. Patient presents with decreased gait speed (during FGA), decreased balance in single limb and tandem stance, mild fall risk evident with LIU balance test and functional gait assessment score. These impairments lead to frequent falls and loss of balance especially when turning in home with ADL/household chore completion, when walking in yard on uneven ground, when looking up for meal prep, and performing work related duties as caregiver. To address these impairments and improve function, patient would benefit from skilled PT consisting of neuromuscular reeducation to improve balance and stability, therapeutic exercises to improve strength, gait training, and patient education on home program and strategies to reduce falls. Patient is in agreement with plan. Impairments: abnormal muscle firing, impaired balance, lacks appropriate home exercise program and safety issue  Understanding of Dx/Px/POC: excellent  Goals  Short term goals:  Patient is independent in home program to support plan of care and improve function.  - 2 weeks  Patient demonstrates improvement in balance with tandem stance of at least 30 seconds b/l, EO. - 2 weeks  Patient demonstrates improvement in gait speed with increase in speed to 1.2 m/s with quickened pace so patient can safely cross busy street. Impairments related to b/l shoulder and low back pain assessed. - 2 weeks    Long term goals:  Patient demonstrates improvement in community participation with increase in FOTO score by 10%. - 8 weeks  Patient demonstrates reduction in fall risk with timed up and go test of 10 seconds or less. - 4 weeks  Patient demonstrates reduction in fall risk and improvement in balance with LIU score of 48 or better and FGA of 22 or better. - 8 weeks  Patient has no falls or loss of balance over the past month. - 8 weeks       Plan  Planned therapy interventions: ADL training, balance, body mechanics training, motor coordination training, neuromuscular re-education, patient education, strengthening, therapeutic exercise, home exercise program and gait training  Frequency: 2x week  Duration in weeks: 8  Plan of Care beginning date: 8/8/2023  Plan of Care expiration date: 10/6/2023  Treatment plan discussed with: patient        Subjective Evaluation    History of Present Illness  Date of onset: 8/1/2022  Mechanism of injury: Patient has PT prescription for left shoulder pain, weakness in b/l lower extremities, and lumbar radiculopathy. She also reports R shoulder pain, and frequent falls/balance issues, which is of greatest concern. Patient reports balance issues started approximately 1 year ago, unsure of cause, possibly following R hip MUSA. She reports falling 2-3x per month, often related to losing her balance when turning. She has been to ER twice in the past month, once from difficulty moving her legs while in swimming pool, diagnosed as hypokalemia and given supplements, and once from tripping and falling (which aggravated shoulder pain).  She saw PCP and orthopedist for shoulder pain.    Symptoms: imbalance and frequent falls. (shoulder and low back pain will be assessed at later date due to time constraints). Patient falls 2-3x per month, often related to turning, walking on uneven ground, looking up, bending over to pick something off the floor. Patient denies buckling, LE paraesthesias, dizziness, or lightheadedness. Upon questioning, she reports that her handwriting has gotten worse. She has not seen neurologist. She reports several occurrences of shift when walking in the middle of the night, where she falls to the side. 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  HTN    Patient Goals  Patient goals for therapy: decreased pain and increased motion          Objective     Strength/Myotome Testing     Left Hip   Planes of Motion   Flexion: 5  Abduction: 5    Right Hip   Planes of Motion   Flexion: 4+  Abduction: 4+    Left Knee   Flexion: 5  Extension: 5    Right Knee   Flexion: 5  Extension: 5    Left Ankle/Foot   Dorsiflexion: 5  Eversion: 5    Right Ankle/Foot   Dorsiflexion: 5  Eversion: 5    Tests     Additional Tests Details  Balance testing:  NBOS 30 seconds EO and EC  Tandem stance: 20 seconds R, 10 L EO  2 seconds R, 10 L EC  SLS 3 on L, 2 seconds on R    Goddard balance test: 43 (mild fall risk)    Functional gait assessment: 17 (under 22 indicates fall risk)      Flowsheet Rows    Flowsheet Row Most Recent Value   PT/OT G-Codes    Current Score 44   Projected Score 61             Precautions: fall risk, HTN, R MUSA, L TKA      Manuals                                                                 Neuro Re-Ed                          Tandem stance             Standing march             NBOS on foam             NBOS EC             NBOS with head turns                          Ther Ex                          Seated trunk rotation             Seated hamstring stretch                                                                              Ther Activity                          Sit>stand             Mini squats             Fwd step ups             Gait Training                                       Modalities

## 2023-08-15 ENCOUNTER — OFFICE VISIT (OUTPATIENT)
Dept: PHYSICAL THERAPY | Facility: CLINIC | Age: 73
End: 2023-08-15
Payer: MEDICARE

## 2023-08-15 DIAGNOSIS — G89.29 CHRONIC RIGHT SHOULDER PAIN: ICD-10-CM

## 2023-08-15 DIAGNOSIS — R26.89 BALANCE DISORDER: ICD-10-CM

## 2023-08-15 DIAGNOSIS — G89.29 CHRONIC LEFT SHOULDER PAIN: Primary | ICD-10-CM

## 2023-08-15 DIAGNOSIS — R29.898 WEAKNESS OF BOTH LOWER EXTREMITIES: ICD-10-CM

## 2023-08-15 DIAGNOSIS — M25.512 CHRONIC LEFT SHOULDER PAIN: Primary | ICD-10-CM

## 2023-08-15 DIAGNOSIS — M25.511 CHRONIC RIGHT SHOULDER PAIN: ICD-10-CM

## 2023-08-15 PROCEDURE — 97112 NEUROMUSCULAR REEDUCATION: CPT | Performed by: PHYSICAL THERAPIST

## 2023-08-15 PROCEDURE — 97110 THERAPEUTIC EXERCISES: CPT | Performed by: PHYSICAL THERAPIST

## 2023-08-15 NOTE — PROGRESS NOTES
Daily Note     Today's date: 8/15/2023  Patient name: Ana María May  : 1950  MRN: [id number]  Referring provider: Shree Johnson DO  Dx:   Encounter Diagnosis     ICD-10-CM    1. Chronic left shoulder pain  M25.512     G89.29       2. Weakness of both lower extremities  R29.898       3. Balance disorder  R26.89       4. Chronic right shoulder pain  M25.511     G89.29                      Subjective: Patient would shoulders to be assessed and added into plan of care, while waiting on treatment for low back issues due to higher priority on balance problems and shoulder pain. Patient reports onset of b/l shoulder pain for several months. She had injections on both sides, moderate improvement on left but still moderate pain on R side. She notes increased lateral shoulder pain with reaching overhead, reaching out to the side, dressing, reaching behind back. She denies sleep disturbance despite being side sleeper. She also has difficulty putting dishes away or lifting anything heavy. Objective: See treatment diary below    Increased muscle tension b/l UT, R worse than L  Mild tenderness anterior Shoulder, biceps tendon on R  Forward shoulder, abducted scapula    Shoulder AROM  120 L, 114 R flexion  110 L, 85 R  IR BTB T10 L, L4 R  ER BTH T2 L, T1 R    Shoulder PROM  Flexion 140 L, 110 R  Abduction 95 L, 85 R  IR 75 L, 60 R   ER 65 L, 65 R    Shoulder Strength  Flexion 4+ L, 4 R  Abduction 4+ L, 4 R  ER 5  IR 5  Biceps 5  tricpes 5    Assessment: Shoulder impairments were assessed today with treatment begun for both shoulders and balance issues. Session was limited in time due to late arrival. Patient presents with b/l shoulder ROM restrictions, mild shoulder weakness, poor shoulder posture, and soft tissue restrictions along upper trapezius. Patient is limited with reaching forward, overhead, behind back with dressing, lifting dishes for household chores, and heavy lifting associated with work duties.  To address these impairments and improve function, she would benefit from skilled PT consisting of manual therapy to improve shoulder ROM and joint mobility, therapeutic exercises to improve UE strength and flexibility, neuromuscular reeducation to improve postural stabilization, and patient education on home program.       Plan: Continue per plan of care.  Progress balance treatment and shoulder mobility/strength     Precautions: fall risk, HTN, R MUSA, L TKA      Manuals 8/15            shld PROM b/l             R GH jt p-a mob gr 3-4                                       Neuro Re-Ed                          Tandem stance 3x30"            Standing march             NBOS on foam             NBOS EC             NBOS with head turns             scap retraction 10x            Ther Ex                          Seated table slides 10x            Supine wand flexion 5x5"            Pt edu             Assessments related to shoulder 20'                                                   Ther Activity                          Sit>stand             Mini squats             Fwd step ups             Gait Training                                       Modalities

## 2023-08-17 ENCOUNTER — APPOINTMENT (OUTPATIENT)
Dept: PHYSICAL THERAPY | Facility: CLINIC | Age: 73
End: 2023-08-17
Payer: MEDICARE

## 2023-08-18 ENCOUNTER — TELEPHONE (OUTPATIENT)
Dept: OBGYN CLINIC | Facility: HOSPITAL | Age: 73
End: 2023-08-18

## 2023-08-18 NOTE — TELEPHONE ENCOUNTER
Caller: Patient    Doctor: Niru Peck    Reason for call: Patient was told to call if her left shoulder pain continues. The injection gave her brief relief.   Please advise    Call back#: [phone]

## 2023-08-22 ENCOUNTER — OFFICE VISIT (OUTPATIENT)
Dept: PHYSICAL THERAPY | Facility: CLINIC | Age: 73
End: 2023-08-22
Payer: MEDICARE

## 2023-08-22 DIAGNOSIS — R29.898 WEAKNESS OF BOTH LOWER EXTREMITIES: ICD-10-CM

## 2023-08-22 DIAGNOSIS — G89.29 CHRONIC LEFT SHOULDER PAIN: Primary | ICD-10-CM

## 2023-08-22 DIAGNOSIS — M25.512 CHRONIC LEFT SHOULDER PAIN: Primary | ICD-10-CM

## 2023-08-22 DIAGNOSIS — M25.511 CHRONIC RIGHT SHOULDER PAIN: ICD-10-CM

## 2023-08-22 DIAGNOSIS — R26.89 BALANCE DISORDER: ICD-10-CM

## 2023-08-22 DIAGNOSIS — G89.29 CHRONIC RIGHT SHOULDER PAIN: ICD-10-CM

## 2023-08-22 PROCEDURE — 97140 MANUAL THERAPY 1/> REGIONS: CPT

## 2023-08-22 PROCEDURE — 97110 THERAPEUTIC EXERCISES: CPT

## 2023-08-22 PROCEDURE — 97112 NEUROMUSCULAR REEDUCATION: CPT

## 2023-08-22 NOTE — PROGRESS NOTES
Daily Note     Today's date: 2023  Patient name: Tavon Kennedy  : 1950  MRN: [id number]  Referring provider: Princess Pedro DO  Dx:   Encounter Diagnosis     ICD-10-CM    1. Chronic left shoulder pain  M25.512     G89.29       2. Weakness of both lower extremities  R29.898       3. Balance disorder  R26.89       4. Chronic right shoulder pain  M25.511     G89.29                      Subjective: pt states that she had no more pain in shoulders after IE. She is performing at home. Objective: See treatment diary below      Assessment: Tolerated treatment with tightness at end ranges of bilaterally. Tightness in right biceps and delt region. . Patient was able to get more rom with sliders after passive motion. CGA with all balance exercises performed  No LOB but did have to grab at times on counter top       Plan: Continue per plan of care.       Precautions: fall risk, HTN, R MUSA, L TKA      Manuals 8/15 8/22           shld PROM b/l  DL           R GH jt p-a mob gr 3-4  Gh glides and distraction-DL           stm r bicep delt  DL                        Neuro Re-Ed                          Tandem stance 3x30" 30"x2           Standing march  x15           NBOS on foam  30"x2           NBOS EC  30"x2           NBOS with head turns  nv           scap retraction 10x 10x           Ther Ex                          Seated table slides 10x x10           Supine wand flexion 5x5" 5"x5           Pt edu             Assessments related to shoulder 20'                                                   Ther Activity                          Sit>stand             Mini squats             Fwd step ups             Gait Training                                       Modalities

## 2023-08-28 DIAGNOSIS — G89.29 CHRONIC RIGHT SHOULDER PAIN: Primary | ICD-10-CM

## 2023-08-28 DIAGNOSIS — M25.511 CHRONIC RIGHT SHOULDER PAIN: Primary | ICD-10-CM

## 2023-08-28 NOTE — PROGRESS NOTES
MRI R shoulder ordered due to ongoing pain, weakness, dysfunction despite several weeks of activity modification, rest, PT

## 2023-08-29 ENCOUNTER — OFFICE VISIT (OUTPATIENT)
Dept: PHYSICAL THERAPY | Facility: CLINIC | Age: 73
End: 2023-08-29
Payer: MEDICARE

## 2023-08-29 DIAGNOSIS — G89.29 CHRONIC RIGHT SHOULDER PAIN: ICD-10-CM

## 2023-08-29 DIAGNOSIS — R26.89 BALANCE DISORDER: ICD-10-CM

## 2023-08-29 DIAGNOSIS — M25.512 CHRONIC LEFT SHOULDER PAIN: Primary | ICD-10-CM

## 2023-08-29 DIAGNOSIS — M25.511 CHRONIC RIGHT SHOULDER PAIN: ICD-10-CM

## 2023-08-29 DIAGNOSIS — R29.898 WEAKNESS OF BOTH LOWER EXTREMITIES: ICD-10-CM

## 2023-08-29 DIAGNOSIS — G89.29 CHRONIC LEFT SHOULDER PAIN: Primary | ICD-10-CM

## 2023-08-29 PROCEDURE — 97110 THERAPEUTIC EXERCISES: CPT | Performed by: PHYSICAL THERAPIST

## 2023-08-29 PROCEDURE — 97112 NEUROMUSCULAR REEDUCATION: CPT | Performed by: PHYSICAL THERAPIST

## 2023-08-29 PROCEDURE — 97140 MANUAL THERAPY 1/> REGIONS: CPT | Performed by: PHYSICAL THERAPIST

## 2023-08-29 NOTE — PROGRESS NOTES
Daily Note     Today's date: 2023  Patient name: Linda   : 1950  MRN: [id number]  Referring provider: Werner Alvarez DO  Dx:   Encounter Diagnosis     ICD-10-CM    1. Chronic left shoulder pain  M25.512     G89.29       2. Weakness of both lower extremities  R29.898       3. Balance disorder  R26.89       4. Chronic right shoulder pain  M25.511     G89.29       5. Lumbar radiculopathy  M54.16                      Subjective: pt reports that she finally got MRI improved for R shoulder. She reports compliance with home program. She also feels her balance has improved a little. Greatest balance difficulty is with walking on grass/uneven ground. Upon questioning, she does get numbness in b/l toes and thinks she has neuropathy. Objective: See treatment diary below      Assessment: Patient displayed greatest loss of balance with NBOS on unstable surface, indicating somatosensory balance deficits vs visual or vestibular primarily. Progress shoulder mobility exercises with addition of pulley's and cross body adduction. She noted mild pain at end range so cued to not force into pain. Patient would benefit from continued skilled PT per plan of care to address impairments and improve function. Plan: Continue per plan of care.       Precautions: fall risk, HTN, R MUSA, L TKA      Manuals 8/15 8/22 8/29          shld PROM b/l  DL DES          R GH jt p-a mob gr 3-4  Gh glides and distraction-DL DES LAD          stm r bicep delt  DL                        Neuro Re-Ed                          Tandem stance 3x30" 30"x2 3x30"          Standing march  x15 15x          NBOS on foam  30"x2 1x30"          NBOS EC  30"x2 2x30"          NBOS with head turns  nv 30"          scap retraction 10x 10x 10x          Ther Ex                          Seated table slides 10x x10           Supine wand flexion 5x5" 5"x5 5x5"          Pt edu   2'          Pulley flexion   15x5" ea          Cross body adduction   5x10" ea Ther Activity                          Sit>stand             Mini squats             Fwd step ups             Gait Training                                       Modalities             Cold pack   At home

## 2023-08-31 ENCOUNTER — CONSULT (OUTPATIENT)
Dept: GASTROENTEROLOGY | Facility: CLINIC | Age: 73
End: 2023-08-31
Payer: MEDICARE

## 2023-08-31 ENCOUNTER — OFFICE VISIT (OUTPATIENT)
Dept: PHYSICAL THERAPY | Facility: CLINIC | Age: 73
End: 2023-08-31
Payer: MEDICARE

## 2023-08-31 ENCOUNTER — TELEPHONE (OUTPATIENT)
Dept: GASTROENTEROLOGY | Facility: CLINIC | Age: 73
End: 2023-08-31

## 2023-08-31 VITALS
SYSTOLIC BLOOD PRESSURE: 130 MMHG | DIASTOLIC BLOOD PRESSURE: 82 MMHG | WEIGHT: 235 LBS | HEIGHT: 64 IN | BODY MASS INDEX: 40.12 KG/M2

## 2023-08-31 DIAGNOSIS — M25.511 CHRONIC RIGHT SHOULDER PAIN: ICD-10-CM

## 2023-08-31 DIAGNOSIS — G89.29 CHRONIC RIGHT SHOULDER PAIN: ICD-10-CM

## 2023-08-31 DIAGNOSIS — R26.89 BALANCE DISORDER: ICD-10-CM

## 2023-08-31 DIAGNOSIS — R19.5 POSITIVE COLORECTAL CANCER SCREENING USING COLOGUARD TEST: ICD-10-CM

## 2023-08-31 DIAGNOSIS — R29.898 WEAKNESS OF BOTH LOWER EXTREMITIES: ICD-10-CM

## 2023-08-31 DIAGNOSIS — M25.512 CHRONIC LEFT SHOULDER PAIN: ICD-10-CM

## 2023-08-31 DIAGNOSIS — M54.16 LUMBAR RADICULOPATHY: Primary | ICD-10-CM

## 2023-08-31 DIAGNOSIS — G89.29 CHRONIC LEFT SHOULDER PAIN: ICD-10-CM

## 2023-08-31 DIAGNOSIS — K59.03 DRUG-INDUCED CONSTIPATION: Primary | ICD-10-CM

## 2023-08-31 PROCEDURE — 97140 MANUAL THERAPY 1/> REGIONS: CPT

## 2023-08-31 PROCEDURE — 97110 THERAPEUTIC EXERCISES: CPT

## 2023-08-31 PROCEDURE — 99204 OFFICE O/P NEW MOD 45 MIN: CPT | Performed by: INTERNAL MEDICINE

## 2023-08-31 RX ORDER — POLYETHYLENE GLYCOL 3350 17 G/17G
17 POWDER, FOR SOLUTION ORAL DAILY
Qty: 255 G | Refills: 0 | Status: SHIPPED | OUTPATIENT
Start: 2023-08-31

## 2023-08-31 NOTE — PROGRESS NOTES
Daily Note     Today's date: 2023  Patient name: Yulissa Amador  : 1950  MRN: [id number]   Referring provider: Beryl Negron DO  Dx:   Encounter Diagnosis     ICD-10-CM    1. Lumbar radiculopathy  M54.16       2. Chronic left shoulder pain  M25.512     G89.29       3. Weakness of both lower extremities  R29.898       4. Balance disorder  R26.89       5. Chronic right shoulder pain  M25.511     G89.29           Start Time: 1100  Stop Time: 1140  Total time in clinic (min): 40 minutes    Subjective: Patient states that R shoulder is still sore. She states that R shoulder pain comes and goes. Objective: See treatment diary below      Assessment: Tolerated treatment well. Moderate guarding during PROM with some reduction with distraction. One instance of lateral LOB with ambulation but steadied by therapist. Patient demonstrated fatigue post treatment and would benefit from continued PT      Plan: Continue per plan of care.       Precautions: fall risk, HTN, R MUSA, L TKA      Manuals 8/15 8/22 8/29 8/31         shld PROM b/l  DL DES LQ         R GH jt p-a mob gr 3-4  Gh glides and distraction-DL DES LAD LAD LQ         stm r bicep delt  DL                        Neuro Re-Ed                          Tandem stance 3x30" 30"x2 3x30" 3x30"ea         Standing march  x15 15x 30x         NBOS on foam  30"x2 1x30" 1x30"         NBOS EC  30"x2 2x30" 2x30"         NBOS with head turns  nv 30" 2x20"         scap retraction 10x 10x 10x 10x5"         Ther Ex                          Seated table slides 10x x10           Supine wand flexion 5x5" 5"x5 5x5" 5x5"         Pt edu   2'          Pulley flexion   15x5" ea 15x5" ea flex         Cross body adduction   5x10" ea 10x10" ea modified with strap                                   Ther Activity                          Sit>stand             Mini squats             Fwd step ups             Gait Training                                       Modalities             Cold pack   At home

## 2023-08-31 NOTE — TELEPHONE ENCOUNTER
Scheduled date of colonoscopy (as of today): 11/30  Physician performing colonoscopy: Dr. Geovanna Patel  Location of colonoscopy: [facility]  Bowel prep reviewed with patient: Clenpiq  Instructions reviewed with patient by: Brinda Sanchez  Clearances: none

## 2023-08-31 NOTE — PROGRESS NOTES
[address] [place] [address] [facility] - Outpatient Consultation  Karolina Fields 81 y.o. female MRN: [id number]  Encounter: [id number]    ASSESSMENT AND PLAN:      1. Positive colorectal cancer screening using Cologuard test  This certainly warrants a diagnostic colonoscopy. I did inform her that there could be a polyp that needs to be removed and that there is also a significant false positive rate with Cologuard. Procedure risks and preparation discussed in detail.  - Ambulatory Referral to Gastroenterology  - Colonoscopy; Future  - sodium picosulfate, magnesium oxide, citric acid (Clenpiq) oral solution; Take 175 mL (1 bottle) the evening before the colonoscopy, between 5 PM and 9 PM, followed by a second 175 mL bottle 5 hours before the colonoscopy. Dispense: 350 mL; Refill: 0    2. Drug-induced constipation  She had been on iron, although she has stopped it. She has no signs of anemia. She does say that she goes every other day and sometimes gets constipated even more. I prescribed MiraLAX with the instruction that she take a capful daily starting now  - polyethylene glycol (GLYCOLAX) 17 GM/SCOOP powder; Take 17 g by mouth daily  Dispense: 255 g; Refill: 0      Follow up Appointment: For colonoscopy    Chief Complaint   Patient presents with   • Positive Cologuard       HPI:   Karolina Fields is a 81y.o. year old female with a PMH significant for arthritis who presents from a consultation from PCP for positive Cologuard test.  She had a colonoscopy many years ago and was told to repeat it in 10 years. She has not had a colonoscopy since, so her PCP ordered a Cologuard that was positive. She herself does not see any blood per rectum and she denies diarrhea. She does admit to constipation, moving her bowels every other day. Sometimes she misses 2 or 3 days. She has never been on any laxatives. She denies abdominal pain as well as family history of colon cancer.   She does admit to very infrequent heartburn, especially after spicy meals like [place] food. This occurs only 3-4 times a year. She denies dysphagia odynophagia and early satiety    Historical Information   Past Medical History:   Diagnosis Date   • Arthritis    • Balance disorder    • Hyperlipidemia    • Vertigo      Past Surgical History:   Procedure Laterality Date   •  SECTION     • COLONOSCOPY     • HYSTERECTOMY     • HYSTEROSCOPY W/ ENDOMETRIAL ABLATION     • [place] ARTHRP ACETBLR/PROX FEM PROSTC AGRFT/ALGRFT Right 2022    Procedure: ARTHROPLASTY HIP TOTAL ANTERIOR and all associated procedures;  Surgeon: Michelle Andujar MD;  Location:  MAIN OR;  Service: Orthopedics     Social History     Substance and Sexual Activity   Alcohol Use Never     Social History     Substance and Sexual Activity   Drug Use Not Currently     Social History     Tobacco Use   Smoking Status Never   Smokeless Tobacco Never     Family History   Problem Relation Age of Onset   • Cancer Father        Meds/Allergies     Current Outpatient Medications:   •  amoxicillin (AMOXIL) 500 mg capsule  •  B Complex Vitamins (VITAMIN B COMPLEX PO)  •  celecoxib (CeleBREX) 100 mg capsule  •  cholecalciferol (VITAMIN D3) 1,000 units tablet  •  DULoxetine (CYMBALTA) 60 mg delayed release capsule  •  gabapentin (NEURONTIN) 300 mg capsule  •  Magnesium 400 MG TABS  •  Multiple Vitamins-Minerals (CENTRUM SILVER PO)  •  polyethylene glycol (GLYCOLAX) 17 GM/SCOOP powder  •  potassium chloride (K-DUR,KLOR-CON) 20 mEq tablet  •  rosuvastatin (CRESTOR) 40 MG tablet  •  sodium picosulfate, magnesium oxide, citric acid (Clenpiq) oral solution  •  VITAMIN E PO  •  ascorbic acid (VITAMIN C) 500 MG tablet    No Known Allergies    PHYSICAL EXAM:    Blood pressure 130/82, height 5' 4" (1.626 m), weight 107 kg (235 lb). Body mass index is 40.34 kg/m².   General Appearance: NAD, cooperative, alert  Eyes: Anicteric  GI:  Soft, non-tender, non-distended; normal bowel sounds; no masses, no organomegaly   Rectal: Deferred  Musculoskeletal: No edema. Skin:  No jaundice    Lab Results:   Lab Results   Component Value Date    WBC 8.44 07/27/2023    WBC 8.65 07/20/2023    WBC 5.95 07/27/2022    HGB 13.1 07/27/2023    HGB 13.2 07/20/2023    HGB 14.1 07/27/2022    MCV 93 07/27/2023     07/27/2023     07/20/2023     07/27/2022    INR 0.89 07/27/2022     Lab Results   Component Value Date    K 3.4 (L) 07/27/2023     07/27/2023    CO2 31 07/27/2023    BUN 19 07/27/2023    CREATININE 0.55 (L) 07/27/2023    GLUF 99 01/16/2023    CALCIUM 9.7 07/27/2023    AST 16 07/27/2023    AST 16 07/20/2023    AST 13 01/16/2023    ALT 17 07/27/2023    ALT 18 07/20/2023    ALT 22 01/16/2023    ALKPHOS 75 07/27/2023    ALKPHOS 68 07/20/2023    ALKPHOS 82 01/16/2023    EGFR 95 07/27/2023     Lab Results   Component Value Date    IRON 105 07/27/2022    TIBC 291 07/27/2022    FERRITIN 127 07/27/2022     No results found for: "LIPASE"    Radiology Results:   No results found.

## 2023-09-04 ENCOUNTER — HOSPITAL ENCOUNTER (EMERGENCY)
Facility: HOSPITAL | Age: 73
Discharge: HOME | End: 2023-09-04
Attending: EMERGENCY MEDICINE | Admitting: EMERGENCY MEDICINE
Payer: MEDICARE

## 2023-09-04 ENCOUNTER — APPOINTMENT (EMERGENCY)
Dept: RADIOLOGY | Facility: HOSPITAL | Age: 73
End: 2023-09-04
Payer: MEDICARE

## 2023-09-04 VITALS
OXYGEN SATURATION: 94 % | HEART RATE: 88 BPM | RESPIRATION RATE: 18 BRPM | DIASTOLIC BLOOD PRESSURE: 76 MMHG | SYSTOLIC BLOOD PRESSURE: 141 MMHG | TEMPERATURE: 98.8 F

## 2023-09-04 DIAGNOSIS — S39.012A LUMBAR STRAIN: ICD-10-CM
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
## 2023-09-04 DIAGNOSIS — S76.012A STRAIN OF LEFT HIP: ICD-10-CM

## 2023-09-04 PROCEDURE — 73502 X-RAY EXAM HIP UNI 2-3 VIEWS: CPT

## 2023-09-04 PROCEDURE — 72100 X-RAY EXAM L-S SPINE 2/3 VWS: CPT

## 2023-09-04 PROCEDURE — 99283 EMERGENCY DEPT VISIT LOW MDM: CPT

## 2023-09-04 PROCEDURE — 99284 EMERGENCY DEPT VISIT MOD MDM: CPT | Performed by: PHYSICIAN ASSISTANT

## 2023-09-04 NOTE — DISCHARGE INSTRUCTIONS
Rest, ice to hip and back for 2 days. Tylenol for discomfort. Over the counter lidocaine patches as needed. Follow up with family doctor in 1 week for recheck.

## 2023-09-04 NOTE — ED PROVIDER NOTES
History  Chief Complaint   Patient presents with   • Back Pain     Left lower back pain after falling backwards hitting it on the commode in her bedroom. Patient is a 82 y/o F with h/o hyperlipidemia, balance disorder presents to the ED with left lower back pain and left hip pain after a fall 1 hour ago. Patient states she was ambulating to her commode and missed it and hit her back on a grab bar. She denies hitting head or LOC. No headache. No other injuries. SHe states she has pain in her left hip with ambulation. No numbness, tingling, bowel/bladder dysfunction. History provided by:  Patient  Back Pain  Location:  Lumbar spine  Quality:  Aching  Radiates to:  Does not radiate  Pain severity:  Moderate  Onset quality:  Gradual  Duration:  1 hour  Timing:  Constant  Associated symptoms: no abdominal pain, no chest pain, no dysuria, no fever, no headaches, no numbness and no weakness        Prior to Admission Medications   Prescriptions Last Dose Informant Patient Reported? Taking?    B Complex Vitamins (VITAMIN B COMPLEX PO)  Self Yes No   Sig: Take by mouth in the morning   DULoxetine (CYMBALTA) 60 mg delayed release capsule  Self No No   Sig: TAKE ONE CAPSULE BY MOUTH AT BEDTIME   Magnesium 400 MG TABS  Self No No   Sig: Take 1 tablet (400 mg total) by mouth in the morning   Multiple Vitamins-Minerals (CENTRUM SILVER PO)  Self Yes No   Sig: Take by mouth   VITAMIN E PO  Self Yes No   Sig: Take by mouth   amoxicillin (AMOXIL) 500 mg capsule  Self Yes No   Sig: TAKE 4 CAPSULES BY MOUTH 1 HOUR BEFORE APPOINTMENT   ascorbic acid (VITAMIN C) 500 MG tablet  Self No No   Sig: Take 1 tablet (500 mg total) by mouth 2 (two) times a day   Patient not taking: Reported on 7/25/2023   celecoxib (CeleBREX) 100 mg capsule  Self No No   Sig: Take 1 capsule (100 mg total) by mouth 2 (two) times a day   cholecalciferol (VITAMIN D3) 1,000 units tablet  Self Yes No   Sig: Take 1,000 Units by mouth daily   gabapentin (NEURONTIN) 300 mg capsule  Self Yes No   Sig: Take 1 capsule by mouth every 6 (six) hours as needed   polyethylene glycol (GLYCOLAX) 17 GM/SCOOP powder   No No   Sig: Take 17 g by mouth daily   potassium chloride (K-DUR,KLOR-CON) 20 mEq tablet  Self No No   Sig: Take 1 tablet (20 mEq total) by mouth daily   rosuvastatin (CRESTOR) 40 MG tablet  Self No No   Sig: Take 1 tablet (40 mg total) by mouth daily   sodium picosulfate, magnesium oxide, citric acid (Clenpiq) oral solution   No No   Sig: Take 175 mL (1 bottle) the evening before the colonoscopy, between 5 PM and 9 PM, followed by a second 175 mL bottle 5 hours before the colonoscopy. Facility-Administered Medications: None       Past Medical History:   Diagnosis Date   • Arthritis    • Balance disorder    • Hyperlipidemia    • Vertigo        Past Surgical History:   Procedure Laterality Date   •  SECTION     • COLONOSCOPY     • HYSTERECTOMY     • HYSTEROSCOPY W/ ENDOMETRIAL ABLATION     • NM ARTHRP ACETBLR/PROX FEM PROSTC AGRFT/ALGRFT Right 2022    Procedure: ARTHROPLASTY HIP TOTAL ANTERIOR and all associated procedures;  Surgeon: Umesh Doty MD;  Location: [facility] OR;  Service: Orthopedics       Family History   Problem Relation Age of Onset   • Cancer Father      I have reviewed and agree with the history as documented. E-Cigarette/Vaping   • E-Cigarette Use Never User      E-Cigarette/Vaping Substances   • Nicotine No    • THC No    • CBD No    • Flavoring No    • Other No    • Unknown No      Social History     Tobacco Use   • Smoking status: Never   • Smokeless tobacco: Never   Vaping Use   • Vaping Use: Never used   Substance Use Topics   • Alcohol use: Never   • Drug use: Not Currently       Review of Systems   Constitutional: Negative for chills and fever. HENT: Negative. Eyes: Negative for visual disturbance. Respiratory: Negative for cough and shortness of breath.     Cardiovascular: Negative for chest pain, palpitations and leg swelling. Gastrointestinal: Negative for abdominal pain. Genitourinary: Negative for dysuria and frequency. Musculoskeletal: Positive for back pain. Negative for neck pain. Left hip pain   Skin: Negative for color change, pallor and rash. Neurological: Negative for dizziness, speech difficulty, weakness, light-headedness, numbness and headaches. All other systems reviewed and are negative. Physical Exam  Physical Exam  Vitals and nursing note reviewed. Constitutional:       General: She is not in acute distress. Appearance: Normal appearance. She is well-developed, well-groomed and normal weight. She is not ill-appearing or diaphoretic. HENT:      Head: Normocephalic and atraumatic. Right Ear: Hearing normal.      Left Ear: Hearing normal.      Nose: Nose normal.      Mouth/Throat:      Mouth: Mucous membranes are moist.      Pharynx: Oropharynx is clear. Eyes:      Conjunctiva/sclera: Conjunctivae normal.      Pupils: Pupils are equal.   Cardiovascular:      Rate and Rhythm: Normal rate and regular rhythm. Heart sounds: Normal heart sounds. Pulmonary:      Effort: Pulmonary effort is normal.      Breath sounds: Normal breath sounds. No wheezing, rhonchi or rales. Chest:      Chest wall: No deformity, swelling or tenderness. Abdominal:      General: Abdomen is flat. Bowel sounds are normal.      Palpations: Abdomen is soft. Tenderness: There is no abdominal tenderness. Musculoskeletal:      Cervical back: Normal, normal range of motion and neck supple. No spinous process tenderness or muscular tenderness. Thoracic back: Normal.      Lumbar back: Tenderness present. No deformity or bony tenderness. Negative right straight leg raise test and negative left straight leg raise test.        Back:       Left hip: Bony tenderness present. No deformity. Normal range of motion.       Left upper leg: Normal.      Left knee: Normal.      Left lower leg: Normal.      Left ankle: Normal.      Left foot: Normal.   Skin:     General: Skin is warm and dry. Coloration: Skin is not pale. Findings: No bruising, erythema or rash. Neurological:      Mental Status: She is alert and oriented to person, place, and time. Sensory: Sensation is intact. Motor: Motor function is intact. Psychiatric:         Mood and Affect: Mood normal.         Speech: Speech normal.         Behavior: Behavior is cooperative. Vital Signs  ED Triage Vitals [09/04/23 0948]   Temperature Pulse Respirations Blood Pressure SpO2   98.8 °F (37.1 °C) 84 20 141/83 97 %      Temp Source Heart Rate Source Patient Position - Orthostatic VS BP Location FiO2 (%)   Oral Monitor Lying Left arm --      Pain Score       6           Vitals:    09/04/23 0948 09/04/23 1000   BP: 141/83 141/76   Pulse: 84 88   Patient Position - Orthostatic VS: Lying          Visual Acuity      ED Medications  Medications - No data to display    Diagnostic Studies  Results Reviewed     None                 XR hip/pelv 2-3 vws left   ED Interpretation by Dre Zhou PA-C (09/04 1055)   No acute fracture. XR lumbar spine 2 or 3 views   ED Interpretation by Dre Zhou PA-C (09/04 1056)   No acute fracture. Degenerative changes similar to prior CT scan. Procedures  Procedures         ED Course                                             Medical Decision Making  Patient presents with left hip and low back pain after a fall, will xray to r/o fracture. No numbness, weakness, bowel/bladder dysfunction, no need for emergent MRI. No other injuries, no LOC. No acute abnormalities on xray, will d/c with close f/u with PCP and strict return precautions.      Fall: acute illness or injury  Lumbar strain: acute illness or injury  Strain of left hip: acute illness or injury  Amount and/or Complexity of Data Reviewed  Radiology: ordered and independent interpretation performed. Disposition  Final diagnoses:   Fall   Strain of left hip   Lumbar strain     Time reflects when diagnosis was documented in both MDM as applicable and the Disposition within this note     Time User Action Codes Description Comment    9/4/2023 10:57 AM Gabriel Alosa Add [J90. XXXA] Fall     9/4/2023 10:57 AM Gabriel Alosa Add [I16.433O] Strain of left hip     9/4/2023 10:57 AM Gabriel Alosa Add [S39.012A] Lumbar strain       ED Disposition     ED Disposition   Discharge    Condition   Stable    Date/Time   Mon Sep 4, 2023 10:56 AM    Comment   Kenia Careykush Armstrong discharge to home/self care.                Follow-up Information     Follow up With Specialties Details Why [address] [facility], [address] [facility], Nurse Practitioner Schedule an appointment as soon as possible for a visit in 1 week As needed, For recheck [address]  [address]  [address] [place] [address]  [phone]            Discharge Medication List as of 9/4/2023 10:58 AM      CONTINUE these medications which have NOT CHANGED    Details   amoxicillin (AMOXIL) 500 mg capsule TAKE 4 CAPSULES BY MOUTH 1 HOUR BEFORE APPOINTMENT, Historical Med      ascorbic acid (VITAMIN C) 500 MG tablet Take 1 tablet (500 mg total) by mouth 2 (two) times a day, Starting Tue 7/5/2022, Normal      B Complex Vitamins (VITAMIN B COMPLEX PO) Take by mouth in the morning, Historical Med      celecoxib (CeleBREX) 100 mg capsule Take 1 capsule (100 mg total) by mouth 2 (two) times a day, Starting Tue 8/1/2023, Normal      cholecalciferol (VITAMIN D3) 1,000 units tablet Take 1,000 Units by mouth daily, Historical Med      DULoxetine (CYMBALTA) 60 mg delayed release capsule TAKE ONE CAPSULE BY MOUTH AT BEDTIME, Normal      gabapentin (NEURONTIN) 300 mg capsule Take 1 capsule by mouth every 6 (six) hours as needed, Historical Med      Magnesium 400 MG TABS Take 1 tablet (400 mg total) by mouth in the morning, Starting Tue 7/25/2023, Normal      Multiple Vitamins-Minerals (CENTRUM SILVER PO) Take by mouth, Historical Med      polyethylene glycol (GLYCOLAX) 17 GM/SCOOP powder Take 17 g by mouth daily, Starting Thu 8/31/2023, Normal      potassium chloride (K-DUR,KLOR-CON) 20 mEq tablet Take 1 tablet (20 mEq total) by mouth daily, Starting Tue 7/25/2023, Normal      rosuvastatin (CRESTOR) 40 MG tablet Take 1 tablet (40 mg total) by mouth daily, Starting Wed 5/17/2023, Normal      sodium picosulfate, magnesium oxide, citric acid (Clenpiq) oral solution Take 175 mL (1 bottle) the evening before the colonoscopy, between 5 PM and 9 PM, followed by a second 175 mL bottle 5 hours before the colonoscopy., Normal      VITAMIN E PO Take by mouth, Historical Med             No discharge procedures on file.     PDMP Review       Value Time User    PDMP Reviewed  Yes 7/25/2023  4:54 PM Zach Salas DO          ED Provider  Electronically Signed by           Dmitriy Blancas PA-C  09/04/23 2965

## 2023-09-05 ENCOUNTER — OFFICE VISIT (OUTPATIENT)
Dept: PHYSICAL THERAPY | Facility: CLINIC | Age: 73
End: 2023-09-05
Payer: MEDICARE

## 2023-09-05 DIAGNOSIS — R29.898 WEAKNESS OF BOTH LOWER EXTREMITIES: ICD-10-CM

## 2023-09-05 DIAGNOSIS — G89.29 CHRONIC LEFT SHOULDER PAIN: ICD-10-CM

## 2023-09-05 DIAGNOSIS — M25.511 CHRONIC RIGHT SHOULDER PAIN: ICD-10-CM

## 2023-09-05 DIAGNOSIS — M25.512 CHRONIC LEFT SHOULDER PAIN: ICD-10-CM

## 2023-09-05 DIAGNOSIS — G89.29 CHRONIC RIGHT SHOULDER PAIN: ICD-10-CM

## 2023-09-05 DIAGNOSIS — M54.16 LUMBAR RADICULOPATHY: Primary | ICD-10-CM

## 2023-09-05 DIAGNOSIS — R26.89 BALANCE DISORDER: ICD-10-CM

## 2023-09-05 PROCEDURE — 97110 THERAPEUTIC EXERCISES: CPT | Performed by: PHYSICAL THERAPIST

## 2023-09-05 PROCEDURE — 97140 MANUAL THERAPY 1/> REGIONS: CPT | Performed by: PHYSICAL THERAPIST

## 2023-09-05 NOTE — PROGRESS NOTES
Daily Note     Today's date: 2023  Patient name: Jennie Turpin  : 1950  MRN: [id number]  Referring provider: Callie Webber DO  Dx:   Encounter Diagnosis     ICD-10-CM    1. Lumbar radiculopathy  M54.16       2. Chronic left shoulder pain  M25.512     G89.29       3. Weakness of both lower extremities  R29.898       4. Balance disorder  R26.89       5. Chronic right shoulder pain  M25.511     G89.29                      Subjective: pt notes that she was in the Ed again yesterday as she fell into a hand rail and bruised her left side. She has had xrays of the l/s and the left hip with no fractures. Pt is very sore and difficulty with transitions. Was able to sleep through the night on the right side. Objective: See treatment diary below      Assessment: pt did well with shoulder  Exercises. We held all standing exercises secondary to pain in standing. Pt was able to perform some gentle rom exercises of the hips and l/s with minimal symptoms. Will work to return to standing balance exercises next visit if soreness from fall reduces. Plan: Continue per plan of care.       Precautions: fall risk, HTN, R MUSA, L TKA      Manuals 8/15 8/22 8/29 8/31 9/5        shld PROM b/l  DL DES LQ DB        R GH jt p-a mob gr 3-4  Gh glides and distraction-DL DES LAD LAD LQ DB        stm r bicep delt  DL                        Neuro Re-Ed                          Tandem stance 3x30" 30"x2 3x30" 3x30"ea nv        Standing march  x15 15x 30x nv        NBOS on foam  30"x2 1x30" 1x30" nv        NBOS EC  30"x2 2x30" 2x30" nv        NBOS with head turns  nv 30" 2x20" nv        scap retraction 10x 10x 10x 10x5"         Ther Ex             ppt     5''x10        Supine hip ad     5''x10        Supine wand flexion 5x5" 5"x5 5x5" 5x5" 5''x10        Supine hip abd     green 5''x10        Pulley flexion   15x5" ea 15x5" ea flex         Cross body adduction   5x10" ea 10x10" ea modified with strap         ltr     5''x10 ea Ther Activity                          Sit>stand             Mini squats             Fwd step ups             Gait Training                                       Modalities             Cold pack   At home

## 2023-09-06 DIAGNOSIS — M16.11 PRIMARY OSTEOARTHRITIS OF RIGHT HIP: ICD-10-CM

## 2023-09-06 DIAGNOSIS — M25.512 LEFT SHOULDER PAIN: ICD-10-CM

## 2023-09-06 DIAGNOSIS — G89.4 CHRONIC PAIN SYNDROME: ICD-10-CM

## 2023-09-06 DIAGNOSIS — E78.2 MIXED HYPERLIPIDEMIA: ICD-10-CM

## 2023-09-06 RX ORDER — DULOXETIN HYDROCHLORIDE 60 MG/1
CAPSULE, DELAYED RELEASE ORAL
Qty: 30 CAPSULE | Refills: 0 | OUTPATIENT
Start: 2023-09-06

## 2023-09-06 RX ORDER — ROSUVASTATIN CALCIUM 20 MG/1
20 TABLET, COATED ORAL DAILY
Qty: 30 TABLET | Refills: 2 | OUTPATIENT
Start: 2023-09-06

## 2023-09-06 RX ORDER — CELECOXIB 100 MG/1
100 CAPSULE ORAL 2 TIMES DAILY
Qty: 60 CAPSULE | Refills: 0 | Status: SHIPPED | OUTPATIENT
Start: 2023-09-06

## 2023-09-07 ENCOUNTER — OFFICE VISIT (OUTPATIENT)
Dept: FAMILY MEDICINE CLINIC | Facility: CLINIC | Age: 73
End: 2023-09-07
Payer: MEDICARE

## 2023-09-07 VITALS
BODY MASS INDEX: 40.22 KG/M2 | WEIGHT: 235.6 LBS | HEIGHT: 64 IN | SYSTOLIC BLOOD PRESSURE: 136 MMHG | OXYGEN SATURATION: 99 % | DIASTOLIC BLOOD PRESSURE: 82 MMHG | RESPIRATION RATE: 16 BRPM | TEMPERATURE: 98.4 F | HEART RATE: 95 BPM

## 2023-09-07 DIAGNOSIS — R29.6 FALLS FREQUENTLY: ICD-10-CM

## 2023-09-07 DIAGNOSIS — R29.898 WEAKNESS OF BOTH LOWER EXTREMITIES: ICD-10-CM

## 2023-09-07 DIAGNOSIS — R26.89 BALANCE DISORDER: ICD-10-CM

## 2023-09-07 DIAGNOSIS — S20.222A: ICD-10-CM

## 2023-09-07 DIAGNOSIS — S29.012A STRAIN OF LATISSIMUS DORSI MUSCLE, INITIAL ENCOUNTER: Primary | ICD-10-CM

## 2023-09-07 PROCEDURE — 99214 OFFICE O/P EST MOD 30 MIN: CPT | Performed by: FAMILY MEDICINE

## 2023-09-07 RX ORDER — METHYLPREDNISOLONE 4 MG/1
TABLET ORAL
Qty: 21 EACH | Refills: 0 | Status: SHIPPED | OUTPATIENT
Start: 2023-09-07
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
## 2023-09-12 ENCOUNTER — OFFICE VISIT (OUTPATIENT)
Dept: PHYSICAL THERAPY | Facility: CLINIC | Age: 73
End: 2023-09-12
Payer: MEDICARE

## 2023-09-12 DIAGNOSIS — M25.512 CHRONIC LEFT SHOULDER PAIN: ICD-10-CM

## 2023-09-12 DIAGNOSIS — M25.511 CHRONIC RIGHT SHOULDER PAIN: ICD-10-CM

## 2023-09-12 DIAGNOSIS — G89.29 CHRONIC RIGHT SHOULDER PAIN: ICD-10-CM

## 2023-09-12 DIAGNOSIS — G89.29 CHRONIC LEFT SHOULDER PAIN: ICD-10-CM

## 2023-09-12 DIAGNOSIS — R26.89 BALANCE DISORDER: ICD-10-CM

## 2023-09-12 DIAGNOSIS — M54.16 LUMBAR RADICULOPATHY: Primary | ICD-10-CM

## 2023-09-12 DIAGNOSIS — R29.898 WEAKNESS OF BOTH LOWER EXTREMITIES: ICD-10-CM

## 2023-09-12 PROCEDURE — 97110 THERAPEUTIC EXERCISES: CPT | Performed by: PHYSICAL THERAPIST

## 2023-09-12 PROCEDURE — 97140 MANUAL THERAPY 1/> REGIONS: CPT | Performed by: PHYSICAL THERAPIST

## 2023-09-12 NOTE — PROGRESS NOTES
Progress Report     Today's date: 2023  Patient name: Sergio Lau  : 1950  MRN: [id number]  Referring provider: Daniela Flowers DO  Dx:   Encounter Diagnosis     ICD-10-CM    1. Lumbar radiculopathy  M54.16       2. Chronic left shoulder pain  M25.512     G89.29       3. Weakness of both lower extremities  R29.898       4. Balance disorder  R26.89       5. Chronic right shoulder pain  M25.511     G89.29                      Subjective: patient recently saw PCP to evaluate rib/flank pain following recent fall, no fx found. She reports no falls in the past week. She was using RW but now is not. She hasn't lost her balance this week, but reports that when she does, it feels that she gets pushed over by something. She reports it often happens when turning or moving, but denies dizziness or symptoms when rolling in bed. Objective: See treatment diary below    m-CTSIB balance testing  EO level ground : .76  EC level ground : 1.98  EO unstable ground : 2.09  EC unstable ground : 4.62    [employer] test: negative b/l  Horizontal roll test: negative b/l      Assessment: Further assessed balance today due to fall history. With biodex testing, patient displayed within normal limits balance testing except when vestibular system was targeted with eyes closed unstable surface setting, scoring higher than 3 on m-CTSIB. With positioning testing, she had negative [employer] or horizontal roll test for BPPV. Patient tolerated shoulder manual therapy well with improvement in pain during wand flexion AAROM following. Patient would benefit from continued skilled PT per plan of care to address impairments and improve function. Plan: Continue per plan of care.       Precautions: fall risk, HTN, R MUSA, L TKA      Manuals 8/15 8/22 8/29 8/31 9/5 9/12       shld PROM b/l  DL DES LQ DB DES       R GH jt p-a mob gr 3-4  Gh glides and distraction-DL DES LAD LAD LQ DB DES       stm r bicep delt  DL Neuro Re-Ed                          Tandem stance 3x30" 30"x2 3x30" 3x30"ea nv 3x30"       Standing march  x15 15x 30x nv        NBOS on foam  30"x2 1x30" 1x30" nv        NBOS EC  30"x2 2x30" 2x30" nv        NBOS with head turns  nv 30" 2x20" nv        scap retraction 10x 10x 10x 10x5"         Ther Ex             ppt     5''x10        Supine hip ad     5''x10        Supine wand flexion 5x5" 5"x5 5x5" 5x5" 5''x10 8x10"       Supine hip abd     green 5''x10        Pulley flexion   15x5" ea 15x5" ea flex         Cross body adduction   5x10" ea 10x10" ea modified with strap         ltr     5''x10 ea        Assessment associated with progress report      13'       Ther Activity                          Sit>stand             Mini squats             Fwd step ups             Gait Training                                       Modalities             Cold pack   At home

## 2023-09-14 ENCOUNTER — OFFICE VISIT (OUTPATIENT)
Dept: PHYSICAL THERAPY | Facility: CLINIC | Age: 73
End: 2023-09-14
Payer: MEDICARE

## 2023-09-14 DIAGNOSIS — R29.898 WEAKNESS OF BOTH LOWER EXTREMITIES: ICD-10-CM

## 2023-09-14 DIAGNOSIS — R26.89 BALANCE DISORDER: ICD-10-CM

## 2023-09-14 DIAGNOSIS — G89.29 CHRONIC LEFT SHOULDER PAIN: ICD-10-CM

## 2023-09-14 DIAGNOSIS — M25.512 CHRONIC LEFT SHOULDER PAIN: ICD-10-CM

## 2023-09-14 DIAGNOSIS — M25.511 CHRONIC RIGHT SHOULDER PAIN: ICD-10-CM

## 2023-09-14 DIAGNOSIS — M54.16 LUMBAR RADICULOPATHY: Primary | ICD-10-CM

## 2023-09-14 DIAGNOSIS — G89.29 CHRONIC RIGHT SHOULDER PAIN: ICD-10-CM

## 2023-09-14 PROCEDURE — 97140 MANUAL THERAPY 1/> REGIONS: CPT | Performed by: PHYSICAL THERAPIST

## 2023-09-14 PROCEDURE — 97112 NEUROMUSCULAR REEDUCATION: CPT | Performed by: PHYSICAL THERAPIST

## 2023-09-14 PROCEDURE — 97110 THERAPEUTIC EXERCISES: CPT | Performed by: PHYSICAL THERAPIST

## 2023-09-14 PROCEDURE — 97010 HOT OR COLD PACKS THERAPY: CPT | Performed by: PHYSICAL THERAPIST

## 2023-09-14 NOTE — PROGRESS NOTES
Daily Note    Today's date: 2023  Patient name: Yulissa Amador  : 1950  MRN: [id number]  Referring provider: Beryl Negron DO  Dx:   Encounter Diagnosis     ICD-10-CM    1. Lumbar radiculopathy  M54.16       2. Chronic left shoulder pain  M25.512     G89.29       3. Weakness of both lower extremities  R29.898       4. Balance disorder  R26.89       5. Chronic right shoulder pain  M25.511     G89.29                      Subjective: patient reports that she got a cane and hasn't fallen or lost her balance since. She has MRI for right shoulder tomorrow. Objective: See treatment diary below      Assessment: Patient noted less pain today during wand flexion AAROM compared to previous session. Progressed shoulder stabilization exercises with shld retraction and extension, and b/l ER with retraction. Progressed balance exercises with 1 foot on step, and NBOS with head turns. Patient continues to have significant difficulty with tandem stance balance. Patient would benefit from continued skilled PT per plan of care to address impairments and improve function. Plan: Continue per plan of care. Precautions:  Moderate fall risk, HTN, R MUSA, L TKA      Manuals 8/15 8/22 8/29 8/31 9/5 9/12 9/14      shld PROM b/l  DL DES LQ DB DES DES      R GH jt p-a mob gr 3-4  Gh glides and distraction-DL DES LAD LAD LQ DB DES DES      stm r bicep delt  DL           B/l UT STM       DES      Neuro Re-Ed             1 foot on step balance       2x30" ea      Tandem stance 3x30" 30"x2 3x30" 3x30"ea nv 3x30" 3x30"      Standing march  x15 15x 30x nv        NBOS on foam  30"x2 1x30" 1x30" nv        NBOS EC  30"x2 2x30" 2x30" nv        NBOS with head turns  nv 30" 2x20" nv  x10 ea (vertical and horizontal)      shld ext with retraction       10x gtb      B/l ER with retraction       10x5" rtb      scap retraction 10x 10x 10x 10x5"   10x5"      Ther Ex             ppt     5''x10        Supine hip ad     5''x10        Supine wand flexion 5x5" 5"x5 5x5" 5x5" 5''x10 8x10" 10x10"      Supine hip abd     green 5''x10        Pulley flexion   15x5" ea 15x5" ea flex         Cross body adduction   5x10" ea 10x10" ea modified with strap         Seated wand ER AROM       10x5" ea                   Ther Activity                          Sit>stand             Mini squats             Fwd step ups             Gait Training                                       Modalities             Cold pack   At home    8' b/l

## 2023-09-15 ENCOUNTER — HOSPITAL ENCOUNTER (OUTPATIENT)
Dept: MRI IMAGING | Facility: HOSPITAL | Age: 73
End: 2023-09-15
Attending: STUDENT IN AN ORGANIZED HEALTH CARE EDUCATION/TRAINING PROGRAM
Payer: MEDICARE

## 2023-09-15 DIAGNOSIS — M25.511 CHRONIC RIGHT SHOULDER PAIN: ICD-10-CM

## 2023-09-15 DIAGNOSIS — G89.29 CHRONIC RIGHT SHOULDER PAIN: ICD-10-CM

## 2023-09-15 PROCEDURE — 73221 MRI JOINT UPR EXTREM W/O DYE: CPT

## 2023-09-15 PROCEDURE — G1004 CDSM NDSC: HCPCS

## 2023-09-19 ENCOUNTER — OFFICE VISIT (OUTPATIENT)
Dept: PHYSICAL THERAPY | Facility: CLINIC | Age: 73
End: 2023-09-19
Payer: MEDICARE

## 2023-09-19 DIAGNOSIS — G89.29 CHRONIC LEFT SHOULDER PAIN: Primary | ICD-10-CM

## 2023-09-19 DIAGNOSIS — G89.29 CHRONIC RIGHT SHOULDER PAIN: ICD-10-CM

## 2023-09-19 DIAGNOSIS — R29.898 WEAKNESS OF BOTH LOWER EXTREMITIES: ICD-10-CM

## 2023-09-19 DIAGNOSIS — R26.89 BALANCE DISORDER: ICD-10-CM

## 2023-09-19 DIAGNOSIS — M25.511 CHRONIC RIGHT SHOULDER PAIN: ICD-10-CM

## 2023-09-19 DIAGNOSIS — M25.512 CHRONIC LEFT SHOULDER PAIN: Primary | ICD-10-CM

## 2023-09-19 PROCEDURE — 97140 MANUAL THERAPY 1/> REGIONS: CPT | Performed by: PHYSICAL THERAPIST

## 2023-09-19 PROCEDURE — 97112 NEUROMUSCULAR REEDUCATION: CPT | Performed by: PHYSICAL THERAPIST

## 2023-09-19 NOTE — PROGRESS NOTES
Daily Note    Today's date: 2023  Patient name: Tapan Osborne  : 1950  MRN: [id number]  Referring provider: Elvis Cronin DO  Dx:   Encounter Diagnosis     ICD-10-CM    1. Chronic left shoulder pain  M25.512     G89.29       2. Weakness of both lower extremities  R29.898       3. Balance disorder  R26.89       4. Chronic right shoulder pain  M25.511     G89.29                      Subjective: patient reports that she hasn't lost her balance since last session. She feels improvement since BPPV testing was done. She reports the pulsing sensation in her ears is now near constant. She denies any other symptoms aside from being more tired in general. She has appointment with ENT this Thursday. Objective: See treatment diary below    Tandem stance  12 seconds on L  10 seconds on R      Assessment: Patient had less difficulty with NBOS balance and head turns compared to previous session. She continues to display decreased stability in tandem stance. Less pain noted with supine wand flexion stretch and during manual therapy compared to last week. Increased time spent on manual therapy today. Patient would benefit from continued skilled PT per plan of care to address impairments and improve function. Plan: Continue per plan of care. Precautions:  Moderate fall risk, HTN, R MUSA, L TKA      Manuals 8/15 8/22 8/29 8/31 9/5 9/12 9/14 9/19     shld PROM b/l  DL DES LQ DB DES DES DES     R GH jt p-a mob gr 3-4  Gh glides and distraction-DL DES LAD LAD LQ DB DES DES DES     stm r bicep delt  DL           B/l UT STM       DES      Neuro Re-Ed             1 foot on step balance       2x30" ea      Tandem stance 3x30" 30"x2 3x30" 3x30"ea nv 3x30" 3x30" 2x30" ea     Standing march  x15 15x 30x nv        NBOS on foam  30"x2 1x30" 1x30" nv        NBOS EC  30"x2 2x30" 2x30" nv        NBOS with head turns  nv 30" 2x20" nv  x10 ea (vertical and horizontal) 2x10 ea     shld ext with retraction       10x gtb      B/l ER with retraction       10x5" rtb      scap retraction 10x 10x 10x 10x5"   10x5"      Ther Ex             ppt     5''x10        Supine hip ad     5''x10        Supine wand flexion 5x5" 5"x5 5x5" 5x5" 5''x10 8x10" 10x10" 10x10"     Supine hip abd     green 5''x10        Pulley flexion   15x5" ea 15x5" ea flex         Cross body adduction   5x10" ea 10x10" ea modified with strap         Seated wand ER AROM       10x5" ea                   Ther Activity                          Sit>stand             Mini squats             Fwd step ups             Gait Training                                       Modalities             Cold pack   At home    8' b/l

## 2023-09-21 ENCOUNTER — OFFICE VISIT (OUTPATIENT)
Dept: PHYSICAL THERAPY | Facility: CLINIC | Age: 73
End: 2023-09-21
Payer: MEDICARE

## 2023-09-21 DIAGNOSIS — G89.29 CHRONIC LEFT SHOULDER PAIN: Primary | ICD-10-CM

## 2023-09-21 DIAGNOSIS — R26.89 BALANCE DISORDER: ICD-10-CM

## 2023-09-21 DIAGNOSIS — M25.512 CHRONIC LEFT SHOULDER PAIN: Primary | ICD-10-CM

## 2023-09-21 DIAGNOSIS — G89.29 CHRONIC RIGHT SHOULDER PAIN: ICD-10-CM

## 2023-09-21 DIAGNOSIS — M25.511 CHRONIC RIGHT SHOULDER PAIN: ICD-10-CM

## 2023-09-21 DIAGNOSIS — R29.898 WEAKNESS OF BOTH LOWER EXTREMITIES: ICD-10-CM

## 2023-09-21 PROCEDURE — 97010 HOT OR COLD PACKS THERAPY: CPT | Performed by: PHYSICAL THERAPIST

## 2023-09-21 PROCEDURE — 97140 MANUAL THERAPY 1/> REGIONS: CPT | Performed by: PHYSICAL THERAPIST

## 2023-09-21 PROCEDURE — 97112 NEUROMUSCULAR REEDUCATION: CPT | Performed by: PHYSICAL THERAPIST

## 2023-09-21 PROCEDURE — 97110 THERAPEUTIC EXERCISES: CPT | Performed by: PHYSICAL THERAPIST

## 2023-09-21 NOTE — PROGRESS NOTES
Daily Note    Today's date: 2023  Patient name: Nidia Penny  : 1950  MRN: [id number]  Referring provider: Davida Cota DO  Dx:   Encounter Diagnosis     ICD-10-CM    1. Chronic left shoulder pain  M25.512     G89.29       2. Weakness of both lower extremities  R29.898       3. Balance disorder  R26.89       4. Chronic right shoulder pain  M25.511     G89.29                      Subjective: patient reports that her right shoulder was sore following last session and she thinks it was due to p-a mobilizations to [facility] joint; she would like to hold off on this and go gentle with shoulder otday. She reports less "pulsing sensation" in her ears compared to earlier this week. She still hasn't lost her balance or fallen this week. Upon questioning, she reports some memory issues and sometimes feels confused. Her ENT appointment is not until next week (she thinks Thursday) and she hasn't made appointment with neurologist yet. Objective: See treatment diary below        Assessment: Recommended patient follow up with PCP regarding memory issues, persistent symptoms of pulsing in her ear, and overall balance issues. She verbalized understanding. Patient displayed good balance with head turns in narrow base of support. Progressed balance exercises with walking and whole body turns; no loss of balance during. Held IR/ER manual therapy and p-a jt mobs. Patient would benefit from continued skilled PT per plan of care to address impairments and improve function. Plan: Continue per plan of care. Progress shoulder strength/mobility and balance as tolerated. Precautions:  Moderate fall risk, HTN, R MUSA, L TKA      Manuals 8/15 8/22 8/29 8/31 9/5 9/12 9/14 9/19 9/21    shld PROM b/l  DL DES LQ DB DES DES DES DES    R GH jt p-a mob gr 3-4  Gh glides and distraction-DL DES LAD LAD LQ DB DES DES DES LAD DES    stm r bicep delt  DL           B/l UT STM       DES      Neuro Re-Ed             1 foot on step balance 2x30" ea      Tandem stance 3x30" 30"x2 3x30" 3x30"ea nv 3x30" 3x30" 2x30" ea 3x30"    Standing march  x15 15x 30x nv        NBOS on foam  30"x2 1x30" 1x30" nv        NBOS EC  30"x2 2x30" 2x30" nv        NBOS with head turns  nv 30" 2x20" nv  x10 ea (vertical and horizontal) 2x10 ea 2x10 foam    Walking with turns          5'    shld ext with retraction       10x gtb      B/l ER with retraction       10x5" rtb  10x5" rtb loop    scap retraction 10x 10x 10x 10x5"   10x5"      Ther Ex             ppt     5''x10        Supine hip ad     5''x10        Supine wand flexion 5x5" 5"x5 5x5" 5x5" 5''x10 8x10" 10x10" 10x10" 10x10"                 Pulley flexion   15x5" ea 15x5" ea flex         Cross body adduction   5x10" ea 10x10" ea modified with strap         Seated wand ER AROM       10x5" ea  supine 10x                 Ther Activity                          Sit>stand             Mini squats             Fwd step ups             Gait Training                                       Modalities             Cold pack   At home    8' b/l  8' R

## 2023-09-26 ENCOUNTER — OFFICE VISIT (OUTPATIENT)
Dept: PHYSICAL THERAPY | Facility: CLINIC | Age: 73
End: 2023-09-26
Payer: MEDICARE

## 2023-09-26 DIAGNOSIS — G89.29 CHRONIC LEFT SHOULDER PAIN: Primary | ICD-10-CM

## 2023-09-26 DIAGNOSIS — M25.512 CHRONIC LEFT SHOULDER PAIN: Primary | ICD-10-CM

## 2023-09-26 DIAGNOSIS — R29.898 WEAKNESS OF BOTH LOWER EXTREMITIES: ICD-10-CM

## 2023-09-26 DIAGNOSIS — R26.89 BALANCE DISORDER: ICD-10-CM

## 2023-09-26 DIAGNOSIS — G89.29 CHRONIC RIGHT SHOULDER PAIN: ICD-10-CM

## 2023-09-26 DIAGNOSIS — M25.511 CHRONIC RIGHT SHOULDER PAIN: ICD-10-CM

## 2023-09-26 PROCEDURE — 97140 MANUAL THERAPY 1/> REGIONS: CPT | Performed by: PHYSICAL THERAPIST

## 2023-09-26 PROCEDURE — 97112 NEUROMUSCULAR REEDUCATION: CPT | Performed by: PHYSICAL THERAPIST

## 2023-09-26 PROCEDURE — 97010 HOT OR COLD PACKS THERAPY: CPT | Performed by: PHYSICAL THERAPIST

## 2023-09-26 PROCEDURE — 97530 THERAPEUTIC ACTIVITIES: CPT | Performed by: PHYSICAL THERAPIST

## 2023-09-26 NOTE — PROGRESS NOTES
Daily Note    Today's date: 2023  Patient name: Raisa Huerta  : 1950  MRN: [id number]  Referring provider: Radha Serrano DO  Dx:   Encounter Diagnosis     ICD-10-CM    1. Chronic left shoulder pain  M25.512     G89.29       2. Weakness of both lower extremities  R29.898       3. Balance disorder  R26.89       4. Chronic right shoulder pain  M25.511     G89.29                      Subjective: patient has appointment with PA-C next week for balance issues, pulsing sensation in her ear, and memory issues. She reports that she continues to get a lot of shoulder pain. She hasn't been having any losses of balance or falls recently. Her appointment with ENT is later this week. Objective: See treatment diary below        Assessment: Progressed balance exercises with addition of walking with head turns, walking backwards, walking with eyes closed, and bending over to  cones. No significant loss of balance or dizziness seen with these exercises, indicating improvement in overall balance and stability. She continues to have shoulder pain with passive stretching. Patient would benefit from continued skilled PT per plan of care to address impairments and improve function. Plan: Continue per plan of care. Progress shoulder strength/mobility and balance as tolerated. Precautions:  Moderate fall risk, HTN, R MUSA, L TKA      Manuals 8/15 8/22 8/29 8/31 9/5 9/12 9/14 9/19 9/21 9/26   shld PROM b/l  DL DES LQ DB DES DES DES DES DES   R GH jt p-a mob gr 3-4  Gh glides and distraction-DL DES LAD LAD LQ DB DES DES DES LAD DES DES LAD   stm r bicep delt  DL           B/l UT STM       DES      Neuro Re-Ed                          Tandem stance 3x30" 30"x2 3x30" 3x30"ea nv 3x30" 3x30" 2x30" ea 3x30" 3x30"   Standing march  x15 15x 30x nv     2x10                             NBOS with head turns  nv 30" 2x20" nv  x10 ea (vertical and horizontal) 2x10 ea 2x10 foam 2x10 ea foam   Walking with turns          5' 3'   shld ext with retraction       10x gtb      B/l ER with retraction       10x5" rtb  10x5" rtb loop 2x10 rtb loop   scap retraction 10x 10x 10x 10x5"   10x5"      Ther Ex             ppt     5''x10        Supine hip ad     5''x10        Supine wand flexion 5x5" 5"x5 5x5" 5x5" 5''x10 8x10" 10x10" 10x10" 10x10" 10x10"   shlf flexion over foam roll          10x10"   Pulley flexion   15x5" ea 15x5" ea flex         Cross body adduction   5x10" ea 10x10" ea modified with strap         Seated wand ER AROM       10x5" ea  supine 10x    Thoracic ext          10x foam   Ther Activity             Walking with head turns          2x10 ft   Walk with EC          2x10 ft   Walking bkwds          2x10 ft   Sit>stand             Mini squats             Fwd step ups             Gait Training                                       Modalities             Cold pack   At home    8' b/l  8' R 8' b/l

## 2023-09-27 ENCOUNTER — OFFICE VISIT (OUTPATIENT)
Age: 73
End: 2023-09-27
Payer: MEDICARE

## 2023-09-27 VITALS
RESPIRATION RATE: 16 BRPM | HEART RATE: 94 BPM | HEIGHT: 64 IN | DIASTOLIC BLOOD PRESSURE: 64 MMHG | BODY MASS INDEX: 40.44 KG/M2 | OXYGEN SATURATION: 97 % | SYSTOLIC BLOOD PRESSURE: 112 MMHG | TEMPERATURE: 97.7 F

## 2023-09-27 DIAGNOSIS — M54.30 SCIATICA, UNSPECIFIED LATERALITY: ICD-10-CM

## 2023-09-27 DIAGNOSIS — R26.89 BALANCE DISORDER: ICD-10-CM

## 2023-09-27 DIAGNOSIS — G31.84 MILD COGNITIVE IMPAIRMENT: Primary | ICD-10-CM

## 2023-09-27 DIAGNOSIS — G62.9 NEUROPATHY: ICD-10-CM

## 2023-09-27 PROCEDURE — 99483 ASSMT & CARE PLN PT COG IMP: CPT | Performed by: FAMILY MEDICINE

## 2023-09-27 NOTE — PROGRESS NOTES
Tonya Petit [facility]  [address], [address] [facility], [address]  [phone]    Social Work 68 [facility] presents today for a geriatric assessment, accompanied by her  Elvia. [place] Cognitive Assessment (MoCA) Version 8.1  Education: 11th grade    Points Earned POSSIBLE Points   Visuospatial/Executive   Alternating [place] Making 0 1   Visuoconstructional skills 1 1   Visuoconstructional skills (clock) 3 3   Naming   Naming Animals 3 3   Attention   Digit Span 2 2   Vigilance (letters) 1 1   Serial 7 subtraction 3 3   Language   Sentence Repetition 2 2   Verbal fluency 1 1   Abstraction   Abstraction (word pairings) 2 2   Delayed recall   Delayed recall 2 5   Memory index score: 11/15   Orientation   Orientation 6 6   TOTAL SCORE: 27/30  (Normal ?26/30)   Additional notes:      Geriatric Depression Scale (GDS) completed today, 3/15.

## 2023-09-27 NOTE — PROGRESS NOTES
ASSESSMENT AND PLAN:  1. Balance disorder    2. Neuropathy    3. Sciatica, unspecified laterality      81year old patient with mild cognitive impairment on today's assessment (MOCA 27/30), as well as a history of ambulatory dysfunction and balance disorder; patient is being followed by neurology and has an ENT appointment for further work-up. Will obtain neurotrax assessment. Will obtain folate and B12. Decision-making capacity: intact. Staging: borderline impaired. Medications Review: conducted. HPI:    We had the pleasure of evaluating Destinee Mendoza who is a 81 y.o. female in Geriatric consultation today. Ms. Edin Vilchis is in the office with her . She lives with her . Has a history of recurrent falls. Independent with ADLs ,as well as IADLs. Uses cane for ambulation. Has a history of fall with head impact, however, CT negative for acute findings. Has history of balance issues and is seeing PT, with consideration given to [facility]. 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 HISTORY AS PER [place]. COGNITION:  Memory Issues:   Memory affected: patient notes transient lapses in memory such as forgetting items while grocery shopping. Symptoms started: gradual over the past year.    Over time the memory has:  stable  Memory issue(s) were noted by: patient   Difficulty finding the right word while speaking: No  Requires repeat information or asking the same question repeatedly: No  Fluctuation in alertness: No  Changes in mood or personality:No  Current or previous treatment for depression or anxiety: No    Family member with dementia and what type? no  History of head trauma: Yes  History of stroke: No  History of alcohol or substance abuse: No    FUNCTIONAL STATUS:  BADLs  Does patient require assistance with:  Bathing: No  Dressing: No  Transferring: No  Continence: No  Toileting: No  Feeding: No    IADLs  Dose patient require assistance with:  Telephone: No  Shopping: No  Food Preparation: No  Housekeeping: No  Laundry: No  Transportation: No  Medications: No  Finances: No    NEUROPSYCH SYMPTOMS:  Does patient get angry or hostile? Resist care from others? No  Does patient see or hear things that no one else can see or hear? No  Does patient act impatient and cranky? Does mood frequently change for no apparent reason? No  Does patient act suspicious without good reason (example: believes that others are stealing from him or her, or planning to harm him or her in some way)? No  Does patient less interested in his or her usual activities or in the activities and plans of others? No  Does patient have trouble sleeping at night? Yes Occasional sleep-cycle disruptions. Dose patient have abnormal movements while asleep? No    SAFETY:  Hearing and vision issue: No  Any gait or balance disorder: Yes  Uses: cane. Any falls in the last year: Yes  Any history of wandering: No  Are there firearms or guns in the home: not assessed. Does patient drive: Yes  Any driving accidents or citations in the last year: not assessed. Any concerns about patient's ability to drive: No    ACP REVIEW:  Does patient have POA: None on record. Does patient have a Living will: none on record.     No Known Allergies    Medications:    Current Outpatient Medications on File Prior to Visit   Medication Sig Dispense Refill   • amoxicillin (AMOXIL) 500 mg capsule TAKE 4 CAPSULES BY MOUTH 1 HOUR BEFORE APPOINTMENT     • ascorbic acid (VITAMIN C) 500 MG tablet Take 1 tablet (500 mg total) by mouth 2 (two) times a day 60 tablet 0   • B Complex Vitamins (VITAMIN B COMPLEX PO) Take by mouth in the morning     • celecoxib (CeleBREX) 100 mg capsule TAKE ONE CAPSULE BY MOUTH TWICE A DAY 60 capsule 0   • cholecalciferol (VITAMIN D3) 1,000 units tablet Take 1,000 Units by mouth daily     • Doxylamine Succinate, Sleep, (SLEEP AID PO) Take by mouth • Magnesium 400 MG TABS Take 1 tablet (400 mg total) by mouth in the morning 30 tablet 1   • Multiple Vitamins-Minerals (CENTRUM SILVER PO) Take by mouth     • polyethylene glycol (GLYCOLAX) 17 GM/SCOOP powder Take 17 g by mouth daily 255 g 0   • potassium chloride (K-DUR,KLOR-CON) 20 mEq tablet Take 1 tablet (20 mEq total) by mouth daily 30 tablet 5   • rosuvastatin (CRESTOR) 40 MG tablet Take 1 tablet (40 mg total) by mouth daily 90 tablet 3   • sodium picosulfate, magnesium oxide, citric acid (Clenpiq) oral solution Take 175 mL (1 bottle) the evening before the colonoscopy, between 5 PM and 9 PM, followed by a second 175 mL bottle 5 hours before the colonoscopy. 350 mL 0   • VITAMIN E PO Take by mouth     • DULoxetine (CYMBALTA) 60 mg delayed release capsule TAKE ONE CAPSULE BY MOUTH AT BEDTIME (Patient not taking: Reported on 2023) 30 capsule 2   • gabapentin (NEURONTIN) 300 mg capsule Take 1 capsule by mouth every 6 (six) hours as needed (Patient not taking: Reported on 2023)     • methylPREDNISolone 4 MG tablet therapy pack Use as directed on package (Patient not taking: Reported on 2023) 21 each 0     No current facility-administered medications on file prior to visit.        History:  Past Medical History:   Diagnosis Date   • Arthritis    • Balance disorder    • Hyperlipidemia    • Vertigo      Past Surgical History:   Procedure Laterality Date   •  SECTION     • COLONOSCOPY     • HYSTERECTOMY     • HYSTEROSCOPY W/ ENDOMETRIAL ABLATION     • [place] ARTHRP ACETBLR/PROX FEM PROSTC AGRFT/ALGRFT Right 2022    Procedure: ARTHROPLASTY HIP TOTAL ANTERIOR and all associated procedures;  Surgeon: Paolo Jolly MD;  Location:  MAIN OR;  Service: Orthopedics     Family History   Problem Relation Age of Onset   • Cancer Father      Social History     Socioeconomic History   • Marital status: /Civil Union     Spouse name: Not on file   • Number of children: Not on file   • Years of education: Not on file   • Highest education level: Not on file   Occupational History   • Not on file   Tobacco Use   • Smoking status: Never     Passive exposure: Past   • Smokeless tobacco: Never   Vaping Use   • Vaping Use: Never used   Substance and Sexual Activity   • Alcohol use: Never   • Drug use: Not Currently   • Sexual activity: Not Currently   Other Topics Concern   • Not on file   Social History Narrative   • Not on file     Social Determinants of Health     Financial Resource Strain: Not on file   Food Insecurity: Not on file   Transportation Needs: Not on file   Physical Activity: Not on file   Stress: Not on file   Social Connections: Not on file   Intimate Partner Violence: Not At Risk (2023)    Humiliation, Afraid, Rape, and Kick questionnaire    • Fear of Current or Ex-Partner: No    • Emotionally Abused: No    • Physically Abused: No    • Sexually Abused: No   Housing Stability: Not on file     Past Surgical History:   Procedure Laterality Date   •  SECTION     • COLONOSCOPY     • HYSTERECTOMY     • HYSTEROSCOPY W/ ENDOMETRIAL ABLATION     • [place] ARTHRP ACETBLR/PROX FEM PROSTC AGRFT/ALGRFT Right 2022    Procedure: ARTHROPLASTY HIP TOTAL ANTERIOR and all associated procedures;  Surgeon: Melissa Stevenson MD;  Location:  MAIN OR;  Service: Orthopedics       OBJECTIVE:  Vitals:    23 1552   BP: 112/64   BP Location: Left arm   Patient Position: Sitting   Cuff Size: Adult   Pulse: 94   Resp: 16   Temp: 97.7 °F (36.5 °C)   TempSrc: Temporal   SpO2: 97%   Height: 5' 4" (1.626 m)     Body mass index is 40.44 kg/m². Physical Exam  Vitals reviewed. Constitutional:       Appearance: Normal appearance. She is obese. HENT:      Head: Normocephalic and atraumatic. Mouth/Throat:      Mouth: Mucous membranes are moist.   Eyes:      General:         Right eye: No discharge. Left eye: No discharge.       Conjunctiva/sclera: Conjunctivae normal.   Cardiovascular: Rate and Rhythm: Normal rate and regular rhythm. Heart sounds: S1 normal and S2 normal.   Pulmonary:      Effort: Pulmonary effort is normal.      Breath sounds: Normal breath sounds. Abdominal:      Palpations: Abdomen is soft. Musculoskeletal:         General: No swelling or tenderness. Cervical back: Neck supple. Skin:     General: Skin is warm and dry. Neurological:      General: No focal deficit present. Mental Status: She is alert and oriented to person, place, and time. Cranial Nerves: No facial asymmetry. Motor: No weakness. Coordination: Romberg sign negative. Finger-Nose-Finger Test and Heel to [facility] Test normal. Rapid alternating movements normal.      Gait: Gait is intact. Psychiatric:         Mood and Affect: Mood normal.         Behavior: Behavior normal.         MoCA: 27/30  GDS: 3  TUGS: <12 seconds.       Labs & Imaging:  Lab Results   Component Value Date    WBC 8.44 07/27/2023    HGB 13.1 07/27/2023    HCT 40.7 07/27/2023    MCV 93 07/27/2023     07/27/2023     Lab Results   Component Value Date    SODIUM 140 07/27/2023    K 3.4 (L) 07/27/2023     07/27/2023    CO2 31 07/27/2023    AGAP 8 07/27/2023    BUN 19 07/27/2023    CREATININE 0.55 (L) 07/27/2023    GLUC 103 07/27/2023    GLUF 99 01/16/2023    CALCIUM 9.7 07/27/2023    AST 16 07/27/2023    ALT 17 07/27/2023    ALKPHOS 75 07/27/2023    TP 7.0 07/27/2023    TBILI 0.95 07/27/2023    EGFR 95 07/27/2023     Lab Results   Component Value Date    HGBA1C 5.8 (H) 01/16/2023     Lab Results   Component Value Date    CHOLESTEROL 208 (H) 01/16/2023     Lab Results   Component Value Date    HDL 58 01/16/2023     Lab Results   Component Value Date    TRIG 190 (H) 01/16/2023     Lab Results   Component Value Date    NONHDLC 150 01/16/2023     Lab Results   Component Value Date    LDLCALC 112 (H) 01/16/2023     No results found for: "NQFSKGUY37"  No results found for: "YJO4APWRYIOL", "TSH"  No results found for: "SYPHILISAB"  No results found for: "RPR"      No results found for: "XMIW61WYUJYB", "[id number]"   Results for orders placed during the hospital encounter of 07/27/23    TRAUMA - CT head wo contrast    Narrative  CT BRAIN - WITHOUT CONTRAST    INDICATION:   TRAUMA.   "Patient presents to the ED with c/o trip and fall on Monday with headstrike on concrete. States back pain and shoulder pain as well "    COMPARISON:  None. TECHNIQUE:  CT examination of the brain was performed. Multiplanar 2D reformatted images were created from the source data. Radiation dose length product (DLP) for this visit:  1119 mGy-cm . This examination, like all CT scans performed in the [facility], was performed utilizing techniques to minimize radiation dose exposure, including the use of iterative  reconstruction and automated exposure control. IMAGE QUALITY:  Diagnostic. FINDINGS:    PARENCHYMA: Decreased attenuation is noted in periventricular and subcortical white matter demonstrating an appearance that is statistically most likely to represent advanced microangiopathic change. No CT signs of acute infarction. No intracranial mass, mass effect or midline shift. No acute parenchymal hemorrhage. VENTRICLES AND EXTRA-AXIAL SPACES:  Normal for the patient's age. VISUALIZED ORBITS: Normal visualized orbits. PARANASAL SINUSES: Normal visualized paranasal sinuses. CALVARIUM AND EXTRACRANIAL SOFT TISSUES:  Normal.    Impression  No acute intracranial abnormality. Workstation performed: [id number]    No results found for this or any previous visit. No results found for this or any previous visit. No results found for this or any previous visit. No results found for this or any previous visit. No results found for this or any previous visit. No results found for this or any previous visit.       I have spent 60 minutes with Patient and family today including taking history from family, patient, review of records, charting, and counseling regarding diagnosis and management of conditions.

## 2023-09-28 ENCOUNTER — APPOINTMENT (OUTPATIENT)
Dept: PHYSICAL THERAPY | Facility: CLINIC | Age: 73
End: 2023-09-28
Payer: MEDICARE

## 2023-09-28 ENCOUNTER — OFFICE VISIT (OUTPATIENT)
Dept: PHYSICAL THERAPY | Facility: CLINIC | Age: 73
End: 2023-09-28
Payer: MEDICARE

## 2023-09-28 DIAGNOSIS — G89.29 CHRONIC LEFT SHOULDER PAIN: Primary | ICD-10-CM

## 2023-09-28 DIAGNOSIS — R26.89 BALANCE DISORDER: ICD-10-CM

## 2023-09-28 DIAGNOSIS — M25.512 CHRONIC LEFT SHOULDER PAIN: Primary | ICD-10-CM

## 2023-09-28 DIAGNOSIS — M54.16 LUMBAR RADICULOPATHY: ICD-10-CM

## 2023-09-28 DIAGNOSIS — M25.511 CHRONIC RIGHT SHOULDER PAIN: ICD-10-CM

## 2023-09-28 DIAGNOSIS — G89.29 CHRONIC RIGHT SHOULDER PAIN: ICD-10-CM

## 2023-09-28 DIAGNOSIS — R29.898 WEAKNESS OF BOTH LOWER EXTREMITIES: ICD-10-CM

## 2023-09-28 PROCEDURE — 97112 NEUROMUSCULAR REEDUCATION: CPT | Performed by: PHYSICAL THERAPIST

## 2023-09-28 NOTE — PROGRESS NOTES
Daily Note    Today's date: 2023  Patient name: Drew Tyson  : 1950  MRN: [id number]  Referring provider: Jennifer Neri DO  Dx:   Encounter Diagnosis     ICD-10-CM    1. Chronic left shoulder pain  M25.512     G89.29       2. Weakness of both lower extremities  R29.898       3. Balance disorder  R26.89       4. Chronic right shoulder pain  M25.511     G89.29       5. Lumbar radiculopathy  M54.16                      Subjective: patient saw ENT today. Balance issues were not attributed to vestibular system. Pulsing tinnitus could be vascular in nature. She has referral to ophthalmologist and was also recommended to see neurologist. She also has CT scan ordered. Objective: See treatment diary below  Therapy session limited in time due to late arrival.     m-CTSIB balance testing  EO level ground : 1.1 (.76 previously)  EC level ground : .91 (1.98 previously)  EO unstable ground : 1.27 (2.09 previously)  EC unstable ground : 3.87 (4.62 previously)    Assessment: Patient had one significant loss of balance during today's session when turning quickly after completing round of walking with head turns. Therapist assistance was required to prevent fall. She attributed this to turning quickly. Educated patient on taking her time when around the house and avoiding quick turns. She displays objective improvement in balance with m-CTSIB testing on biodex in nearly all categories, only scoring outside normal limits for EC/unstable category (greater than 3). Patient would benefit from continued skilled PT per plan of care to address impairments and improve function. Plan: Continue per plan of care. Progress shoulder strength/mobility and balance as tolerated. Precautions:  Moderate fall risk, HTN, R MUSA, L TKA      Manuals    shld PROM b/l  DL DES LQ DB DES DES DES DES DES   R [address] jt p-a mob gr 3-4  Gh glides and distraction-DL DES LAD LAD LQ DB DES DES DES LAD DES DES LAD   stm r bicep delt  DL           B/l UT STM       DES      Neuro Re-Ed                          Tandem stance 3x30" 30"x2 3x30" 3x30"ea nv 3x30" 3x30" 2x30" ea 3x30" 3x30"   Standing march  x15 15x 30x nv     2x10   m-ctsib testing 8'            Rocker board 10x ea            NBOS with head turns  nv 30" 2x20" nv  x10 ea (vertical and horizontal) 2x10 ea 2x10 foam 2x10 ea foam   Walking with unexpected turns          5' 3'   shld ext with retraction       10x gtb      B/l ER with retraction       10x5" rtb  10x5" rtb loop 2x10 rtb loop   scap retraction  10x 10x 10x5"   10x5"      Ther Ex             ppt     5''x10        Supine hip ad     5''x10        Supine wand flexion  5"x5 5x5" 5x5" 5''x10 8x10" 10x10" 10x10" 10x10" 10x10"   shlf flexion over foam roll          10x10"                Cross body adduction   5x10" ea 10x10" ea modified with strap         Seated wand ER AROM       10x5" ea  supine 10x    Thoracic ext          10x foam   Ther Activity             Walking with head turns 4x10 ft         2x10 ft   Walk with EC          2x10 ft   Walking bkwds          2x10 ft   Sit>stand             Mini squats             Fwd step ups             Gait Training                                       Modalities             Cold pack   At home    8' b/l  8' R 8' b/l

## 2023-10-02 ENCOUNTER — OFFICE VISIT (OUTPATIENT)
Dept: FAMILY MEDICINE CLINIC | Facility: CLINIC | Age: 73
End: 2023-10-02
Payer: MEDICARE

## 2023-10-02 VITALS
HEART RATE: 85 BPM | SYSTOLIC BLOOD PRESSURE: 110 MMHG | WEIGHT: 238.8 LBS | DIASTOLIC BLOOD PRESSURE: 78 MMHG | HEIGHT: 65 IN | BODY MASS INDEX: 39.79 KG/M2 | OXYGEN SATURATION: 99 % | TEMPERATURE: 99 F | RESPIRATION RATE: 16 BRPM

## 2023-10-02 DIAGNOSIS — R42 VERTIGO: Primary | ICD-10-CM

## 2023-10-02 PROCEDURE — 99213 OFFICE O/P EST LOW 20 MIN: CPT | Performed by: PHYSICIAN ASSISTANT

## 2023-10-02 RX ORDER — MECLIZINE HYDROCHLORIDE 25 MG/1
25 TABLET ORAL 3 TIMES DAILY PRN
Qty: 30 TABLET | Refills: 1 | Status: SHIPPED | OUTPATIENT
Start: 2023-10-02
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
## 2023-10-03 ENCOUNTER — APPOINTMENT (OUTPATIENT)
Dept: RADIOLOGY | Facility: CLINIC | Age: 73
End: 2023-10-03
Payer: MEDICARE

## 2023-10-03 ENCOUNTER — LAB (OUTPATIENT)
Dept: LAB | Facility: CLINIC | Age: 73
End: 2023-10-03
Payer: MEDICARE

## 2023-10-03 ENCOUNTER — OFFICE VISIT (OUTPATIENT)
Dept: PHYSICAL THERAPY | Facility: CLINIC | Age: 73
End: 2023-10-03
Payer: MEDICARE

## 2023-10-03 DIAGNOSIS — R26.89 BALANCE DISORDER: ICD-10-CM

## 2023-10-03 DIAGNOSIS — G31.84 MILD COGNITIVE IMPAIRMENT: ICD-10-CM

## 2023-10-03 DIAGNOSIS — M25.511 CHRONIC RIGHT SHOULDER PAIN: ICD-10-CM

## 2023-10-03 DIAGNOSIS — E83.42 HYPOMAGNESEMIA: ICD-10-CM

## 2023-10-03 DIAGNOSIS — M25.512 CHRONIC LEFT SHOULDER PAIN: ICD-10-CM

## 2023-10-03 DIAGNOSIS — M54.16 LUMBAR RADICULOPATHY: ICD-10-CM

## 2023-10-03 DIAGNOSIS — E87.6 HYPOKALEMIA: ICD-10-CM

## 2023-10-03 DIAGNOSIS — G89.29 CHRONIC RIGHT SHOULDER PAIN: ICD-10-CM

## 2023-10-03 DIAGNOSIS — G89.29 CHRONIC LEFT SHOULDER PAIN: Primary | ICD-10-CM

## 2023-10-03 DIAGNOSIS — G89.29 CHRONIC LEFT SHOULDER PAIN: ICD-10-CM

## 2023-10-03 DIAGNOSIS — R29.898 WEAKNESS OF BOTH LOWER EXTREMITIES: ICD-10-CM

## 2023-10-03 DIAGNOSIS — M25.512 CHRONIC LEFT SHOULDER PAIN: Primary | ICD-10-CM

## 2023-10-03 DIAGNOSIS — G62.9 NEUROPATHY: ICD-10-CM

## 2023-10-03 LAB
ANION GAP SERPL CALCULATED.3IONS-SCNC: 6 MMOL/L
BUN SERPL-MCNC: 16 MG/DL (ref 5–25)
CALCIUM SERPL-MCNC: 9.5 MG/DL (ref 8.4–10.2)
CHLORIDE SERPL-SCNC: 102 MMOL/L (ref 96–108)
CO2 SERPL-SCNC: 30 MMOL/L (ref 21–32)
CREAT SERPL-MCNC: 0.56 MG/DL (ref 0.6–1.3)
FOLATE SERPL-MCNC: >22.3 NG/ML
GFR SERPL CREATININE-BSD FRML MDRD: 94 ML/MIN/1.73SQ M
GLUCOSE P FAST SERPL-MCNC: 87 MG/DL (ref 65–99)
MAGNESIUM SERPL-MCNC: 2.3 MG/DL (ref 1.9–2.7)
POTASSIUM SERPL-SCNC: 4 MMOL/L (ref 3.5–5.3)
SODIUM SERPL-SCNC: 138 MMOL/L (ref 135–147)
VIT B12 SERPL-MCNC: 268 PG/ML (ref 180–914)

## 2023-10-03 PROCEDURE — 83735 ASSAY OF MAGNESIUM: CPT

## 2023-10-03 PROCEDURE — 82607 VITAMIN B-12: CPT

## 2023-10-03 PROCEDURE — 97140 MANUAL THERAPY 1/> REGIONS: CPT | Performed by: PHYSICAL THERAPIST

## 2023-10-03 PROCEDURE — 97010 HOT OR COLD PACKS THERAPY: CPT | Performed by: PHYSICAL THERAPIST

## 2023-10-03 PROCEDURE — 82746 ASSAY OF FOLIC ACID SERUM: CPT

## 2023-10-03 PROCEDURE — 73030 X-RAY EXAM OF SHOULDER: CPT

## 2023-10-03 PROCEDURE — 97530 THERAPEUTIC ACTIVITIES: CPT | Performed by: PHYSICAL THERAPIST

## 2023-10-03 PROCEDURE — 36415 COLL VENOUS BLD VENIPUNCTURE: CPT

## 2023-10-03 PROCEDURE — 72110 X-RAY EXAM L-2 SPINE 4/>VWS: CPT

## 2023-10-03 PROCEDURE — 97112 NEUROMUSCULAR REEDUCATION: CPT | Performed by: PHYSICAL THERAPIST

## 2023-10-03 PROCEDURE — 80048 BASIC METABOLIC PNL TOTAL CA: CPT

## 2023-10-03 NOTE — PROGRESS NOTES
Daily Note    Today's date: 10/3/2023  Patient name: Karolina Fields  : 1950  MRN: [id number]  Referring provider: Rocael Kimble DO  Dx:   Encounter Diagnosis     ICD-10-CM    1. Chronic left shoulder pain  M25.512     G89.29       2. Balance disorder  R26.89       3. Chronic right shoulder pain  M25.511     G89.29       4. Weakness of both lower extremities  R29.898                      Subjective: patient saw GERARD for family practice and was given Meclazine for dizziness and balance issues. She was also able to get appointment with neurologist on 10/11/23. She has CT scheduled for this weekend. Objective: See treatment diary below      Assessment: Patient demonstrated significant improvement in tandem stance balance today. Low irritability of shoulders seen as well and she was able to tolerate increased resistance for both sets of b/l ER. No loss of balance with walking and turning, walking with head turns, or cone , even with increased speed today. Patient would benefit from continued skilled PT per plan of care to address impairments and improve function. Plan: Continue per plan of care. Progress shoulder strength/mobility and balance as tolerated. Precautions:  Moderate fall risk, HTN, R MUSA, L TKA      Manuals 9/28 10/3 8/29 8/31 9/5 9/12 9/14 9/19 9/21 9/26   shld PROM b/l  DES DES LQ DB DES DES DES DES DES   R [facility] jt p-a mob gr 3-4  DES LAD only DES LAD LAD LQ DB DES DES DES LAD DES DES LAD   stm r bicep delt             B/l UT STM       DES      Neuro Re-Ed             Step taps  20x           Tandem stance 3x30" 30"x3 3x30" 3x30"ea nv 3x30" 3x30" 2x30" ea 3x30" 3x30"   Standing march   15x 30x nv     2x10   m-ctsib testing 8'            Rocker board 10x ea 15 ea                        Walking with unexpected turns   Quick turns 4'       5' 3'   shld ext with retraction       10x gtb      B/l ER with retraction  2x10 gtb     10x5" rtb  10x5" rtb loop 2x10 rtb loop   scap retraction   10x 10x5" 10x5"      Ther Ex                                       Supine wand flexion  10x5" 5x5" 5x5" 5''x10 8x10" 10x10" 10x10" 10x10" 10x10"   shlf flexion over foam roll  10x5"        10x10"                Cross body adduction   5x10" ea 10x10" ea modified with strap         Seated wand ER AROM  10x5" ea     10x5" ea  supine 10x    Thoracic ext          10x foam   Ther Activity             Walking with head turns 4x10 ft 4x10 ft        2x10 ft   Walk with EC  4x10 ft        2x10 ft   Walking with cone   2x 6 cones           Walking bkwds          2x10 ft   Sit>stand             Mini squats             Fwd step ups             Gait Training                                       Modalities             Cold pack   At home    8' b/l  8' R 8' b/l

## 2023-10-05 ENCOUNTER — OFFICE VISIT (OUTPATIENT)
Dept: PHYSICAL THERAPY | Facility: CLINIC | Age: 73
End: 2023-10-05
Payer: MEDICARE

## 2023-10-05 DIAGNOSIS — R29.898 WEAKNESS OF BOTH LOWER EXTREMITIES: ICD-10-CM

## 2023-10-05 DIAGNOSIS — G89.29 CHRONIC RIGHT SHOULDER PAIN: ICD-10-CM

## 2023-10-05 DIAGNOSIS — G89.29 CHRONIC LEFT SHOULDER PAIN: Primary | ICD-10-CM

## 2023-10-05 DIAGNOSIS — M25.512 CHRONIC LEFT SHOULDER PAIN: Primary | ICD-10-CM

## 2023-10-05 DIAGNOSIS — R26.89 BALANCE DISORDER: ICD-10-CM

## 2023-10-05 DIAGNOSIS — M25.511 CHRONIC RIGHT SHOULDER PAIN: ICD-10-CM

## 2023-10-05 PROCEDURE — 97110 THERAPEUTIC EXERCISES: CPT | Performed by: PHYSICAL THERAPIST

## 2023-10-05 PROCEDURE — 97112 NEUROMUSCULAR REEDUCATION: CPT | Performed by: PHYSICAL THERAPIST

## 2023-10-05 PROCEDURE — 97140 MANUAL THERAPY 1/> REGIONS: CPT | Performed by: PHYSICAL THERAPIST

## 2023-10-05 NOTE — PROGRESS NOTES
Daily Note    Today's date: 10/5/2023  Patient name: Triston Rodriguez  : 1950  MRN: [id number]  Referring provider: Alfonso Ayers DO  Dx:   Encounter Diagnosis     ICD-10-CM    1. Chronic left shoulder pain  M25.512     G89.29       2. Balance disorder  R26.89       3. Chronic right shoulder pain  M25.511     G89.29       4. Weakness of both lower extremities  R29.898                      Subjective: patient reports that she still hasn't lost her balance or fallen recently. No aggravation of shoulder pain after last session. Objective: See treatment diary below      Assessment: Patient demonstrated good stability with tandem stance today. Progressed stabilization exercises with addition of biodex weight shifting and random postural control. She also noted less difficulty and fear of falling with rockerboard balance today. Patient would benefit from continued skilled PT per plan of care to address impairments and improve function. Plan: Continue per plan of care. Progress shoulder strength/mobility and balance as tolerated. Precautions:  Moderate fall risk, HTN, R MUSA, L TKA      Manuals 9/28 10/3 10/5 8/31 9/5 9/12 9/14 9/19 9/21 9/26   shld PROM b/l  DES DES LQ DB DES DES DES DES DES   R [address] p-a mob gr 3-4  DES LAD only DES LAD LAD LQ DB DES DES DES LAD DES DES LAD   stm r bicep delt             B/l UT STM       DES      Neuro Re-Ed             Step taps  20x           Tandem stance 3x30" 30"x3 3x30" 3x30"ea nv 3x30" 3x30" 2x30" ea 3x30" 3x30"   Standing x nv     2x10   m-ctsib testing 8'  biodex practice 5'          Rocker board 10x ea 15 ea 20 ea                       Walking with unexpected turns   Quick turns 4'       5' 3'   shld ext with retraction       10x gtb      B/l ER with retraction  2x10 gtb 2x10 gtb    10x5" rtb  10x5" rtb loop 2x10 rtb loop   scap retraction    10x5"   10x5"      Ther Ex                                       Supine wand flexion  10x5" 10x5" 5x5" 5''x10 8x10" 10x10" 10x10" 10x10" 10x10"   shlf flexion over foam roll  10x5"        10x10"                Cross body adduction    10x10" ea modified with strap         Seated wand ER AROM  10x5" ea 10x5"    10x5" ea  supine 10x    Thoracic ext          10x foam   Ther Activity             Walking with head turns 4x10 ft 4x10 ft        2x10 ft   Walk with EC  4x10 ft        2x10 ft   Walking with cone   2x 6 cones 2x 6 cones          Walking bkwds   4x10 ft       2x10 ft   Sit>stand             Mini squats             Fwd step ups             Gait Training                                       Modalities             Cold pack   At home    8' b/l  8' R 8' b/l

## 2023-10-06 ENCOUNTER — TELEPHONE (OUTPATIENT)
Dept: NEUROLOGY | Facility: CLINIC | Age: 73
End: 2023-10-06

## 2023-10-06 NOTE — TELEPHONE ENCOUNTER
PT was scheduled for 10/11 with Dr. Rtuh Muro in [place]. Dr. Ruth Muro does not see for the Dx that pt needs to be seen for. Called pt and LMOM asked her to call back to r/s the appt to either the residents or Dr. Kim Nielson

## 2023-10-07 NOTE — TELEPHONE ENCOUNTER
Patient is returning a call from the office to reschedule her appointnemt and needs clearance to go back to work.  Also needs her appointment on or before 10/11/23

## 2023-10-08 ENCOUNTER — HOSPITAL ENCOUNTER (OUTPATIENT)
Dept: CT IMAGING | Facility: HOSPITAL | Age: 73
Discharge: HOME | End: 2023-10-08
Attending: OTOLARYNGOLOGY
Payer: MEDICARE

## 2023-10-08 DIAGNOSIS — H93.A3 PULSATILE TINNITUS OF BOTH EARS: ICD-10-CM

## 2023-10-08 PROCEDURE — G1004 CDSM NDSC: HCPCS

## 2023-10-08 PROCEDURE — 70498 CT ANGIOGRAPHY NECK: CPT

## 2023-10-08 PROCEDURE — 70496 CT ANGIOGRAPHY HEAD: CPT

## 2023-10-08 RX ADMIN — IOHEXOL 90 ML: 350 INJECTION, SOLUTION INTRAVENOUS at 13:00

## 2023-10-10 ENCOUNTER — OFFICE VISIT (OUTPATIENT)
Dept: PHYSICAL THERAPY | Facility: CLINIC | Age: 73
End: 2023-10-10
Payer: MEDICARE

## 2023-10-10 DIAGNOSIS — G89.29 CHRONIC RIGHT SHOULDER PAIN: ICD-10-CM

## 2023-10-10 DIAGNOSIS — M25.511 CHRONIC RIGHT SHOULDER PAIN: ICD-10-CM

## 2023-10-10 DIAGNOSIS — G89.29 CHRONIC LEFT SHOULDER PAIN: Primary | ICD-10-CM

## 2023-10-10 DIAGNOSIS — M25.512 CHRONIC LEFT SHOULDER PAIN: Primary | ICD-10-CM

## 2023-10-10 DIAGNOSIS — R26.89 BALANCE DISORDER: ICD-10-CM

## 2023-10-10 DIAGNOSIS — R29.898 WEAKNESS OF BOTH LOWER EXTREMITIES: ICD-10-CM

## 2023-10-10 PROCEDURE — 97010 HOT OR COLD PACKS THERAPY: CPT | Performed by: PHYSICAL THERAPIST

## 2023-10-10 PROCEDURE — 97140 MANUAL THERAPY 1/> REGIONS: CPT | Performed by: PHYSICAL THERAPIST

## 2023-10-10 PROCEDURE — 97110 THERAPEUTIC EXERCISES: CPT | Performed by: PHYSICAL THERAPIST

## 2023-10-10 PROCEDURE — 97112 NEUROMUSCULAR REEDUCATION: CPT | Performed by: PHYSICAL THERAPIST

## 2023-10-10 NOTE — PROGRESS NOTES
Daily Note    Today's date: 10/10/2023  Patient name: Karolina Fields  : 1950  MRN: [id number]  Referring provider: Rocael Kimble DO  Dx:   Encounter Diagnosis     ICD-10-CM    1. Chronic left shoulder pain  M25.512     G89.29       2. Balance disorder  R26.89       3. Chronic right shoulder pain  M25.511     G89.29       4. Weakness of both lower extremities  R29.898                      Subjective: patient reports interest in returning to work. She reports no falls or loss of balance over the past two weeks. 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 She has follow up with PCP at the end of this week. Objective: See treatment diary below      Assessment: Advised patient ask PCP at follow up this week about readiness to return to work. Patient continues to display improved balance with PT participation, with no losses of balance with exercises today. Progressed difficulty of balance exercises with addition of quick turns while walking with unexpected directions. Patient would benefit from continued skilled PT per plan of care to address impairments and improve function. Plan: Continue per plan of care. Progress shoulder strength/mobility and balance as tolerated. Progress Report NV. Precautions:  Moderate fall risk, HTN, R MUSA, L TKA      Manuals 9/28 10/3 10/5 10/10 MA    shld PROM b/l  DES DES DES DB DES DES DES DES DES   R [address] jt p-a mob gr 3-4  DES LAD only DES LAD DES LAD DB DES DES DES LAD DES DES LAD   stm r bicep delt             B/l UT STM       DES      Neuro Re-Ed             Step taps  20x           Tandem stance 3x30" 30"x3 3x30" 3x30"ea nv 3x30" 3x30" 2x30" ea 3x30" 3x30"   Standing march    30x nv     2x10   m-ctsib testing 8'  biodex practice 5' biodex practice 5'         Rocker board 10x ea 15 ea 20 ea 20 ea                      Walking with unexpected turns   Quick turns 4'  Quick turns 3'     5' 3' shld ext with retraction       10x gtb      B/l ER with retraction  2x10 gtb 2x10 gtb    10x5" rtb  10x5" rtb loop 2x10 rtb loop   scap retraction       10x5"      Ther Ex                                       Supine wand flexion  10x5" 10x5" 10x5" 5''x10 8x10" 10x10" 10x10" 10x10" 10x10"   shlf flexion over foam roll  10x5"  10x5"      10x10"                Cross body adduction             Seated wand ER AROM  10x5" ea 10x5"    10x5" ea  supine 10x    Thoracic ext          10x foam   Ther Activity             Walking with head turns 4x10 ft 4x10 ft        2x10 ft   Walk with EC  4x10 ft        2x10 ft   Walking with cone   2x 6 cones 2x 6 cones 1x6 cones         Walking bkwds   4x10 ft       2x10 ft   Sit>stand             Mini squats             Fwd step ups             Gait Training                                       Modalities             Cold pack   At home 8' b/l   8' b/l  8' R 8' b/l

## 2023-10-11 DIAGNOSIS — M25.512 LEFT SHOULDER PAIN: ICD-10-CM

## 2023-10-11 RX ORDER — CELECOXIB 100 MG/1
100 CAPSULE ORAL 2 TIMES DAILY
Qty: 60 CAPSULE | Refills: 0 | Status: SHIPPED | OUTPATIENT
Start: 2023-10-11

## 2023-10-12 ENCOUNTER — OFFICE VISIT (OUTPATIENT)
Age: 73
End: 2023-10-12
Payer: MEDICARE

## 2023-10-12 ENCOUNTER — OFFICE VISIT (OUTPATIENT)
Dept: PHYSICAL THERAPY | Facility: CLINIC | Age: 73
End: 2023-10-12
Payer: MEDICARE

## 2023-10-12 DIAGNOSIS — G89.29 CHRONIC RIGHT SHOULDER PAIN: ICD-10-CM

## 2023-10-12 DIAGNOSIS — M25.512 CHRONIC LEFT SHOULDER PAIN: Primary | ICD-10-CM

## 2023-10-12 DIAGNOSIS — R29.898 WEAKNESS OF BOTH LOWER EXTREMITIES: ICD-10-CM

## 2023-10-12 DIAGNOSIS — G89.29 CHRONIC LEFT SHOULDER PAIN: Primary | ICD-10-CM

## 2023-10-12 DIAGNOSIS — R26.89 BALANCE DISORDER: ICD-10-CM

## 2023-10-12 DIAGNOSIS — G31.84 MILD COGNITIVE IMPAIRMENT: Primary | ICD-10-CM

## 2023-10-12 DIAGNOSIS — M25.511 CHRONIC RIGHT SHOULDER PAIN: ICD-10-CM

## 2023-10-12 PROCEDURE — 97140 MANUAL THERAPY 1/> REGIONS: CPT | Performed by: PHYSICAL THERAPIST

## 2023-10-12 PROCEDURE — 96138 PSYCL/NRPSYC TECH 1ST: CPT | Performed by: FAMILY MEDICINE

## 2023-10-12 PROCEDURE — 97112 NEUROMUSCULAR REEDUCATION: CPT | Performed by: PHYSICAL THERAPIST

## 2023-10-12 PROCEDURE — 96139 PSYCL/NRPSYC TST TECH EA: CPT | Performed by: FAMILY MEDICINE

## 2023-10-12 NOTE — PROGRESS NOTES
PT Re-Evaluation     Today's date: 10/12/2023  Patient name: George Mccarty  : 1950  MRN: [id number]  Referring provider: Claude Brannon DO  Dx:   Encounter Diagnosis     ICD-10-CM    1. Chronic left shoulder pain  M25.512     G89.29       2. Balance disorder  R26.89       3. Chronic right shoulder pain  M25.511     G89.29       4. Weakness of both lower extremities  R29.898                      Assessment  Assessment details: RE-EVALUATION 10/12/23: Patient has been seen for 16 visits over the past 2 months for balance impairments/frequent falls and b/l shoulder pain. Treatment has consisted of neuromuscular reeducation to improve balance and stability, therapeutic exercises to improve shoulder flexibility and strength, gait training, manual therapy to improve shoulder ROM and joint mobility, and patient education on home program and strategies to reduce falls. From an objective standpoint, patient demonstrates significant improvement in balance and fall risk evident with LIU balance test, functional gait assessment, and tandem stance balance. Functionally, patient hasn't had any falls or losses of balance over the past two weeks. She still is fearful of falling and is cautious/slow with her movements. In regards to her shoulder, less progress has been seen, but fall risk was of higher priority so more treatment was focused on this. Patient would benefit from continued skilled PT over the next 4 weeks with greater emphasis on her shoulders and improving confidence in her balance. Patient also has complaints and PT script for her lumbar spine. She would like benefit from evaluation and treatment of these impairments when shoulder and balance therapy is finished.     Impairments: abnormal muscle firing, impaired balance, lacks appropriate home exercise program and safety issue  Understanding of Dx/Px/POC: excellent  Goals  Short term goals:  Patient is independent in home program to support plan of care and improve function. - 2 weeks  Patient demonstrates improvement in balance with tandem stance of at least 30 seconds b/l, EO. - progressing, 2 weeks  Patient demonstrates improvement in gait speed with increase in speed to 1.2 m/s with quickened pace so patient can safely cross busy street. - goal met  Impairments related to b/l shoulder and low back pain assessed. - met for shoulder, 4 weeks for low back    Long term goals:  Patient demonstrates improvement in community participation with increase in FOTO score by 10%. - 8 weeks  Patient demonstrates reduction in fall risk with timed up and go test of 10 seconds or less. - goal met  Patient demonstrates reduction in fall risk and improvement in balance with LIU score of 48 or better and FGA of 22 or better. - goal met  Patient has no falls or loss of balance over the past month. - (3 weeks at least) 4 weeks   Patient demonstrates 120 deg. Shoulder flexion AROM b/l to improve ability to perform ADLs and meal prep. - 4 weeks      Plan  Planned therapy interventions: ADL training, balance, body mechanics training, motor coordination training, neuromuscular re-education, patient education, strengthening, therapeutic exercise, home exercise program and gait training  Frequency: 2x week  Duration in weeks: 4  Plan of Care beginning date: 10/9/2023  Plan of Care expiration date: 11/10/2023  Treatment plan discussed with: patient      Subjective Evaluation    History of Present Illness  Date of onset: 8/1/2022  Mechanism of injury: RE-EVALUATION 10/12/23: Patient reports no falls or losses of balance over the past few weeks. She is still very cautious with her movements, and moves more slowly to avoid loss of balance. She is interested in going back to work. She has follow up with PCP tomorrow, and is getting some more memory testing done. In regard to her shoulder, she still gets pain, but no more than 4/10 recently.  She is getting a lot of popping/clicking in her shoulders, but it doesn't hurt at rest. She is still very limited with reaching overhead, behind back, or lifting with shoulders for ADLs and household chores. Initial evaluation: Patient has PT prescription for left shoulder pain, weakness in b/l lower extremities, and lumbar radiculopathy. She also reports R shoulder pain, and frequent falls/balance issues, which is of greatest concern. Patient reports balance issues started approximately 1 year ago, unsure of cause, possibly following R hip MUSA. She reports falling 2-3x per month, often related to losing her balance when turning. She has been to ER twice in the past month, once from difficulty moving her legs while in swimming pool, diagnosed as hypokalemia and given supplements, and once from tripping and falling (which aggravated shoulder pain). She saw PCP and orthopedist for shoulder pain. Symptoms: imbalance and frequent falls. (shoulder and low back pain will be assessed at later date due to time constraints). Patient falls 2-3x per month, often related to turning, walking on uneven ground, looking up, bending over to pick something off the floor. Patient denies buckling, LE paraesthesias, dizziness, or lightheadedness. Upon questioning, she reports that her handwriting has gotten worse. She has not seen neurologist. She reports several occurrences of shift when walking in the middle of the night, where she falls to the side. 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  HTN    Patient Goals  Patient goals for therapy: decreased pain and increased motion    Pain  At worst pain ratin        Objective     Passive Range of Motion   Left Shoulder   Flexion: 110 degrees with pain    Right Shoulder   Flexion: 100 degrees with pain    Strength/Myotome Testing     Left Shoulder     Planes of Motion   Flexion: 4   Abduction: 4   External rotation at 0°: 4+   Internal rotation at 0°: 5     Right Shoulder     Planes of Motion   Flexion: 4   Abduction: 4   External rotation at 0°: 4+   Internal rotation at 0°: 5     Left Hip   Planes of Motion   Flexion: 5  Abduction: 5    Right Hip   Planes of Motion   Flexion: 4+  Abduction: 4+    Left Knee   Flexion: 5  Extension: 5    Right Knee   Flexion: 5  Extension: 5    Left Ankle/Foot   Dorsiflexion: 5  Eversion: 5    Right Ankle/Foot   Dorsiflexion: 5  Eversion: 5    Tests     Additional Tests Details  Balance testing:  NBOS 30 seconds EO and EC  Tandem stance: 10 seconds R, 20 L EO  SLS 3 on L, 2 seconds on R    Liu balance test: 50 (mild fall risk) 43 start of care    Functional gait assessment: 23, 17 start of care (under 22 indicates fall risk)     m-CTSIB balance testing  EO level ground : 1.41 (1.1 previously)   EC level ground : 1.82 (.92 previously)   EO unstable ground : 2.23 (1.27 previously)   EC unstable ground : 2.95 (3.87 previously)            Precautions: Moderate fall risk, HTN, R MUSA, L TKA.  EPOC 11/10/23      Manuals 9/28 10/3 10/5 10/10 10/12 9/12 9/14 9/19 9/21 9/26   shld PROM b/l  DES DES DES DES DES DES DES DES DES   R [address] p-a mob gr 3-4  DES LAD only DES LAD DES LAD DES LAD DES DES DES LAD DES DES LAD   stm r bicep delt             B/l UT STM       DES      Neuro Re-Ed             Step taps  20x           Tandem stance 3x30" 30"x3 3x30" 3x30"ea nv 3x30" 3x30" 2x30" ea 3x30" 3x30"   Standing march    30x nv     2x10   m-ctsib testing 8'  biodex practice 5' biodex practice 5' 5'        Rocker board 10x ea 15 ea 20 ea 20 ea         LIU balance     10'        FGA     10'        Walking with unexpected turns   Quick turns 4'  Quick turns 3'     5' 3'                shld ext with retraction       10x gtb      B/l ER with retraction  2x10 gtb 2x10 gtb    10x5" rtb  10x5" rtb loop 2x10 rtb loop   scap retraction       10x5"      Ther Ex ROM and MMT assessments for shoulder     5'        Supine wand flexion  10x5" 10x5" 10x5"  8x10" 10x10" 10x10" 10x10" 10x10"   shlf flexion over foam roll  10x5"  10x5"      10x10"                Cross body adduction             Seated wand ER AROM  10x5" ea 10x5"    10x5" ea  supine 10x    Thoracic ext          10x foam   Ther Activity             Walking with head turns 4x10 ft 4x10 ft        2x10 ft   Walk with EC  4x10 ft        2x10 ft   Walking with cone   2x 6 cones 2x 6 cones 1x6 cones         Walking bkwds   4x10 ft       2x10 ft   Sit>stand             Mini squats             Fwd step ups             Gait Training                                       Modalities             Cold pack   At home 8' b/l   8' b/l  8' R 8' b/l

## 2023-10-12 NOTE — PROGRESS NOTES
[place] [address] [address]  [phone]    Tapan Osborne was here today for [employer] comprehensive test. It took the patient 71 minutes to complete.

## 2023-10-13 ENCOUNTER — TELEPHONE (OUTPATIENT)
Dept: FAMILY MEDICINE CLINIC | Facility: CLINIC | Age: 73
End: 2023-10-13

## 2023-10-13 ENCOUNTER — OFFICE VISIT (OUTPATIENT)
Dept: FAMILY MEDICINE CLINIC | Facility: CLINIC | Age: 73
End: 2023-10-13
Payer: MEDICARE

## 2023-10-13 VITALS
WEIGHT: 241.2 LBS | BODY MASS INDEX: 40.19 KG/M2 | RESPIRATION RATE: 16 BRPM | SYSTOLIC BLOOD PRESSURE: 114 MMHG | HEIGHT: 65 IN | TEMPERATURE: 98.2 F | HEART RATE: 82 BPM | OXYGEN SATURATION: 99 % | DIASTOLIC BLOOD PRESSURE: 80 MMHG

## 2023-10-13 DIAGNOSIS — M25.511 CHRONIC RIGHT SHOULDER PAIN: Primary | ICD-10-CM

## 2023-10-13 DIAGNOSIS — G89.29 CHRONIC RIGHT SHOULDER PAIN: Primary | ICD-10-CM

## 2023-10-13 PROCEDURE — 99213 OFFICE O/P EST LOW 20 MIN: CPT | Performed by: NURSE PRACTITIONER

## 2023-10-13 NOTE — PATIENT INSTRUCTIONS
Continue with PT - ask when he feels your shoulder is ready to return to work   Otherwise all other issues have resolved   Schedule Medicare wellness visit in December

## 2023-10-13 NOTE — PROGRESS NOTES
Assessment/Plan   Problem List Items Addressed This Visit    None  Visit Diagnoses       Chronic right shoulder pain    -  Primary              Depression Screening and Follow-up Plan: Patient was screened for depression during today's encounter. They screened negative with a PHQ-2 score of 0. Chief Complaint   Patient presents with    Follow-up       Subjective   Patient ID: Jennie Turpin is a 72 y.o. female. Vitals:    10/13/23 1017   BP: 114/80   Pulse: 82   Resp: 16   Temp: 98.2 °F (36.8 °C)   SpO2: 99%     Shoulder Pain   The pain is present in the right shoulder. This is a chronic (history of arthritis and has been seeing ortho, would like clearance to return to work - seeing PT and will have them evaluate) problem. There has been no history of extremity trauma. The problem occurs constantly. The problem has been waxing and waning. The quality of the pain is described as aching. The pain is at a severity of 4/10. The pain is moderate. Pertinent negatives include no fever, inability to bear weight, joint swelling or stiffness. The symptoms are aggravated by activity. She has tried rest (PT and steroid injection) for the symptoms. Family history does not include gout or rheumatoid arthritis. ostearthritis       The following portions of the patient's history were reviewed and updated as appropriate: allergies, current medications, past family history, past medical history, past social history, past surgical history, and problem list.    Review of Systems   Constitutional: Negative. Negative for fever. HENT: Negative. Eyes: Negative. Respiratory: Negative. Cardiovascular: Negative. Gastrointestinal: Negative. Endocrine: Negative. Genitourinary: Negative. Musculoskeletal:  Positive for arthralgias. Negative for stiffness. Skin: Negative. Allergic/Immunologic: Negative. Neurological: Negative. Hematological: Negative. Psychiatric/Behavioral: Negative. Objective     Physical Exam  Vitals and nursing note reviewed. Constitutional:       Appearance: She is obese. HENT:      Head: Normocephalic and atraumatic. Nose: Nose normal. No congestion. Eyes:      Extraocular Movements: Extraocular movements intact. Conjunctiva/sclera: Conjunctivae normal.   Cardiovascular:      Rate and Rhythm: Normal rate and regular rhythm. Pulses: Normal pulses. Heart sounds: Normal heart sounds. No murmur heard. Pulmonary:      Effort: Pulmonary effort is normal.      Breath sounds: Normal breath sounds. Musculoskeletal:         General: Normal range of motion. Cervical back: Normal range of motion. Skin:     General: Skin is warm and dry. Capillary Refill: Capillary refill takes less than 2 seconds. Neurological:      Mental Status: She is alert and oriented to person, place, and time. Comments: Reports having issues with memory - saw Geriatrics yesterday for testing    Psychiatric:         Mood and Affect: Mood normal.         Behavior: Behavior normal.         Thought Content:  Thought content normal.         Judgment: Judgment normal.

## 2023-10-16 ENCOUNTER — TELEPHONE (OUTPATIENT)
Dept: FAMILY MEDICINE CLINIC | Facility: CLINIC | Age: 73
End: 2023-10-16

## 2023-10-16 NOTE — TELEPHONE ENCOUNTER
Patient called.  She said that the tendonitis in her arm is acting up bad.  She said Tylenol is not working anymore.  She is wondering if there is something else she can take so she can do her job.  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  I told her I would leave a message for you and we would call her back.  Patient is aware that you are out of the office today.  [phone]  Thank you.

## 2023-10-17 ENCOUNTER — OFFICE VISIT (OUTPATIENT)
Dept: PHYSICAL THERAPY | Facility: CLINIC | Age: 73
End: 2023-10-17
Payer: MEDICARE

## 2023-10-17 DIAGNOSIS — G89.29 CHRONIC RIGHT SHOULDER PAIN: ICD-10-CM

## 2023-10-17 DIAGNOSIS — G89.29 CHRONIC LEFT SHOULDER PAIN: Primary | ICD-10-CM

## 2023-10-17 DIAGNOSIS — R29.898 WEAKNESS OF BOTH LOWER EXTREMITIES: ICD-10-CM

## 2023-10-17 DIAGNOSIS — M25.511 CHRONIC RIGHT SHOULDER PAIN: ICD-10-CM

## 2023-10-17 DIAGNOSIS — R26.89 BALANCE DISORDER: ICD-10-CM

## 2023-10-17 DIAGNOSIS — M25.512 CHRONIC LEFT SHOULDER PAIN: Primary | ICD-10-CM

## 2023-10-17 PROCEDURE — 97110 THERAPEUTIC EXERCISES: CPT | Performed by: PHYSICAL THERAPIST

## 2023-10-17 PROCEDURE — 97140 MANUAL THERAPY 1/> REGIONS: CPT | Performed by: PHYSICAL THERAPIST

## 2023-10-17 PROCEDURE — 97112 NEUROMUSCULAR REEDUCATION: CPT | Performed by: PHYSICAL THERAPIST

## 2023-10-17 NOTE — PROGRESS NOTES
Daily Note    Today's date: 10/17/2023  Patient name: Alissa Albert  : 1950  MRN: [id number]  Referring provider: Divya Samson DO  Dx:   Encounter Diagnosis     ICD-10-CM    1. Chronic left shoulder pain  M25.512     G89.29       2. Balance disorder  R26.89       3. Chronic right shoulder pain  M25.511     G89.29       4. Weakness of both lower extremities  R29.898                      Subjective: patient reports that she had follow up with PCP last Friday. Patient reports feeling more limited with work duties from her shoulder vs her balance as she hasn't had any falls or losses of balance lately. She thinks she will be ready to return to work next week, although she still may be a little limited by her R shoulder. Objective: See treatment diary below      Assessment: Updated treatment program as planned following re-evaluation last session, with minor focus still on balance treatment, but the majority of session to be focused on her shoulder pain. Patient noted no provocation of sx's with manual therapy. Progressed scapular strength/stabilization with addition of rows and shoulder extension with retraction. She noted fatigue but only mild discomfort. Patient would benefit from continued skilled PT per plan of care to address impairments and improve function. Plan: Continue per plan of care. Progress shoulder strength/mobility and balance as tolerated. Precautions:  Moderate fall risk, HTN, R MUSA, L TKA  EPOC 11/10/23      Manuals 9/28 10/3 10/5 10/10 10/12 10/17 9/14 9/19 9/21 9/26   shld PROM b/l  DES DES DES DES DES DES DES DES DES   R [address] p-a mob gr 3-4  DES LAD only DES LAD DES LAD DES LAD DES LAD DES DES LAD DES DES LAD   stm r bicep delt             B/l UT STM       DES      Neuro Re-Ed             Tandem walk      4x10 ft       Tandem stance 3x30" 30"x3 3x30" 3x30"ea nv 3x30" 3x30" 2x30" ea 3x30" 3x30"   Standing march    30x nv     2x10                Rocker board 10x ea 15 ea 20 ea 20 ea  20 ea Rocker board static hold      1' ea                    Walking with unexpected turns   Quick turns 4'  Quick turns 3'     5' 3'   Rows with retraction      10x gtb       shld ext with retraction      10x rtb 10x gtb      B/l ER with retraction  2x10 gtb 2x10 gtb   10x rtb 10x5" rtb  10x5" rtb loop 2x10 rtb loop   scap retraction       10x5"      Ther Ex                          Pulley flexion      10x5"       Supine wand flexion  10x5" 10x5" 10x5"  10x10" 10x10" 10x10" 10x10" 10x10"   shlf flexion over foam roll  10x5"  10x5"      10x10"                Cross body adduction             Seated wand ER AROM  10x5" ea 10x5"    10x5" ea  supine 10x    Thoracic ext          10x foam   Ther Activity             Walking with head turns 4x10 ft 4x10 ft        2x10 ft   Walk with EC  4x10 ft        2x10 ft   Walking with cone   2x 6 cones 2x 6 cones 1x6 cones         Walking bkwds   4x10 ft       2x10 ft   Sit>stand                                       Gait Training                                       Modalities             Cold pack   At home 8' b/l   8' b/l  8' R 8' b/l

## 2023-10-17 NOTE — TELEPHONE ENCOUNTER
Please call Bozena and tell her that besides tylenol she can purchase a wrist splint at the [facility] and wear it during the day - it will prevent movement of the tendon - if no improvement she should be seen for further evaluation

## 2023-10-18 ENCOUNTER — TELEPHONE (OUTPATIENT)
Dept: FAMILY MEDICINE CLINIC | Facility: CLINIC | Age: 73
End: 2023-10-18

## 2023-10-18 NOTE — TELEPHONE ENCOUNTER
Patient called. She is aware you are not in the office until 10/20. Patient would like to return to work on Monday 10/23. Patient was wondering if she could have a return to work note. Is it okay to give her a note? Please advise.  Thank you

## 2023-10-19 ENCOUNTER — OFFICE VISIT (OUTPATIENT)
Dept: PHYSICAL THERAPY | Facility: CLINIC | Age: 73
End: 2023-10-19
Payer: MEDICARE

## 2023-10-19 DIAGNOSIS — M25.511 CHRONIC RIGHT SHOULDER PAIN: ICD-10-CM

## 2023-10-19 DIAGNOSIS — R26.89 BALANCE DISORDER: ICD-10-CM

## 2023-10-19 DIAGNOSIS — M25.512 CHRONIC LEFT SHOULDER PAIN: Primary | ICD-10-CM

## 2023-10-19 DIAGNOSIS — G89.29 CHRONIC RIGHT SHOULDER PAIN: ICD-10-CM

## 2023-10-19 DIAGNOSIS — G89.29 CHRONIC LEFT SHOULDER PAIN: Primary | ICD-10-CM

## 2023-10-19 DIAGNOSIS — R29.898 WEAKNESS OF BOTH LOWER EXTREMITIES: ICD-10-CM

## 2023-10-19 PROCEDURE — 97110 THERAPEUTIC EXERCISES: CPT | Performed by: PHYSICAL THERAPIST

## 2023-10-19 PROCEDURE — 97010 HOT OR COLD PACKS THERAPY: CPT | Performed by: PHYSICAL THERAPIST

## 2023-10-19 PROCEDURE — 97112 NEUROMUSCULAR REEDUCATION: CPT | Performed by: PHYSICAL THERAPIST

## 2023-10-19 PROCEDURE — 97140 MANUAL THERAPY 1/> REGIONS: CPT | Performed by: PHYSICAL THERAPIST

## 2023-10-19 NOTE — PROGRESS NOTES
Daily Note    Today's date: 10/19/2023  Patient name: Garrick Rivera  : 1950  MRN: [id number]  Referring provider: Hyacinth Gallego DO  Dx:   Encounter Diagnosis     ICD-10-CM    1. Chronic left shoulder pain  M25.512     G89.29       2. Balance disorder  R26.89       3. Chronic right shoulder pain  M25.511     G89.29       4. Weakness of both lower extremities  R29.898                      Subjective: patient reports that she feels ready to return to work, as long as she is not doing heavy lifting due to b/l shoulder pain. Patient notes being a little stressed and rushed today. Objective: See treatment diary below      Assessment: Performed soft tissue mobilization along scapular muscles; she tolerated well and tolerated pulleys with less shoulder pain following. Patient had several losses of balance during rockerboard exercise, but this may have been due to patient's stress as when cued to relax and focus on current task, balance improved. Educated patient on importance of not rushing when at work or home, because she tends to have worse balance when stressed or rushing. She verbalized understanding. Patient would benefit from continued skilled PT per plan of care to address impairments and improve function. Plan: Continue per plan of care. Progress shoulder strength/mobility and balance as tolerated. Precautions:  Moderate fall risk, HTN, R MUSA, L TKA  EPOC 11/10/23      Manuals 9/28 10/3 10/5 10/10 10/12 10/17 10/19 9/19 9/21 9/26   shld PROM b/l  DES DES DES DES DES  DES DES DES   R GH jt p-a mob gr 3-4  DES LAD only DES LAD DES LAD DES LAD DES LAD  DES LAD DES DES LAD   stm r bicep delt             B/l UT STM       DES      Neuro Re-Ed             Tandem walk      4x10 ft 4x10 ft      Tandem stance 3x30" 30"x3 3x30" 3x30"ea nv 3x30"  2x30" ea 3x30" 3x30"   Standing march    30x nv     2x10                Rocker board 10x ea 15 ea 20 ea 20 ea  20 ea 20 ea      Rocker board static hold      1' ea 1' ea Walking with unexpected turns   Quick turns 4'  Quick turns 3'     5' 3'   Rows with retraction      10x gtb 20x gtb      shld ext with retraction      10x rtb 10x gtb      B/l ER with retraction  2x10 gtb 2x10 gtb   10x rtb 2x10 rtb  10x5" rtb loop 2x10 rtb loop   scap retraction             Ther Ex                          Pulley flexion      10x5" 10x10"      Supine wand flexion  10x5" 10x5" 10x5"  10x10"  10x10" 10x10" 10x10"   shlf flexion over foam roll  10x5"  10x5"   10x5"   10x10"                Cross body adduction             Seated wand ER AROM  10x5" ea 10x5"      supine 10x    Thoracic ext          10x foam   Ther Activity             Walking with head turns 4x10 ft 4x10 ft        2x10 ft   Walk with EC  4x10 ft        2x10 ft   Walking with cone   2x 6 cones 2x 6 cones 1x6 cones         Walking bkwds   4x10 ft       2x10 ft   Sit>stand                                       Gait Training                                       Modalities             Cold pack   At home 8' b/l   8' b/l  8' R 8' b/l

## 2023-10-20 ENCOUNTER — TELEPHONE (OUTPATIENT)
Dept: FAMILY MEDICINE CLINIC | Facility: CLINIC | Age: 73
End: 2023-10-20

## 2023-10-20 NOTE — TELEPHONE ENCOUNTER
Please schedule her a follow up appointment - now it is her shoulder - I'm not going to treat her over the phone anymore

## 2023-10-20 NOTE — TELEPHONE ENCOUNTER
Vanessa Morales stopped by to ask about her shoulder pain- she saw that you suggested a wrist splint- she is confused by this.  She was wondering what she can do for shoulder pain not her wrist.  Please advise Juancarlos Green or Agapito BERRIOS as I am leaving for the day- Have a good weekend  Thank you

## 2023-10-20 NOTE — TELEPHONE ENCOUNTER
Called and spoke with patient. Patient advised she will need a new appointment to discuss this. Patient requested an appointment outside of PCP schedule.  Appointment scheduled with another provider at patient request.

## 2023-10-23 ENCOUNTER — TELEPHONE (OUTPATIENT)
Dept: NEUROLOGY | Facility: CLINIC | Age: 73
End: 2023-10-23

## 2023-10-24 ENCOUNTER — APPOINTMENT (OUTPATIENT)
Dept: PHYSICAL THERAPY | Facility: CLINIC | Age: 73
End: 2023-10-24
Payer: MEDICARE

## 2023-10-26 ENCOUNTER — OFFICE VISIT (OUTPATIENT)
Dept: PHYSICAL THERAPY | Facility: CLINIC | Age: 73
End: 2023-10-26
Payer: MEDICARE

## 2023-10-26 DIAGNOSIS — R26.89 BALANCE DISORDER: ICD-10-CM

## 2023-10-26 DIAGNOSIS — G89.29 CHRONIC LEFT SHOULDER PAIN: Primary | ICD-10-CM

## 2023-10-26 DIAGNOSIS — M25.511 CHRONIC RIGHT SHOULDER PAIN: ICD-10-CM

## 2023-10-26 DIAGNOSIS — R29.898 WEAKNESS OF BOTH LOWER EXTREMITIES: ICD-10-CM

## 2023-10-26 DIAGNOSIS — G89.29 CHRONIC RIGHT SHOULDER PAIN: ICD-10-CM

## 2023-10-26 DIAGNOSIS — M25.512 CHRONIC LEFT SHOULDER PAIN: Primary | ICD-10-CM

## 2023-10-26 PROCEDURE — 97112 NEUROMUSCULAR REEDUCATION: CPT | Performed by: PHYSICAL THERAPIST

## 2023-10-26 PROCEDURE — 97140 MANUAL THERAPY 1/> REGIONS: CPT | Performed by: PHYSICAL THERAPIST

## 2023-10-26 NOTE — PROGRESS NOTES
Daily Note    Today's date: 10/26/2023  Patient name: Gilbert Barrett  : 1950  MRN: [id number]  Referring provider: Pat Gar DO  Dx:   Encounter Diagnosis     ICD-10-CM    1. Chronic left shoulder pain  M25.512     G89.29       2. Balance disorder  R26.89       3. Chronic right shoulder pain  M25.511     G89.29       4. 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 She also hasn't lost her balance recently. Objective: See treatment diary below  Session limited today due to pt having to leave early for 's doctor appointment. Assessment: Today's session focused on shoulders due to limited time. Patient presented with less irritability during manual therapy with no pain reported during passive flexion. Patient also tolerated shoulder stabilization/scapular strengthening without provocation of symptoms. Patient would benefit from continued skilled PT per plan of care to address impairments and improve function. Plan: Continue per plan of care. Progress shoulder strength/mobility and balance as tolerated. Precautions:  Moderate fall risk, HTN, R MUSA, L TKA  EPOC 11/10/23      Manuals 9/28 10/3 10/5 10/10 10/12 10/17 10/19 10/26 9/21 9/26   shld PROM b/l  DES DES DES DES DES  DES DES DES   R GH jt p-a mob gr 3-4  DES LAD only DES LAD DES LAD DES LAD DES LAD  DES LAD DES DES LAD   stm r bicep delt             B/l UT STM       DES      Neuro Re-Ed             Tandem walk      4x10 ft 4x10 ft      Tandem stance 3x30" 30"x3 3x30" 3x30"ea nv 3x30"   3x30" 3x30"   Standing march    30x nv     2x10                Rocker board 10x ea 15 ea 20 ea 20 ea  20 ea 20 ea      Rocker board static hold      1' ea 1' ea                   Walking with unexpected turns   Quick turns 4'  Quick turns 3'     5' 3'   Rows with retraction      10x gtb 20x gtb 2x10 gtb     shld ext with retraction      10x rtb 10x gtb 2x10 rtb     B/l ER with retraction  2x10 gtb 2x10 gtb   10x rtb 2x10 rtb 2x10 rtb 10x5" rtb loop 2x10 rtb loop   scap retraction             Ther Ex                          Pulley flexion      10x5" 10x10"      Supine wand flexion  10x5" 10x5" 10x5"  10x10"  10x10" 10x10" 10x10"   shlf flexion over foam roll  10x5"  10x5"   10x5"   10x10"                Cross body adduction             Seated wand ER AROM  10x5" ea 10x5"      supine 10x    Thoracic ext          10x foam   Ther Activity             Walking with head turns 4x10 ft 4x10 ft        2x10 ft   Walk with EC  4x10 ft        2x10 ft   Walking with cone   2x 6 cones 2x 6 cones 1x6 cones         Walking bkwds   4x10 ft       2x10 ft   Sit>stand                                       Gait Training                                       Modalities             Cold pack   At home 8' b/l   8' b/l  8' R 8' b/l

## 2023-10-27 NOTE — PROGRESS NOTES
Kathie Pinto [address], [address] [place] [address] [address]  [phone]    Care Conference    NAME: Thuan Amador  AGE: 72 y.o. SEX: female  YOB: 1950  DATE OF ASSESSMENT: 09/27/23  DATE OF CONFERENCE: 11/01/23    Family Present:   Staff Present: Frankie Olmstead MD    Patient / Family Goals of Care: Review cognitive decline and associated symptoms, discuss treatment options and care planning. Medical Concerns (Current/Historical): Neuropathy, sciatica, unspecified laterality    Geriatric Syndromes/Age Related Syndromes: balance disorder    Neuropsychological  Cognitive impairment  Coleman Cognitive Assessment: 27/30  Geriatric Depression Screen: 3/15  [employer]x:  106.6 (global cognitive score)  105.1 (memory)   99.2 (executive function)  92.7 (attention)   100.3 (information processing speed)  129.1 (visual spatial)  110.2 (verbal function)  109.4 (motor skills)    Decision-making capacity: Intact    Remain active physically, mentally and socially  Pharmaceutical and non-pharmaceutical interventions discussed  Engage in cognitively challenging exercises such as crosswords and puzzles  Maintain chronic conditions under control  Repeat cognitive assessment in 6 months    Diagnostic Studies  Review of bloodwork  Review of imaging: CTA of head & neck: IMPRESSION: No acute intracranial pathology. Mild ventricular prominence, correlate for NPH. No significant stenosis of the cervical carotid or vertebral arteries. No significant interval stenosis, large vessel occlusion or aneurysm.     Physical Finding Impacting Function   Fall Risk:    Activities of Daily Living: Independent   Instrumental Activities of Daily Living: Independent    Encourage appropriate footwear at all times  Review fall risk prevention tips and adjust within the home environment as needed    Medications Reviewed   Medications seem appropriate for present conditions  Check with PCP before using over the counter medications  Avoid over the counter medications that can affect cognition (e.g., Benadryl, Tylenol PM)   Avoid NSAIDs due to risk of GI bleed and renal impairment    Other Findings   Overall health  BMI: 40.14 kg/m2  Maintain well-balanced diet  Continue following with primary care physician regularly  Balance disorder    Neuropathy      Recommended Health Maintenance   Immunizations, if not contraindicated: Influenza vaccine yearly  Pneumo vaccine every 5 years (65 years and over)  Shingles vaccine  COVID-19 vaccine    Social / Safety Considerations  Consider a [employer] for positive socialization, physical exercise, cognitive stimulation  Stay in touch with family and friends  Plan self-care activities for your mental well-being each week  Consider volunteering through [employer] ([phone]) or [employer]  Recommend review of fall risk prevention tips  Recommend use of fall precautions including fall alert device  Consider assistance for medication administration such as blister packaging or use of an automated pill dispenser  Consider contacting your local [address] [place] [address] on Aging for possible eligible programs such as OPTIONS, Caregiver Support Program, or [address] [place] [address],[place] [address] updated advance directives and provide a copy to your primary care provider  Utilize reorientation and redirection as needed (dependent on situation)  Educational information provided    Patient and family verbalized understanding of above care plan. For care coordination purposes, this care plan will be shared with your primary care provider. With any questions, please contact our office at [phone].

## 2023-10-31 ENCOUNTER — OFFICE VISIT (OUTPATIENT)
Dept: FAMILY MEDICINE CLINIC | Facility: CLINIC | Age: 73
End: 2023-10-31
Payer: MEDICARE

## 2023-10-31 ENCOUNTER — APPOINTMENT (OUTPATIENT)
Dept: PHYSICAL THERAPY | Facility: CLINIC | Age: 73
End: 2023-10-31
Payer: MEDICARE

## 2023-10-31 VITALS
WEIGHT: 243.8 LBS | HEART RATE: 85 BPM | DIASTOLIC BLOOD PRESSURE: 78 MMHG | SYSTOLIC BLOOD PRESSURE: 120 MMHG | TEMPERATURE: 98.1 F | BODY MASS INDEX: 40.62 KG/M2 | RESPIRATION RATE: 17 BRPM | HEIGHT: 65 IN | OXYGEN SATURATION: 98 %

## 2023-10-31 DIAGNOSIS — M19.012 PRIMARY OSTEOARTHRITIS OF LEFT SHOULDER: Primary | ICD-10-CM

## 2023-10-31 DIAGNOSIS — M16.11 PRIMARY OSTEOARTHRITIS OF RIGHT HIP: ICD-10-CM

## 2023-10-31 DIAGNOSIS — M75.101 TEAR OF RIGHT SUPRASPINATUS TENDON: ICD-10-CM

## 2023-10-31 DIAGNOSIS — G89.4 CHRONIC PAIN SYNDROME: ICD-10-CM

## 2023-10-31 PROCEDURE — 99213 OFFICE O/P EST LOW 20 MIN: CPT | Performed by: PHYSICIAN ASSISTANT

## 2023-10-31 RX ORDER — DULOXETIN HYDROCHLORIDE 60 MG/1
CAPSULE, DELAYED RELEASE ORAL
Qty: 30 CAPSULE | Refills: 2 | Status: SHIPPED | OUTPATIENT
Start: 2023-10-31

## 2023-11-01 ENCOUNTER — OFFICE VISIT (OUTPATIENT)
Age: 73
End: 2023-11-01
Payer: MEDICARE

## 2023-11-01 VITALS
HEART RATE: 80 BPM | OXYGEN SATURATION: 100 % | BODY MASS INDEX: 40.82 KG/M2 | HEIGHT: 65 IN | SYSTOLIC BLOOD PRESSURE: 122 MMHG | DIASTOLIC BLOOD PRESSURE: 78 MMHG | WEIGHT: 245 LBS | TEMPERATURE: 98.3 F

## 2023-11-01 DIAGNOSIS — R32 URINARY INCONTINENCE, UNSPECIFIED TYPE: ICD-10-CM

## 2023-11-01 DIAGNOSIS — R26.89 BALANCE DISORDER: Primary | ICD-10-CM

## 2023-11-01 DIAGNOSIS — R41.3 MEMORY DEFICIT: ICD-10-CM

## 2023-11-01 PROCEDURE — 99215 OFFICE O/P EST HI 40 MIN: CPT | Performed by: FAMILY MEDICINE

## 2023-11-01 NOTE — PROGRESS NOTES
Name: Javier Ho      : 1950      MRN: [id number]  Encounter Provider: Cheyanne Maria PA-C  Encounter Date: 10/31/2023   Encounter department: [facility]    Assessment & Plan     1. Primary osteoarthritis of left shoulder    2. Tear of right supraspinatus tendon    3. Chronic pain syndrome  -     DULoxetine (CYMBALTA) 60 mg delayed release capsule; TAKE ONE CAPSULE BY MOUTH AT BEDTIME    4. Primary osteoarthritis of right hip  -     DULoxetine (CYMBALTA) 60 mg delayed release capsule; TAKE ONE CAPSULE BY MOUTH AT BEDTIME    Patient has already been established with sports medicine Dr. Neena Rendon and will make follow-up appointment. Sports medicine has already placed referrals for orthopedic surgery as well as rheumatology and patient will call to make appointments. Patient instructed to call with any questions or concerns. Patient will continue her Celebrex and Cymbalta for pain until further evaluation by specialist.  Gerber Bound to call with any questions or concerns. Continue physical therapy       Subjective      HPI  72-year-old female here today with questions about her bilateral shoulder pain which has been chronic in nature. Patient does not want an examination just want discuss options. Reviewed recent x-ray and MRI with patient. Patient has already been established with sports medicine Dr. Neena Rendon who has already placed referrals to orthopedic surgery as well as rheumatology. Numbers for both offices provided to patient. Patient will call to make appointments. Patient also supposed to follow-up with sports medicine Dr. Neena Rendon this month and states will call to make follow-up appointment as well. Patient will continue her Celebrex and restarted on Cymbalta. Review of Systems   Constitutional: Negative. Respiratory: Negative. Cardiovascular: Negative. Gastrointestinal: Negative. Neurological: Negative.         Current Outpatient Medications on File Prior to Visit   Medication Sig    amoxicillin (AMOXIL) 500 mg capsule TAKE 4 CAPSULES BY MOUTH 1 HOUR BEFORE APPOINTMENT    ascorbic acid (VITAMIN C) 500 MG tablet Take 1 tablet (500 mg total) by mouth 2 (two) times a day    B Complex Vitamins (VITAMIN B COMPLEX PO) Take by mouth in the morning    celecoxib (CeleBREX) 100 mg capsule TAKE ONE CAPSULE BY MOUTH TWICE A DAY    cholecalciferol (VITAMIN D3) 1,000 units tablet Take 1,000 Units by mouth daily    Doxylamine Succinate, Sleep, (SLEEP AID PO) Take by mouth    Magnesium 400 MG TABS Take 1 tablet (400 mg total) by mouth in the morning    meclizine (ANTIVERT) 25 mg tablet Take 1 tablet (25 mg total) by mouth 3 (three) times a day as needed for dizziness    Multiple Vitamins-Minerals (CENTRUM SILVER PO) Take by mouth    polyethylene glycol (GLYCOLAX) 17 GM/SCOOP powder Take 17 g by mouth daily    potassium chloride (K-DUR,KLOR-CON) 20 mEq tablet Take 1 tablet (20 mEq total) by mouth daily    rosuvastatin (CRESTOR) 40 MG tablet Take 1 tablet (40 mg total) by mouth daily    sodium picosulfate, magnesium oxide, citric acid (Clenpiq) oral solution Take 175 mL (1 bottle) the evening before the colonoscopy, between 5 PM and 9 PM, followed by a second 175 mL bottle 5 hours before the colonoscopy. VITAMIN E PO Take by mouth    gabapentin (NEURONTIN) 300 mg capsule Take 1 capsule by mouth every 6 (six) hours as needed (Patient not taking: Reported on 9/27/2023)       Objective     /78 (BP Location: Right arm, Patient Position: Sitting, Cuff Size: Large)   Pulse 85   Temp 98.1 °F (36.7 °C) (Tympanic)   Resp 17   Ht 5' 5" (1.651 m)   Wt 111 kg (243 lb 12.8 oz)   SpO2 98%   BMI 40.57 kg/m²     Physical Exam  Exam deferred. Patient declined exam.  Patient just wanted to discuss medical options for her bilateral shoulder pain.   Shari Beckman PA-C

## 2023-11-01 NOTE — PROGRESS NOTES
Assessment & Plan:   Beverly Bernard was seen today for care conference. Diagnoses and all orders for this visit:    Balance disorder    Memory deficit  -     Ambulatory Referral to Senior Care    Urinary incontinence, unspecified type      72year old female with concerns for memory deficits- she also endorses a history of balance disorder (improved with meclizine) as well as pulsatile tinnitus (improved) and urinary incontinence. Patient is without any significant cognitive deficits on objective assessment, however, may have been manifesting lapses in memory associated with tinnitus; she is, however, reported for mild ventricular prominence on CTA-head & neck, and given aforementioned symptoms, consideration for normal pressure hydrocephalus has been made. Patient is recommended to follow-up with neurology as scheduled. She was provided with some educational material at the time of discharge with regards to pelvic floor exercises to address urinary incontinence. Patient will follow-up in clinic in 1 year for re-assessment. atient / Family Goals of Care: Review cognitive decline and associated symptoms, discuss treatment options and care planning.       Medical Concerns (Current/Historical): Neuropathy, sciatica, unspecified laterality     Geriatric Syndromes/Age Related Syndromes: balance disorder     Neuropsychological  Cognitive impairment  Luis Cognitive Assessment: 27/30  Geriatric Depression Screen: 3/15  [employer]:  106.6 (global cognitive score)  105.1 (memory)   99.2 (executive function)  92.7 (attention)   100.3 (information processing speed)  129.1 (visual spatial)  110.2 (verbal function)  109.4 (motor skills)     Decision-making capacity: Intact     Remain active physically, mentally and socially  Pharmaceutical and non-pharmaceutical interventions discussed  Engage in cognitively challenging exercises such as crosswords and puzzles  Maintain chronic conditions under control  Repeat cognitive assessment in 12 months     Diagnostic Studies  Review of bloodwork  Review of imaging: CTA of head & neck: IMPRESSION: No acute intracranial pathology. Mild ventricular prominence, correlate for NPH. No significant stenosis of the cervical carotid or vertebral arteries. No significant interval stenosis, large vessel occlusion or aneurysm. Physical Finding Impacting Function              Fall Risk: TUGS approximately 12 seconds. Activities of Daily Living: Independent              Instrumental Activities of Daily Living: Independent     Encourage appropriate footwear at all times  Review fall risk prevention tips and adjust within the home environment as needed     Medications Reviewed   Medications seem appropriate for present conditions  Check with PCP before using over the counter medications  Avoid over the counter medications that can affect cognition (e.g., Benadryl, Tylenol PM)   Avoid NSAIDs due to risk of GI bleed and renal impairment     Other Findings              Overall health  BMI: 40.14 kg/m2  Maintain well-balanced diet  Continue following with primary care physician regularly  Balance disorder  History of disequilibrium?/ambulatory disorder responsive to meclizine. Neuropathy  None diagnosed      Recommended Health Maintenance              Immunizations, if not contraindicated:    Influenza vaccine yearly  Pneumo vaccine every 5 years (65 years and over)  Shingles vaccine  COVID-19 vaccine     Social / Safety Considerations  Consider a [employer] for positive socialization, physical exercise, cognitive stimulation  Stay in touch with family and friends  Plan self-care activities for your mental well-being each week  Consider volunteering through [employer] (007-405-0210) or [employer]  Recommend review of fall risk prevention tips  Recommend use of fall precautions including fall alert device  Consider assistance for medication administration such as blister packaging or use of an automated pill dispenser  Consider contacting your local [address] [place] [address] on Aging for possible eligible programs such as OPTIONS, Caregiver Support Program, or [address] updated advance directives and provide a copy to your primary care provider  Utilize reorientation and redirection as needed (dependent on situation)  Educational information provided     Patient and family verbalized understanding of above care plan. HPI:  We had the pleasure of evaluating Gilbert Barrett who is a 72 y.o. female   in Geriatric follow up today. She lives with   Ms. Armstrong is in the office unaccompanied. Patient was previously seen in senior care clinic due to subjective concerns for cognitive deficits; patient, however, underwent MOCA assessment at that time, which was unremarkable for any significant cognitive deficits. Additionally, patient had reported difficulty with balance/ ambulatory dysfunction with falls, as well as pulsatile tinnitus, however, states that the former has not returned, and has not experienced any falls for about 2 months since starting meclizine. Recently underwent ENT evaluation, with no evidence of inner ear pathology- ENT did subject patient to CTA-head and neck, which did not reveal any vascular pathologies, however, there was some evidence of ventricular enhancement (mild), with consideration given to a differential diagnosis, inclusive of normal pressure hydrocephalus. Patient had an appointment with neurology yesterday, however, patient's  got directions confused and she went to an incorrect location. She has a rescheduled appointment for March 2024. Patient expressed some concerns over what she perceives to be cognitive decline in her . Patient additionally endorses periods of bladder leakage, exacerbated by valsalva maneuvers such as coughing, which she has been addressing with scheduled voiding.  Denies any recent illnesses or hospitalizations. Patient seen with Dr. Almas Singleton        ROS: Review of Systems   Constitutional:  Negative for chills and fever. HENT:  Negative for ear pain and sore throat. Eyes:  Negative for pain and visual disturbance. Respiratory:  Negative for cough and shortness of breath. Cardiovascular:  Negative for chest pain and palpitations. Gastrointestinal:  Negative for abdominal pain and vomiting. Genitourinary:  Negative for dysuria and hematuria. Musculoskeletal:  Negative for arthralgias and back pain. Skin:  Negative for color change and rash. Neurological:  Negative for seizures and syncope. Psychiatric/Behavioral:  Negative for agitation and behavioral problems. All other systems reviewed and are negative.     Allergies:   No Known Allergies    Medications:      Current Outpatient Medications:     amoxicillin (AMOXIL) 500 mg capsule, TAKE 4 CAPSULES BY MOUTH 1 HOUR BEFORE APPOINTMENT, Disp: , Rfl:     ascorbic acid (VITAMIN C) 500 MG tablet, Take 1 tablet (500 mg total) by mouth 2 (two) times a day, Disp: 60 tablet, Rfl: 0    B Complex Vitamins (VITAMIN B COMPLEX PO), Take by mouth in the morning, Disp: , Rfl:     celecoxib (CeleBREX) 100 mg capsule, TAKE ONE CAPSULE BY MOUTH TWICE A DAY, Disp: 60 capsule, Rfl: 0    cholecalciferol (VITAMIN D3) 1,000 units tablet, Take 1,000 Units by mouth daily, Disp: , Rfl:     Doxylamine Succinate, Sleep, (SLEEP AID PO), Take by mouth, Disp: , Rfl:     DULoxetine (CYMBALTA) 60 mg delayed release capsule, TAKE ONE CAPSULE BY MOUTH AT BEDTIME, Disp: 30 capsule, Rfl: 2    Magnesium 400 MG TABS, Take 1 tablet (400 mg total) by mouth in the morning, Disp: 30 tablet, Rfl: 1    meclizine (ANTIVERT) 25 mg tablet, Take 1 tablet (25 mg total) by mouth 3 (three) times a day as needed for dizziness, Disp: 30 tablet, Rfl: 1    Multiple Vitamins-Minerals (CENTRUM SILVER PO), Take by mouth, Disp: , Rfl:     polyethylene glycol (GLYCOLAX) 17 GM/SCOOP powder, Take 17 g by mouth daily, Disp: 255 g, Rfl: 0    potassium chloride (K-DUR,KLOR-CON) 20 mEq tablet, Take 1 tablet (20 mEq total) by mouth daily, Disp: 30 tablet, Rfl: 5    rosuvastatin (CRESTOR) 40 MG tablet, Take 1 tablet (40 mg total) by mouth daily, Disp: 90 tablet, Rfl: 3    sodium picosulfate, magnesium oxide, citric acid (Clenpiq) oral solution, Take 175 mL (1 bottle) the evening before the colonoscopy, between 5 PM and 9 PM, followed by a second 175 mL bottle 5 hours before the colonoscopy., Disp: 350 mL, Rfl: 0    VITAMIN E PO, Take by mouth, Disp: , Rfl:     gabapentin (NEURONTIN) 300 mg capsule, Take 1 capsule by mouth every 6 (six) hours as needed (Patient not taking: Reported on 2023), Disp: , Rfl:     Vitals:  Vitals:    23 1551   BP: 122/78   Pulse: 80   Temp: 98.3 °F (36.8 °C)   SpO2: 100%       History:  Past Medical History:   Diagnosis Date    Arthritis     Balance disorder     GERD (gastroesophageal reflux disease)     Hyperlipidemia     Hypertension     Vertigo      Past Surgical History:   Procedure Laterality Date     SECTION      COLONOSCOPY      HYSTERECTOMY      HYSTEROSCOPY W/ ENDOMETRIAL ABLATION      KNEE SURGERY      CT ARTHRP ACETBLR/PROX FEM PROSTC AGRFT/ALGRFT Right 2022    Procedure: ARTHROPLASTY HIP TOTAL ANTERIOR and all associated procedures;  Surgeon: Araseli Richard MD;  Location:  MAIN OR;  Service: Orthopedics     Family History   Problem Relation Age of Onset    Arthritis Mother     Cancer Father     Heart disease Father     Anemia Brother      Social History     Socioeconomic History    Marital status: /Civil Union     Spouse name: Not on file    Number of children: Not on file    Years of education: Not on file    Highest education level: Not on file   Occupational History    Not on file   Tobacco Use    Smoking status: Never     Passive exposure: Past    Smokeless tobacco: Never   Vaping Use    Vaping Use: Never used Substance and Sexual Activity    Alcohol use: Never    Drug use: Never    Sexual activity: Not Currently   Other Topics Concern    Not on file   Social History Narrative    Not on file     Social Determinants of Health     Financial Resource Strain: Not on file   Food Insecurity: Not on file   Transportation Needs: Not on file   Physical Activity: Not on file   Stress: Not on file   Social Connections: Not on file   Intimate Partner Violence: Not At Risk (10/13/2023)    Humiliation, Afraid, Rape, and Kick questionnaire     Fear of Current or Ex-Partner: No     Emotionally Abused: No     Physically Abused: No     Sexually Abused: No   Housing Stability: Not on file     Past Surgical History:   Procedure Laterality Date     SECTION      COLONOSCOPY      HYSTERECTOMY      HYSTEROSCOPY W/ [address] [facility] ACETBLR/PROX FEM PROSTC AGRFT/ALGRFT Right 2022    Procedure: ARTHROPLASTY HIP TOTAL ANTERIOR and all associated procedures;  Surgeon: Saravanan Ma MD;  Location: [facility];  Service: Orthopedics         Physical Exam:   Physical Exam  Vitals reviewed. Constitutional:       Appearance: Normal appearance. She is obese. HENT:      Mouth/Throat:      Mouth: Mucous membranes are moist.   Eyes:      General:         Right eye: No discharge. Left eye: No discharge. Conjunctiva/sclera: Conjunctivae normal.   Cardiovascular:      Rate and Rhythm: Normal rate and regular rhythm. Heart sounds: S1 normal and S2 normal.   Pulmonary:      Effort: Pulmonary effort is normal.      Breath sounds: Normal breath sounds. Abdominal:      Palpations: Abdomen is soft. Musculoskeletal:         General: No swelling or tenderness. Cervical back: Neck supple. Skin:     General: Skin is warm and dry. Neurological:      Mental Status: She is alert and oriented to person, place, and time.    Psychiatric:         Mood and Affect: Mood normal. Behavior: Behavior normal.

## 2023-11-06 ENCOUNTER — APPOINTMENT (OUTPATIENT)
Dept: PHYSICAL THERAPY | Facility: CLINIC | Age: 73
End: 2023-11-06
Payer: MEDICARE

## 2023-11-06 NOTE — PROGRESS NOTES
[facility]  [address] [facility], [address] [facility], [address]  [phone]    Care Conference: Resources Provided    LSW participated in today's conference and provided the following resources, along with a copy of care plan:  Sivakumar GARNETT [id number]    General Information  - 10 Ways to [facility] fall prevention / home safety [address] [place] [address] in [facility] (contains information about higher levels of care, homecare, etc.)

## 2023-11-09 DIAGNOSIS — R42 VERTIGO: ICD-10-CM

## 2023-11-09 RX ORDER — MECLIZINE HYDROCHLORIDE 25 MG/1
25 TABLET ORAL 3 TIMES DAILY PRN
Qty: 30 TABLET | Refills: 1 | Status: SHIPPED | OUTPATIENT
Start: 2023-11-09

## 2023-11-13 ENCOUNTER — APPOINTMENT (OUTPATIENT)
Dept: PHYSICAL THERAPY | Facility: CLINIC | Age: 73
End: 2023-11-13
Payer: MEDICARE

## 2023-11-15 ENCOUNTER — APPOINTMENT (OUTPATIENT)
Dept: PHYSICAL THERAPY | Facility: CLINIC | Age: 73
End: 2023-11-15
Payer: MEDICARE

## 2023-11-15 DIAGNOSIS — M25.512 LEFT SHOULDER PAIN: ICD-10-CM

## 2023-11-15 RX ORDER — CELECOXIB 100 MG/1
100 CAPSULE ORAL 2 TIMES DAILY
Qty: 60 CAPSULE | Refills: 0 | Status: SHIPPED | OUTPATIENT
Start: 2023-11-15

## 2023-11-16 ENCOUNTER — ANESTHESIA (OUTPATIENT)
Dept: ANESTHESIOLOGY | Facility: AMBULATORY SURGERY CENTER | Age: 73
End: 2023-11-16

## 2023-11-16 ENCOUNTER — TELEPHONE (OUTPATIENT)
Age: 73
End: 2023-11-16

## 2023-11-16 ENCOUNTER — ANESTHESIA EVENT (OUTPATIENT)
Dept: ANESTHESIOLOGY | Facility: AMBULATORY SURGERY CENTER | Age: 73
End: 2023-11-16

## 2023-11-16 NOTE — TELEPHONE ENCOUNTER
Spoke w/Pt advised once she gets her Covid test results to contact the office. We would be able to determine if she could keep the procedure as scheduled on 11/30/23 or if it would need to be rescheduled to a later date. Pt agreed and will call on Monday.

## 2023-11-16 NOTE — TELEPHONE ENCOUNTER
Patients GI provider:  Dr. Ashkan Hernandez    Number to return call: [phone]    Reason for call: Pt calling stating she is sick and has a doctor's appt on Monday. Pt has not taken a covid test but will on Monday.  Pt wants to know if it is covid can she still proceed w/ her procedure on 11/30    Scheduled procedure/appointment date if applicable: procedure 47/00/86

## 2023-11-20 ENCOUNTER — OFFICE VISIT (OUTPATIENT)
Dept: FAMILY MEDICINE CLINIC | Facility: CLINIC | Age: 73
End: 2023-11-20
Payer: MEDICARE

## 2023-11-20 ENCOUNTER — OFFICE VISIT (OUTPATIENT)
Dept: PHYSICAL THERAPY | Facility: CLINIC | Age: 73
End: 2023-11-20
Payer: MEDICARE

## 2023-11-20 VITALS
HEART RATE: 94 BPM | TEMPERATURE: 98.8 F | SYSTOLIC BLOOD PRESSURE: 124 MMHG | DIASTOLIC BLOOD PRESSURE: 88 MMHG | HEIGHT: 65 IN | OXYGEN SATURATION: 97 % | BODY MASS INDEX: 38.99 KG/M2 | RESPIRATION RATE: 17 BRPM | WEIGHT: 234 LBS

## 2023-11-20 DIAGNOSIS — F11.20 CONTINUOUS OPIOID DEPENDENCE (HCC): ICD-10-CM

## 2023-11-20 DIAGNOSIS — B34.9 VIRAL ILLNESS: Primary | ICD-10-CM

## 2023-11-20 DIAGNOSIS — G89.29 CHRONIC LEFT SHOULDER PAIN: Primary | ICD-10-CM

## 2023-11-20 DIAGNOSIS — M25.512 CHRONIC LEFT SHOULDER PAIN: Primary | ICD-10-CM

## 2023-11-20 DIAGNOSIS — R26.89 BALANCE DISORDER: ICD-10-CM

## 2023-11-20 DIAGNOSIS — M54.16 LUMBAR RADICULOPATHY: ICD-10-CM

## 2023-11-20 LAB
SARS-COV-2 AG UPPER RESP QL IA: NEGATIVE
VALID CONTROL: NORMAL

## 2023-11-20 PROCEDURE — 87811 SARS-COV-2 COVID19 W/OPTIC: CPT | Performed by: PHYSICIAN ASSISTANT

## 2023-11-20 PROCEDURE — 97140 MANUAL THERAPY 1/> REGIONS: CPT | Performed by: PHYSICAL THERAPIST

## 2023-11-20 PROCEDURE — 99213 OFFICE O/P EST LOW 20 MIN: CPT | Performed by: PHYSICIAN ASSISTANT

## 2023-11-20 PROCEDURE — 97164 PT RE-EVAL EST PLAN CARE: CPT | Performed by: PHYSICAL THERAPIST

## 2023-11-20 NOTE — TELEPHONE ENCOUNTER
Pt called in to confirm that she does not have covid and she can proceed with her upcoming procedure.

## 2023-11-20 NOTE — PROGRESS NOTES
Name: Thuan Amador      : 1950      MRN: [id number]  Encounter Provider: Vikki Chester PA-C  Encounter Date: 2023   Encounter department: [facility]    Assessment & Plan     1. Viral illness  Comments:  Symptoms x10 days. Tested negative for COVID    Supportive care. Good hydration and nutrition. Recommended repeating home COVID test in 1 to 2 days  Continue to wear mask till symptoms improve  Patient will call with any questions or concerns    Depression Screening and Follow-up Plan: Patient was screened for depression during today's encounter. They screened negative with a PHQ-2 score of 0. Subjective      HPI  81-year-old female here today for sick visit. Patient states has been feeling ill for the past 10 days. Patient states has lost of taste and smell, mild body aches, nasal congestion and fatigue. Patient tested negative for COVID in the office today. Explain possibly could have some underlying flu as well however she is 10 days out and there would be no benefit with medication treatment. Patient states overall slightly feeling better main complaint now is fatigue and tiredness. Denies any fever, chills, headaches, dizziness, dyspnea, cough, chest pain, GI symptoms, abdominal pain.   Review of Systems  As per HPI  Current Outpatient Medications on File Prior to Visit   Medication Sig    ascorbic acid (VITAMIN C) 500 MG tablet Take 1 tablet (500 mg total) by mouth 2 (two) times a day    B Complex Vitamins (VITAMIN B COMPLEX PO) Take by mouth in the morning    celecoxib (CeleBREX) 100 mg capsule TAKE ONE CAPSULE BY MOUTH TWICE A DAY    cholecalciferol (VITAMIN D3) 1,000 units tablet Take 1,000 Units by mouth daily    Doxylamine Succinate, Sleep, (SLEEP AID PO) Take by mouth    DULoxetine (CYMBALTA) 60 mg delayed release capsule TAKE ONE CAPSULE BY MOUTH AT BEDTIME    Magnesium 400 MG TABS Take 1 tablet (400 mg total) by mouth in the morning meclizine (ANTIVERT) 25 mg tablet TAKE 1 TABLET BY MOUTH 3 TIMES DAILY AS NEEDED FOR DIZZINESS    Multiple Vitamins-Minerals (CENTRUM SILVER PO) Take by mouth    polyethylene glycol (GLYCOLAX) 17 GM/SCOOP powder Take 17 g by mouth daily    potassium chloride (K-DUR,KLOR-CON) 20 mEq tablet Take 1 tablet (20 mEq total) by mouth daily    rosuvastatin (CRESTOR) 40 MG tablet Take 1 tablet (40 mg total) by mouth daily    sodium picosulfate, magnesium oxide, citric acid (Clenpiq) oral solution Take 175 mL (1 bottle) the evening before the colonoscopy, between 5 PM and 9 PM, followed by a second 175 mL bottle 5 hours before the colonoscopy. VITAMIN E PO Take by mouth    amoxicillin (AMOXIL) 500 mg capsule TAKE 4 CAPSULES BY MOUTH 1 HOUR BEFORE APPOINTMENT (Patient not taking: Reported on 11/20/2023)    gabapentin (NEURONTIN) 300 mg capsule Take 1 capsule by mouth every 6 (six) hours as needed (Patient not taking: Reported on 9/27/2023)       Objective     /88 (BP Location: Left arm, Patient Position: Sitting, Cuff Size: Large)   Pulse 94   Temp 98.8 °F (37.1 °C) (Tympanic)   Resp 17   Ht 5' 4.8" (1.646 m)   Wt 106 kg (234 lb)   SpO2 97%   BMI 39.18 kg/m²     Physical Exam  Vitals and nursing note reviewed. Constitutional:       General: She is not in acute distress. Appearance: She is not ill-appearing, toxic-appearing or diaphoretic. HENT:      Head: Normocephalic. Nose: Congestion present. Mouth/Throat:      Mouth: Mucous membranes are moist.      Pharynx: Oropharynx is clear. Eyes:      General: No scleral icterus. Conjunctiva/sclera: Conjunctivae normal.      Pupils: Pupils are equal, round, and reactive to light. Cardiovascular:      Rate and Rhythm: Normal rate and regular rhythm. Heart sounds: Normal heart sounds. Pulmonary:      Effort: Pulmonary effort is normal. No respiratory distress. Breath sounds: Normal breath sounds. No wheezing, rhonchi or rales. Abdominal:      General: Bowel sounds are normal.      Palpations: Abdomen is soft. Tenderness: There is no abdominal tenderness. Musculoskeletal:      Cervical back: Neck supple. Right lower leg: No edema. Left lower leg: No edema. Lymphadenopathy:      Cervical: No cervical adenopathy. Skin:     General: Skin is warm and dry. Coloration: Skin is not jaundiced. Neurological:      General: No focal deficit present. Mental Status: She is alert and oriented to person, place, and time.    Psychiatric:         Mood and Affect: Mood normal.       Cheyanne Maria PA-C

## 2023-11-20 NOTE — PROGRESS NOTES
PT Re-Evaluation     Today's date: 2023  Patient name: Dann Russell  : 1950  MRN: [id number]  Referring provider: Isabel Sofia DO  Dx:   Encounter Diagnosis     ICD-10-CM    1. Chronic left shoulder pain  M25.512     G89.29       2. Lumbar radiculopathy  M54.16       3. Balance disorder  R26.89                      Assessment  Assessment details: RE-EVALUATION 23: Patient has only been seen for 4 visits since last re-evaluation over 1 month ago, in part due to work schedule and illness. Patient has maintained improvements in her balance, with further progress seen with FGA (functional gait assessment). This in combination with no recent losses of balance or falls indicates that patient's balance treatment can be transitioned into a home program. Priority will shift to focus on her her shoulders, and lumbar spine was evaluated for the first time. Objectively, patient demonstrates improvements in shoulder strength and ROM compared to 1 month ago, but is still moderately limited by pain and lack of ROM. In regard to lumbar spine, patient shows only mild ROM deficits of hips and lumbar spine, but moderate core muscle weakness and poor postural control with prolonged positioning for standing and doing household chores like vacuuming and doing dishes. Patient requires continued skilled PT to address impairments related to b/l shoulder pain and low back pain/lumbar radiculopathy to improve function and quality of life. RE-EVALUATION 10/12/23: Patient has been seen for 16 visits over the past 2 months for balance impairments/frequent falls and b/l shoulder pain. Treatment has consisted of neuromuscular reeducation to improve balance and stability, therapeutic exercises to improve shoulder flexibility and strength, gait training, manual therapy to improve shoulder ROM and joint mobility, and patient education on home program and strategies to reduce falls.  From an objective standpoint, patient demonstrates significant improvement in balance and fall risk evident with LIU balance test, functional gait assessment, and tandem stance balance. Functionally, patient hasn't had any falls or losses of balance over the past two weeks. She still is fearful of falling and is cautious/slow with her movements. In regards to her shoulder, less progress has been seen, but fall risk was of higher priority so more treatment was focused on this. Patient would benefit from continued skilled PT over the next 4 weeks with greater emphasis on her shoulders and improving confidence in her balance. Patient also has complaints and PT script for her lumbar spine. She would like benefit from evaluation and treatment of these impairments when shoulder and balance therapy is finished. Impairments: abnormal muscle firing, impaired balance, lacks appropriate home exercise program and safety issue  Understanding of Dx/Px/POC: excellent  Goals  Short term goals:  Patient is independent in home program to support plan of care and improve function. - 2 weeks, goal met for balance, new for lumbar spine  Patient demonstrates improvement in balance with tandem stance of at least 30 seconds b/l, EO. - nearly met (29 sec/30 sec)  Patient demonstrates improvement in gait speed with increase in speed to 1.2 m/s with quickened pace so patient can safely cross busy street. - goal met  Impairments related to b/l shoulder and low back pain assessed. - goal met  Patient demonstrates proper body mechanics with vacuuming to decrease strain to lumbar spine. - 2 weeks  Patient can perform posterior pelvic tilt for improved core strength and posture awareness with performing household chores. - 2 weeks    Long term goals:  Patient demonstrates improvement in community participation with increase in FOTO score by 10%. - 8 weeks  Patient demonstrates reduction in fall risk with timed up and go test of 10 seconds or less.  - nearly met,   Patient demonstrates reduction in fall risk and improvement in balance with LIU score of 48 or better and FGA of 22 or better. - goal met  Patient has no falls or loss of balance over the past month. - goal met   Patient demonstrates 120 deg. Shoulder flexion AROM b/l to improve ability to perform ADLs and meal prep. - 4 weeks  Patient demonstrates T12 IR AROM behind the back to reduce difficulty with dressing. 8 weeks  Patient demonstrates proper lumbar posture in standing to reduce strain and pain with doing dishes. 6 weeks  Patient demonstrates 4+ core strength so she can perform work duties without back pain. 8 weeks      Plan  Planned therapy interventions: ADL training, balance, body mechanics training, motor coordination training, neuromuscular re-education, patient education, strengthening, therapeutic exercise, home exercise program and gait training  Frequency: 2x week  Duration in weeks: 8  Plan of Care beginning date: 11/20/2023  Plan of Care expiration date: 1/19/2024  Treatment plan discussed with: patient      Subjective Evaluation    History of Present Illness  Date of onset: 8/1/2022  Mechanism of injury: RE-EVALUATION 11/20/23: Patient returns to therapy several weeks after last session. She reports feeling sick about 10 days ago. She was told that she may have had COVID and she has been resting a lot more lately. She is due to start back at work tomorrow. She hasn't had much shoulder or back pain lately, but she thinks it is related to not being as active since she was sick. She also hasn't had any losses of balance or falls over the past month. RE-EVALUATION 10/12/23: Patient reports no falls or losses of balance over the past few weeks. She is still very cautious with her movements, and moves more slowly to avoid loss of balance. She is interested in going back to work. She has follow up with PCP tomorrow, and is getting some more memory testing done.  In regard to her shoulder, she still gets pain, but no more than /10 recently. She is getting a lot of popping/clicking in her shoulders, but it doesn't hurt at rest. She is still very limited with reaching overhead, behind back, or lifting with shoulders for ADLs and household chores. Initial evaluation: Patient has PT prescription for left shoulder pain, weakness in b/l lower extremities, and lumbar radiculopathy. She also reports R shoulder pain, and frequent falls/balance issues, which is of greatest concern. Patient reports balance issues started approximately 1 year ago, unsure of cause, possibly following R hip MUSA. She reports falling 2-3x per month, often related to losing her balance when turning. She has been to ER twice in the past month, once from difficulty moving her legs while in swimming pool, diagnosed as hypokalemia and given supplements, and once from tripping and falling (which aggravated shoulder pain). She saw PCP and orthopedist for shoulder pain. Symptoms: imbalance and frequent falls. (shoulder and low back pain will be assessed at later date due to time constraints). Patient falls 2-3x per month, often related to turning, walking on uneven ground, looking up, bending over to pick something off the floor. Patient denies buckling, LE paraesthesias, dizziness, or lightheadedness. Upon questioning, she reports that her handwriting has gotten worse. She has not seen neurologist. She reports several occurrences of shift when walking in the middle of the night, where she falls to the side. 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  HTN    Patient Goals  Patient goals for therapy: decreased pain and increased motion    Pain  At worst pain ratin (shoulder pain)        Objective     Active Range of Motion   Left Shoulder   Flexion: 116 degrees with pain  Abduction: 130 degrees with pain  External rotation BTH: T2 with pain  Internal rotation BTB: T11 with pain    Right Shoulder   Flexion: 110 degrees with pain  Abduction: 120 degrees with pain  External rotation BTH: T2 with pain  Internal rotation BTB: L3 with pain    Additional Active Range of Motion Details  Lumbar AROM %  Flexion 100%  Extension 75%  R side bending 100%  L side bending 100%  R rotation 75%  L rotation 75%    Passive Range of Motion   Left Shoulder   Flexion: 135 degrees with pain    Right Shoulder   Flexion: 130 degrees with pain    Strength/Myotome Testing     Left Shoulder     Planes of Motion   Flexion: 5   Abduction: 4+   External rotation at 0°: 4+   Internal rotation at 0°: 5     Right Shoulder     Planes of Motion   Flexion: 4+   Abduction: 4+   External rotation at 0°: 4+   Internal rotation at 0°: 5     Left Hip   Planes of Motion   Flexion: 5  Abduction: 5    Right Hip   Planes of Motion   Flexion: 4+  Abduction: 4+    Left Knee   Flexion: 5  Extension: 5    Right Knee   Flexion: 5  Extension: 5    Left Ankle/Foot   Dorsiflexion: 5  Eversion: 5    Right Ankle/Foot   Dorsiflexion: 5  Eversion: 5    Additional Strength Details  PPT unable  Bridge: able  Abdominal curl up, mild difficulty   TA contraction: able    Tests     Lumbar     Left   Negative passive SLR. Right   Negative passive SLR. Additional Tests Details  Balance testing:  Tandem stance: 30 seconds R, 29 L EO  SLS 1 on L, 1 seconds on R  TUG: 10.5 seconds    Functional gait assessment: 27 (23 previously, 17 start of care. Under 22 indicates fall risk)    General Comments:      Hip Comments   R hip: moderate IR restriction  L hip  mild ER restriction           Precautions: Moderate fall risk, HTN, R MUSA, L TKA.  EPOC 1/19/24      Manuals 9/28 10/3 10/5 10/10 10/12 11/20 9/14 9/19 9/21 9/26   shld PROM b/l  DES DES DES DES DES DES DES DES DES   R [address] [place] mob gr 3-4  DES LAD only DES LAD DES LAD DES LAD DES DES DES LAD DES DES LAD   stm r bicep delt             B/l UT STM       DES      Neuro Re-Ed             Step taps  20x           Tandem stance 3x30" 30"x3 3x30" 3x30"ea nv  3x30" 2x30" ea 3x30" 3x30"   Standing march    30x nv     2x10   m-ctsib testing 8'  biodex practice 5' biodex practice 5' 5'        Rocker board 10x ea 15 ea 20 ea 20 ea         LIU balance     10'        FGA     10'        Walking with unexpected turns   Quick turns 4'  Quick turns 3'     5' 3'                shld ext with retraction       10x gtb      B/l ER with retraction  2x10 gtb 2x10 gtb    10x5" rtb  10x5" rtb loop 2x10 rtb loop   scap retraction       10x5"      Ther Ex             Pt edu POC and HEP      5'       ROM and MMT assessments for shoulder     5' Re-eval 30'       Supine wand flexion  10x5" 10x5" 10x5"   10x10" 10x10" 10x10" 10x10"   shlf flexion over foam roll  10x5"  10x5"      10x10"                Cross body adduction             Seated wand ER AROM  10x5" ea 10x5"    10x5" ea  supine 10x    Thoracic ext          10x foam   Ther Activity             Walking with head turns 4x10 ft 4x10 ft        2x10 ft   Walk with EC  4x10 ft        2x10 ft   Walking with cone   2x 6 cones 2x 6 cones 1x6 cones         Walking bkwds   4x10 ft       2x10 ft   Sit>stand             Mini squats             Fwd step ups             Gait Training                                       Modalities             Cold pack   At home 8' b/l   8' b/l  8' R 8' b/l

## 2023-11-22 ENCOUNTER — OFFICE VISIT (OUTPATIENT)
Dept: PHYSICAL THERAPY | Facility: CLINIC | Age: 73
End: 2023-11-22
Payer: MEDICARE

## 2023-11-22 DIAGNOSIS — M25.511 CHRONIC RIGHT SHOULDER PAIN: ICD-10-CM

## 2023-11-22 DIAGNOSIS — M54.16 LUMBAR RADICULOPATHY: ICD-10-CM

## 2023-11-22 DIAGNOSIS — G89.29 CHRONIC RIGHT SHOULDER PAIN: ICD-10-CM

## 2023-11-22 DIAGNOSIS — M25.512 CHRONIC LEFT SHOULDER PAIN: Primary | ICD-10-CM

## 2023-11-22 DIAGNOSIS — G89.29 CHRONIC LEFT SHOULDER PAIN: Primary | ICD-10-CM

## 2023-11-22 PROCEDURE — 97110 THERAPEUTIC EXERCISES: CPT | Performed by: PHYSICAL THERAPIST

## 2023-11-22 PROCEDURE — 97010 HOT OR COLD PACKS THERAPY: CPT | Performed by: PHYSICAL THERAPIST

## 2023-11-22 PROCEDURE — 97112 NEUROMUSCULAR REEDUCATION: CPT | Performed by: PHYSICAL THERAPIST

## 2023-11-22 PROCEDURE — 97140 MANUAL THERAPY 1/> REGIONS: CPT | Performed by: PHYSICAL THERAPIST

## 2023-11-22 NOTE — PROGRESS NOTES
Daily Note    Today's date: 2023  Patient name: Thuan Amador  : 1950  MRN: [id number]  Referring provider: Jose Negron DO  Dx:   Encounter Diagnosis     ICD-10-CM    1. Chronic left shoulder pain  M25.512     G89.29       2. Chronic right shoulder pain  M25.511     G89.29       3. Lumbar radiculopathy  M54.16                      Subjective: patient reports that she had some shoulder soreness following re-evaluation last session, but had work today without significant issue. Objective: See treatment diary below      Assessment: Began session with light manual therapy to b/l shoulders, which she tolerated without pain. Initiated core stabilization treatment today with bridging and posterior pelvic tilt. Patient had difficulty coordinating PPT, so stabilizer cuff was used for feedback. Recommended patient purchase shoulder pulleys for home use due to painfree stretch that she was able to do here. Patient would benefit from continued skilled PT per plan of care to address impairments and improve function. Plan: Continue per plan of care. Progress shoulder strength/mobility and core strength as tolerated.       Precautions: HTN, R MUSA, L TKA  EPOC 24      Manuals 9/28 10/3 10/5 10/10 10/12 11/20 11/22      shld PROM b/l  DES DES DES DES DES DES      R [address]  DES LAD only DES LAD DES LAD DES LAD DES DES gr 1-2                                Neuro Re-Ed                                                                 bridge       10x      PPT with stabilizer cuff for biofeedback       10x      B/l ER with retraction  2x10 gtb 2x10 gtb    10x gtb      [facility] ext with retraction       2x10 gtb      Ther Ex             Pt edu POC and HEP      5' 2'      Pulley flexion       5x10"      [place] scaption/abd       5x10"      Supine wand flexion  10x5" 10x5" 10x5"         shld flexion over foam roll  10x5"  10x5"         Butterfly stretch       4x20"      Figure 4 stretch       R 3x15"      Cross body adduction             Seated wand ER AROM  10x5" ea 10x5"          Thoracic ext             Ther Activity                                                                                                        Modalities             Cold pack   At home 8' b/l   8' R  8' R 8' b/l

## 2023-11-24 ENCOUNTER — TELEPHONE (OUTPATIENT)
Dept: GASTROENTEROLOGY | Facility: CLINIC | Age: 73
End: 2023-11-24

## 2023-11-25 NOTE — TELEPHONE ENCOUNTER
Procedure confirmed  Colonoscopy     Via: Spoke with patient. Instructions given: Given to Patient at Visit     Prep Given: Clenpiq    Call the office if there are any questions.

## 2023-11-28 ENCOUNTER — OFFICE VISIT (OUTPATIENT)
Dept: PHYSICAL THERAPY | Facility: CLINIC | Age: 73
End: 2023-11-28
Payer: MEDICARE

## 2023-11-28 DIAGNOSIS — M54.16 LUMBAR RADICULOPATHY: ICD-10-CM

## 2023-11-28 DIAGNOSIS — M25.511 CHRONIC RIGHT SHOULDER PAIN: ICD-10-CM

## 2023-11-28 DIAGNOSIS — G89.29 CHRONIC RIGHT SHOULDER PAIN: ICD-10-CM

## 2023-11-28 DIAGNOSIS — G89.29 CHRONIC LEFT SHOULDER PAIN: Primary | ICD-10-CM

## 2023-11-28 DIAGNOSIS — M25.512 CHRONIC LEFT SHOULDER PAIN: Primary | ICD-10-CM

## 2023-11-28 DIAGNOSIS — M25.512 LEFT SHOULDER PAIN: ICD-10-CM

## 2023-11-28 PROCEDURE — 97112 NEUROMUSCULAR REEDUCATION: CPT | Performed by: PHYSICAL THERAPIST

## 2023-11-28 PROCEDURE — 97140 MANUAL THERAPY 1/> REGIONS: CPT | Performed by: PHYSICAL THERAPIST

## 2023-11-28 RX ORDER — CELECOXIB 100 MG/1
100 CAPSULE ORAL 2 TIMES DAILY
Qty: 60 CAPSULE | Refills: 0 | Status: SHIPPED | OUTPATIENT
Start: 2023-11-28

## 2023-11-28 NOTE — PROGRESS NOTES
Daily Note    Today's date: 2023  Patient name: Melia Maxwell  : 1950  MRN: [id number]  Referring provider: Flori Stoll DO  Dx:   Encounter Diagnosis     ICD-10-CM    1. Chronic left shoulder pain  M25.512     G89.29       2. Lumbar radiculopathy  M54.16       3. Chronic right shoulder pain  M25.511     G89.29                      Subjective: patient reports that her shoulder's haven't been that sore lately. Objective: See treatment diary below      Assessment: Patient tolerated manual therapy to shoulders well with stiffness at end range, but no provocation of pain. Patient continues to have difficulty with correct muscle activation during pelvic tilt, so stabilizer cuff was used again for biofeedback with fair results. Patient had difficulty maintaining upright seated posture during wand ER, possibly from postural muscle weakness. Patient would benefit from continued skilled PT per plan of care to address impairments and improve function. Plan: Continue per plan of care. Progress shoulder strength/mobility and core strength as tolerated.       Precautions: HTN, R MUSA, L TKA  EPOC 24      Manuals 9/28 10/3 10/5 10/10 10/12 11/20 11/22 11/28     shld PROM b/l  DES DES DES DES DES DES DES     R [address]  DES LAD only DES LAD DES LAD DES LAD DES DES gr 1-2 DES LAD                               Neuro Re-Ed                                                                 bridge       10x 2x10     PPT with stabilizer cuff for biofeedback       10x 15x     B/l ER with retraction  2x10 gtb 2x10 gtb    10x gtb 2x10 gtb     [facility] ext with retraction       2x10 gtb      Ther Ex             Pt edu POC and HEP      5' 2'      Pulley flexion       5x10" 10x10"     Pulley scaption/abd       5x10" 10x10"     Supine wand flexion  10x5" 10x5" 10x5"    10x     shld flexion over foam roll  10x5"  10x5"         Butterfly stretch       4x20" 4x20"     Figure 4 stretch       R 3x15" 4x20" b/l     Cross body adduction Seated wand ER AROM  10x5" ea 10x5"     10x5" ea     Thoracic ext             Ther Activity                                                                                                        Modalities             Cold pack   At home 8' b/l   8' R

## 2023-11-30 ENCOUNTER — ANESTHESIA (OUTPATIENT)
Dept: GASTROENTEROLOGY | Facility: AMBULATORY SURGERY CENTER | Age: 73
End: 2023-11-30

## 2023-11-30 ENCOUNTER — HOSPITAL ENCOUNTER (OUTPATIENT)
Dept: GASTROENTEROLOGY | Facility: AMBULATORY SURGERY CENTER | Age: 73
Discharge: HOME | End: 2023-11-30
Attending: INTERNAL MEDICINE
Payer: MEDICARE

## 2023-11-30 ENCOUNTER — ANESTHESIA EVENT (OUTPATIENT)
Dept: GASTROENTEROLOGY | Facility: AMBULATORY SURGERY CENTER | Age: 73
End: 2023-11-30

## 2023-11-30 VITALS
OXYGEN SATURATION: 98 % | RESPIRATION RATE: 18 BRPM | HEIGHT: 64 IN | WEIGHT: 234 LBS | HEART RATE: 76 BPM | SYSTOLIC BLOOD PRESSURE: 140 MMHG | BODY MASS INDEX: 39.95 KG/M2 | DIASTOLIC BLOOD PRESSURE: 82 MMHG | TEMPERATURE: 98 F

## 2023-11-30 DIAGNOSIS — R19.5 POSITIVE COLORECTAL CANCER SCREENING USING COLOGUARD TEST: ICD-10-CM

## 2023-11-30 PROCEDURE — 45385 COLONOSCOPY W/LESION REMOVAL: CPT | Performed by: INTERNAL MEDICINE

## 2023-11-30 PROCEDURE — 45380 COLONOSCOPY AND BIOPSY: CPT | Performed by: INTERNAL MEDICINE

## 2023-11-30 PROCEDURE — 88305 TISSUE EXAM BY PATHOLOGIST: CPT | Performed by: PATHOLOGY

## 2023-11-30 RX ORDER — PROPOFOL 10 MG/ML
INJECTION, EMULSION INTRAVENOUS AS NEEDED
Status: DISCONTINUED | OUTPATIENT
Start: 2023-11-30 | End: 2023-11-30

## 2023-11-30 RX ORDER — LIDOCAINE HYDROCHLORIDE 10 MG/ML
INJECTION, SOLUTION EPIDURAL; INFILTRATION; INTRACAUDAL; PERINEURAL AS NEEDED
Status: DISCONTINUED | OUTPATIENT
Start: 2023-11-30 | End: 2023-11-30

## 2023-11-30 RX ORDER — SODIUM CHLORIDE, SODIUM LACTATE, POTASSIUM CHLORIDE, CALCIUM CHLORIDE 600; 310; 30; 20 MG/100ML; MG/100ML; MG/100ML; MG/100ML
50 INJECTION, SOLUTION INTRAVENOUS CONTINUOUS
Status: DISCONTINUED | OUTPATIENT
Start: 2023-11-30 | End: 2023-12-04 | Stop reason: HOSPADM

## 2023-11-30 RX ADMIN — PROPOFOL 50 MG: 10 INJECTION, EMULSION INTRAVENOUS at 13:06

## 2023-11-30 RX ADMIN — PROPOFOL 150 MG: 10 INJECTION, EMULSION INTRAVENOUS at 13:00

## 2023-11-30 RX ADMIN — LIDOCAINE HYDROCHLORIDE 50 MG: 10 INJECTION, SOLUTION EPIDURAL; INFILTRATION; INTRACAUDAL; PERINEURAL at 13:00

## 2023-11-30 RX ADMIN — PROPOFOL 50 MG: 10 INJECTION, EMULSION INTRAVENOUS at 13:14

## 2023-11-30 RX ADMIN — SODIUM CHLORIDE, SODIUM LACTATE, POTASSIUM CHLORIDE, CALCIUM CHLORIDE 50 ML/HR: 600; 310; 30; 20 INJECTION, SOLUTION INTRAVENOUS at 12:50

## 2023-11-30 NOTE — H&P
History and Physical - [facility]  Julio Gordon 72 y.o. female MRN: [id number]    HPI: Julio Gordon is a 72 y.o. female who presents for diagnostic colonoscopy due to positive Cologuard    REVIEW OF SYSTEMS: Per the HPI, and otherwise unremarkable.     Historical Information   Past Medical History:   Diagnosis Date    Arthritis     Balance disorder     GERD (gastroesophageal reflux disease)     Hyperlipidemia     Hypertension     Vertigo      Past Surgical History:   Procedure Laterality Date     SECTION      COLONOSCOPY      HYSTERECTOMY      HYSTEROSCOPY W/ ENDOMETRIAL ABLATION      JOINT REPLACEMENT      KNEE SURGERY      [facility] ARTHRP ACETBLR/PROX FEM PROSTC AGRFT/ALGRFT Right 2022    Procedure: ARTHROPLASTY HIP TOTAL ANTERIOR and all associated procedures;  Surgeon: Brittni Barr MD;  Location:  MAIN OR;  Service: Orthopedics     Social History   Social History     Substance and Sexual Activity   Alcohol Use Never     Social History     Substance and Sexual Activity   Drug Use Never     Social History     Tobacco Use   Smoking Status Never    Passive exposure: Past   Smokeless Tobacco Never     Family History   Problem Relation Age of Onset    Arthritis Mother     Cancer Father     Heart disease Father     Anemia Brother        Meds/Allergies       Current Outpatient Medications:     ascorbic acid (VITAMIN C) 500 MG tablet    B Complex Vitamins (VITAMIN B COMPLEX PO)    celecoxib (CeleBREX) 100 mg capsule    Doxylamine Succinate, Sleep, (SLEEP AID PO)    DULoxetine (CYMBALTA) 60 mg delayed release capsule    Magnesium 400 MG TABS    meclizine (ANTIVERT) 25 mg tablet    Multiple Vitamins-Minerals (CENTRUM SILVER PO)    potassium chloride (K-DUR,KLOR-CON) 20 mEq tablet    rosuvastatin (CRESTOR) 40 MG tablet    sodium picosulfate, magnesium oxide, citric acid (Clenpiq) oral solution    VITAMIN E PO    amoxicillin (AMOXIL) 500 mg capsule    cholecalciferol (VITAMIN D3) 1,000 units tablet    gabapentin (NEURONTIN) 300 mg capsule    polyethylene glycol (GLYCOLAX) 17 GM/SCOOP powder    Current Facility-Administered Medications:     lactated ringers infusion, 50 mL/hr, Intravenous, Continuous, 50 mL/hr at 11/30/23 1250    No Known Allergies    Objective     /79   Pulse 85   Temp 98 °F (36.7 °C) (Temporal)   Resp 20   Ht 5' 4" (1.626 m)   Wt 106 kg (234 lb)   SpO2 98%   BMI 40.17 kg/m²     PHYSICAL EXAM    Gen: NAD AAOx3  Head: Normocephalic, Atraumatic  CV: S1S2 RRR no m/r/g  CHEST: Clear b/l no c/r/w  ABD: soft, +BS NT/ND  EXT: no edema    ASSESSMENT/PLAN:  This is a 72y.o. year old female here for diagnostic colonoscopy, and she is stable and optimized for her procedure.

## 2023-11-30 NOTE — ANESTHESIA PREPROCEDURE EVALUATION
Procedure:  COLONOSCOPY    Relevant Problems   CARDIO   (+) Essential hypertension      HEMATOLOGY   (+) Anemia      MUSCULOSKELETAL   (+) Chronic bilateral low back pain without sciatica   (+) Osteoarthritis of knee, unilateral   (+) Primary osteoarthritis of right hip   (+) Sciatica      NEURO/PSYCH   (+) Chronic bilateral low back pain without sciatica   (+) Chronic pain of both shoulders   (+) Chronic pain syndrome   (+) Continuous opioid dependence (HCC)        Physical Exam    Airway    Mallampati score: II         Dental       Cardiovascular  Cardiovascular exam normal    Pulmonary  Pulmonary exam normal     Other Findings  post-pubertal.      Anesthesia Plan  ASA Score- 3     Anesthesia Type- IV sedation with anesthesia with ASA Monitors. Additional Monitors:     Airway Plan:            Plan Factors-    Chart reviewed. Patient is not a current smoker. Patient did not smoke on day of surgery. Induction- intravenous. Postoperative Plan-     Informed Consent- Anesthetic plan and risks discussed with patient. I personally reviewed this patient with the CRNA. Discussed and agreed on the Anesthesia Plan with the CRNA. Kristina Castro

## 2023-11-30 NOTE — PROGRESS NOTES
Procedure results reviewed with patient by Dr Rachel Oakes. Pt given written and verbal d/c instructions.

## 2023-11-30 NOTE — ANESTHESIA POSTPROCEDURE EVALUATION
Post-Op Assessment Note    CV Status:  Stable  Pain Score: 0    Pain management: adequate       Mental Status:  Alert and awake   Hydration Status:  Euvolemic   PONV Controlled:  None   Airway Patency:  Patent     Post Op Vitals Reviewed: Yes    No anethesia notable event occurred.     Staff: Anesthesiologist, CRNA   Comments: report given to RN; RANDOLPH; JOANA              BP   155/79   Temp      Pulse  74   Resp   16   SpO2   98

## 2023-12-04 ENCOUNTER — OFFICE VISIT (OUTPATIENT)
Dept: PHYSICAL THERAPY | Facility: CLINIC | Age: 73
End: 2023-12-04
Payer: MEDICARE

## 2023-12-04 DIAGNOSIS — G89.29 CHRONIC RIGHT SHOULDER PAIN: ICD-10-CM

## 2023-12-04 DIAGNOSIS — M25.512 CHRONIC LEFT SHOULDER PAIN: Primary | ICD-10-CM

## 2023-12-04 DIAGNOSIS — M25.511 CHRONIC RIGHT SHOULDER PAIN: ICD-10-CM

## 2023-12-04 DIAGNOSIS — M54.16 LUMBAR RADICULOPATHY: ICD-10-CM

## 2023-12-04 DIAGNOSIS — G89.29 CHRONIC LEFT SHOULDER PAIN: Primary | ICD-10-CM

## 2023-12-04 PROCEDURE — 97140 MANUAL THERAPY 1/> REGIONS: CPT | Performed by: PHYSICAL THERAPIST

## 2023-12-04 PROCEDURE — 97112 NEUROMUSCULAR REEDUCATION: CPT | Performed by: PHYSICAL THERAPIST

## 2023-12-04 PROCEDURE — 88305 TISSUE EXAM BY PATHOLOGIST: CPT | Performed by: PATHOLOGY

## 2023-12-04 NOTE — PROGRESS NOTES
Daily Note    Today's date: 2023  Patient name: Faviola Daley  : 1950  MRN: [id number]  Referring provider: Sundeep Collado DO  Dx:   Encounter Diagnosis     ICD-10-CM    1. Chronic left shoulder pain  M25.512     G89.29       2. Lumbar radiculopathy  M54.16       3. Chronic right shoulder pain  M25.511     G89.29                      Subjective: patient reports that her shoulders and low back haven't been that sore, unless really active like today at work. She does report that she had fall/loss of balance Saturday when getting up. It was the symptom she had several months ago where it feels like something pushes her over. She does not she think she had any dizziness. She had finished Meclozine medication but resumed just prior to this occurrence. Objective: See treatment diary below      Assessment: Recommended patient tell PCP at her follow up this week about recent fall. Patient has neurologist appointment but not for several months. No pain provocation with shoulder manual therapy today. Only minor cuing required for shoulder exercise technique. Slight improvement seen in muscle recruitment with PPT. Patient would benefit from continued skilled PT per plan of care to address impairments and improve function. Plan: Continue per plan of care. Progress shoulder strength/mobility and core strength as tolerated.       Precautions: HTN, R MUSA, L TKA  EPOC 24      Manuals 9/28 10/3 10/5 10/10 10/12 11/20 11/22 11/28 12/4    shld PROM b/l  DES DES DES DES DES DES DES DES    R [address]  DES LAD only DES LAD DES LAD DES LAD DES DES gr 1-2 DES LAD DES LAD                              Neuro Re-Ed                                                                 bridge       10x 2x10 2x10    PPT with stabilizer cuff for biofeedback       10x 15x 20x    B/l ER with retraction  2x10 gtb 2x10 gtb    10x gtb 2x10 gtb 2x10 gtb    Shld ext with retraction       2x10 gtb      Ther Ex             Pt edu POC and HEP 5' 2'      Pulley flexion       5x10" 10x10" 10x10"    Pulley scaption/abd       5x10" 10x10" 10x10"    Supine wand flexion  10x5" 10x5" 10x5"    10x 5x standing    shld flexion over foam roll  10x5"  10x5"         Butterfly stretch       4x20" 4x20" 4x20"    Figure 4 stretch       R 3x15" 4x20" b/l     Cross body adduction             Seated wand ER AROM  10x5" ea 10x5"     10x5" ea 8x5" ea    Thoracic ext             Ther Activity                                                                                                        Modalities             Cold pack   At home 8' b/l   8' R  deferred

## 2023-12-06 ENCOUNTER — OFFICE VISIT (OUTPATIENT)
Dept: PHYSICAL THERAPY | Facility: CLINIC | Age: 73
End: 2023-12-06
Payer: MEDICARE

## 2023-12-06 DIAGNOSIS — G89.29 CHRONIC LEFT SHOULDER PAIN: Primary | ICD-10-CM

## 2023-12-06 DIAGNOSIS — G89.29 CHRONIC RIGHT SHOULDER PAIN: ICD-10-CM

## 2023-12-06 DIAGNOSIS — M25.511 CHRONIC RIGHT SHOULDER PAIN: ICD-10-CM

## 2023-12-06 DIAGNOSIS — M25.512 CHRONIC LEFT SHOULDER PAIN: Primary | ICD-10-CM

## 2023-12-06 DIAGNOSIS — M54.16 LUMBAR RADICULOPATHY: ICD-10-CM

## 2023-12-06 PROCEDURE — 97112 NEUROMUSCULAR REEDUCATION: CPT | Performed by: PHYSICAL THERAPIST

## 2023-12-06 PROCEDURE — 97140 MANUAL THERAPY 1/> REGIONS: CPT | Performed by: PHYSICAL THERAPIST

## 2023-12-06 PROCEDURE — 97110 THERAPEUTIC EXERCISES: CPT | Performed by: PHYSICAL THERAPIST

## 2023-12-06 NOTE — PROGRESS NOTES
Daily Note    Today's date: 2023  Patient name: Tavon Kennedy  : 1950  MRN: [id number]  Referring provider: Princess Pedro DO  Dx:   Encounter Diagnosis     ICD-10-CM    1. Chronic left shoulder pain  M25.512     G89.29       2. Lumbar radiculopathy  M54.16       3. Chronic right shoulder pain  M25.511     G89.29                      Subjective: patient reports no shoulder or back pain today, but she wasn't as active. She also hasn't had any more losses of balance. Her PCP appointment got canceled due to illness. Objective: See treatment diary below    130 deg. Flexion PROM on R  140 deg. Flexion PROM on L    Assessment: Educated patient that falls may be due to being distraction and lack of automatic balance reactions, as patient was demonstrating good improvement in balance with PT during testing. Recommended patient add cognitive task to tandem balance which she has been doing as part of her home program. Patient demonstrated good shoulder PROM today with no pain at end range. She required cuing for scapular strengthening exercises but displayed good technique with core exercises. Patient would benefit from continued skilled PT per plan of care to address impairments and improve function. Plan: Continue per plan of care. Progress shoulder strength/mobility and core strength as tolerated.       Precautions: HTN, R MUSA, L TKA  EPOC 24      Manuals 9/28 10/3 10/5 10/10 10/12 11/20 11/22 11/28 12/4 12/6   shld PROM b/l  DES DES DES DES DES DES DES DES DES   R [id number]  DES LAD only DES LAD DES LAD DES LAD DES DES gr 1-2 DES LAD DES LAD DES LAD                             Neuro Re-Ed                                                                 bridge       10x 2x10 2x10 2x10   PPT with stabilizer cuff for biofeedback       10x 15x 20x 2x10   B/l ER with retraction  2x10 gtb 2x10 gtb    10x gtb 2x10 gtb 2x10 gtb 2x10 gtb   Rows with retraction          2x10 gtb   Shld ext with retraction       2x10 gtb 2x10 gtb   Ther Ex             Pt edu POC and HEP      5' 2'      Pulley flexion       5x10" 10x10" 10x10" 10x10"   Pulley scaption/abd       5x10" 10x10" 10x10" 10x10"   Supine wand flexion  10x5" 10x5" 10x5"    10x 5x standing    shld flexion over foam roll  10x5"  10x5"         Butterfly stretch       4x20" 4x20" 4x20"                 Figure 4 stretch       R 3x15" 4x20" b/l  3x30"   Cross body adduction             Seated wand ER AROM  10x5" ea 10x5"     10x5" ea 8x5" ea    Thoracic ext             Ther Activity                                                                                                        Modalities             Cold pack   At home 8' b/l   8' R  deferred

## 2023-12-11 ENCOUNTER — OFFICE VISIT (OUTPATIENT)
Dept: PHYSICAL THERAPY | Facility: CLINIC | Age: 73
End: 2023-12-11
Payer: MEDICARE

## 2023-12-11 DIAGNOSIS — M54.16 LUMBAR RADICULOPATHY: ICD-10-CM

## 2023-12-11 DIAGNOSIS — G89.29 CHRONIC LEFT SHOULDER PAIN: Primary | ICD-10-CM

## 2023-12-11 DIAGNOSIS — M25.511 CHRONIC RIGHT SHOULDER PAIN: ICD-10-CM

## 2023-12-11 DIAGNOSIS — M25.512 CHRONIC LEFT SHOULDER PAIN: Primary | ICD-10-CM

## 2023-12-11 DIAGNOSIS — G89.29 CHRONIC RIGHT SHOULDER PAIN: ICD-10-CM

## 2023-12-11 PROCEDURE — 97112 NEUROMUSCULAR REEDUCATION: CPT | Performed by: PHYSICAL THERAPIST

## 2023-12-11 PROCEDURE — 97140 MANUAL THERAPY 1/> REGIONS: CPT | Performed by: PHYSICAL THERAPIST

## 2023-12-11 PROCEDURE — 97530 THERAPEUTIC ACTIVITIES: CPT | Performed by: PHYSICAL THERAPIST

## 2023-12-11 NOTE — PROGRESS NOTES
Daily Note    Today's date: 2023  Patient name: Gilbert Barrett  : 1950  MRN: [id number]  Referring provider: Pat Gar DO  Dx:   Encounter Diagnosis     ICD-10-CM    1. Chronic left shoulder pain  M25.512     G89.29       2. Lumbar radiculopathy  M54.16       3. Chronic right shoulder pain  M25.511     G89.29                      Subjective: patient reports that her shoulders are a little sore from last visit, but not bad. She reports her low back mainly hurts when doing dishes and vacuuming. Objective: See treatment diary below      Assessment: Educated in proper body mechanics with vacuuming with avoiding repeated lumbar flexion and taking small strokes with vacuum. Also instructed patient in standing with split stance with dishes to avoid bent over position during. Increased muscle guarding noted with shoulder manual therapy today likely due to increased shoulder soreness. Patient requires continued skilled PT per plan of care to address impairments and improve function. Plan: Continue per plan of care. Progress shoulder strength/mobility and core strength as tolerated.       Precautions: HTN, R MUSA, L TKA  EPOC 24      Manuals 12/11 10/3 10/5 10/10 10/12 11/20 11/22 11/28 12/4 12/6   shld PROM b/l DES DES DES DES DES DES DES DES DES DES   R [address] [place] [address] p[address]a DES LAD DES LAD only DES LAD DES LAD DES LAD DES DES gr 1-2 DES LAD DES LAD DES LAD                             Neuro Re-Ed                                                    PPT sitting 10x            bridge       10x 2x10 2x10 2x10   PPT with stabilizer cuff for biofeedback 20x      10x 15x 20x 2x10   B/l ER with retraction 2x10 gtb 2x10 gtb 2x10 gtb    10x gtb 2x10 gtb 2x10 gtb 2x10 gtb   Rows with retraction          2x10 gtb   Shld ext with retraction       2x10 gtb   2x10 gtb   Ther Ex             Pt edu POC and HEP      5' 2'      Pulley flexion 10x10"      5x10" 10x10" 10x10" 10x10"   Pulley scaption/abd 10x10"      5x10" 10x10" 10x10" 10x10" Supine wand flexion  10x5" 10x5" 10x5"    10x 5x standing    shld flexion over foam roll  10x5"  10x5"         Butterfly stretch       4x20" 4x20" 4x20"                 Figure 4 stretch       R 3x15" 4x20" b/l  3x30"   Cross body adduction             Seated wand ER AROM 20x 10x5" ea 10x5"     10x5" ea 8x5" ea    Thoracic ext             Ther Activity                          Body mechanics practice vacuuming and dishes 10'                                                                             Modalities             Cold pack   At home 8' b/l   8' R  deferred

## 2023-12-13 ENCOUNTER — OFFICE VISIT (OUTPATIENT)
Dept: PHYSICAL THERAPY | Facility: CLINIC | Age: 73
End: 2023-12-13
Payer: MEDICARE

## 2023-12-13 DIAGNOSIS — G89.29 CHRONIC LEFT SHOULDER PAIN: Primary | ICD-10-CM

## 2023-12-13 DIAGNOSIS — M54.16 LUMBAR RADICULOPATHY: ICD-10-CM

## 2023-12-13 DIAGNOSIS — M25.511 CHRONIC RIGHT SHOULDER PAIN: ICD-10-CM

## 2023-12-13 DIAGNOSIS — M25.512 CHRONIC LEFT SHOULDER PAIN: Primary | ICD-10-CM

## 2023-12-13 DIAGNOSIS — G89.29 CHRONIC RIGHT SHOULDER PAIN: ICD-10-CM

## 2023-12-13 PROCEDURE — 97112 NEUROMUSCULAR REEDUCATION: CPT | Performed by: PHYSICAL THERAPIST

## 2023-12-13 PROCEDURE — 97110 THERAPEUTIC EXERCISES: CPT | Performed by: PHYSICAL THERAPIST

## 2023-12-13 PROCEDURE — 97140 MANUAL THERAPY 1/> REGIONS: CPT | Performed by: PHYSICAL THERAPIST

## 2023-12-13 NOTE — PROGRESS NOTES
Daily Note    Today's date: 2023  Patient name: Corie Xiong  : 1950  MRN: [id number]  Referring provider: Rajiv Pace DO  Dx:   Encounter Diagnosis     ICD-10-CM    1. Chronic left shoulder pain  M25.512     G89.29       2. Lumbar radiculopathy  M54.16       3. Chronic right shoulder pain  M25.511     G89.29                      Subjective: patient reports that she tried doing dishes as instructed with split stance, and had less low back pain with this method. Objective: See treatment diary below      Assessment: Progressed core strengthening exercises with addition of pelvic tilt with marching. Progressed shoulder mobility exercises with wand abduction AAROM. Patient continues to be most limited by decreased shoulder mobility and rotator cuff weakness. Patient requires continued skilled PT per plan of care to address impairments and improve function. Plan: Continue per plan of care. Progress shoulder strength/mobility and core strength as tolerated.       Precautions: HTN, R MUSA, L TKA  EPOC 24      Manuals 12/11 12/13 10/5 10/10 10/12 11/20 11/22 11/28 12/4 12/6   shld PROM b/l DES DES DES DES DES DES DES DES DES DES   R [facility] j[facility] p-a DES LAD DES LAD only DES LAD DES LAD DES LAD DES DES gr 1-2 DES LAD DES LAD DES LAD                             Neuro Re-Ed                                       PPT with march  10x           PPT sitting 10x            bridge  2x10     10x 2x10 2x10 2x10   PPT with stabilizer cuff for biofeedback 20x 20x     10x 15x 20x 2x10   B/l ER with retraction 2x10 gtb 2x15 gtb 2x10 gtb    10x gtb 2x10 gtb 2x10 gtb 2x10 gtb   Rows with retraction          2x10 gtb   [address] ext with retraction  3x10 gtb     2x10 gtb   2x10 gtb   Ther Ex             Pt edu POC and HEP      5' 2'      Pulley flexion 10x10" 10x10"     5x10" 10x10" 10x10" 10x10"   Pulley scaption/abd 10x10" 10x10"     5x10" 10x10" 10x10" 10x10"   Supine wand flexion  10x5" 10x5" 10x5"    10x 5x standing    shld flexion over foam roll    10x5"         Butterfly stretch       4x20" 4x20" 4x20"    Wand abd AAROM  10 ea           Figure 4 stretch       R 3x15" 4x20" b/l  3x30"   Cross body adduction             Seated wand ER AROM 20x 10x5" ea 10x5"     10x5" ea 8x5" ea    Thoracic ext             Ther Activity                          Body mechanics practice vacuuming and dishes 10'                                                                             Modalities             Cold pack   At home 8' b/l   8' R  deferred

## 2023-12-18 ENCOUNTER — OFFICE VISIT (OUTPATIENT)
Dept: PHYSICAL THERAPY | Facility: CLINIC | Age: 73
End: 2023-12-18
Payer: MEDICARE

## 2023-12-18 DIAGNOSIS — G89.29 CHRONIC LEFT SHOULDER PAIN: Primary | ICD-10-CM

## 2023-12-18 DIAGNOSIS — M25.512 CHRONIC LEFT SHOULDER PAIN: Primary | ICD-10-CM

## 2023-12-18 DIAGNOSIS — G89.29 CHRONIC RIGHT SHOULDER PAIN: ICD-10-CM

## 2023-12-18 DIAGNOSIS — M54.16 LUMBAR RADICULOPATHY: ICD-10-CM

## 2023-12-18 DIAGNOSIS — M25.511 CHRONIC RIGHT SHOULDER PAIN: ICD-10-CM

## 2023-12-18 PROCEDURE — 97140 MANUAL THERAPY 1/> REGIONS: CPT | Performed by: PHYSICAL THERAPIST

## 2023-12-18 PROCEDURE — 97110 THERAPEUTIC EXERCISES: CPT | Performed by: PHYSICAL THERAPIST

## 2023-12-18 PROCEDURE — 97112 NEUROMUSCULAR REEDUCATION: CPT | Performed by: PHYSICAL THERAPIST

## 2023-12-18 NOTE — PROGRESS NOTES
Daily Note    Today's date: 2023  Patient name: Bozena Armstrong  : 1950  MRN: [id number]  Referring provider: He Conti DO  Dx:   Encounter Diagnosis     ICD-10-CM    1. Chronic left shoulder pain  M25.512     G89.29       2. Lumbar radiculopathy  M54.16       3. Chronic right shoulder pain  M25.511     G89.29                      Subjective: patient reports that she hasn't been back pain when doing dishes recently. She did a little vacuuming and shortened strokes, without back pain. She is still having difficulty putting dishes away and reaching overhead. She reports at worst, 4/10 shoulder pian in the past week.      Objective: See treatment diary below      Assessment: Patient was able to progress reps for b/l ER with retraction and progress mobility exercises with wall slides shoulder flexion. Patient continues to have pain with end range stretching, but overall presents with less irritability and pain intensity. Patient requires continued skilled PT per plan of care to address impairments and improve function.     Plan: Continue per plan of care. Progress shoulder strength/mobility and core strength as tolerated.      Precautions: HTN, R MUSA, L TKA  EPOC 24      Manuals 12/11 12/13 12/18 10/10 10/12 11/20 11/22 11/28 12/4 12/6   shld PROM b/l DES DES DES DES DES DES DES DES DES DES   R [facility] j[facility] p-a DES LAD DES LAD only DES LAD DES LAD DES LAD DES DES gr 1-2 DES LAD DES LAD DES LAD                             Neuro Re-Ed                                       PPT with march  10x nv          PPT with adductor squeeze 10x  20x          bridge  2x10 2x10    10x 2x10 2x10 2x10   PPT with stabilizer cuff for biofeedback 20x 20x     10x 15x 20x 2x10   B/l ER with retraction 2x10 gtb 2x15 gtb 2x15 gtb    10x gtb 2x10 gtb 2x10 gtb 2x10 gtb   Rows with retraction          2x10 gtb   Shld ext with retraction  3x10 gtb 3x10 gtb    2x10 gtb   2x10 gtb   Ther Ex             Pt edu POC and HEP      5' 2'      Pulley flexion  "10x10\" 10x10\" 10-x10\"    5x10\" 10x10\" 10x10\" 10x10\"   Pulley scaption/abd 10x10\" 10x10\" 10x10\"    5x10\" 10x10\" 10x10\" 10x10\"   Supine wand flexion  10x5\" 10x5\" 10x5\"    10x 5x standing    shld flexion over foam roll    10x5\"         Wall slides   10x    4x20\" 4x20\" 4x20\"    Wand abd AAROM  10 ea           Figure 4 stretch       R 3x15\" 4x20\" b/l  3x30\"   Cross body adduction             Seated wand ER AROM 20x 10x5\" ea 10x5\"     10x5\" ea 8x5\" ea    Thoracic ext             Ther Activity                          Body mechanics practice vacuuming and dishes 10'                                                                             Modalities             Cold pack    8' b/l   8' R  deferred                          "

## 2023-12-19 ENCOUNTER — OFFICE VISIT (OUTPATIENT)
Dept: FAMILY MEDICINE CLINIC | Facility: CLINIC | Age: 73
End: 2023-12-19
Payer: MEDICARE

## 2023-12-19 VITALS
HEIGHT: 64 IN | OXYGEN SATURATION: 97 % | HEART RATE: 65 BPM | WEIGHT: 241 LBS | RESPIRATION RATE: 14 BRPM | SYSTOLIC BLOOD PRESSURE: 128 MMHG | DIASTOLIC BLOOD PRESSURE: 86 MMHG | TEMPERATURE: 98.4 F | BODY MASS INDEX: 41.15 KG/M2

## 2023-12-19 DIAGNOSIS — G89.4 CHRONIC PAIN SYNDROME: ICD-10-CM

## 2023-12-19 DIAGNOSIS — I10 ESSENTIAL HYPERTENSION: ICD-10-CM

## 2023-12-19 DIAGNOSIS — Z12.31 ENCOUNTER FOR SCREENING MAMMOGRAM FOR BREAST CANCER: ICD-10-CM

## 2023-12-19 DIAGNOSIS — R42 VERTIGO: ICD-10-CM

## 2023-12-19 DIAGNOSIS — Z13.820 SCREENING FOR OSTEOPOROSIS: ICD-10-CM

## 2023-12-19 DIAGNOSIS — Z00.00 MEDICARE ANNUAL WELLNESS VISIT, SUBSEQUENT: Primary | ICD-10-CM

## 2023-12-19 DIAGNOSIS — Z23 ENCOUNTER FOR IMMUNIZATION: ICD-10-CM

## 2023-12-19 PROCEDURE — G0008 ADMIN INFLUENZA VIRUS VAC: HCPCS

## 2023-12-19 PROCEDURE — 90662 IIV NO PRSV INCREASED AG IM: CPT

## 2023-12-19 PROCEDURE — G0439 PPPS, SUBSEQ VISIT: HCPCS | Performed by: PHYSICIAN ASSISTANT

## 2023-12-19 RX ORDER — MECLIZINE HYDROCHLORIDE 25 MG/1
25 TABLET ORAL 3 TIMES DAILY PRN
Qty: 30 TABLET | Refills: 1 | Status: SHIPPED | OUTPATIENT
Start: 2023-12-19

## 2023-12-19 NOTE — PATIENT INSTRUCTIONS
Medicare Preventive Visit Patient Instructions  Thank you for completing your Welcome to Medicare Visit or Medicare Annual Wellness Visit today. Your next wellness visit will be due in one year (12/19/2024).  The screening/preventive services that you may require over the next 5-10 years are detailed below. Some tests may not apply to you based off risk factors and/or age. Screening tests ordered at today's visit but not completed yet may show as past due. Also, please note that scanned in results may not display below.  Preventive Screenings:  Service Recommendations Previous Testing/Comments   Colorectal Cancer Screening  * Colonoscopy    * Fecal Occult Blood Test (FOBT)/Fecal Immunochemical Test (FIT)  * Fecal DNA/Cologuard Test  * Flexible Sigmoidoscopy Age: 45-75 years old   Colonoscopy: every 10 years (may be performed more frequently if at higher risk)  OR  FOBT/FIT: every 1 year  OR  Cologuard: every 3 years  OR  Sigmoidoscopy: every 5 years  Screening may be recommended earlier than age 45 if at higher risk for colorectal cancer. Also, an individualized decision between you and your healthcare provider will decide whether screening between the ages of 76-85 would be appropriate. Colonoscopy: 11/30/2023  FOBT/FIT: Not on file  Cologuard: 06/14/2023  Sigmoidoscopy: Not on file          Breast Cancer Screening Age: 40+ years old  Frequency: every 1-2 years  Not required if history of left and right mastectomy Mammogram: 10/15/2021        Cervical Cancer Screening Between the ages of 21-29, pap smear recommended once every 3 years.   Between the ages of 30-65, can perform pap smear with HPV co-testing every 5 years.   Recommendations may differ for women with a history of total hysterectomy, cervical cancer, or abnormal pap smears in past. Pap Smear: Not on file        Hepatitis C Screening Once for adults born between 1945 and 1965  More frequently in patients at high risk for Hepatitis C Hep C Antibody: Not  on file        Diabetes Screening 1-2 times per year if you're at risk for diabetes or have pre-diabetes Fasting glucose: 87 mg/dL (10/3/2023)  A1C: 5.8 % (1/16/2023)      Cholesterol Screening Once every 5 years if you don't have a lipid disorder. May order more often based on risk factors. Lipid panel: 01/16/2023          Other Preventive Screenings Covered by Medicare:  Abdominal Aortic Aneurysm (AAA) Screening: covered once if your at risk. You're considered to be at risk if you have a family history of AAA.  Lung Cancer Screening: covers low dose CT scan once per year if you meet all of the following conditions: (1) Age 55-77; (2) No signs or symptoms of lung cancer; (3) Current smoker or have quit smoking within the last 15 years; (4) You have a tobacco smoking history of at least 20 pack years (packs per day multiplied by number of years you smoked); (5) You get a written order from a healthcare provider.  Glaucoma Screening: covered annually if you're considered high risk: (1) You have diabetes OR (2) Family history of glaucoma OR (3)  aged 50 and older OR (4)  American aged 65 and older  Osteoporosis Screening: covered every 2 years if you meet one of the following conditions: (1) You're estrogen deficient and at risk for osteoporosis based off medical history and other findings; (2) Have a vertebral abnormality; (3) On glucocorticoid therapy for more than 3 months; (4) Have primary hyperparathyroidism; (5) On osteoporosis medications and need to assess response to drug therapy.   Last bone density test (DXA Scan): Not on file.  HIV Screening: covered annually if you're between the age of 15-65. Also covered annually if you are younger than 15 and older than 65 with risk factors for HIV infection. For pregnant patients, it is covered up to 3 times per pregnancy.    Immunizations:  Immunization Recommendations   Influenza Vaccine Annual influenza vaccination during flu season is  recommended for all persons aged >= 6 months who do not have contraindications   Pneumococcal Vaccine   * Pneumococcal conjugate vaccine = PCV13 (Prevnar 13), PCV15 (Vaxneuvance), PCV20 (Prevnar 20)  * Pneumococcal polysaccharide vaccine = PPSV23 (Pneumovax) Adults 19-63 yo with certain risk factors or if 65+ yo  If never received any pneumonia vaccine: recommend Prevnar 20 (PCV20)  Give PCV20 if previously received 1 dose of PCV13 or PPSV23   Hepatitis B Vaccine 3 dose series if at intermediate or high risk (ex: diabetes, end stage renal disease, liver disease)   Respiratory syncytial virus (RSV) Vaccine - COVERED BY MEDICARE PART D  * RSVPreF3 (Arexvy) CDC recommends that adults 60 years of age and older may receive a single dose of RSV vaccine using shared clinical decision-making (SCDM)   Tetanus (Td) Vaccine - COST NOT COVERED BY MEDICARE PART B Following completion of primary series, a booster dose should be given every 10 years to maintain immunity against tetanus. Td may also be given as tetanus wound prophylaxis.   Tdap Vaccine - COST NOT COVERED BY MEDICARE PART B Recommended at least once for all adults. For pregnant patients, recommended with each pregnancy.   Shingles Vaccine (Shingrix) - COST NOT COVERED BY MEDICARE PART B  2 shot series recommended in those 19 years and older who have or will have weakened immune systems or those 50 years and older     Health Maintenance Due:      Topic Date Due   • Breast Cancer Screening: Mammogram  10/15/2022   • Hepatitis C Screening  12/22/2024 (Originally 1950)   • Colorectal Cancer Screening  11/28/2030     Immunizations Due:      Topic Date Due   • Influenza Vaccine (1) 09/01/2023   • COVID-19 Vaccine (5 - 2023-24 season) 09/01/2023     Advance Directives   What are advance directives?  Advance directives are legal documents that state your wishes and plans for medical care. These plans are made ahead of time in case you lose your ability to make  decisions for yourself. Advance directives can apply to any medical decision, such as the treatments you want, and if you want to donate organs.   What are the types of advance directives?  There are many types of advance directives, and each state has rules about how to use them. You may choose a combination of any of the following:  Living will:  This is a written record of the treatment you want. You can also choose which treatments you do not want, which to limit, and which to stop at a certain time. This includes surgery, medicine, IV fluid, and tube feedings.   Durable power of  for healthcare (DPAHC):  This is a written record that states who you want to make healthcare choices for you when you are unable to make them for yourself. This person, called a proxy, is usually a family member or a friend. You may choose more than 1 proxy.  Do not resuscitate (DNR) order:  A DNR order is used in case your heart stops beating or you stop breathing. It is a request not to have certain forms of treatment, such as CPR. A DNR order may be included in other types of advance directives.  Medical directive:  This covers the care that you want if you are in a coma, near death, or unable to make decisions for yourself. You can list the treatments you want for each condition. Treatment may include pain medicine, surgery, blood transfusions, dialysis, IV or tube feedings, and a ventilator (breathing machine).  Values history:  This document has questions about your views, beliefs, and how you feel and think about life. This information can help others choose the care that you would choose.  Why are advance directives important?  An advance directive helps you control your care. Although spoken wishes may be used, it is better to have your wishes written down. Spoken wishes can be misunderstood, or not followed. Treatments may be given even if you do not want them. An advance directive may make it easier for your family  to make difficult choices about your care.   Weight Management   Why it is important to manage your weight:  Being overweight increases your risk of health conditions such as heart disease, high blood pressure, type 2 diabetes, and certain types of cancer. It can also increase your risk for osteoarthritis, sleep apnea, and other respiratory problems. Aim for a slow, steady weight loss. Even a small amount of weight loss can lower your risk of health problems.  How to lose weight safely:  A safe and healthy way to lose weight is to eat fewer calories and get regular exercise. You can lose up about 1 pound a week by decreasing the number of calories you eat by 500 calories each day.   Healthy meal plan for weight management:  A healthy meal plan includes a variety of foods, contains fewer calories, and helps you stay healthy. A healthy meal plan includes the following:  Eat whole-grain foods more often.  A healthy meal plan should contain fiber. Fiber is the part of grains, fruits, and vegetables that is not broken down by your body. Whole-grain foods are healthy and provide extra fiber in your diet. Some examples of whole-grain foods are whole-wheat breads and pastas, oatmeal, brown rice, and bulgur.  Eat a variety of vegetables every day.  Include dark, leafy greens such as spinach, kale, moises greens, and mustard greens. Eat yellow and orange vegetables such as carrots, sweet potatoes, and winter squash.   Eat a variety of fruits every day.  Choose fresh or canned fruit (canned in its own juice or light syrup) instead of juice. Fruit juice has very little or no fiber.  Eat low-fat dairy foods.  Drink fat-free (skim) milk or 1% milk. Eat fat-free yogurt and low-fat cottage cheese. Try low-fat cheeses such as mozzarella and other reduced-fat cheeses.  Choose meat and other protein foods that are low in fat.  Choose beans or other legumes such as split peas or lentils. Choose fish, skinless poultry (chicken or  turkey), or lean cuts of red meat (beef or pork). Before you cook meat or poultry, cut off any visible fat.   Use less fat and oil.  Try baking foods instead of frying them. Add less fat, such as margarine, sour cream, regular salad dressing and mayonnaise to foods. Eat fewer high-fat foods. Some examples of high-fat foods include french fries, doughnuts, ice cream, and cakes.  Eat fewer sweets.  Limit foods and drinks that are high in sugar. This includes candy, cookies, regular soda, and sweetened drinks.  Exercise:  Exercise at least 30 minutes per day on most days of the week. Some examples of exercise include walking, biking, dancing, and swimming. You can also fit in more physical activity by taking the stairs instead of the elevator or parking farther away from stores. Ask your healthcare provider about the best exercise plan for you.      © Copyright [employer] 2018 Information is for End User's use only and may not be sold, redistributed or otherwise used for commercial purposes. All illustrations and images included in CareNotes® are the copyrighted property of [employer] or [employer]

## 2023-12-19 NOTE — PROGRESS NOTES
Assessment and Plan:     Problem List Items Addressed This Visit          Cardiovascular and Mediastinum    Essential hypertension       Other    Chronic pain syndrome     Other Visit Diagnoses       Medicare annual wellness visit, subsequent    -  Primary    Encounter for screening mammogram for breast cancer        Relevant Orders    Mammo screening bilateral w 3d & cad    Screening for osteoporosis        Vertigo        Patient has been evaluated by otolaryngology stated not ENT issue  Patient has upcoming Neurology appointment.  Continue physical therapy    Relevant Medications    meclizine (ANTIVERT) 25 mg tablet    Encounter for immunization        Relevant Orders    influenza vaccine, high-dose, PF 0.7 mL (FLUZONE HIGH-DOSE)              Depression Screening and Follow-up Plan: Patient was screened for depression during today's encounter. They screened negative with a PHQ-2 score of 0.      Preventive health issues were discussed with patient, and age appropriate screening tests were ordered as noted in patient's After Visit Summary.  Personalized health advice and appropriate referrals for health education or preventive services given if needed, as noted in patient's After Visit Summary.     History of Present Illness:     Patient presents for a Medicare Wellness Visit    HPI   Patient Care Team:  LUCIA Conrad as PCP - General (Family Medicine)     Review of Systems:     Review of Systems   Constitutional: Negative.    Respiratory: Negative.     Cardiovascular: Negative.    Gastrointestinal: Negative.    Genitourinary:  Negative for difficulty urinating.   Neurological: Negative.    Psychiatric/Behavioral: Negative.     All other systems reviewed and are negative.       Problem List:     Patient Active Problem List   Diagnosis    Anemia    Constipation    Dyslipidemia    Essential hypertension    History of total knee arthroplasty, left    Osteoarthritis of knee, unilateral    Prediabetes     Dystonia    Chronic right hip pain    Primary osteoarthritis of right hip    Chronic bilateral low back pain without sciatica    Chronic pain syndrome    Obesity (BMI 35.0-39.9 without comorbidity)    Status post total hip replacement, right    Continuous opioid dependence (HCC)    Chronic pain of both shoulders    Obesity, morbid (HCC)    Sciatica    Neuropathy    Balance disorder    Memory deficit    Urinary incontinence      Past Medical and Surgical History:     Past Medical History:   Diagnosis Date    Arthritis     Balance disorder     GERD (gastroesophageal reflux disease)     Hyperlipidemia     Hypertension     Vertigo      Past Surgical History:   Procedure Laterality Date     SECTION      COLONOSCOPY      HYSTERECTOMY      HYSTEROSCOPY W/ ENDOMETRIAL ABLATION      JOINT REPLACEMENT      KNEE SURGERY      MO ARTHRP ACETBLR/PROX FEM PROSTC AGRFT/ALGRFT Right 2022    Procedure: ARTHROPLASTY HIP TOTAL ANTERIOR and all associated procedures;  Surgeon: Trisha Alonso MD;  Location:  MAIN OR;  Service: Orthopedics      Family History:     Family History   Problem Relation Age of Onset    Arthritis Mother     Cancer Father     Heart disease Father     Anemia Brother       Social History:     Social History     Socioeconomic History    Marital status: /Civil Union     Spouse name: None    Number of children: None    Years of education: None    Highest education level: None   Occupational History    None   Tobacco Use    Smoking status: Never     Passive exposure: Past    Smokeless tobacco: Never   Vaping Use    Vaping status: Never Used   Substance and Sexual Activity    Alcohol use: Never    Drug use: Never    Sexual activity: Not Currently   Other Topics Concern    None   Social History Narrative    None     Social Determinants of Health     Financial Resource Strain: Low Risk  (2023)    Overall Financial Resource Strain (CARDIA)     Difficulty of Paying Living Expenses: Not very  hard   Food Insecurity: Not on file   Transportation Needs: No Transportation Needs (12/19/2023)    PRAPARE - Transportation     Lack of Transportation (Medical): No     Lack of Transportation (Non-Medical): No   Physical Activity: Not on file   Stress: Not on file   Social Connections: Not on file   Intimate Partner Violence: Not At Risk (12/19/2023)    Humiliation, Afraid, Rape, and Kick questionnaire     Fear of Current or Ex-Partner: No     Emotionally Abused: No     Physically Abused: No     Sexually Abused: No   Housing Stability: Not on file      Medications and Allergies:     Current Outpatient Medications   Medication Sig Dispense Refill    ascorbic acid (VITAMIN C) 500 MG tablet Take 1 tablet (500 mg total) by mouth 2 (two) times a day 60 tablet 0    B Complex Vitamins (VITAMIN B COMPLEX PO) Take by mouth in the morning      celecoxib (CeleBREX) 100 mg capsule TAKE ONE CAPSULE BY MOUTH TWICE A DAY 60 capsule 0    cholecalciferol (VITAMIN D3) 1,000 units tablet Take 1,000 Units by mouth daily      Doxylamine Succinate, Sleep, (SLEEP AID PO) Take by mouth      DULoxetine (CYMBALTA) 60 mg delayed release capsule TAKE ONE CAPSULE BY MOUTH AT BEDTIME 30 capsule 2    Magnesium 400 MG TABS Take 1 tablet (400 mg total) by mouth in the morning 30 tablet 1    meclizine (ANTIVERT) 25 mg tablet Take 1 tablet (25 mg total) by mouth 3 (three) times a day as needed for dizziness 30 tablet 1    Multiple Vitamins-Minerals (CENTRUM SILVER PO) Take by mouth      polyethylene glycol (GLYCOLAX) 17 GM/SCOOP powder Take 17 g by mouth daily 255 g 0    potassium chloride (K-DUR,KLOR-CON) 20 mEq tablet Take 1 tablet (20 mEq total) by mouth daily 30 tablet 5    rosuvastatin (CRESTOR) 40 MG tablet Take 1 tablet (40 mg total) by mouth daily 90 tablet 3    VITAMIN E PO Take by mouth      gabapentin (NEURONTIN) 300 mg capsule Take 1 capsule by mouth every 6 (six) hours as needed (Patient not taking: Reported on 9/27/2023)       No  current facility-administered medications for this visit.     No Known Allergies   Immunizations:     Immunization History   Administered Date(s) Administered    COVID-19 MODERNA VACC 0.5 ML IM 04/19/2021, 06/07/2021, 12/13/2021    COVID-19 Pfizer Vac BIVALENT Armaan-sucrose 12 Yr+ IM 12/13/2022    INFLUENZA 12/10/2016, 10/15/2020, 10/06/2021    Influenza, high dose seasonal 0.7 mL 11/22/2022    Pneumococcal Conjugate 13-Valent 05/15/2018    Pneumococcal Conjugate Vaccine 20-valent (Pcv20), Polysace 11/22/2022    Pneumococcal Polysaccharide PPV23 09/17/2019    Tuberculin Skin Test-PPD Intradermal 05/17/2021      Health Maintenance:         Topic Date Due    Breast Cancer Screening: Mammogram  10/15/2022    Hepatitis C Screening  12/22/2024 (Originally 1950)    Colorectal Cancer Screening  11/28/2030         Topic Date Due    Influenza Vaccine (1) 09/01/2023    COVID-19 Vaccine (5 - 2023-24 season) 09/01/2023      Medicare Screening Tests and Risk Assessments:     Bozena is here for her Subsequent Wellness visit. Last Medicare Wellness visit information reviewed, patient interviewed and updates made to the record today.      Health Risk Assessment:   Patient rates overall health as fair. Patient feels that their physical health rating is slightly worse. Patient is satisfied with their life. Eyesight was rated as same. Hearing was rated as same. Patient feels that their emotional and mental health rating is same. Patients states they are never, rarely angry. Patient states they are sometimes unusually tired/fatigued. Pain experienced in the last 7 days has been some. Patient's pain rating has been 4/10. Patient states that she has experienced weight loss or gain in last 6 months. Weight gain reason for slightly worse health compared to last year.  Arthritic pain    Depression Screening:   PHQ-2 Score: 0      Fall Risk Screening:   In the past year, patient has experienced: history of falling in past year    Number  of falls: 2 or more  Injured during fall?: No    Feels unsteady when standing or walking?: Yes    Worried about falling?: Yes      Urinary Incontinence Screening:   Patient has leaked urine accidently in the last six months. Currently seeing physical therapy    Home Safety:  Patient does not have trouble with stairs inside or outside of their home. Patient has working smoke alarms and has working carbon monoxide detector. Home safety hazards include: none.     Nutrition:   Current diet is Regular. Tries to cut back on chocolate and sodas    Medications:   Patient is currently taking over-the-counter supplements. OTC medications include: see medication list. Patient is able to manage medications.     Activities of Daily Living (ADLs)/Instrumental Activities of Daily Living (IADLs):   Walk and transfer into and out of bed and chair?: Yes  Dress and groom yourself?: Yes    Bathe or shower yourself?: Yes    Feed yourself? Yes  Do your laundry/housekeeping?: Yes  Manage your money, pay your bills and track your expenses?: Yes  Make your own meals?: Yes    Do your own shopping?: Yes    Previous Hospitalizations:   Any hospitalizations or ED visits within the last 12 months?: Yes    How many hospitalizations have you had in the last year?: 3-4    Advance Care Planning:   Living will: No    Advanced directive: No      PREVENTIVE SCREENINGS      Cardiovascular Screening:    General: Screening Not Indicated and History Lipid Disorder      Diabetes Screening:     General: Screening Current      Colorectal Cancer Screening:     General: Screening Current      Cervical Cancer Screening:    General: Screening Not Indicated      Lung Cancer Screening:     General: Screening Not Indicated      Hepatitis C Screening:    General: Screening Current    Screening, Brief Intervention, and Referral to Treatment (SBIRT)    Screening  Typical number of drinks in a day: 0  Typical number of drinks in a week: 0  Interpretation: Low risk  "drinking behavior.    Single Item Drug Screening:  How often have you used an illegal drug (including marijuana) or a prescription medication for non-medical reasons in the past year? never    Single Item Drug Screen Score: 0  Interpretation: Negative screen for possible drug use disorder    Vision Screening    Right eye Left eye Both eyes   Without correction      With correction   20/20        Physical Exam:     /86 (BP Location: Left arm, Patient Position: Sitting, Cuff Size: Large)   Pulse 65   Temp 98.4 °F (36.9 °C)   Resp 14   Ht 5' 4\" (1.626 m)   Wt 109 kg (241 lb)   SpO2 97%   BMI 41.37 kg/m²     Physical Exam  Vitals and nursing note reviewed.   Constitutional:       General: She is not in acute distress.     Appearance: She is not ill-appearing, toxic-appearing or diaphoretic.   HENT:      Head: Normocephalic and atraumatic.      Right Ear: External ear normal.      Left Ear: External ear normal.      Nose: Nose normal.      Mouth/Throat:      Mouth: Mucous membranes are moist.      Pharynx: Oropharynx is clear.   Eyes:      General: No scleral icterus.     Conjunctiva/sclera: Conjunctivae normal.      Pupils: Pupils are equal, round, and reactive to light.   Cardiovascular:      Rate and Rhythm: Normal rate and regular rhythm.      Heart sounds: Normal heart sounds.   Pulmonary:      Effort: Pulmonary effort is normal.      Breath sounds: Normal breath sounds.   Abdominal:      General: Bowel sounds are normal.      Palpations: Abdomen is soft.      Tenderness: There is no abdominal tenderness.   Musculoskeletal:      Cervical back: Neck supple.      Right lower leg: Edema present.      Left lower leg: Edema present.      Comments: Chronic lower extremity edema   Lymphadenopathy:      Cervical: No cervical adenopathy.   Neurological:      General: No focal deficit present.      Mental Status: She is alert and oriented to person, place, and time.   Psychiatric:         Mood and Affect: Mood " normal.          Rajan Murray PA-C

## 2023-12-20 ENCOUNTER — OFFICE VISIT (OUTPATIENT)
Dept: PHYSICAL THERAPY | Facility: CLINIC | Age: 73
End: 2023-12-20
Payer: MEDICARE

## 2023-12-20 DIAGNOSIS — M54.16 LUMBAR RADICULOPATHY: ICD-10-CM

## 2023-12-20 DIAGNOSIS — G89.29 CHRONIC RIGHT SHOULDER PAIN: ICD-10-CM

## 2023-12-20 DIAGNOSIS — M25.511 CHRONIC RIGHT SHOULDER PAIN: ICD-10-CM

## 2023-12-20 DIAGNOSIS — G89.29 CHRONIC LEFT SHOULDER PAIN: Primary | ICD-10-CM

## 2023-12-20 DIAGNOSIS — M25.512 CHRONIC LEFT SHOULDER PAIN: Primary | ICD-10-CM

## 2023-12-20 PROCEDURE — 97110 THERAPEUTIC EXERCISES: CPT | Performed by: PHYSICAL THERAPIST

## 2023-12-20 PROCEDURE — 97112 NEUROMUSCULAR REEDUCATION: CPT | Performed by: PHYSICAL THERAPIST

## 2023-12-20 PROCEDURE — 97140 MANUAL THERAPY 1/> REGIONS: CPT | Performed by: PHYSICAL THERAPIST

## 2023-12-20 NOTE — PROGRESS NOTES
Progress Report    Today's date: 2023  Patient name: Bozena Armstrong  : 1950  MRN: [id number]  Referring provider: He Conti DO  Dx:   Encounter Diagnosis     ICD-10-CM    1. Chronic left shoulder pain  M25.512     G89.29       2. Lumbar radiculopathy  M54.16       3. Chronic right shoulder pain  M25.511     G89.29                      Subjective: patient reports Patient notes that she got a flu shot yesterday in her left shoulder, which might be making it a little sore. Patient reports no worse than 4/10 pain in shoulders or low back recently. She continues to have pain with increased activity and doing household chores/cleaning around the house.      Objective: See treatment diary below    Left Shoulder   Flexion: 116 degrees with pain  Abduction: 130 degrees with pain  Internal rotation BTB: L3 with pain     Right Shoulder   Flexion: 118 degrees with pain  Abduction: 120 degrees with pain  Internal rotation BTB: L5 with pain     Additional Active Range of Motion Details  Lumbar AROM %  Flexion 100%  Extension 100%  R side bending 100%  L side bending 100%  R rotation 100%  L rotation 75%     Passive Range of Motion   Left Shoulder   Flexion: 140 degrees  Abduction 115 deg.  ER 90 deg.     Right Shoulder   Flexion: 150 degrees with pain  Abduction: 110 deg  ER 90 deg.    R shoulder strength  5/5 IR B/L  5/5 ER b/l  4+ flexion b/l  4+ abduction b/l    Core strength:   Pain with bridge  Mild difficulty abdominal curl up  Less difficulty posterior pelvic tilt        Assessment: Over the past month, PT treatment has focused on low back pain and b/l shoulder issues. Patient demonstrates further improvements in passive shoulder ROM especially with b/l shoulder flexion, and lumbar AROM. Patient is still limited with shoulder girdle strength, and shows slight regression in active IR. She displays slight improvement in core strength, and better awareness of proper body mechanics when performing household  "chores. Functionally, patient reports significant improvement in her shoulder and low back pain. At this time, patient is appropriate for continued skilled PT for 1 more month to further progress upon shoulder and back impairments. She is in agreement with this plan.    Plan: Continue per plan of care. Progress shoulder strength/mobility and core strength as tolerated.      Precautions: HTN, R MUSA, L TKA  EPOC 1/19/24    Manuals 12/11 12/13 12/18 12/20 10/12 11/20 11/22 11/28 12/4 12/6   shld PROM b/l DES DES DES DES DES DES DES DES DES DES   R GH jt p-a DES LAD DES LAD only DES LAD DES LAD DES LAD DES DES gr 1-2 DES LAD DES LAD DES LAD                             Neuro Re-Ed                                       PPT with march  10x nv 10 ea         PPT with adductor squeeze 10x  20x          bridge  2x10 2x10    10x 2x10 2x10 2x10   PPT with stabilizer cuff for biofeedback 20x 20x  2x10   10x 15x 20x 2x10   B/l ER with retraction 2x10 gtb 2x15 gtb 2x15 gtb    10x gtb 2x10 gtb 2x10 gtb 2x10 gtb   Rows with retraction          2x10 gtb   Shld ext with retraction  3x10 gtb 3x10 gtb    2x10 gtb   2x10 gtb   Ther Ex             Pt edu POC and HEP    Progress Report measurements  5' 2'      Pulley flexion 10x10\" 10x10\" 10-x10\" 10x10\"   5x10\" 10x10\" 10x10\" 10x10\"   Pulley scaption/abd 10x10\" 10x10\" 10x10\" 10x10\"   5x10\" 10x10\" 10x10\" 10x10\"   Supine wand flexion  10x5\" 10x5\"     10x 5x standing    shld flexion over foam roll             Wall slides   10x 10 ea   4x20\" 4x20\" 4x20\"    Wand abd AAROM  10 ea           Figure 4 stretch       R 3x15\" 4x20\" b/l  3x30\"   Cross body adduction             Seated wand ER AROM 20x 10x5\" ea 10x5\" 10x5\"    10x5\" ea 8x5\" ea    Thoracic ext             Ther Activity                          Body mechanics practice vacuuming and dishes 10'                                                                             Modalities             Cold pack       8' R  deferred                          "

## 2023-12-26 ENCOUNTER — OFFICE VISIT (OUTPATIENT)
Dept: PHYSICAL THERAPY | Facility: CLINIC | Age: 73
End: 2023-12-26
Payer: MEDICARE

## 2023-12-26 DIAGNOSIS — G89.29 CHRONIC RIGHT SHOULDER PAIN: ICD-10-CM

## 2023-12-26 DIAGNOSIS — M54.16 LUMBAR RADICULOPATHY: ICD-10-CM

## 2023-12-26 DIAGNOSIS — G89.29 CHRONIC LEFT SHOULDER PAIN: Primary | ICD-10-CM

## 2023-12-26 DIAGNOSIS — M25.512 CHRONIC LEFT SHOULDER PAIN: Primary | ICD-10-CM

## 2023-12-26 DIAGNOSIS — M25.511 CHRONIC RIGHT SHOULDER PAIN: ICD-10-CM

## 2023-12-26 PROCEDURE — 97110 THERAPEUTIC EXERCISES: CPT | Performed by: PHYSICAL THERAPIST

## 2023-12-26 PROCEDURE — 97112 NEUROMUSCULAR REEDUCATION: CPT | Performed by: PHYSICAL THERAPIST

## 2023-12-26 PROCEDURE — 97140 MANUAL THERAPY 1/> REGIONS: CPT | Performed by: PHYSICAL THERAPIST

## 2023-12-26 NOTE — PROGRESS NOTES
Daily Note    Today's date: 2023  Patient name: Bozena Armstrong  : 1950  MRN: [id number]  Referring provider: He Conti DO  Dx:   Encounter Diagnosis     ICD-10-CM    1. Chronic left shoulder pain  M25.512     G89.29       2. Lumbar radiculopathy  M54.16       3. Chronic right shoulder pain  M25.511     G89.29                      Subjective: patient reports that her back was hurting today because she had to do a lot of household chores that included bending over, and some vacuuming. Her shoulders aren't hurting that much currently.      Objective: See treatment diary below      Assessment: Patient presented with low irritability of symptoms today and displayed improved overhead shoulder moboility with pulleys at onset of session compared to last week. Patient required less cuing for proper technique today with shoulder stabilization exercises. Patient requires continued skilled PT per plan of care to address impairments and improve function with household management and ADL completion.    Plan: Continue per plan of care. Progress shoulder strength/mobility and core strength as tolerated.      Precautions: HTN, R MUSA, L TKA  EPOC 24    Manuals    shld PROM b/l DES DES DES DES DES DES DES DES DES DES   R GH jt p-a DES LAD DES LAD only DES LAD DES LAD DES LAD DES DES gr 1-2 DES LAD DES LAD DES LAD                             Neuro Re-Ed                                       PPT with march  10x nv 10 ea 10 ea        PPT with adductor squeeze 10x  20x          bridge  2x10 2x10    10x 2x10 2x10 2x10   PPT with stabilizer cuff for biofeedback 20x 20x  2x10 x10  10x 15x 20x 2x10   B/l ER with retraction 2x10 gtb 2x15 gtb 2x15 gtb  2x15 gtb  10x gtb 2x10 gtb 2x10 gtb 2x10 gtb   Rows with retraction     3x10 gtb     2x10 gtb   Shld ext with retraction  3x10 gtb 3x10 gtb  3x10 gtb  2x10 gtb   2x10 gtb   Ther Ex             Pt edu POC and HEP    Progress Report  "measurements  5' 2'      Pulley flexion 10x10\" 10x10\" 10-x10\" 10x10\" 10x10\"  5x10\" 10x10\" 10x10\" 10x10\"   Pulley scaption/abd 10x10\" 10x10\" 10x10\" 10x10\" 10x10\"  5x10\" 10x10\" 10x10\" 10x10\"   Supine wand flexion  10x5\" 10x5\"     10x 5x standing    shld flexion over foam roll             Wall slides   10x 10 ea   4x20\" 4x20\" 4x20\"    Wand abd AAROM  10 ea           Figure 4 stretch       R 3x15\" 4x20\" b/l  3x30\"   Cross body adduction             Seated wand ER AROM 20x 10x5\" ea 10x5\" 10x5\"    10x5\" ea 8x5\" ea    Thoracic ext             Ther Activity                          Body mechanics practice vacuuming and dishes 10'                                                                             Modalities             Cold pack       8' R  deferred                          "

## 2024-01-03 ENCOUNTER — OFFICE VISIT (OUTPATIENT)
Dept: PHYSICAL THERAPY | Facility: CLINIC | Age: 74
End: 2024-01-03
Payer: MEDICARE

## 2024-01-03 DIAGNOSIS — M54.16 LUMBAR RADICULOPATHY: ICD-10-CM

## 2024-01-03 DIAGNOSIS — M25.512 CHRONIC LEFT SHOULDER PAIN: Primary | ICD-10-CM

## 2024-01-03 DIAGNOSIS — G89.29 CHRONIC RIGHT SHOULDER PAIN: ICD-10-CM

## 2024-01-03 DIAGNOSIS — G89.29 CHRONIC LEFT SHOULDER PAIN: Primary | ICD-10-CM

## 2024-01-03 DIAGNOSIS — M25.511 CHRONIC RIGHT SHOULDER PAIN: ICD-10-CM

## 2024-01-03 PROCEDURE — 97140 MANUAL THERAPY 1/> REGIONS: CPT | Performed by: PHYSICAL THERAPIST

## 2024-01-03 PROCEDURE — 97112 NEUROMUSCULAR REEDUCATION: CPT | Performed by: PHYSICAL THERAPIST

## 2024-01-03 NOTE — PROGRESS NOTES
Daily Note    Today's date: 1/3/2024  Patient name: Bozena Armstrong  : 1950  MRN: [id number]  Referring provider: He Conti DO  Dx:   Encounter Diagnosis     ICD-10-CM    1. Chronic left shoulder pain  M25.512     G89.29       2. Lumbar radiculopathy  M54.16       3. Chronic right shoulder pain  M25.511     G89.29                      Subjective: patient reports that her shoulders haven't been hurting as much lately. Her biggest complaint is low back pain, which she had when carrying groceries and performing household chores.    Objective: See treatment diary below      Assessment: Educated patient on proper body mechanics with carrying groceries, ensuring to have upright posture and core brace. Patient continued to have low irritability of shoulder pain, tolerating exercises and manual therapy without pain. She required cuing to prevent upper trap compensation with shoulder ext w/ retraction. Patient requires continued skilled PT per plan of care to address impairments and improve function with household management and ADL completion.    Plan: Continue per plan of care. Progress shoulder strength/mobility and core strength as tolerated.      Precautions: HTN, R MUSA, L TKA  EPOC 24    Manuals 12/11 12/13 12/18 12/20 12/26 1/3 11/22 11/28 12/4 12/6   shld PROM b/l DES DES DES DES DES DES DSE DES DES DES   R GH jt p-a DES LAD DES LAD only DES LAD DES LAD DES LAD DES DES gr 1-2 DES LAD DES LAD DES LAD                             Neuro Re-Ed             Pull down with  ea gtb       Paloff press      10 ea gtb       PPT with march  10x nv 10 ea 10 ea 10 ea       PPT with adductor squeeze 10x  20x          bridge  2x10 2x10    10x 2x10 2x10 2x10   PPT with stabilizer cuff for biofeedback 20x 20x  2x10 x10 x10 10x 15x 20x 2x10   B/l ER with retraction 2x10 gtb 2x15 gtb 2x15 gtb  2x15 gtb 2x15 gtb 10x gtb 2x10 gtb 2x10 gtb 2x10 gtb   Rows with retraction     3x10 gtb 2x10 gtb    2x10 gtb   Shld ext with  "retraction  3x10 gtb 3x10 gtb  3x10 gtb 3x10 rtb 2x10 gtb   2x10 gtb   Ther Ex             Pt edu POC and HEP    Progress Report measurements  5' 2'      Pulley flexion 10x10\" 10x10\" 10-x10\" 10x10\" 10x10\" 10x10\" 5x10\" 10x10\" 10x10\" 10x10\"   Pulley scaption/abd 10x10\" 10x10\" 10x10\" 10x10\" 10x10\" 10x10\" 5x10\" 10x10\" 10x10\" 10x10\"   Supine wand flexion  10x5\" 10x5\"     10x 5x standing    shld flexion over foam roll             Wall slides   10x 10 ea   4x20\" 4x20\" 4x20\"    Wand abd AAROM  10 ea           Figure 4 stretch       R 3x15\" 4x20\" b/l  3x30\"   Cross body adduction             Seated wand ER AROM 20x 10x5\" ea 10x5\" 10x5\"    10x5\" ea 8x5\" ea    Thoracic ext             Ther Activity                          Body mechanics practice vacuuming and dishes 10'                                                                             Modalities             Cold pack       8' R  deferred                          "

## 2024-01-08 ENCOUNTER — TELEPHONE (OUTPATIENT)
Dept: FAMILY MEDICINE CLINIC | Facility: CLINIC | Age: 74
End: 2024-01-08

## 2024-01-08 NOTE — TELEPHONE ENCOUNTER
Patient called. Patient was wondering if she could get a 60 day supply of meclizine since it is difficult to get to the pharmacy.    Please advise. Thank you

## 2024-01-09 ENCOUNTER — OFFICE VISIT (OUTPATIENT)
Dept: PHYSICAL THERAPY | Facility: CLINIC | Age: 74
End: 2024-01-09
Payer: MEDICARE

## 2024-01-09 DIAGNOSIS — G89.29 CHRONIC RIGHT SHOULDER PAIN: ICD-10-CM

## 2024-01-09 DIAGNOSIS — G89.29 CHRONIC LEFT SHOULDER PAIN: Primary | ICD-10-CM

## 2024-01-09 DIAGNOSIS — M54.16 LUMBAR RADICULOPATHY: ICD-10-CM

## 2024-01-09 DIAGNOSIS — R42 VERTIGO: ICD-10-CM

## 2024-01-09 DIAGNOSIS — M25.511 CHRONIC RIGHT SHOULDER PAIN: ICD-10-CM

## 2024-01-09 DIAGNOSIS — M25.512 CHRONIC LEFT SHOULDER PAIN: Primary | ICD-10-CM

## 2024-01-09 PROCEDURE — 97140 MANUAL THERAPY 1/> REGIONS: CPT | Performed by: PHYSICAL THERAPIST

## 2024-01-09 PROCEDURE — 97112 NEUROMUSCULAR REEDUCATION: CPT | Performed by: PHYSICAL THERAPIST

## 2024-01-09 PROCEDURE — 97110 THERAPEUTIC EXERCISES: CPT | Performed by: PHYSICAL THERAPIST

## 2024-01-09 RX ORDER — MECLIZINE HYDROCHLORIDE 25 MG/1
25 TABLET ORAL 3 TIMES DAILY PRN
Qty: 30 TABLET | Refills: 5 | Status: SHIPPED | OUTPATIENT
Start: 2024-01-09

## 2024-01-09 NOTE — PROGRESS NOTES
"Daily Note    Today's date: 2024  Patient name: Bozena Armstrong  : 1950  MRN: [id number]  Referring provider: He Conti DO  Dx:   Encounter Diagnosis     ICD-10-CM    1. Chronic left shoulder pain  M25.512     G89.29       2. Lumbar radiculopathy  M54.16       3. Chronic right shoulder pain  M25.511     G89.29                      Subjective: patient reports that her back and shoulders are feeling good today.      Objective: See treatment diary below      Assessment: Good passive mobility with shoulder manuals today. Progressed lumbar mobility stretches with supine trunk rotation. Patient requires continued skilled PT per plan of care to address impairments and improve function with household management and ADL completion.    Plan: Continue per plan of care. Progress shoulder strength/mobility and core strength as tolerated. Consider d/c to home program after next week.     Precautions: HTN, R MUSA, L TKA  EPOC 24    Manuals 12/11 12/13 12/18 12/20 12/26 1/3 1/9 11/28 12/4 12/6   shld PROM b/l DES DES DES DES DES DES DES DES DES DES   R GH jt p-a DES LAD DES LAD only DES LAD DES LAD DES LAD DES DES DES LAD DES LAD DES LAD                             Neuro Re-Ed             Pull down with  ea gtb 20 ea      Paloff press      10 ea gtb 2x10 ea gtb      PPT with march  10x nv 10 ea 10 ea 10 ea 20 ea      PPT with adductor squeeze 10x  20x          bridge  2x10 2x10    10x 2x10 2x10 2x10   PPT with stabilizer cuff for biofeedback 20x 20x  2x10 x10 x10 20x 15x 20x 2x10   B/l ER with retraction 2x10 gtb 2x15 gtb 2x15 gtb  2x15 gtb 2x15 gtb 2x15 gtb 2x10 gtb 2x10 gtb 2x10 gtb   Rows with retraction     3x10 gtb 2x10 gtb    2x10 gtb   Shld ext with retraction  3x10 gtb 3x10 gtb  3x10 gtb 3x10 rtb    2x10 gtb   Ther Ex             Pt edu POC and HEP    Progress Report measurements  5' 2'      Pulley flexion 10x10\" 10x10\" 10-x10\" 10x10\" 10x10\" 10x10\" 10x10\" 10x10\" 10x10\" 10x10\"   Pulley scaption/abd 10x10\" " "10x10\" 10x10\" 10x10\" 10x10\" 10x10\" 10x10\" 10x10\" 10x10\" 10x10\"   Supine trunk rotation       10x5\"      Supine wand flexion  10x5\" 10x5\"     10x 5x standing    shld flexion over foam roll             Wall slides   10x 10 ea    4x20\" 4x20\"    Wand abd AAROM  10 ea           Figure 4 stretch        4x20\" b/l  3x30\"   Cross body adduction             Seated wand ER AROM 20x 10x5\" ea 10x5\" 10x5\"    10x5\" ea 8x5\" ea    Thoracic ext             Ther Activity                          Body mechanics practice vacuuming and dishes 10'                                                                             Modalities             Cold pack       8' R  deferred                          "

## 2024-01-11 ENCOUNTER — APPOINTMENT (OUTPATIENT)
Dept: PHYSICAL THERAPY | Facility: CLINIC | Age: 74
End: 2024-01-11
Payer: MEDICARE

## 2024-01-11 ENCOUNTER — APPOINTMENT (EMERGENCY)
Dept: RADIOLOGY | Facility: HOSPITAL | Age: 74
End: 2024-01-11
Payer: MEDICARE

## 2024-01-11 ENCOUNTER — HOSPITAL ENCOUNTER (EMERGENCY)
Facility: HOSPITAL | Age: 74
Discharge: HOME | End: 2024-01-11
Attending: STUDENT IN AN ORGANIZED HEALTH CARE EDUCATION/TRAINING PROGRAM
Payer: MEDICARE

## 2024-01-11 ENCOUNTER — APPOINTMENT (EMERGENCY)
Dept: CT IMAGING | Facility: HOSPITAL | Age: 74
End: 2024-01-11
Payer: MEDICARE

## 2024-01-11 VITALS
HEIGHT: 64 IN | SYSTOLIC BLOOD PRESSURE: 132 MMHG | BODY MASS INDEX: 41.15 KG/M2 | HEART RATE: 87 BPM | DIASTOLIC BLOOD PRESSURE: 78 MMHG | WEIGHT: 241 LBS | OXYGEN SATURATION: 99 % | TEMPERATURE: 98.9 F | RESPIRATION RATE: 18 BRPM

## 2024-01-11 DIAGNOSIS — R26.2 AMBULATORY DYSFUNCTION: ICD-10-CM

## 2024-01-11 DIAGNOSIS — R29.6 MULTIPLE FALLS: ICD-10-CM

## 2024-01-11 DIAGNOSIS — S32.019A CLOSED FRACTURE OF FIRST LUMBAR VERTEBRA, UNSPECIFIED FRACTURE MORPHOLOGY, INITIAL ENCOUNTER (HCC): ICD-10-CM
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
## 2024-01-11 LAB
2HR DELTA HS TROPONIN: 0 NG/L
ALBUMIN SERPL BCP-MCNC: 4 G/DL (ref 3.5–5)
ALP SERPL-CCNC: 81 U/L (ref 34–104)
ALT SERPL W P-5'-P-CCNC: 16 U/L (ref 7–52)
AMORPH URATE CRY URNS QL MICRO: NORMAL
ANION GAP SERPL CALCULATED.3IONS-SCNC: 8 MMOL/L
APTT PPP: 26 SECONDS (ref 23–37)
AST SERPL W P-5'-P-CCNC: 13 U/L (ref 13–39)
BACTERIA UR QL AUTO: NORMAL /HPF
BASOPHILS # BLD AUTO: 0.05 THOUSANDS/ÂΜL (ref 0–0.1)
BASOPHILS NFR BLD AUTO: 1 % (ref 0–1)
BILIRUB SERPL-MCNC: 0.76 MG/DL (ref 0.2–1)
BILIRUB UR QL STRIP: NEGATIVE
BUN SERPL-MCNC: 13 MG/DL (ref 5–25)
CALCIUM SERPL-MCNC: 9 MG/DL (ref 8.4–10.2)
CARDIAC TROPONIN I PNL SERPL HS: 3 NG/L
CARDIAC TROPONIN I PNL SERPL HS: 3 NG/L
CHLORIDE SERPL-SCNC: 102 MMOL/L (ref 96–108)
CLARITY UR: CLEAR
CO2 SERPL-SCNC: 28 MMOL/L (ref 21–32)
COLOR UR: ABNORMAL
CREAT SERPL-MCNC: 0.51 MG/DL (ref 0.6–1.3)
EOSINOPHIL # BLD AUTO: 0.08 THOUSAND/ÂΜL (ref 0–0.61)
EOSINOPHIL NFR BLD AUTO: 1 % (ref 0–6)
ERYTHROCYTE [DISTWIDTH] IN BLOOD BY AUTOMATED COUNT: 13.5 % (ref 11.6–15.1)
GFR SERPL CREATININE-BSD FRML MDRD: 97 ML/MIN/1.73SQ M
GLUCOSE SERPL-MCNC: 153 MG/DL (ref 65–140)
GLUCOSE UR STRIP-MCNC: NEGATIVE MG/DL
HCT VFR BLD AUTO: 40.7 % (ref 34.8–46.1)
HGB BLD-MCNC: 13.3 G/DL (ref 11.5–15.4)
HGB UR QL STRIP.AUTO: NEGATIVE
IMM GRANULOCYTES # BLD AUTO: 0.03 THOUSAND/UL (ref 0–0.2)
IMM GRANULOCYTES NFR BLD AUTO: 1 % (ref 0–2)
INR PPP: 0.95 (ref 0.84–1.19)
KETONES UR STRIP-MCNC: NEGATIVE MG/DL
LEUKOCYTE ESTERASE UR QL STRIP: NEGATIVE
LYMPHOCYTES # BLD AUTO: 2.25 THOUSANDS/ÂΜL (ref 0.6–4.47)
LYMPHOCYTES NFR BLD AUTO: 35 % (ref 14–44)
MCH RBC QN AUTO: 30.5 PG (ref 26.8–34.3)
MCHC RBC AUTO-ENTMCNC: 32.7 G/DL (ref 31.4–37.4)
MCV RBC AUTO: 93 FL (ref 82–98)
MONOCYTES # BLD AUTO: 0.48 THOUSAND/ÂΜL (ref 0.17–1.22)
MONOCYTES NFR BLD AUTO: 8 % (ref 4–12)
NEUTROPHILS # BLD AUTO: 3.49 THOUSANDS/ÂΜL (ref 1.85–7.62)
NEUTS SEG NFR BLD AUTO: 54 % (ref 43–75)
NITRITE UR QL STRIP: NEGATIVE
NON-SQ EPI CELLS URNS QL MICRO: NORMAL /HPF
NRBC BLD AUTO-RTO: 0 /100 WBCS
PH UR STRIP.AUTO: 8 [PH]
PLATELET # BLD AUTO: 242 THOUSANDS/UL (ref 149–390)
PMV BLD AUTO: 9.8 FL (ref 8.9–12.7)
POTASSIUM SERPL-SCNC: 3.5 MMOL/L (ref 3.5–5.3)
PROT SERPL-MCNC: 6.7 G/DL (ref 6.4–8.4)
PROT UR STRIP-MCNC: ABNORMAL MG/DL
PROTHROMBIN TIME: 13 SECONDS (ref 11.6–14.5)
RBC # BLD AUTO: 4.36 MILLION/UL (ref 3.81–5.12)
RBC #/AREA URNS AUTO: NORMAL /HPF
SODIUM SERPL-SCNC: 138 MMOL/L (ref 135–147)
SP GR UR STRIP.AUTO: <1.005 (ref 1–1.03)
UROBILINOGEN UR STRIP-ACNC: <2 MG/DL
WBC # BLD AUTO: 6.38 THOUSAND/UL (ref 4.31–10.16)
WBC #/AREA URNS AUTO: NORMAL /HPF

## 2024-01-11 PROCEDURE — 36415 COLL VENOUS BLD VENIPUNCTURE: CPT | Performed by: STUDENT IN AN ORGANIZED HEALTH CARE EDUCATION/TRAINING PROGRAM

## 2024-01-11 PROCEDURE — 72125 CT NECK SPINE W/O DYE: CPT

## 2024-01-11 PROCEDURE — 99285 EMERGENCY DEPT VISIT HI MDM: CPT

## 2024-01-11 PROCEDURE — 73502 X-RAY EXAM HIP UNI 2-3 VIEWS: CPT

## 2024-01-11 PROCEDURE — 93005 ELECTROCARDIOGRAM TRACING: CPT

## 2024-01-11 PROCEDURE — 85610 PROTHROMBIN TIME: CPT | Performed by: STUDENT IN AN ORGANIZED HEALTH CARE EDUCATION/TRAINING PROGRAM

## 2024-01-11 PROCEDURE — 81001 URINALYSIS AUTO W/SCOPE: CPT | Performed by: STUDENT IN AN ORGANIZED HEALTH CARE EDUCATION/TRAINING PROGRAM

## 2024-01-11 PROCEDURE — 80053 COMPREHEN METABOLIC PANEL: CPT | Performed by: STUDENT IN AN ORGANIZED HEALTH CARE EDUCATION/TRAINING PROGRAM

## 2024-01-11 PROCEDURE — 70450 CT HEAD/BRAIN W/O DYE: CPT

## 2024-01-11 PROCEDURE — 71260 CT THORAX DX C+: CPT

## 2024-01-11 PROCEDURE — 74177 CT ABD & PELVIS W/CONTRAST: CPT

## 2024-01-11 PROCEDURE — 99285 EMERGENCY DEPT VISIT HI MDM: CPT | Performed by: STUDENT IN AN ORGANIZED HEALTH CARE EDUCATION/TRAINING PROGRAM

## 2024-01-11 PROCEDURE — 85025 COMPLETE CBC W/AUTO DIFF WBC: CPT | Performed by: STUDENT IN AN ORGANIZED HEALTH CARE EDUCATION/TRAINING PROGRAM

## 2024-01-11 PROCEDURE — 85730 THROMBOPLASTIN TIME PARTIAL: CPT | Performed by: STUDENT IN AN ORGANIZED HEALTH CARE EDUCATION/TRAINING PROGRAM

## 2024-01-11 PROCEDURE — 84484 ASSAY OF TROPONIN QUANT: CPT | Performed by: STUDENT IN AN ORGANIZED HEALTH CARE EDUCATION/TRAINING PROGRAM

## 2024-01-11 RX ADMIN — IOHEXOL 100 ML: 350 INJECTION, SOLUTION INTRAVENOUS at 12:12

## 2024-01-11 NOTE — DISCHARGE INSTRUCTIONS
You have been seen and evaluated in the Emergency Department today for evaluation after your fall yesterday and today.  We did not find any evidence of an acute injury today.  Your imaging did show a healed rib fracture on the left and a subacute (probably remote) L1 Transverse process fracture.  I discussed these findings with the trauma team, they are is no intervention for that injury.  Please follow-up with your primary care physician and physical therapy team.  Please return to the emergency department for any further falls, pain or concerns.  You can use Tylenol as needed for pain and as directed at home    Please return to the Emergency Department (ED) as soon as possible if you develop any new or concerning symptoms which include but is not limited to chest pain, shortness of breath, dizziness, nausea, vomiting, significant or persistent abdominal pain. If you are unable to drive or walk please call 911 to be safely transported to your nearest medical facility.    If you were dispensed any prescribed medications, please take the medication as directed.     Please call your primary care physician in 1-2 days for further evaluation as well as any specialist provided by calling the numbers listed below.   If you do not have a primary care physician, please call [facility] at [phone]   Or visit: [email or web address]    Please bring this paper to all doctor follow up visits as it may contain information that your doctor may request. Please have your primary care physician follow up on all of your lab tests and imaging studies performed at this hospital visit. If your primary care physician can’t do this, please call the hospital and ask for the medical records department to obtain personal records of this visit.    Again, please return to the Emergency Department as soon as possible if you develop any new or concerning symptoms, including but not limited to, chest pain, shortness of breath,  dizziness, nausea, vomiting, significant or persistent abdominal pain. If you are unable to drive or walk, please call 911 to be transported to your nearest medical facility.  We are here 24/7. Please feel free to contact the Emergency Department if you have any further questions. Thank you for allowing us to care for you.

## 2024-01-11 NOTE — ED PROVIDER NOTES
History  Chief Complaint   Patient presents with    Multiple Falls     Patient presents to the ER with report of having falls last night and today.  Patient reports not being on thinners.  Patient reports that she gets dizzy prior to the falls.     HPI    73-year-old female, past medical history of balance disorder, GERD, hypertension, hyperlipidemia, vertigo, presenting to the ED for evaluation after 2 falls, 1 yesterday at 3 PM and 1 this morning around 3 AM.  Patient reports the first fall she was in her bathroom, denies head strike or LOC, denies any injuries, was able to get herself up off the floor.  The second fall was this morning around 3 AM in which patient was getting out of bed to go to the bathroom, lost her balance which is typical for her with her known balance disorder, did hit her head, denies any loss of consciousness but was landed on her right hip and is reporting right hip pain.  She denies any blood thinner use.  She denies any other areas of pain.  She reports falling last month and landed on her left breast, has some residual bruising there.  She denies any recent illnesses, urinary symptoms, chest pain, shortness of breath, abdominal pain, nausea, vomiting, diarrhea, visual changes.  She reports falling twice in 2 days is abnormal for her she does not typically fall that frequently.  She does use a walker when she is out but does not use any assistive devices in the home.  She reports pain is mild and is declining any pain medications at this time.  [facility]    Prior to Admission Medications   Prescriptions Last Dose Informant Patient Reported? Taking?   B Complex Vitamins (VITAMIN B COMPLEX PO)  Self Yes No   Sig: Take by mouth in the morning   DULoxetine (CYMBALTA) 60 mg delayed release capsule  Self No No   Sig: TAKE ONE CAPSULE BY MOUTH AT BEDTIME   Doxylamine Succinate, Sleep, (SLEEP AID PO)  Self Yes No   Sig: Take by mouth    Magnesium 400 MG TABS  Self No No   Sig: Take 1 tablet (400 mg total) by mouth in the morning   Multiple Vitamins-Minerals (CENTRUM SILVER PO)  Self Yes No   Sig: Take by mouth   VITAMIN E PO  Self Yes No   Sig: Take by mouth   ascorbic acid (VITAMIN C) 500 MG tablet  Self No No   Sig: Take 1 tablet (500 mg total) by mouth 2 (two) times a day   celecoxib (CeleBREX) 100 mg capsule  Self No No   Sig: TAKE ONE CAPSULE BY MOUTH TWICE A DAY   cholecalciferol (VITAMIN D3) 1,000 units tablet  Self Yes No   Sig: Take 1,000 Units by mouth daily   gabapentin (NEURONTIN) 300 mg capsule  Self Yes No   Sig: Take 1 capsule by mouth every 6 (six) hours as needed   Patient not taking: Reported on 2023   meclizine (ANTIVERT) 25 mg tablet   No No   Sig: Take 1 tablet (25 mg total) by mouth 3 (three) times a day as needed for dizziness   polyethylene glycol (GLYCOLAX) 17 GM/SCOOP powder  Self No No   Sig: Take 17 g by mouth daily   potassium chloride (K-DUR,KLOR-CON) 20 mEq tablet  Self No No   Sig: Take 1 tablet (20 mEq total) by mouth daily   rosuvastatin (CRESTOR) 40 MG tablet  Self No No   Sig: Take 1 tablet (40 mg total) by mouth daily      Facility-Administered Medications: None       Past Medical History:   Diagnosis Date    Arthritis     Balance disorder     GERD (gastroesophageal reflux disease)     Hyperlipidemia     Hypertension     Vertigo        Past Surgical History:   Procedure Laterality Date     SECTION      COLONOSCOPY      HYSTERECTOMY      HYSTEROSCOPY W/ ENDOMETRIAL ABLATION      JOINT REPLACEMENT      KNEE SURGERY      [place] ARTHRP ACETBLR/PROX FEM PROSTC AGRFT/ALGRFT Right 2022    Procedure: ARTHROPLASTY HIP TOTAL ANTERIOR and all associated procedures;  Surgeon: Trisha Alonso MD;  Location:  MAIN OR;  Service: Orthopedics       Family History   Problem Relation Age of Onset    Arthritis Mother     Cancer Father     Heart disease Father     Anemia Brother      I have reviewed and agree  with the history as documented.    E-Cigarette/Vaping    E-Cigarette Use Never User      E-Cigarette/Vaping Substances    Nicotine No     THC No     CBD No     Flavoring No     Other No     Unknown No      Social History     Tobacco Use    Smoking status: Never     Passive exposure: Past    Smokeless tobacco: Never   Vaping Use    Vaping status: Never Used   Substance Use Topics    Alcohol use: Never    Drug use: Never       Review of Systems    Physical Exam  Physical Exam  Vitals and nursing note reviewed.   Constitutional:       General: She is not in acute distress.     Appearance: Normal appearance. She is well-developed. She is not ill-appearing, toxic-appearing or diaphoretic.   HENT:      Head: Normocephalic and atraumatic.      Mouth/Throat:      Mouth: Mucous membranes are moist.      Pharynx: Oropharynx is clear.   Eyes:      Extraocular Movements: Extraocular movements intact.      Pupils: Pupils are equal, round, and reactive to light.   Cardiovascular:      Rate and Rhythm: Normal rate and regular rhythm.      Pulses: Normal pulses.      Comments: Ecchymoses left anterior chest wall, nontender  Pulmonary:      Effort: Pulmonary effort is normal. No respiratory distress.   Abdominal:      General: There is no distension.      Palpations: Abdomen is soft.      Tenderness: There is no abdominal tenderness.   Musculoskeletal:      Cervical back: Normal range of motion and neck supple. No tenderness.      Right lower leg: No edema.      Left lower leg: No edema.      Comments: Tenderness R pelvis/hip, no deformity  No midline thoracic or lumbar spinous process tenderness to palpation  B/L lumbar paraspinal tenderness to palpation  No step-offs   Skin:     General: Skin is warm and dry.   Neurological:      General: No focal deficit present.      Mental Status: She is alert and oriented to person, place, and time. Mental status is at baseline.   Psychiatric:         Mood and Affect: Mood normal.          Behavior: Behavior normal.         Vital Signs  ED Triage Vitals [01/11/24 1142]   Temperature Pulse Respirations Blood Pressure SpO2   98.9 °F (37.2 °C) 95 18 149/82 95 %      Temp Source Heart Rate Source Patient Position - Orthostatic VS BP Location FiO2 (%)   Oral Monitor Sitting Left arm --      Pain Score       2           Vitals:    01/11/24 1330 01/11/24 1430 01/11/24 1530 01/11/24 1630   BP: 146/82 127/72 129/72 132/78   Pulse: 87 75 71 87   Patient Position - Orthostatic VS:             Visual Acuity  Visual Acuity      Flowsheet Row Most Recent Value   L Pupil Size (mm) 3   R Pupil Size (mm) 3            ED Medications  Medications   iohexol (OMNIPAQUE) 350 MG/ML injection (SINGLE-DOSE) 100 mL (100 mL Intravenous Given 1/11/24 1212)       Diagnostic Studies  Results Reviewed       Procedure Component Value Units Date/Time    HS Troponin I 2hr [[id number]]  (Normal) Collected: 01/11/24 1504    Lab Status: Final result Specimen: Blood from Arm, Right Updated: 01/11/24 1534     hs TnI 2hr 3 ng/L      Delta 2hr hsTnI 0 ng/L     Urine Microscopic [[id number]] Collected: 01/11/24 1435    Lab Status: Final result Specimen: Urine, Clean Catch Updated: 01/11/24 1456     RBC, UA None Seen /hpf      WBC, UA None Seen /hpf      Epithelial Cells Occasional /hpf      Bacteria, UA None Seen /hpf      Amorphous Crystals, UA Occasional    UA w Reflex to Microscopic w Reflex to Culture [[id number]]  (Abnormal) Collected: 01/11/24 1435    Lab Status: Final result Specimen: Urine, Clean Catch Updated: 01/11/24 1452     Color, UA Light Yellow     Clarity, UA Clear     Specific Gravity, UA <1.005     pH, UA 8.0     Leukocytes, UA Negative     Nitrite, UA Negative     Protein, UA Trace mg/dl      Glucose, UA Negative mg/dl      Ketones, UA Negative mg/dl      Urobilinogen, UA <2.0 mg/dl      Bilirubin, UA Negative     Occult Blood, UA Negative    HS Troponin 0hr (reflex protocol) [[id number]]  (Normal) Collected: 01/11/24 1206     Lab Status: Final result Specimen: Blood from Arm, Right Updated: 01/11/24 1233     hs TnI 0hr 3 ng/L     Protime-INR [[id number]]  (Normal) Collected: 01/11/24 1206    Lab Status: Final result Specimen: Blood from Arm, Right Updated: 01/11/24 1228     Protime 13.0 seconds      INR 0.95    APTT [[id number]]  (Normal) Collected: 01/11/24 1206    Lab Status: Final result Specimen: Blood from Arm, Right Updated: 01/11/24 1228     PTT 26 seconds     Comprehensive metabolic panel [[id number]]  (Abnormal) Collected: 01/11/24 1206    Lab Status: Final result Specimen: Blood from Arm, Right Updated: 01/11/24 1227     Sodium 138 mmol/L      Potassium 3.5 mmol/L      Chloride 102 mmol/L      CO2 28 mmol/L      ANION GAP 8 mmol/L      BUN 13 mg/dL      Creatinine 0.51 mg/dL      Glucose 153 mg/dL      Calcium 9.0 mg/dL      AST 13 U/L      ALT 16 U/L      Alkaline Phosphatase 81 U/L      Total Protein 6.7 g/dL      Albumin 4.0 g/dL      Total Bilirubin 0.76 mg/dL      eGFR 97 ml/min/1.73sq m     Narrative:      National Kidney Disease Foundation guidelines for Chronic Kidney Disease (CKD):     Stage 1 with normal or high GFR (GFR > 90 mL/min/1.73 square meters)    Stage 2 Mild CKD (GFR = 60-89 mL/min/1.73 square meters)    Stage 3A Moderate CKD (GFR = 45-59 mL/min/1.73 square meters)    Stage 3B Moderate CKD (GFR = 30-44 mL/min/1.73 square meters)    Stage 4 Severe CKD (GFR = 15-29 mL/min/1.73 square meters)    Stage 5 End Stage CKD (GFR <15 mL/min/1.73 square meters)  Note: GFR calculation is accurate only with a steady state creatinine    CBC and differential [[id number]] Collected: 01/11/24 1206    Lab Status: Final result Specimen: Blood from Arm, Right Updated: 01/11/24 1213     WBC 6.38 Thousand/uL      RBC 4.36 Million/uL      Hemoglobin 13.3 g/dL      Hematocrit 40.7 %      MCV 93 fL      MCH 30.5 pg      MCHC 32.7 g/dL      RDW 13.5 %      MPV 9.8 fL      Platelets 242 Thousands/uL      nRBC 0 /100 WBCs      Neutrophils  Relative 54 %      Immat GRANS % 1 %      Lymphocytes Relative 35 %      Monocytes Relative 8 %      Eosinophils Relative 1 %      Basophils Relative 1 %      Neutrophils Absolute 3.49 Thousands/µL      Immature Grans Absolute 0.03 Thousand/uL      Lymphocytes Absolute 2.25 Thousands/µL      Monocytes Absolute 0.48 Thousand/µL      Eosinophils Absolute 0.08 Thousand/µL      Basophils Absolute 0.05 Thousands/µL                    XR hip/pelv 2-3 vws right if performed   Final Result by Gt Rodriguez DO (01/11 1420)      1. No evidence of acute osseous abnormality.      2. Mild osteoarthritic changes in the left hip         Workstation performed: [id number]         TRAUMA - CT head wo contrast   Final Result by Gt Rodriguez DO (01/11 1311)      No evidence of acute intracranial abnormality.      The study was marked in EPIC for immediate notification per trauma evaluation protocol.                  Workstation performed: [id number]         TRAUMA - CT spine cervical wo contrast   Final Result by Gt Rodriguez DO (01/11 1321)      No evidence of acute cervical spine fracture or traumatic malalignment.      The study was marked in EPIC for immediate notification per trauma evaluation protocol.            Workstation performed: [id number]         TRAUMA - CT chest abdomen pelvis w contrast   Final Result by Gt Rodriguez DO (01/11 1353)      1. Healed fracture deformity involving the posterior aspect of rib 11 on the left with nearby minimally displaced fracture of the left L1 transverse process which also appears subacute/remote. Both findings are new as compared to CT of July 2023.    Correlation with point tenderness is recommended. Otherwise, no CT evidence of acute traumatic sequelae within the chest, abdomen, or pelvis.      2. Right lower lobe pulmonary nodule measuring 3 mm. Based on current Fleischner Society 2017 Guidelines on incidental pulmonary nodule, no routine follow-up is  needed if the patient is low risk.  If the patient is high risk, optional follow-up chest CT    at 12 months can be considered.      The study was marked in EPIC for immediate notification per trauma evaluation protocol.               Workstation performed: [id number]                    Procedures  ECG 12 Lead Documentation Only    Date/Time: 1/11/2024 1:00 PM    Performed by: Wilma Bateman DO  Authorized by: Wilma Bateman DO    Indications / Diagnosis:  Fall/trauma  Patient location:  ED  Interpretation:     Interpretation: non-specific    Rate:     ECG rate:  83    ECG rate assessment: normal    Rhythm:     Rhythm: sinus rhythm    Ectopy:     Ectopy: none    QRS:     QRS axis:  Normal    QRS intervals:  Normal  Conduction:     Conduction: normal    ST segments:     ST segments:  Normal  T waves:     T waves: normal    Other findings:     Other findings comment:  Low voltage qrs  Comments:      No STEMI           ED Course  ED Course as of 01/13/24 2048   Thu Jan 11, 2024   1346 TRAUMA - CT spine cervical wo contrast  IMPRESSION:     No evidence of acute cervical spine fracture or traumatic malalignment.     1347 TRAUMA - CT head wo contrast  No evidence of acute intracranial abnormality.   1432 XR hip/pelv 2-3 vws right if performed  IMPRESSION:     1. No evidence of acute osseous abnormality.     2. Mild osteoarthritic changes in the left hip     1503 Speaking with [facility] trauma team to discuss CT findings of likely subacute injury. Trauma team to call back   1540 hs TnI 2hr: 3   1540 Delta 2hr hsTnI: 0   1600 TRAUMA - CT chest abdomen pelvis w contrast  Discussed w/ Dr. Mena of [facility] trauma regarding findings as above.  Patient does not require trauma evaluation or any intervention.             Medical Decision Making  73-year-old female, past medical history of balance disorder, GERD, hypertension, hyperlipidemia, vertigo, presenting to the ED for evaluation after 2 falls, 1 yesterday at 3 PM and 1 this  morning around 3 AM. AVSS well appearing. On exam, patient with R hip tenderness to palpation. Healing ecchymoses L anterior chest wall from fall last month, and reported chronic b/l lumbar paraspinal tenderness to palpation. Obtained basic labs, UA, CT imaging. Imaging notable for healed L rib fracture and nearby minimally displaced fracture of the left L1 transverse process which also appears subacute/remote.  No other traumatic injuries noted on imaging.  Patient did not require analgesia while in the ED.  She ambulated steadily with her walker which she uses at baseline.  She feels comfortable going home and feels steady on her feet.  She will follow-up with her primary care provider and physical therapist, has upcoming neurology appointment as well. Discussed with [facility] trauma (see ED course). Pt discharged with strict return precautions and PCP follow-up. Well appearing, AVSS upon discharge. Pt verbalized understanding of discharge instructions and return precautions. All questions patient had were answered.      Amount and/or Complexity of Data Reviewed  Labs: ordered. Decision-making details documented in ED Course.  Radiology: ordered. Decision-making details documented in ED Course.  ECG/medicine tests: ordered and independent interpretation performed.  Discussion of management or test interpretation with external provider(s): Dr Mena trauma surgery at [facility]    Risk  OTC drugs.  Prescription drug management.             Disposition  Final diagnoses:   Fall, initial encounter   Ambulatory dysfunction   Multiple falls   Closed fracture of first lumbar vertebra, unspecified fracture morphology, initial encounter (HCC)     Time reflects when diagnosis was documented in both MDM as applicable and the Disposition within this note       Time User Action Codes Description Comment    1/11/2024  4:33 PM Wilma Bateman Add [W19.XXXA] Fall, initial encounter     1/11/2024  4:33 PM Wilma Bateman Add [R26.2]  Ambulatory dysfunction     1/11/2024  4:33 PM Fransico Wilma Add [R29.6] Multiple falls     1/11/2024  4:33 PM Wilma Bateman Add [S32.019A] Closed fracture of first lumbar vertebra, unspecified fracture morphology, initial encounter (HCC)           ED Disposition       ED Disposition   Discharge    Condition   Stable    Date/Time   Thu Jan 11, 2024  4:33 PM    Comment   Bozena Armstrong discharge to home/self care.                   Follow-up Information       Follow up With Specialties Details Why Contact Info Additional Information    HANNAH GriffinC Physician Assistant, Family Medicine Schedule an appointment as soon as possible for a visit in 2 days  [address] [address] [phone]        [facility] Emergency Department Emergency Medicine Go to  As needed, If symptoms worsen [address] [facility] [address] [phone] [facility] Emergency Department, [address] [place], [place] [address]            Discharge Medication List as of 1/11/2024  4:35 PM        CONTINUE these medications which have NOT CHANGED    Details   meclizine (ANTIVERT) 25 mg tablet Take 1 tablet (25 mg total) by mouth 3 (three) times a day as needed for dizziness, Starting Tue 1/9/2024, Normal      ascorbic acid (VITAMIN C) 500 MG tablet Take 1 tablet (500 mg total) by mouth 2 (two) times a day, Starting Tue 7/5/2022, Normal      B Complex Vitamins (VITAMIN B COMPLEX PO) Take by mouth in the morning, Historical Med      celecoxib (CeleBREX) 100 mg capsule TAKE ONE CAPSULE BY MOUTH TWICE A DAY, Starting Tue 11/28/2023, Normal      cholecalciferol (VITAMIN D3) 1,000 units tablet Take 1,000 Units by mouth daily, Historical Med      Doxylamine Succinate, Sleep, (SLEEP AID PO) Take by mouth, Historical Med      DULoxetine (CYMBALTA) 60 mg delayed release capsule TAKE ONE CAPSULE BY MOUTH AT BEDTIME, Normal       gabapentin (NEURONTIN) 300 mg capsule Take 1 capsule by mouth every 6 (six) hours as needed, Historical Med      Magnesium 400 MG TABS Take 1 tablet (400 mg total) by mouth in the morning, Starting Tue 7/25/2023, Normal      Multiple Vitamins-Minerals (CENTRUM SILVER PO) Take by mouth, Historical Med      polyethylene glycol (GLYCOLAX) 17 GM/SCOOP powder Take 17 g by mouth daily, Starting Thu 8/31/2023, Normal      potassium chloride (K-DUR,KLOR-CON) 20 mEq tablet Take 1 tablet (20 mEq total) by mouth daily, Starting Tue 7/25/2023, Normal      rosuvastatin (CRESTOR) 40 MG tablet Take 1 tablet (40 mg total) by mouth daily, Starting Wed 5/17/2023, Normal      VITAMIN E PO Take by mouth, Historical Med             No discharge procedures on file.    PDMP Review         Value Time User    PDMP Reviewed  Yes 9/11/2023  5:08 AM Sara Major DO            ED Provider  Electronically Signed by Wilma Bateman DO  01/13/24 2048

## 2024-01-12 LAB
ATRIAL RATE: 83 BPM
P AXIS: 27 DEGREES
PR INTERVAL: 128 MS
QRS AXIS: 48 DEGREES
QRSD INTERVAL: 68 MS
QT INTERVAL: 360 MS
QTC INTERVAL: 423 MS
T WAVE AXIS: 44 DEGREES
VENTRICULAR RATE: 83 BPM

## 2024-01-15 ENCOUNTER — APPOINTMENT (OUTPATIENT)
Dept: PHYSICAL THERAPY | Facility: CLINIC | Age: 74
End: 2024-01-15
Payer: MEDICARE

## 2024-01-16 ENCOUNTER — TELEPHONE (OUTPATIENT)
Dept: NEUROLOGY | Facility: CLINIC | Age: 74
End: 2024-01-16

## 2024-01-16 ENCOUNTER — OFFICE VISIT (OUTPATIENT)
Dept: FAMILY MEDICINE CLINIC | Facility: CLINIC | Age: 74
End: 2024-01-16
Payer: MEDICARE

## 2024-01-16 VITALS
SYSTOLIC BLOOD PRESSURE: 140 MMHG | BODY MASS INDEX: 41.42 KG/M2 | TEMPERATURE: 98.6 F | WEIGHT: 242.6 LBS | DIASTOLIC BLOOD PRESSURE: 86 MMHG | RESPIRATION RATE: 17 BRPM | HEART RATE: 121 BPM | HEIGHT: 64 IN | OXYGEN SATURATION: 97 %

## 2024-01-16 DIAGNOSIS — I10 ESSENTIAL HYPERTENSION: ICD-10-CM

## 2024-01-16 DIAGNOSIS — R42 DIZZINESS: Primary | ICD-10-CM

## 2024-01-16 DIAGNOSIS — Z78.0 ASYMPTOMATIC POSTMENOPAUSAL STATUS: ICD-10-CM

## 2024-01-16 DIAGNOSIS — E66.01 OBESITY, MORBID (HCC): ICD-10-CM

## 2024-01-16 DIAGNOSIS — D64.1 SECONDARY SIDEROBLASTIC ANEMIA DUE TO DISEASE (HCC): ICD-10-CM

## 2024-01-16 PROCEDURE — 99214 OFFICE O/P EST MOD 30 MIN: CPT | Performed by: FAMILY MEDICINE

## 2024-01-16 NOTE — ASSESSMENT & PLAN NOTE
BMI Counseling: Body mass index is 41.64 kg/m². The BMI is above normal. Nutrition recommendations include reducing portion sizes, decreasing overall calorie intake, and 3-5 servings of fruits/vegetables daily. Exercise recommendations include exercising 3-5 times per week.

## 2024-01-16 NOTE — TELEPHONE ENCOUNTER
VM 1/15 at 11:29 am:    My name is Bozena Armstrong. My birthday is [birth year]. I have an appointment at 10:00 on March 8th. I was wondering if it's possible to move it up. The reason why I'm seeing a neurologist is because I have balance issues and I had a lot of falls in the last 6 months. So I was just wondering if there was a sooner appointment.  _________________________________________________    Pt has OV with Dr. Kim on 3/8/24. Routed to clerical team to advise if there are any sooner appointments at this time.

## 2024-01-16 NOTE — PROGRESS NOTES
Bozena Armstrong 1950 female MRN: [id number]    Family Medicine Acute Visit    ASSESSMENT/PLAN  Problem List Items Addressed This Visit          Cardiovascular and Mediastinum    Essential hypertension    Relevant Orders    Echo complete w/ contrast if indicated       Other    Anemia    Obesity, morbid (HCC)     BMI Counseling: Body mass index is 41.64 kg/m². The BMI is above normal. Nutrition recommendations include reducing portion sizes, decreasing overall calorie intake, and 3-5 servings of fruits/vegetables daily. Exercise recommendations include exercising 3-5 times per week.           Other Visit Diagnoses       Dizziness    -  Primary    Relevant Orders    Holter monitor    Echo complete w/ contrast if indicated    Asymptomatic postmenopausal status        Relevant Orders    DXA bone density spine hip and pelvis            Bozena has been dealing with dizziness on and off for about a year now. She has had extensive testing done with labs, neuro imaging. Will check some additional cardiac testing as above. She has an apt with neurology in March. She already saw her eye doctor as well as ENT and had a good report there. Follow up in 6 months or sooner if needed       Future Appointments   Date Time Provider Department Center   3/8/2024 10:00 AM Petty Kim MD NEURO [facility]   11/6/2024  4:00 PM Felix Heard MD  CARE [facility]          SUBJECTIVE  CC: Balance Problem (Pt reports issue with what she thought is vertigo, has to take meclizine daily TID, only gets 30 at a time. If skips any doses, balance is off. Was in ER due to falls. Can fall from standing. Sees neuro March 8th as new patient. )      HPI:  Bozena Armstrong is a 73 y.o. female who presents for follow up for dizziness  She is dealing with on going vertigo issues    Review of Systems   Constitutional:  Negative for chills, fatigue and fever.   HENT:  Negative for congestion, postnasal drip, rhinorrhea and sinus  pressure.    Eyes:  Negative for photophobia and visual disturbance.   Respiratory:  Negative for cough and shortness of breath.    Cardiovascular:  Negative for chest pain, palpitations and leg swelling.   Gastrointestinal:  Negative for abdominal pain, constipation, diarrhea, nausea and vomiting.   Genitourinary:  Negative for difficulty urinating and dysuria.   Musculoskeletal:  Negative for arthralgias and myalgias.   Skin:  Negative for color change and rash.   Neurological:  Positive for dizziness. Negative for weakness, light-headedness and headaches.       Historical Information   The patient history was reviewed as follows:  Past Medical History:   Diagnosis Date    Arthritis     Balance disorder     GERD (gastroesophageal reflux disease)     Hyperlipidemia     Hypertension     Vertigo          Past Surgical History:   Procedure Laterality Date     SECTION      COLONOSCOPY      HYSTERECTOMY      HYSTEROSCOPY W/ ENDOMETRIAL ABLATION      JOINT REPLACEMENT      KNEE SURGERY      NE ARTHRP ACETBLR/PROX FEM PROSTC AGRFT/ALGRFT Right 2022    Procedure: ARTHROPLASTY HIP TOTAL ANTERIOR and all associated procedures;  Surgeon: Trisha Alonso MD;  Location: [facility];  Service: Orthopedics     Family History   Problem Relation Age of Onset    Arthritis Mother     Cancer Father     Heart disease Father     Anemia Brother       Social History   Social History     Substance and Sexual Activity   Alcohol Use Never     Social History     Substance and Sexual Activity   Drug Use Never     Social History     Tobacco Use   Smoking Status Never    Passive exposure: Past   Smokeless Tobacco Never       Medications:     Current Outpatient Medications:     ascorbic acid (VITAMIN C) 500 MG tablet, Take 1 tablet (500 mg total) by mouth 2 (two) times a day, Disp: 60 tablet, Rfl: 0    B Complex Vitamins (VITAMIN B COMPLEX PO), Take by mouth in the morning, Disp: , Rfl:     celecoxib (CeleBREX) 100 mg capsule,  "TAKE ONE CAPSULE BY MOUTH TWICE A DAY, Disp: 60 capsule, Rfl: 0    cholecalciferol (VITAMIN D3) 1,000 units tablet, Take 1,000 Units by mouth daily, Disp: , Rfl:     Doxylamine Succinate, Sleep, (SLEEP AID PO), Take by mouth, Disp: , Rfl:     DULoxetine (CYMBALTA) 60 mg delayed release capsule, TAKE ONE CAPSULE BY MOUTH AT BEDTIME, Disp: 30 capsule, Rfl: 2    Magnesium 400 MG TABS, Take 1 tablet (400 mg total) by mouth in the morning, Disp: 30 tablet, Rfl: 1    meclizine (ANTIVERT) 25 mg tablet, Take 1 tablet (25 mg total) by mouth 3 (three) times a day as needed for dizziness, Disp: 30 tablet, Rfl: 5    Multiple Vitamins-Minerals (CENTRUM SILVER PO), Take by mouth, Disp: , Rfl:     polyethylene glycol (GLYCOLAX) 17 GM/SCOOP powder, Take 17 g by mouth daily, Disp: 255 g, Rfl: 0    potassium chloride (K-DUR,KLOR-CON) 20 mEq tablet, Take 1 tablet (20 mEq total) by mouth daily, Disp: 30 tablet, Rfl: 5    rosuvastatin (CRESTOR) 40 MG tablet, Take 1 tablet (40 mg total) by mouth daily, Disp: 90 tablet, Rfl: 3    VITAMIN E PO, Take by mouth, Disp: , Rfl:     gabapentin (NEURONTIN) 300 mg capsule, Take 1 capsule by mouth every 6 (six) hours as needed (Patient not taking: Reported on 9/27/2023), Disp: , Rfl:     No Known Allergies    OBJECTIVE  Vitals:   Vitals:    01/16/24 0924   BP: 140/86   BP Location: Left arm   Patient Position: Sitting   Cuff Size: Large   Pulse: (!) 121   Resp: 17   Temp: 98.6 °F (37 °C)   TempSrc: Tympanic   SpO2: 97%   Weight: 110 kg (242 lb 9.6 oz)   Height: 5' 4\" (1.626 m)         Physical Exam  Constitutional:       Appearance: She is well-developed.   HENT:      Head: Normocephalic and atraumatic.   Eyes:      Pupils: Pupils are equal, round, and reactive to light.   Cardiovascular:      Rate and Rhythm: Normal rate and regular rhythm.      Heart sounds: Normal heart sounds.   Pulmonary:      Effort: Pulmonary effort is normal. No respiratory distress.      Breath sounds: Normal breath sounds. " No wheezing.   Musculoskeletal:         General: No tenderness. Normal range of motion.      Cervical back: Normal range of motion and neck supple.   Skin:     General: Skin is warm and dry.   Neurological:      Mental Status: She is alert and oriented to person, place, and time.   Psychiatric:         Behavior: Behavior normal.                    Hillary Grimm, DO    1/16/2024

## 2024-01-18 ENCOUNTER — APPOINTMENT (OUTPATIENT)
Dept: PHYSICAL THERAPY | Facility: CLINIC | Age: 74
End: 2024-01-18
Payer: MEDICARE

## 2024-01-20 DIAGNOSIS — M25.512 LEFT SHOULDER PAIN: ICD-10-CM

## 2024-01-22 RX ORDER — CELECOXIB 100 MG/1
100 CAPSULE ORAL 2 TIMES DAILY
Qty: 60 CAPSULE | Refills: 5 | Status: SHIPPED | OUTPATIENT
Start: 2024-01-22

## 2024-01-26 ENCOUNTER — DOCUMENTATION (OUTPATIENT)
Dept: FAMILY MEDICINE CLINIC | Facility: CLINIC | Age: 74
End: 2024-01-26

## 2024-01-30 DIAGNOSIS — E87.6 HYPOKALEMIA: ICD-10-CM

## 2024-01-30 DIAGNOSIS — M16.11 PRIMARY OSTEOARTHRITIS OF RIGHT HIP: ICD-10-CM

## 2024-01-30 DIAGNOSIS — G89.4 CHRONIC PAIN SYNDROME: ICD-10-CM

## 2024-01-30 RX ORDER — DULOXETIN HYDROCHLORIDE 60 MG/1
CAPSULE, DELAYED RELEASE ORAL
Qty: 30 CAPSULE | Refills: 2 | Status: SHIPPED | OUTPATIENT
Start: 2024-01-30

## 2024-01-30 RX ORDER — POTASSIUM CHLORIDE 20 MEQ/1
20 TABLET, EXTENDED RELEASE ORAL DAILY
Qty: 30 TABLET | Refills: 5 | Status: SHIPPED | OUTPATIENT
Start: 2024-01-30

## 2024-02-21 ENCOUNTER — HOSPITAL ENCOUNTER (OUTPATIENT)
Dept: MAMMOGRAPHY | Facility: CLINIC | Age: 74
Discharge: HOME | End: 2024-02-21
Payer: MEDICARE

## 2024-02-21 ENCOUNTER — HOSPITAL ENCOUNTER (OUTPATIENT)
Dept: BONE DENSITY | Facility: CLINIC | Age: 74
Discharge: HOME | End: 2024-02-21
Payer: MEDICARE

## 2024-02-21 VITALS — BODY MASS INDEX: 42.17 KG/M2 | WEIGHT: 247 LBS | HEIGHT: 64 IN

## 2024-02-21 DIAGNOSIS — Z12.31 ENCOUNTER FOR SCREENING MAMMOGRAM FOR BREAST CANCER: ICD-10-CM

## 2024-02-21 DIAGNOSIS — Z78.0 ASYMPTOMATIC POSTMENOPAUSAL STATUS: ICD-10-CM

## 2024-02-21 PROCEDURE — 77063 BREAST TOMOSYNTHESIS BI: CPT

## 2024-02-21 PROCEDURE — 77080 DXA BONE DENSITY AXIAL: CPT

## 2024-02-21 PROCEDURE — 77067 SCR MAMMO BI INCL CAD: CPT

## 2024-02-22 ENCOUNTER — TELEPHONE (OUTPATIENT)
Dept: FAMILY MEDICINE CLINIC | Facility: CLINIC | Age: 74
End: 2024-02-22

## 2024-02-22 NOTE — TELEPHONE ENCOUNTER
Pt returned call regarding DEXA scan, reviewed results, advised calcium and vitamin d and to stay as active as possible. Pt reports she does take vit d 3 and will check and see if she is also taking calcium. Advised we will reach out again when we get her mammo results.

## 2024-02-29 ENCOUNTER — HOSPITAL ENCOUNTER (OUTPATIENT)
Dept: NON INVASIVE DIAGNOSTICS | Age: 74
Discharge: HOME | End: 2024-02-29
Payer: MEDICARE

## 2024-02-29 VITALS
SYSTOLIC BLOOD PRESSURE: 162 MMHG | DIASTOLIC BLOOD PRESSURE: 88 MMHG | WEIGHT: 247 LBS | HEART RATE: 90 BPM | BODY MASS INDEX: 43.77 KG/M2 | HEIGHT: 63 IN

## 2024-02-29 DIAGNOSIS — R42 DIZZINESS: ICD-10-CM

## 2024-02-29 DIAGNOSIS — I10 ESSENTIAL HYPERTENSION: ICD-10-CM

## 2024-02-29 LAB
AORTIC ROOT: 2.9 CM
APICAL FOUR CHAMBER EJECTION FRACTION: 62 %
ASCENDING AORTA: 3.2 CM
BSA FOR ECHO PROCEDURE: 2.12 M2
DOP CALC LVOT AREA: 3.14 CM2
DOP CALC LVOT DIAMETER: 2 CM
E WAVE DECELERATION TIME: 159 MS
E/A RATIO: 0.74
FRACTIONAL SHORTENING: 26 (ref 28–44)
INTERVENTRICULAR SEPTUM IN DIASTOLE (PARASTERNAL SHORT AXIS VIEW): 1.1 CM
INTERVENTRICULAR SEPTUM: 1.1 CM (ref 0.6–1.1)
LAAS-AP2: 19.1 CM2
LAAS-AP4: 13.2 CM2
LEFT ATRIUM AREA SYSTOLE SINGLE PLANE A4C: 13.6 CM2
LEFT ATRIUM SIZE: 4 CM
LEFT ATRIUM VOLUME (MOD BIPLANE): 44 ML
LEFT ATRIUM VOLUME INDEX (MOD BIPLANE): 20.7 ML/M2
LEFT INTERNAL DIMENSION IN SYSTOLE: 3.5 CM (ref 2.1–4)
LEFT VENTRICULAR INTERNAL DIMENSION IN DIASTOLE: 4.7 CM (ref 3.5–6)
LEFT VENTRICULAR POSTERIOR WALL IN END DIASTOLE: 1.1 CM
LEFT VENTRICULAR STROKE VOLUME: 54 ML
LVSV (TEICH): 54 ML
MV E'TISSUE VEL-SEP: 7 CM/S
MV PEAK A VEL: 0.86 M/S
MV PEAK E VEL: 64 CM/S
MV STENOSIS PRESSURE HALF TIME: 46 MS
MV VALVE AREA P 1/2 METHOD: 4.78
RIGHT ATRIUM AREA SYSTOLE A4C: 13.8 CM2
RIGHT VENTRICLE ID DIMENSION: 2.9 CM
SL CV LEFT ATRIUM LENGTH A2C: 5 CM
SL CV LV EF: 60
SL CV PED ECHO LEFT VENTRICLE DIASTOLIC VOLUME (MOD BIPLANE) 2D: 104 ML
SL CV PED ECHO LEFT VENTRICLE SYSTOLIC VOLUME (MOD BIPLANE) 2D: 50 ML
TRICUSPID ANNULAR PLANE SYSTOLIC EXCURSION: 1.3 CM

## 2024-02-29 PROCEDURE — 93306 TTE W/DOPPLER COMPLETE: CPT | Performed by: INTERNAL MEDICINE

## 2024-02-29 PROCEDURE — 93226 XTRNL ECG REC<48 HR SCAN A/R: CPT

## 2024-02-29 PROCEDURE — 93225 XTRNL ECG REC<48 HRS REC: CPT

## 2024-02-29 PROCEDURE — 93306 TTE W/DOPPLER COMPLETE: CPT

## 2024-03-01 DIAGNOSIS — R93.1 ABNORMAL ECHOCARDIOGRAM: ICD-10-CM

## 2024-03-01 DIAGNOSIS — R42 DIZZINESS: Primary | ICD-10-CM

## 2024-03-06 ENCOUNTER — TELEPHONE (OUTPATIENT)
Dept: NEUROLOGY | Facility: CLINIC | Age: 74
End: 2024-03-06

## 2024-03-08 ENCOUNTER — CONSULT (OUTPATIENT)
Dept: NEUROLOGY | Facility: CLINIC | Age: 74
End: 2024-03-08
Payer: MEDICARE

## 2024-03-08 VITALS
HEIGHT: 63 IN | BODY MASS INDEX: 38.98 KG/M2 | SYSTOLIC BLOOD PRESSURE: 130 MMHG | WEIGHT: 220 LBS | HEART RATE: 88 BPM | DIASTOLIC BLOOD PRESSURE: 78 MMHG

## 2024-03-08 DIAGNOSIS — G60.9 NEUROPATHY, IDIOPATHIC: Primary | ICD-10-CM

## 2024-03-08 DIAGNOSIS — R26.89 IMBALANCE: ICD-10-CM

## 2024-03-08 DIAGNOSIS — G31.84 MCI (MILD COGNITIVE IMPAIRMENT): ICD-10-CM

## 2024-03-08 PROCEDURE — 99204 OFFICE O/P NEW MOD 45 MIN: CPT | Performed by: STUDENT IN AN ORGANIZED HEALTH CARE EDUCATION/TRAINING PROGRAM

## 2024-03-08 RX ORDER — LANOLIN ALCOHOL/MO/W.PET/CERES
1000 CREAM (GRAM) TOPICAL DAILY
Qty: 90 TABLET | Refills: 3 | Status: SHIPPED | OUTPATIENT
Start: 2024-03-08

## 2024-03-08 NOTE — PROGRESS NOTES
[facility] Neurology Consult  PATIENT:  Bozena Armstrong  MRN:  [id number]  :  1950  DATE OF SERVICE:  3/8/2024  REFERRED BY: Sara Major DO  PMD: Hillary Grimm DO    Assessment/Plan:     Bozena Armstrong is a very pleasant 73 y.o. female who presents for evaluation     Mild cognitive impairment  -MOCA  today. Previously  on 2023. She continues to be independent in all ADLs. Overall clinical picture c/w mild cognitive impairment  -will obtain MRI brain neuroquant, B12, TSH    Imbalance  Peripheral neuropathy  Lumbar radiculopathy  Positional dizziness  -exam notable for decreased sensation to LT, PP and vibration at the toes bilaterally, which is likely contributing to her imbalance. Recommended PT referral. She also endorses episodes of lightheadedness, not vertigo, which occur when she is going from a seated to a standing position. Falls are typically preceded by one of these position changes  -advised her to increase her water intake to goal of 60-80 oz water daily and make transitions in a slow fashion    CC:   Multiple complaints    History of Present Illness:     74 y/o female with h/o arthritis, nonspecific balance disorder, GERD, HTN, and HLD who arrives for evaluation of frequent falls. Patient states that she is currently falling approximately 2x/week which often follows some lightheadedness that occurs after standing up from a seated position. Patient denies room spinning sensation or dizziness with rapid head movements. Patient was seen in the ED 2023 after having 3 falls in one day. Patient was evaluated, discharged and prescribed meclizine which the patient states has been helping by decreasing dizzy spells and falls. Patient states that she consumes approximately 30 oz of water daily. Patient states that she has been evaluated by ENT and ophthalmology but has not received a diagnosis. Patient has also worked with PT for balance and states that she was  recently discharged from their services. She also complains of some memory loss which began 2-3 years ago and describes forgetting doctors appointments and forgetting to get certain items when grocery shopping. MoCA on 9/27/2023 was 27/30. She also reports some urinary incontinence that began 1 year ago. Patient denies seizures, tremors, numbness, tingling, vision changes, ear fullness, or changes to hearing. She drinks about 30 oz water daily.     Past Medical History:     Past Medical History:   Diagnosis Date    Arthritis     Balance disorder     GERD (gastroesophageal reflux disease)     Hyperlipidemia     Hypertension     Vertigo        Patient Active Problem List   Diagnosis    Anemia    Constipation    Dyslipidemia    Essential hypertension    History of total knee arthroplasty, left    Osteoarthritis of knee, unilateral    Prediabetes    Dystonia    Chronic right hip pain    Primary osteoarthritis of right hip    Chronic bilateral low back pain without sciatica    Chronic pain syndrome    Obesity (BMI 35.0-39.9 without comorbidity)    Status post total hip replacement, right    Chronic pain of both shoulders    Obesity, morbid (HCC)    Sciatica    Neuropathy    Balance disorder    Memory deficit    Urinary incontinence       Medications:      Current Outpatient Medications   Medication Sig Dispense Refill    ascorbic acid (VITAMIN C) 500 MG tablet Take 1 tablet (500 mg total) by mouth 2 (two) times a day 60 tablet 0    celecoxib (CeleBREX) 100 mg capsule TAKE ONE CAPSULE BY MOUTH TWICE A DAY 60 capsule 5    cholecalciferol (VITAMIN D3) 1,000 units tablet Take 50 Units by mouth daily      Doxylamine Succinate, Sleep, (SLEEP AID PO) Take 25 mg by mouth      DULoxetine (CYMBALTA) 60 mg delayed release capsule TAKE ONE CAPSULE BY MOUTH AT BEDTIME 30 capsule 2    meclizine (ANTIVERT) 25 mg tablet Take 1 tablet (25 mg total) by mouth 3 (three) times a day as needed for dizziness 30 tablet 5    Multiple  Vitamins-Minerals (CENTRUM SILVER PO) Take by mouth      potassium chloride (Klor-Con M20) 20 mEq tablet TAKE ONE TABLET BY MOUTH EVERY DAY 30 tablet 5    VITAMIN E PO Take by mouth      rosuvastatin (CRESTOR) 40 MG tablet Take 1 tablet (40 mg total) by mouth daily (Patient not taking: Reported on 1/26/2024) 90 tablet 3     No current facility-administered medications for this visit.        Allergies:    No Known Allergies    Family History:     Family History   Problem Relation Age of Onset    Arthritis Mother     Cancer Father     Heart disease Father     Anemia Brother        Social History:       Social History     Socioeconomic History    Marital status: /Civil Union     Spouse name: Not on file    Number of children: Not on file    Years of education: Not on file    Highest education level: Not on file   Occupational History    Not on file   Tobacco Use    Smoking status: Never     Passive exposure: Past    Smokeless tobacco: Never   Vaping Use    Vaping status: Never Used   Substance and Sexual Activity    Alcohol use: Never    Drug use: Never    Sexual activity: Not Currently   Other Topics Concern    Not on file   Social History Narrative    Not on file     Social Determinants of Health     Financial Resource Strain: Low Risk  (12/19/2023)    Overall Financial Resource Strain (CARDIA)     Difficulty of Paying Living Expenses: Not very hard   Food Insecurity: Not on file   Transportation Needs: No Transportation Needs (12/19/2023)    PRAPARE - Transportation     Lack of Transportation (Medical): No     Lack of Transportation (Non-Medical): No   Physical Activity: Not on file   Stress: Not on file   Social Connections: Not on file   Intimate Partner Violence: Not At Risk (12/19/2023)    Humiliation, Afraid, Rape, and Kick questionnaire     Fear of Current or Ex-Partner: No     Emotionally Abused: No     Physically Abused: No     Sexually Abused: No   Housing Stability: Not on file         Objective:  "  /78 (BP Location: Left arm, Patient Position: Standing, Cuff Size: Large)   Pulse 88   Ht 5' 3\" (1.6 m)   Wt 99.8 kg (220 lb)   BMI 38.97 kg/m²     General: Patient is not in any acute/apparent distress, well nourished, well developed and cooperative.   HEENT: normocephalic, atraumatic, moist membranes  Neck: supple  Heart: regular heart rate and rhythm, no murmurs, rubs and or gallops.   Chest: Clear to auscultation bilaterally  Abdomen: soft and non-tender.   Extremities: no edema noted   Skin: no lesions or rash  Musculosketal: no bony abnormalities    Neurologic Examination:   Mental status: alert, awake, oriented X 3 and following commands.     Speech/Language: Speech is fluent without any dysarthria, no aphasia noted, can name, repeat, read and write and comprehension intact    Cranial Nerves:   CN I: smell not tested  CN II: Visual fields full to confrontation, fundus - no papilledema noted.  CN III, IV, VI: Extraocular movements intact bilaterally. Pupils equal round and reactive to light bilaterally.  CN V: Facial sensation is normal.  CN VII: Full and symmetric facial movement.  CN VIII: Hearing is normal.  CN IX, X: Palate elevates symmetrically. Normal gag reflex.  CN XI: Shoulder shrug strength is normal.  CN XII: Tongue midline without atrophy or fasciculations.    Motor:   Strength 5/5 in all 4 extremities. No pronator drift. Normal rapid alternating movements. Bulk/tone - normal.  Fasiculations - none    Sensory:   Sensation intact to soft touch, vibration and pinprick in BUE.   Decreased LT/pinprick sensation to right first toe, normal proprioception bilaterally,   Vibration sensation is decreased in feet as compared to lower leg.    Cerebellar:   Finger-to-nose intact, normal heel to shin.    Reflexes: 2+ in all 4 extremities  Pathologic reflexes - babinski reflex negative, Hoffmans reflex - negative    Gait:   Normal gait, able to tandem walk, able to tip-toe, able to walk on heels, " normal arm to swing. Rhomberg sign negative       Review of Systems:     ROS:    Review of Systems   Constitutional:  Negative for appetite change, fatigue and fever.   HENT: Negative.  Negative for hearing loss, tinnitus, trouble swallowing and voice change.    Eyes:  Positive for photophobia (Sometimes). Negative for pain and visual disturbance.   Respiratory: Negative.  Negative for shortness of breath.    Cardiovascular: Negative.  Negative for palpitations.   Gastrointestinal: Negative.  Negative for nausea and vomiting.   Endocrine: Negative.  Negative for cold intolerance.   Genitourinary: Negative.  Negative for dysuria, frequency and urgency.   Musculoskeletal:  Positive for back pain (If she is staning too long lower back), gait problem, myalgias and neck pain. Negative for neck stiffness.   Skin: Negative.  Negative for rash.   Allergic/Immunologic: Negative.    Neurological:  Positive for dizziness, tremors (If she picks up sometime heavy), weakness (Mostly in her legs), light-headedness (Once in awhile) and numbness (Sometimes). Negative for seizures, syncope, facial asymmetry, speech difficulty and headaches.   Hematological:  Bruises/bleeds easily (Bruises easily).   Psychiatric/Behavioral:  Positive for confusion (Sometimes). Negative for hallucinations and sleep disturbance.      I have spent a total time of 56 minutes on 03/08/24 in caring for this patient including Risks and benefits of tx options, Instructions for management, Patient and family education, Risk factor reductions, Impressions, Documenting in the medical record, Reviewing / ordering tests, medicine, procedures  , and Obtaining or reviewing history  .

## 2024-03-15 DIAGNOSIS — R94.31 HOLTER MONITOR, ABNORMAL: Primary | ICD-10-CM

## 2024-03-15 DIAGNOSIS — I49.9 IRREGULAR HEARTBEAT: ICD-10-CM

## 2024-03-15 DIAGNOSIS — R42 DIZZINESS: ICD-10-CM

## 2024-03-15 DIAGNOSIS — R93.1 ABNORMAL ECHOCARDIOGRAM: ICD-10-CM

## 2024-03-16 ENCOUNTER — NURSE TRIAGE (OUTPATIENT)
Dept: OTHER | Facility: OTHER | Age: 74
End: 2024-03-16

## 2024-03-16 DIAGNOSIS — R42 VERTIGO: ICD-10-CM

## 2024-03-16 RX ORDER — MECLIZINE HYDROCHLORIDE 25 MG/1
25 TABLET ORAL 3 TIMES DAILY PRN
Qty: 60 TABLET | Refills: 1 | Status: SHIPPED | OUTPATIENT
Start: 2024-03-16

## 2024-03-16 NOTE — TELEPHONE ENCOUNTER
"Regarding: med refill  ----- Message from Angely Pinzon sent at 3/16/2024  9:31 AM EDT -----  \" I need a refill on my meclizine 25 mg, I should be taking it 3 times a day and I am down to only three pills. \"    "

## 2024-03-16 NOTE — TELEPHONE ENCOUNTER
"Reason for Disposition  • [1] Caller requesting a prescription renewal (no refills left), no triage required, AND [2] triager able to renew prescription per department policy    Answer Assessment - Initial Assessment Questions  1. DRUG NAME: \"What medicine do you need to have refilled?\"      Meclizine    2. REFILLS REMAINING: \"How many refills are remaining?\" (Note: The label on the medicine or pill bottle will show how many refills are remaining. If there are no refills remaining, then a renewal may be needed.)      0    3. EXPIRATION DATE: \"What is the expiration date?\" (Note: The label states when the prescription will , and thus can no longer be refilled.)  N/a    4. PRESCRIBING HCP: \"Who prescribed it?\" Reason: If prescribed by specialist, call should be referred to that group.    Vandana brewster      5. SYMPTOMS: \"Do you have any symptoms?\"  Vertigo    Protocols used: Medication Refill and Renewal Call-ADULT-    "

## 2024-03-19 ENCOUNTER — TELEPHONE (OUTPATIENT)
Dept: FAMILY MEDICINE CLINIC | Facility: CLINIC | Age: 74
End: 2024-03-19

## 2024-03-19 ENCOUNTER — TELEPHONE (OUTPATIENT)
Dept: MAMMOGRAPHY | Facility: CLINIC | Age: 74
End: 2024-03-19

## 2024-03-19 NOTE — TELEPHONE ENCOUNTER
Spoke with pt via telephone, advised the breast center was trying to get a hold of her to schedule imaging.   Contact information provided for central scheduling and advised what she needs to schedule.  Pt will call to schedule.

## 2024-03-19 NOTE — TELEPHONE ENCOUNTER
We were unsuccessful in contacting the above patient for her diagnostic follow up mammogram/ultrasound. I have left messages for her on 3/1 and 3/7 and 3/15 with no response to schedule.  Please attempt to contact this patient and have them schedule their appointment. Please let me know if you have any questions or if I can be of any further assistance.  Thank you,  Maris Aguayo, CHELLEN,RN  Breast Nurse Navigator

## 2024-03-25 ENCOUNTER — TELEPHONE (OUTPATIENT)
Dept: FAMILY MEDICINE CLINIC | Facility: CLINIC | Age: 74
End: 2024-03-25

## 2024-03-25 NOTE — TELEPHONE ENCOUNTER
Patient called.  She had a really bad spell of dizziness when she was at the store.  She said she was walking to her car and she fell.  She said people helped her up and to her car.  She said that she has been to the ER many times for this, and she didn't want to go there.  She has an MRI scheduled for Wednesday, which she is looking forward too.  She is currently not working, just home resting.  She just wanted you to know.  Thank you.

## 2024-03-27 ENCOUNTER — HOSPITAL ENCOUNTER (OUTPATIENT)
Dept: MRI IMAGING | Facility: HOSPITAL | Age: 74
Discharge: HOME | End: 2024-03-27
Attending: STUDENT IN AN ORGANIZED HEALTH CARE EDUCATION/TRAINING PROGRAM
Payer: MEDICARE

## 2024-03-27 DIAGNOSIS — R26.89 IMBALANCE: ICD-10-CM

## 2024-03-27 PROCEDURE — 70553 MRI BRAIN STEM W/O & W/DYE: CPT

## 2024-03-27 PROCEDURE — A9585 GADOBUTROL INJECTION: HCPCS | Performed by: STUDENT IN AN ORGANIZED HEALTH CARE EDUCATION/TRAINING PROGRAM

## 2024-03-27 RX ORDER — GADOBUTROL 604.72 MG/ML
10 INJECTION INTRAVENOUS
Status: COMPLETED | OUTPATIENT
Start: 2024-03-27 | End: 2024-03-27

## 2024-03-27 RX ADMIN — GADOBUTROL 10 ML: 604.72 INJECTION INTRAVENOUS at 18:51

## 2024-04-08 ENCOUNTER — TELEPHONE (OUTPATIENT)
Dept: FAMILY MEDICINE CLINIC | Facility: CLINIC | Age: 74
End: 2024-04-08

## 2024-04-08 NOTE — TELEPHONE ENCOUNTER
Hi, my name is Bozena Armstrong. My number is [phone]. On the 27th of March I had an MRI of my brain. I was and it I think it was ordered by Rani Kim and I was told in seven week days I would have the results and that was last Friday. I'm pretty concerned because I haven't heard what the results were. OK, I was wondering if you could track down how I can find out my results. I left a message with someone else and so far I don't know how it turned out, so I'd appreciate your help. OK. Alright. Thank you  Spoke with Pt - she will reach out to [facility] office to get results.

## 2024-04-10 ENCOUNTER — TELEPHONE (OUTPATIENT)
Dept: NEUROLOGY | Facility: CLINIC | Age: 74
End: 2024-04-10

## 2024-04-10 NOTE — TELEPHONE ENCOUNTER
VM 4/9 at 8:32 am:    Hi, my name is Bozena Armstrong and I am calling to find out the results of my MRI of my brain. It was on the 27th of March. I would appreciate it if you  could get back to me today. It's been 2 weeks. I would like to know what the results are please. Okay, thank you. My birthday is [birth year]  ___________________________________________________________    Spoke with pt. She is requesting the results of her MRI from 3/27/24. Routed to provider to make her aware.

## 2024-04-10 NOTE — TELEPHONE ENCOUNTER
Patient called in requesting MRI results. She stated that no one got back to her yet.  She can be reached at Phone # [phone].

## 2024-04-11 NOTE — TELEPHONE ENCOUNTER
24 120 pm    Hi, this is the 2nd time I'm calling today. I called around 830. My name is Boznea Armstrong  1950, and I had an MRI of my brain 2 weeks ago. Tomorrow. Very worried about the result. I would like to know what the results are. . Again, my name is Bozena bergman, then my phone number is [phone]. Please call me today. I'd appreciate it. Riki.    ------------------------    Already addressed.

## 2024-04-11 NOTE — TELEPHONE ENCOUNTER
Pt called her family doctor stated she reached out several times about MRI results. They stated the pt is very nervous and concerned she would like a call back with her results.

## 2024-04-15 DIAGNOSIS — G91.2 NPH (NORMAL PRESSURE HYDROCEPHALUS) (HCC): Primary | ICD-10-CM

## 2024-04-16 ENCOUNTER — TELEPHONE (OUTPATIENT)
Dept: NEUROSURGERY | Facility: CLINIC | Age: 74
End: 2024-04-16

## 2024-04-16 NOTE — TELEPHONE ENCOUNTER
Received a call from patient looking for next steps in scheduling since she had her MRI with neurology.     After review of the chart patient is in need of an intake. Will route to intake team.

## 2024-04-17 ENCOUNTER — TELEPHONE (OUTPATIENT)
Dept: NEUROSURGERY | Facility: CLINIC | Age: 74
End: 2024-04-17

## 2024-04-17 NOTE — TELEPHONE ENCOUNTER
Patient phoned for scheduling guidelines for initial [facility] PT assessment and F/U with provider.  This call was to determine if there were any days in the next two-three that should be avoided due to other obligations.  Bozena stated that she is not available on 5/1/2024.  This RN let Bozena know that this RN will take care of the scheduling and reach back out to her by tomorrow the latest as this RN is pending response from [facility] to see if we can go there versus [place].  Patient grateful and appreciative for assistance.

## 2024-04-19 NOTE — TELEPHONE ENCOUNTER
Patient phoned and updated.  [facility] appointment already scheduled at [facility] for [facility]/24/2024 at 1145 and they will add on the [facility] gait assessment.  Follow up appointment will be on 4/25/2024 at 1400.  Bozena fuentes for assistance and coordination of care closer to her home.

## 2024-04-24 ENCOUNTER — EVALUATION (OUTPATIENT)
Facility: CLINIC | Age: 74
End: 2024-04-24
Payer: MEDICARE

## 2024-04-24 DIAGNOSIS — R42 VERTIGO: ICD-10-CM

## 2024-04-24 DIAGNOSIS — G91.2 NORMAL PRESSURE HYDROCEPHALUS (HCC): Primary | ICD-10-CM

## 2024-04-24 DIAGNOSIS — R26.89 IMBALANCE: ICD-10-CM

## 2024-04-24 PROCEDURE — 97162 PT EVAL MOD COMPLEX 30 MIN: CPT

## 2024-04-24 NOTE — PROGRESS NOTES
PT Evaluation     Today's date: 2024  Patient name: Bozena Armstrong  : 1950  MRN: [id number]  Referring provider: Petty Kim MD  Dx:   Encounter Diagnosis     ICD-10-CM    1. Normal pressure hydrocephalus (HCC)  G91.2       2. Imbalance  R26.89 Ambulatory Referral to Physical Therapy      3. Vertigo  R42                      Assessment  Assessment details: Pt is a 73 y.o. female presenting to physical therapy with chief complaint of reduced endurance, poor activity tolerance, impaired transfers, and reduced functional participation secondary to NPH.  Furthermore, FGA & TUG testing reflect that pt is at increased risk of falls. 10mWT reflects that pt is classified as a limited community ambulator. Pending clearance from neurosurgeon, pt will benefit from skilled physical therapy intervention addressing muscle strength, endurance, activity tolerance, and functional deficits so that she may be able to navigate their environment with increased independence and safety.   Impairments: abnormal gait, abnormal movement, activity intolerance, difficulty understanding, impaired balance, impaired physical strength, lacks appropriate home exercise program, safety issue and poor posture   Functional limitations: deconditioning, gait, and transfersUnderstanding of Dx/Px/POC: good   Prognosis: good    Goals  STG's: to be achieved within 4 weeks  -Pt will exhibit independence with HEP within 2 weeks.   -Pt will verbally express understanding of DOMS vs muscle pain within 3 visits.   -pt to establish plan with neurosurgeon regarding how to proceed with her care.     LTG's: to be achieved within 8 weeks  -pt will improve FGA score >24/30 to reflect reduced risk of falls  -pt will be able to resume ambulation at the grocery store with 0 instance of LOB without her SPC   -Pt will improve TUG score to <12s to reflect reduced risk of falls.   -Gait speed to be improved to >1.2 m/s to be able to cross the street.  "    Plan  Plan details: .TE: for strengthening, stretching, and flexibility  TA: for functional participation  NR: for balance, coordination, reaction time  MT: for STM, IASTM, joint mobilizations  Gait Training: to improve gait mechanics.    Patient would benefit from: skilled physical therapy  Planned modality interventions: electrical stimulation/Russian stimulation, thermotherapy: hydrocollator packs, biofeedback and cryotherapy  Planned therapy interventions: balance, ADL training, abdominal trunk stabilization, activity modification, balance/weight bearing training, behavior modification, body mechanics training, breathing training, canalith repositioning, neuromuscular re-education, patient education, therapeutic activities, therapeutic exercise, strengthening, sensory integrative techniques, gait training, home exercise program, postural training, graded exercise, graded activity and stretching  Frequency: 2x week  Duration in visits: 16  Duration in weeks: 8  Plan of Care beginning date: 4/24/2024  Plan of Care expiration date: 6/19/2024  Treatment plan discussed with: patient      Subjective Evaluation    History of Present Illness  Date of onset: 11/24/2023  Mechanism of injury: Pt states that she found out a couple weeks ago that she has \"water on her brain.\"  Her symptoms began about 6 years ago. She sees a neurosurgeon tomorrow to discuss her options. She notes that she falls when she starts to lose her balance and she feels like cannot react fast enough. She notes that leaning forward to pick something up makes her keep going. She feels she has fallen at least 40 times in two years. She notes that she fell out of the shower today, she did not get hurt. It occurred when bending forward to pick something up. Leaning and turning quick can make her fall and she has to take her time. She is not having any pain at this time. She notes that she also has some arthritis. She notes that she is having dizziness. " "The very first time she had a flare up, she got out of bed in the middle of the night and \"a force just knocked her over.\" It was a \"strong shove.\" She states that that has happened a couple times. She states that she just drops. She has hit her head multiple times with this. She was in the ER for this a couple weeks ago and that is how she found out about the water in her brain. Furthermore, she notes that she has some memory issues and has difficulties with her short term memory. She is also noticing difficulty with word finding and attentions. She notes that she has some difficulty donning and doffing shoes and socks because of her knees and arthritis, but not because of the NPH. She also notes that her arthritis makes it difficult for her to button her shirts but she can do those things herself. She states that the numbness and tingling in her feet and hands happens a lot and impacts her balance. 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   Patient Goals  Patient goals for therapy: return to work and improved balance  Patient goal: be able to walk without her cane  Pain  No pain reported    Treatments  No previous or current treatments        Objective   Neuro Exam:     Functional outcomes   Functional outcome gait comment: Feet turned outwards, trendelenburg present karen, SURJIT  WNL, use of SPC intermittently. Notes she primarily walks without an AD but has been bringing an SPC since her falls increased.         ..NPH [facility] Assessment:   Gait Quality :   Wide Base of Support:    Present or WNL  Outwardly rotated feet:  Present or WNL  Step Height Diminished:  Present or WNL     Gait Speed:  10 meter walk test: ________0.99_____m/s  (<1m/s predictive of falls)  Comments:    Fall Risk: ( Yes     /    No)    TUG  " "____14.34_______  sec   (<12 sec predictive of falls)  FGA ____16_______/30 (< 24 predictive of falls)  Number of falls in the last month: ___2_______    Cognition:     MOCA:  ____19______ /30 (<26 cognitive deficit present) per appointment on 3/8/2024 with Dr. Grimm  Comments: reports memory deficits, forgot her purse, cane, and water bottle when she left her appointment. Also has neuropathy in hands and feet       Urinary incontinence: Yes   /    No  Pt states she was having problems with it months ago but she is doing better now. She has a \"[id number]\" in her room that she utilizes.       Recommendations:  Physical Therapy: YES  Speech Therapy: YES  Occupational Therapy:  Not at this time, potentially in the future due to arthritis     Precautions: NPH, HTN, Peripheral Neuropathy, Hyperlipidemia, Vertigo, R hip replacement, falls risk  POC Expires: 06/19/2024      Tests and Measures 4/24 IE            10mWT 0.99m/s            TUG 14.34s            FGA 16/30                         Neuro Re-Ed             Compliant surface             Half turn practice in SOLO             Jori navigation             Tandem gait             CC & CW hurdles                                       Ther Ex                                                                                                                     Ther Activity                                       Gait Training                                       Modalities                                            "

## 2024-04-25 ENCOUNTER — OFFICE VISIT (OUTPATIENT)
Age: 74
End: 2024-04-25
Payer: MEDICARE

## 2024-04-25 VITALS
OXYGEN SATURATION: 97 % | BODY MASS INDEX: 38.98 KG/M2 | RESPIRATION RATE: 16 BRPM | HEIGHT: 63 IN | HEART RATE: 100 BPM | SYSTOLIC BLOOD PRESSURE: 134 MMHG | DIASTOLIC BLOOD PRESSURE: 82 MMHG | WEIGHT: 220 LBS | TEMPERATURE: 98.6 F

## 2024-04-25 DIAGNOSIS — G91.2 NPH (NORMAL PRESSURE HYDROCEPHALUS) (HCC): ICD-10-CM

## 2024-04-25 PROCEDURE — 99203 OFFICE O/P NEW LOW 30 MIN: CPT | Performed by: STUDENT IN AN ORGANIZED HEALTH CARE EDUCATION/TRAINING PROGRAM

## 2024-04-25 NOTE — PROGRESS NOTES
"Office Note - Neurosurgery   Bozena Armstrong 73 y.o. female MRN: [id number]      Assessment:/Plan:    Bozena Armstrong is a 73 year old female with a history of frequent falls referred for evaluation of possible normal pressure hydrocephalus. She has had 5-6 years of imbalance issues and falls. She notes that she frequently falls when she stands from sitting. She also has falls when walking that feel like a \"force\" is knocking her down. She does endorse dizziness with many of these falls, but does not feel that the room is spinning. Many episodes are associated with light-headedness. She also falls frequently when she leans or reaches for something. She does have peripheral neuropathy but does not feel that this influences her falls. She does endorse worsening memory issues over the past year and is frequently forgetful of items and appointments. She also endorses episode of urge incontinence, mostly at night, though this has improved since she placed a [id number] in her bedroom. She received an MRI which demonstrated volume loss in a pattern concerning for NPH. I did reviewed this MRI with here, she has large ventricles in the setting of global volume loss. The callosal angle is borderline acute, suggestive of NPH. We discussed however that NPH is not a radiographic diagnosis, and these MRI findings can be seen in normal aging. Her gait is not typical of an NPH patient, stride is appropriate and steps not magnetic appearing. She is ambulating today without any assistive device. Her falls are also not typical for NPH, especially those associated with dizziness or those where she falls when she reaches for an object or leans. I  would recommend she be evaluated by cardiology, as the falls with standing seem more orthostatic in nature. She has been evaluated by ENT.     Ultimately, proceeding with an LP would be of low risk, but I did discuss with her that this might not improve all of her symptoms. NPH is a " diagnosis of exclusion and she should be evaluated by cardiology beforehand. She can return to my clinic after her appointment for further discussion of risk/benefit of LP evaluation.      Subjective/Objective     Chief Complaint    Consult       HPI    Bozena Armstrong is a 73 year old female with a history of frequent falls referred for evaluation of possible normal pressure hydrocephalus.    ROS  ROS personally reviewed and updated.    Review of Systems   Musculoskeletal:  Positive for gait problem (falls frequently, ambulates w/ cane, unsteady last 5 years).   Neurological:  Positive for dizziness (frequent), speech difficulty (trouble finding words) and weakness. Negative for seizures, light-headedness and headaches.   Psychiatric/Behavioral:  Positive for confusion.         Poor memory, noticed worsening in last year   All other systems reviewed and are negative.      Family History    Family History   Problem Relation Age of Onset    Arthritis Mother     Cancer Father     Heart disease Father     Anemia Brother        Social History    Social History     Socioeconomic History    Marital status: /Civil Union     Spouse name: Not on file    Number of children: Not on file    Years of education: Not on file    Highest education level: Not on file   Occupational History    Not on file   Tobacco Use    Smoking status: Never     Passive exposure: Past    Smokeless tobacco: Never   Vaping Use    Vaping status: Never Used   Substance and Sexual Activity    Alcohol use: Never    Drug use: Never    Sexual activity: Not Currently   Other Topics Concern    Not on file   Social History Narrative    Not on file     Social Determinants of Health     Financial Resource Strain: Low Risk  (12/19/2023)    Overall Financial Resource Strain (CARDIA)     Difficulty of Paying Living Expenses: Not very hard   Food Insecurity: Not on file   Transportation Needs: No Transportation Needs (12/19/2023)    PRAPARE - Transportation      Lack of Transportation (Medical): No     Lack of Transportation (Non-Medical): No   Physical Activity: Not on file   Stress: Not on file   Social Connections: Not on file   Intimate Partner Violence: Not At Risk (2023)    Humiliation, Afraid, Rape, and Kick questionnaire     Fear of Current or Ex-Partner: No     Emotionally Abused: No     Physically Abused: No     Sexually Abused: No   Housing Stability: Not on file       Past Medical History    Past Medical History:   Diagnosis Date    Arthritis     Balance disorder     GERD (gastroesophageal reflux disease)     Hyperlipidemia     Hypertension     Peripheral neuropathy     Vertigo        Surgical History    Past Surgical History:   Procedure Laterality Date     SECTION      COLONOSCOPY      HYSTERECTOMY      HYSTEROSCOPY W/ ENDOMETRIAL ABLATION      JOINT REPLACEMENT      KNEE SURGERY      AR ARTHRP ACETBLR/PROX FEM PROSTC AGRFT/ALGRFT Right 2022    Procedure: ARTHROPLASTY HIP TOTAL ANTERIOR and all associated procedures;  Surgeon: Trisha Alonso MD;  Location:  MAIN OR;  Service: Orthopedics       Medications      Current Outpatient Medications:     ascorbic acid (VITAMIN C) 500 MG tablet, Take 1 tablet (500 mg total) by mouth 2 (two) times a day, Disp: 60 tablet, Rfl: 0    celecoxib (CeleBREX) 100 mg capsule, TAKE ONE CAPSULE BY MOUTH TWICE A DAY, Disp: 60 capsule, Rfl: 5    cholecalciferol (VITAMIN D3) 1,000 units tablet, Take 50 Units by mouth daily, Disp: , Rfl:     Doxylamine Succinate, Sleep, (SLEEP AID PO), Take 25 mg by mouth, Disp: , Rfl:     DULoxetine (CYMBALTA) 60 mg delayed release capsule, TAKE ONE CAPSULE BY MOUTH AT BEDTIME, Disp: 30 capsule, Rfl: 2    meclizine (ANTIVERT) 25 mg tablet, TAKE ONE TABLET BY MOUTH THREE TIMES A DAY AS NEEDED FOR DIZZINESS (Patient taking differently: Take 25 mg by mouth 3 (three) times a day as needed for dizziness Takes 3x/day as of 24), Disp: 60 tablet, Rfl: 1    Multiple  "Vitamins-Minerals (CENTRUM SILVER PO), Take by mouth, Disp: , Rfl:     potassium chloride (Klor-Con M20) 20 mEq tablet, TAKE ONE TABLET BY MOUTH EVERY DAY, Disp: 30 tablet, Rfl: 5    rosuvastatin (CRESTOR) 40 MG tablet, Take 1 tablet (40 mg total) by mouth daily, Disp: 90 tablet, Rfl: 3    vitamin B-12 (VITAMIN B-12) 1,000 mcg tablet, Take 1 tablet (1,000 mcg total) by mouth daily, Disp: 90 tablet, Rfl: 3    VITAMIN E PO, Take by mouth, Disp: , Rfl:     Allergies    No Known Allergies      Physical Exam    Vitals:  Blood pressure 134/82, pulse 100, temperature 98.6 °F (37 °C), temperature source Temporal, resp. rate 16, height 5' 3\" (1.6 m), weight 99.8 kg (220 lb), SpO2 97%.,Body mass index is 38.97 kg/m².    Physical Exam  Neurologic Exam  Awake and alert  Oriented and appropriate  5/5 throughout  Gait steady non-magnetic, normal stride.     "

## 2024-04-27 DIAGNOSIS — R42 VERTIGO: ICD-10-CM

## 2024-04-27 DIAGNOSIS — M16.11 PRIMARY OSTEOARTHRITIS OF RIGHT HIP: ICD-10-CM

## 2024-04-27 DIAGNOSIS — G89.4 CHRONIC PAIN SYNDROME: ICD-10-CM

## 2024-04-28 RX ORDER — MECLIZINE HYDROCHLORIDE 25 MG/1
TABLET ORAL
Qty: 60 TABLET | Refills: 1 | Status: SHIPPED | OUTPATIENT
Start: 2024-04-28

## 2024-04-28 RX ORDER — DULOXETIN HYDROCHLORIDE 60 MG/1
CAPSULE, DELAYED RELEASE ORAL
Qty: 30 CAPSULE | Refills: 5 | Status: SHIPPED | OUTPATIENT
Start: 2024-04-28

## 2024-05-01 ENCOUNTER — HOSPITAL ENCOUNTER (OUTPATIENT)
Dept: MAMMOGRAPHY | Facility: CLINIC | Age: 74
Discharge: HOME | End: 2024-05-01
Payer: MEDICARE

## 2024-05-01 ENCOUNTER — HOSPITAL ENCOUNTER (OUTPATIENT)
Dept: ULTRASOUND IMAGING | Facility: CLINIC | Age: 74
Discharge: HOME | End: 2024-05-01
Payer: MEDICARE

## 2024-05-01 VITALS — BODY MASS INDEX: 38.98 KG/M2 | WEIGHT: 220 LBS | HEIGHT: 63 IN

## 2024-05-01 DIAGNOSIS — R92.8 FOLLOW-UP EXAMINATION OF ABNORMAL MAMMOGRAM: ICD-10-CM

## 2024-05-01 PROCEDURE — 77065 DX MAMMO INCL CAD UNI: CPT

## 2024-05-01 PROCEDURE — 76642 ULTRASOUND BREAST LIMITED: CPT

## 2024-05-01 PROCEDURE — G0279 TOMOSYNTHESIS, MAMMO: HCPCS

## 2024-05-06 ENCOUNTER — APPOINTMENT (OUTPATIENT)
Facility: CLINIC | Age: 74
End: 2024-05-06
Payer: MEDICARE

## 2024-05-08 ENCOUNTER — OFFICE VISIT (OUTPATIENT)
Facility: CLINIC | Age: 74
End: 2024-05-08
Payer: MEDICARE

## 2024-05-08 DIAGNOSIS — R26.89 IMBALANCE: ICD-10-CM

## 2024-05-08 DIAGNOSIS — G91.2 NORMAL PRESSURE HYDROCEPHALUS (HCC): Primary | ICD-10-CM

## 2024-05-08 DIAGNOSIS — R42 VERTIGO: ICD-10-CM

## 2024-05-08 PROCEDURE — 97112 NEUROMUSCULAR REEDUCATION: CPT

## 2024-05-08 NOTE — PROGRESS NOTES
"Daily Note     Today's date: 2024  Patient name: Bozena Armstrong  : 1950  MRN: [id number]  Referring provider: Petty Kim MD  Dx:   Encounter Diagnosis     ICD-10-CM    1. Normal pressure hydrocephalus (HCC)  G91.2       2. Imbalance  R26.89       3. Vertigo  R42                      Subjective: Pt states that she found out that she has fluid on her brain and she needs it drained. She has to be cleared by a cardiologist first. She notes that it was a spinning lightheadedness. She reports no changes in hearing along with this. Pt states that she got the dizzy spins when she left her room and started walking. She states that \"the force makes me move faster in front.\"       Objective: See treatment diary below      Assessment: Pt participated in a skilled PT session focused on balance, gait, and strengthening. Due to concern noted at her last doctor's appointment pt was assessed for orthostatic hypotension today. Findings reflect lack of statistically significant change, however, instructed pt to have someone take her BP during her dizzy spells and to track it prior to her appointment with the cardiologist to provide them with more information. Pt exhibited significant improvement with gait + HT and NBOS tasks compared to IE.  Compliant surface beam was challenging for the pt as was foam EC, reflecting poor reweighting of the vestibular system. VORx1 was performed and pt exhibited difficulty sustaining gaze on the target. Pt will continue to benefit from skilled PT intervention focused on balance, gait, and strengthening so that she can navigate her environment with increased safety and stability.       Plan: Continue per plan of care.      Precautions: NPH, HTN, Peripheral Neuropathy, Hyperlipidemia, Vertigo, R hip replacement, falls risk  POC Expires: 2024      Tests and Measures  IE            10mWT 0.99m/s            TUG 14.34s            FGA             vitals  Sitting " "130/84mmHg    Standin/92mmHg            Neuro Re-Ed             Compliant surface          Foam EC 3x30\" holds    Foam beam navigation forward & sideways 4x each in SOLO   Half turn practice in SOLO             Jori navigation          Highest setting 3x5 hurdles step over, also trialed med and low settings but too easy   Tandem gait          SOLO 5x20ft   CC & CW hurdles             Backwards walking             [facility]           15 cones no LOB + OH reaching task    VORx1          H & V 2x30s added to HEP   Gait EC          SOLO 4x40ft   Ther Ex             STS                                                                                                        Ther Activity                                       Gait Training                                       Modalities                                            "

## 2024-05-13 ENCOUNTER — OFFICE VISIT (OUTPATIENT)
Dept: CARDIOLOGY CLINIC | Facility: CLINIC | Age: 74
End: 2024-05-13
Payer: MEDICARE

## 2024-05-13 VITALS
SYSTOLIC BLOOD PRESSURE: 134 MMHG | BODY MASS INDEX: 37.56 KG/M2 | HEIGHT: 64 IN | DIASTOLIC BLOOD PRESSURE: 90 MMHG | HEART RATE: 109 BPM | WEIGHT: 220 LBS

## 2024-05-13 DIAGNOSIS — E78.2 MIXED HYPERLIPIDEMIA: ICD-10-CM

## 2024-05-13 DIAGNOSIS — Z01.818 PRE-OP EVALUATION: Primary | ICD-10-CM

## 2024-05-13 DIAGNOSIS — R42 DIZZINESS: ICD-10-CM

## 2024-05-13 PROCEDURE — 99214 OFFICE O/P EST MOD 30 MIN: CPT | Performed by: INTERNAL MEDICINE

## 2024-05-13 PROCEDURE — 93000 ELECTROCARDIOGRAM COMPLETE: CPT | Performed by: INTERNAL MEDICINE

## 2024-05-13 NOTE — PROGRESS NOTES
[facility] Cardiology  Office Consultation  Bozena Armstrong 73 y.o. female MRN: [id number]        Chief Complaint    Chief Complaint   Patient presents with    Consult     Referred by Neuro     Pre-op Clearance     Neuro surgery TBD    Dizziness     Vertitgo       Referring Provider: Hillary Grimm DO    Impression & Plan:    1. Dizziness  Ms. Armstrong's symptoms of dizziness and imbalance are not consistent with cardiac etiology.  She repeatedly denies any lightheadedness or presyncopal sensations.  Most of what she describes does sound vestibular in nature with sudden forceful sensations of being pushed and losing balance.  She had previously been evaluated by ENT without concerns for BPPV.  To my knowledge, there are no cardiac syndromes which would cause this type of symptomatology.  Her recent echocardiogram in February 2024 was largely unremarkable.  Her Holter monitor in February 2024, during which she experienced at least 3 of her stereotypical episodes, showed some short runs of benign atrial tachycardia but no significant tachyarrhythmia; her symptoms corresponded to mild sinus tachycardia.  I do not think there is an arrhythmic etiology to her complaints.  Furthermore, her orthostatics were negative in the office today with a supine blood pressure of 128/78 which roxy to 162/86 upon standing.  - Ambulatory Referral to Cardiology  - POCT ECG    2. Mixed hyperlipidemia  Continue rosuvastatin 40 mg daily.    3. Pre-op evaluation  No cardiac contraindication to lumbar puncture.  Patient to follow-up with her neurosurgeon regarding this procedure.  No further cardiac evaluation required prior to proceeding.  Low cardiac risk.  - POCT ECG        We will see Bozena Armstrong back PRN    HPI: Bozena Armstrong is a 73 y.o. year old female    She has been having falls worked up by various specialists including neurology and neurosurgery. There has been 5-6 years of imbalance and nonspecific etiology of  "falling. \"It has been real bad this year\". She had an MRI brain in March 2024 which was concerning for NPH and was sent to neurosurgery for evaluation. She was seen by Dr. Smith of neurosurgery on 4/25/24 (encounter reviewed) who fel tthat her falls were not typical for NPH and recommended cardiology evaluation due to concerns for orthostasis. He did offer an LP but wanted to exclude other etiologies first.     Her episodes are characterized by a sudden onset loss of balance she describes as someone forcefully shoving her. This does not occur strictly with positional changes but does sometimes occur with positional changes. She sometimes has episodes while standing up from bed \"and I get that pushed feeling\". Asked several times, she denies that these episodes involve any lightheadedness or presyncope. Episodes often happen mid-walk, not strictly with positional changes. The other week in [facility] while shopping she \"felt something coming on\" but cannot elaborate to me what sensation she experienced. She does describe \"like someone was pushing me forward leaning on the cart.\" She eventually \"spun around 2-3 times then fell\" and describes that she \"had no control of my body\". No lightheadedness, presyncope, chest pain or SOB accompanied this episode.     She was evaluated by ENT, Dr. Zeng, 9/28/23 who felt that her symptoms may have been due to benign intracranial hypertension and ordered a CTA angio of the head to rule out vascular cause of pulsatile tinnitus. This study was only notable for mild ventricular prominence.     She was seen by neurology 324, Dr. Kim.  She was noted to have mild cognitive impairment with a MoCA score 19/30    Personally reviewed her Holter monitor from 2/29/24. She did have several episodes of dizziness during the Holter monitor which corresponded to mild sinus tachycardia. Incidentally, there were a few runs of atrial tachyardia, typically 130-140 bpm, the longest only being 19 " beats not associated with symptoms. One episode of AT did reach 160 bpm, again without a symptom diary correlate.       Cardiac testing:     Echo 2/29/24    Interpretation Summary         Left Ventricle: Left ventricular cavity size is normal. Wall thickness is upper normal. The left ventricular ejection fraction is 60%. Systolic function is normal. Wall motion is normal. Diastolic function is mildly abnormal, consistent with grade I (abnormal) relaxation.    Aortic Valve: There is aortic valve sclerosis.    Mitral Valve: There is mild annular calcification. There is trace regurgitation.          Holter 48 hr, 2/29/24  IMPRESSION:  Abnormal 48 hour holter monitor.  Patient in normal sinus rhythm throughout holter monitoring.  No premature ventricular contractions.  Rare premature atrial contractions with several runs of atrial tachycardia.  No significant pauses.  Symptoms on diary did not correlate with any dysrhythmia.          MRI Brain 3/27/24  IMPRESSION:     No mass effect, acute intracranial hemorrhage or evidence of recent infarction. No abnormal parenchymal or leptomeningeal enhancement identified.     Mild to moderate chronic microvascular ischemic change.     Mild ventricular prominence with narrowing of the callosal angle and disproportionate enlargement of the sylvian fissures. Consider the possibility of normal pressure hydrocephalus in the appropriate clinical setting.     NeuroQuant analysis was performed: Hippocampal volume is lower limits of normal. However hippocampal occupancy score is low and there is enlargement of both the superior and inferior lateral ventricles. 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     CTA Head Neck w wo contrast 10/8/23  IMPRESSION:     No acute intracranial pathology.  Mild ventricular prominence, correlate for NPH  No  significant stenosis of the cervical carotid or vertebral arteries.  No significant interval stenosis, large vessel occlusion or aneurysm.           EKG reviewed personally:   EKG 24 - sinus tachycardia, 109 bpm, normal axis and intervals, low voltage, borderline study.       Relevant Laboratory Studies:  (Laboratory studies personally reviewed)  CMP 24 - Cr 0.51  CBC 24 - Hgb 13.3 g/dL     Review of Systems      Past Medical History:   Diagnosis Date    Arthritis     Balance disorder     GERD (gastroesophageal reflux disease)     Hyperlipidemia     Hypertension     Peripheral neuropathy     Vertigo      Past Surgical History:   Procedure Laterality Date     SECTION      COLONOSCOPY      HYSTERECTOMY      HYSTEROSCOPY W/ ENDOMETRIAL ABLATION      JOINT REPLACEMENT      KNEE SURGERY      [place] ARTHRP ACETBLR/PROX FEM PROSTC AGRFT/ALGRFT Right 2022    Procedure: ARTHROPLASTY HIP TOTAL ANTERIOR and all associated procedures;  Surgeon: Trisha Alonso MD;  Location: [facility]  Service: Orthopedics     Social History     Substance and Sexual Activity   Alcohol Use Never     Social History     Substance and Sexual Activity   Drug Use Never     Social History     Tobacco Use   Smoking Status Never    Passive exposure: Past   Smokeless Tobacco Never     Family History   Problem Relation Age of Onset    Arthritis Mother     Cancer Father     Heart disease Father     No Known Problems Sister     No Known Problems Daughter     No Known Problems Daughter     No Known Problems Daughter     No Known Problems Maternal Grandmother     No Known Problems Maternal Grandfather     No Known Problems Paternal Grandmother     No Known Problems Paternal Grandfather     Anemia Brother     No Known Problems Maternal Aunt     No Known Problems Maternal Aunt        Allergies:  No Known Allergies    Medications (as of START of this encounter):   Outpatient Medications Prior to Visit   Medication Sig Dispense  Refill    ascorbic acid (VITAMIN C) 500 MG tablet Take 1 tablet (500 mg total) by mouth 2 (two) times a day 60 tablet 0    celecoxib (CeleBREX) 100 mg capsule TAKE ONE CAPSULE BY MOUTH TWICE A DAY 60 capsule 5    cholecalciferol (VITAMIN D3) 1,000 units tablet Take 50 Units by mouth daily      Doxylamine Succinate, Sleep, (SLEEP AID PO) Take 25 mg by mouth      DULoxetine (CYMBALTA) 60 mg delayed release capsule TAKE ONE CAPSULE BY MOUTH AT BEDTIME 30 capsule 5    meclizine (ANTIVERT) 25 mg tablet TAKE ONE TABLET BY MOUTH THREE TIMES A DAY AS NEEDED DIZZINESS 60 tablet 1    Multiple Vitamins-Minerals (CENTRUM SILVER PO) Take by mouth      potassium chloride (Klor-Con M20) 20 mEq tablet TAKE ONE TABLET BY MOUTH EVERY DAY 30 tablet 5    rosuvastatin (CRESTOR) 40 MG tablet Take 1 tablet (40 mg total) by mouth daily 90 tablet 3    vitamin B-12 (VITAMIN B-12) 1,000 mcg tablet Take 1 tablet (1,000 mcg total) by mouth daily 90 tablet 3    VITAMIN E PO Take by mouth       No facility-administered medications prior to visit.         Vitals:    05/13/24 1028   BP: 134/90   Pulse: (!) 109     Weight (last 2 days)       Date/Time Weight    05/13/24 1028 99.8 (220)            General: Bozena Armstrong is an obsese but otherwise appearing female, with a cane  HEENT: moist mucous membranes, EOMI  Neck:  No JVD, supple, trachea midline   Cardiovascular: unremarkable S1/S2, tachycardic and regular, no murmur  Pulmonary: normal respiratory effort, CTAB   Abdomen: soft and nondistended  Extremities: No lower extremity edema. Warm and well perfused extremities.   Neuro: deferred  Psych: Normal mood and affect, cooperative        Time Spent:  Total time (face-to-face and non-face-to-face) spent on today's visit was 35 minutes. This includes preparation for the visits (i.e. reviewing test results), performance of a medically appropriate history and examination, and orders for medications, tests or other procedures. This time is exclusive  "of procedures performed and time spent teaching.    Durga Genao MD    This note was completed in part utilizing TweetUp recognition software. Grammatical errors, random word insertion, spelling mistakes, occasional wrong word or \"sound-alike\" substitutions and incomplete sentences may be an occasional consequence of the system secondary to software limitations, ambient noise and hardware issues. At the time of dictation, efforts were made to edit, clarify and /or correct errors.  Please read the chart carefully and recognize, using context, where substitutions have occurred.  If you have any questions or concerns about the context, text or information contained within the body of this dictation, please contact myself, the provider, for further clarification.    "

## 2024-05-16 ENCOUNTER — OFFICE VISIT (OUTPATIENT)
Facility: CLINIC | Age: 74
End: 2024-05-16
Payer: MEDICARE

## 2024-05-16 DIAGNOSIS — G91.2 NORMAL PRESSURE HYDROCEPHALUS (HCC): Primary | ICD-10-CM

## 2024-05-16 DIAGNOSIS — R42 VERTIGO: ICD-10-CM

## 2024-05-16 DIAGNOSIS — R26.89 IMBALANCE: ICD-10-CM

## 2024-05-16 PROCEDURE — 97112 NEUROMUSCULAR REEDUCATION: CPT

## 2024-05-16 NOTE — PROGRESS NOTES
"Daily Note     Today's date: 2024  Patient name: Bozena Armstrong  : 1950  MRN: [id number]  Referring provider: Petty Kim MD  Dx:   Encounter Diagnosis     ICD-10-CM    1. Normal pressure hydrocephalus (HCC)  G91.2       2. Imbalance  R26.89       3. Vertigo  R42                        Subjective: Pt states that she lost a tooth trying to open up a cracker bag. She states that her cardiologist cleared her for surgery.  They still have not set a date for that yet. She has an appointment on the  with the neurosurgeon and they will discuss the whole procedure then. Pt states that she felt the dizziness coming on and she just \"dropped\" involuntarily. She did not lose consciousness.  She states that her legs were like a \"raggedy shelbie doll.\"       Objective: See treatment diary below      Assessment: Pt participated in a skilled PT session focused on balance, gait, and strengthening. Increased difficulty across tasks today. Pt exhibited significant difficulty with foam surface navigation. Gait with HT performed with increased difficulty. Gaze stabilization was performed cueing to keep eyes on the target.  Pt will continue to benefit from skilled PT intervention focused on balance, gait, and strengthening so that she can navigate her environment with increased safety and stability.       Plan: Continue per plan of care.      Precautions: NPH, HTN, Peripheral Neuropathy, Hyperlipidemia, Vertigo, R hip replacement, falls risk  POC Expires: 2024      Tests and Measures  IE 16   10mWT 0.99m/s     TUG 14.34s     FGA      vitals  Sitting 130/84mmHg    Standing 120/92mmHg    Neuro Re-Ed      Compliant surface  Foam EC 3x30\" holds    Foam beam navigation forward & sideways 4x each in SOLO Foam EC 3x30\" holds    Foam navigation forward 6x intermittent error to step off    Foam navigation sideways + Ht6x each way   Half turn practice in SOLO      Jori navigation  Highest setting 3x5 " "hurdles step over, also trialed med and low settings but too easy    Tandem gait  SOLO 5x20ft 6x20ft   CC & CW hurdles      Gait + head turns   6x20ft    Backwards walking      Cone   15 cones no LOB + OH reaching task     VORx1  H & V 2x30s added to HEP 3x30\"    Gait EC  SOLO 4x40ft 6x20ft   Ther Ex      STS                                                Ther Activity                  Gait Training                  Modalities                       "

## 2024-05-21 ENCOUNTER — APPOINTMENT (OUTPATIENT)
Facility: CLINIC | Age: 74
End: 2024-05-21
Payer: MEDICARE

## 2024-05-22 ENCOUNTER — APPOINTMENT (EMERGENCY)
Dept: RADIOLOGY | Facility: HOSPITAL | Age: 74
End: 2024-05-22
Payer: MEDICARE

## 2024-05-22 ENCOUNTER — HOSPITAL ENCOUNTER (EMERGENCY)
Facility: HOSPITAL | Age: 74
Discharge: HOME | End: 2024-05-22
Attending: EMERGENCY MEDICINE
Payer: MEDICARE

## 2024-05-22 VITALS
HEART RATE: 101 BPM | OXYGEN SATURATION: 95 % | SYSTOLIC BLOOD PRESSURE: 152 MMHG | DIASTOLIC BLOOD PRESSURE: 92 MMHG | TEMPERATURE: 98.1 F | RESPIRATION RATE: 18 BRPM
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
## 2024-05-22 DIAGNOSIS — S50.812A ABRASION OF LEFT FOREARM, INITIAL ENCOUNTER: ICD-10-CM

## 2024-05-22 DIAGNOSIS — S20.20XA CONTUSION OF MULTIPLE SITES OF TRUNK, INITIAL ENCOUNTER: ICD-10-CM

## 2024-05-22 PROCEDURE — 99284 EMERGENCY DEPT VISIT MOD MDM: CPT

## 2024-05-22 PROCEDURE — 99284 EMERGENCY DEPT VISIT MOD MDM: CPT | Performed by: EMERGENCY MEDICINE

## 2024-05-22 PROCEDURE — 73502 X-RAY EXAM HIP UNI 2-3 VIEWS: CPT

## 2024-05-22 PROCEDURE — 71046 X-RAY EXAM CHEST 2 VIEWS: CPT

## 2024-05-22 NOTE — ED PROVIDER NOTES
History  Chief Complaint   Patient presents with    Fall     Pt to er with reports of missing a concrete step, causing herself to trip and fall onto her left side. Complaints of left side and hip pain. Denies head strike, denies thinners, no loc.      History from patient and medical records, past medical history normal pressure hydrocephalus, recurrent falling episodes with fluid in the brain, patient presents by private vehicle with concern for falling yesterday evening but this time she tripped on a concrete step.  She did not hit her head or pass out she has no headache or confusion.  She was able to get up on her own today she got her nails done and went to the dentist but she was telling the dentist about the fall and they recommended she get evaluated.  She has some abrasions at her left forearm.  She has some left chest pain and left hip pain from where she landed during the fall.  She took Aleve yesterday.  GCS 15.        Prior to Admission Medications   Prescriptions Last Dose Informant Patient Reported? Taking?   DULoxetine (CYMBALTA) 60 mg delayed release capsule  Self No No   Sig: TAKE ONE CAPSULE BY MOUTH AT BEDTIME   Doxylamine Succinate, Sleep, (SLEEP AID PO)  Self Yes No   Sig: Take 25 mg by mouth   Multiple Vitamins-Minerals (CENTRUM SILVER PO)  Self Yes No   Sig: Take by mouth   VITAMIN E PO  Self Yes No   Sig: Take by mouth   ascorbic acid (VITAMIN C) 500 MG tablet  Self No No   Sig: Take 1 tablet (500 mg total) by mouth 2 (two) times a day   celecoxib (CeleBREX) 100 mg capsule  Self No No   Sig: TAKE ONE CAPSULE BY MOUTH TWICE A DAY   cholecalciferol (VITAMIN D3) 1,000 units tablet  Self Yes No   Sig: Take 50 Units by mouth daily   meclizine (ANTIVERT) 25 mg tablet  Self No No   Sig: TAKE ONE TABLET BY MOUTH THREE TIMES A DAY AS NEEDED DIZZINESS   potassium chloride (Klor-Con M20) 20 mEq tablet  Self No No   Sig: TAKE ONE TABLET BY MOUTH EVERY DAY   rosuvastatin (CRESTOR) 40 MG tablet  Self No  No   Sig: Take 1 tablet (40 mg total) by mouth daily   vitamin B-12 (VITAMIN B-12) 1,000 mcg tablet  Self No No   Sig: Take 1 tablet (1,000 mcg total) by mouth daily      Facility-Administered Medications: None       Past Medical History:   Diagnosis Date    Arthritis     Balance disorder     GERD (gastroesophageal reflux disease)     Hyperlipidemia     Hypertension     Peripheral neuropathy     Vertigo        Past Surgical History:   Procedure Laterality Date     SECTION      COLONOSCOPY      HYSTERECTOMY      HYSTEROSCOPY W/ ENDOMETRIAL ABLATION      JOINT REPLACEMENT      KNEE SURGERY      [place] ARTHRP ACETBLR/PROX FEM PROSTC AGRFT/ALGRFT Right 2022    Procedure: ARTHROPLASTY HIP TOTAL ANTERIOR and all associated procedures;  Surgeon: Trisha Alonso MD;  Location:  MAIN OR;  Service: Orthopedics       Family History   Problem Relation Age of Onset    Arthritis Mother     Cancer Father     Heart disease Father     No Known Problems Sister     No Known Problems Daughter     No Known Problems Daughter     No Known Problems Daughter     No Known Problems Maternal Grandmother     No Known Problems Maternal Grandfather     No Known Problems Paternal Grandmother     No Known Problems Paternal Grandfather     Anemia Brother     No Known Problems Maternal Aunt     No Known Problems Maternal Aunt      I have reviewed and agree with the history as documented.    E-Cigarette/Vaping    E-Cigarette Use Never User      E-Cigarette/Vaping Substances    Nicotine No     THC No     CBD No     Flavoring No     Other No     Unknown No      Social History     Tobacco Use    Smoking status: Never     Passive exposure: Past    Smokeless tobacco: Never   Vaping Use    Vaping status: Never Used   Substance Use Topics    Alcohol use: Never    Drug use: Never       Review of Systems   Constitutional:  Negative for chills and fever.   HENT:  Negative for ear pain and sore throat.    Eyes:  Negative for pain and visual  disturbance.   Respiratory:  Negative for cough and shortness of breath.    Cardiovascular:  Negative for chest pain and palpitations.   Gastrointestinal:  Negative for abdominal pain and vomiting.   Genitourinary:  Negative for dysuria and hematuria.   Musculoskeletal:  Negative for arthralgias and back pain.   Skin:  Negative for color change and rash.   Neurological:  Negative for seizures, syncope and headaches.   All other systems reviewed and are negative.      Physical Exam  Physical Exam  Vitals and nursing note reviewed.   Constitutional:       Appearance: She is well-developed.   HENT:      Head: Normocephalic and atraumatic.      Right Ear: Tympanic membrane normal.      Left Ear: Tympanic membrane normal.   Eyes:      Extraocular Movements: Extraocular movements intact.      Conjunctiva/sclera: Conjunctivae normal.      Pupils: Pupils are equal, round, and reactive to light.   Cardiovascular:      Rate and Rhythm: Normal rate and regular rhythm.      Heart sounds: Normal heart sounds.   Pulmonary:      Effort: Pulmonary effort is normal.      Breath sounds: Normal breath sounds.   Chest:      Chest wall: Tenderness present. No deformity, swelling or crepitus.       Abdominal:      General: Bowel sounds are normal.      Palpations: Abdomen is soft.   Musculoskeletal:         General: Normal range of motion.      Left forearm: No deformity or tenderness.        Arms:       Cervical back: No bony tenderness. No spinous process tenderness.      Thoracic back: No bony tenderness.      Lumbar back: No bony tenderness.      Left hip: Tenderness present. No deformity. Normal range of motion.      Left ankle: Normal pulse.   Skin:     General: Skin is warm and dry.   Neurological:      Mental Status: She is alert and oriented to person, place, and time.         Vital Signs  ED Triage Vitals [05/22/24 1500]   Temperature Pulse Respirations Blood Pressure SpO2   98.1 °F (36.7 °C) 101 18 152/92 95 %      Temp Source  Heart Rate Source Patient Position - Orthostatic VS BP Location FiO2 (%)   Temporal Monitor Sitting Right arm --      Pain Score       --           Vitals:    05/22/24 1500   BP: 152/92   Pulse: 101   Patient Position - Orthostatic VS: Sitting         Visual Acuity      ED Medications  Medications - No data to display    Diagnostic Studies  Results Reviewed       None                   XR hip/pelv 2-3 vws left if performed   ED Interpretation by Derian Crowder DO (05/22 1526)   No fx, no dislocation, hardware in place right hip, interpreted by me      Final Result by Daniel Dubon MD (05/22 1603)      No acute fracture or dislocation of the left hip.            Workstation performed: [id number]         XR chest 2 views   ED Interpretation by Derian Crowder DO (05/22 1526)   No acute abnl, no ptx, effusion, infiltrate, no obvious rib fracture.  Interpreted by me          Final Result by Daniel Dubon MD (05/22 8004)      No acute cardiopulmonary disease.            Workstation performed: [id number]                    Procedures  Procedures         ED Course                               SBIRT 20yo+      Flowsheet Row Most Recent Value   Initial Alcohol Screen: US AUDIT-C     1. How often do you have a drink containing alcohol? 0 Filed at: 05/22/2024 1501   2. How many drinks containing alcohol do you have on a typical day you are drinking?  0 Filed at: 05/22/2024 1501   3a. Male UNDER 65: How often do you have five or more drinks on one occasion? 0 Filed at: 05/22/2024 1501   3b. FEMALE Any Age, or MALE 65+: How often do you have 4 or more drinks on one occassion? 0 Filed at: 05/22/2024 1501   Audit-C Score 0 Filed at: 05/22/2024 1501   ARUN: How many times in the past year have you...    Used an illegal drug or used a prescription medication for non-medical reasons? Never Filed at: 05/22/2024 1501                      Medical Decision Making  Diff includes mechanical fall with left chest wall pain, possible rib  contusion, less likely fracture, ptx, effusion, less likely kidney injury.  Patient has abrasion left forearm less likely fracture at forearm, hip pain less likely fracture - probable contusion from fall.  Did not hit head, no headache, less likely intracranial hemorrhage    Amount and/or Complexity of Data Reviewed  Radiology: ordered and independent interpretation performed.             Disposition  Final diagnoses:   Fall, initial encounter   Abrasion of left forearm, initial encounter   Contusion of multiple sites of trunk, initial encounter     Time reflects when diagnosis was documented in both MDM as applicable and the Disposition within this note       Time User Action Codes Description Comment    5/22/2024  3:26 PM Derian Crowder [W19.XXXA] Fall, initial encounter     5/22/2024  3:26 PM Derian Crowder [S50.812A] Abrasion of left forearm, initial encounter     5/22/2024  3:30 PM Derian Crowder [S20.212A] Rib contusion, left, initial encounter     5/22/2024  3:30 PM Derian Crowder [S70.02XA] Contusion of left hip, initial encounter     5/22/2024  3:30 PM Derian Crowder [S70.02XA] Contusion of left hip, initial encounter     5/22/2024  3:30 PM Derian Crowder [S20.212A] Rib contusion, left, initial encounter     5/22/2024  3:30 PM Derian Crowder [S20.20XA] Contusion of multiple sites of trunk, initial encounter           ED Disposition       ED Disposition   Discharge    Condition   Stable    Date/Time   Wed May 22, 2024 1526    Comment   Bozena Armstrong discharge to home/self care.                   Follow-up Information    None         Discharge Medication List as of 5/22/2024  3:31 PM        CONTINUE these medications which have NOT CHANGED    Details   ascorbic acid (VITAMIN C) 500 MG tablet Take 1 tablet (500 mg total) by mouth 2 (two) times a day, Starting Tue 7/5/2022, Normal      celecoxib (CeleBREX) 100 mg capsule TAKE ONE CAPSULE BY MOUTH TWICE A DAY, Starting Mon 1/22/2024, Normal       cholecalciferol (VITAMIN D3) 1,000 units tablet Take 50 Units by mouth daily, Historical Med      Doxylamine Succinate, Sleep, (SLEEP AID PO) Take 25 mg by mouth, Historical Med      DULoxetine (CYMBALTA) 60 mg delayed release capsule TAKE ONE CAPSULE BY MOUTH AT BEDTIME, Normal      meclizine (ANTIVERT) 25 mg tablet TAKE ONE TABLET BY MOUTH THREE TIMES A DAY AS NEEDED DIZZINESS, Normal      Multiple Vitamins-Minerals (CENTRUM SILVER PO) Take by mouth, Historical Med      potassium chloride (Klor-Con M20) 20 mEq tablet TAKE ONE TABLET BY MOUTH EVERY DAY, Starting Tue 1/30/2024, Normal      rosuvastatin (CRESTOR) 40 MG tablet Take 1 tablet (40 mg total) by mouth daily, Starting Wed 5/17/2023, Normal      vitamin B-12 (VITAMIN B-12) 1,000 mcg tablet Take 1 tablet (1,000 mcg total) by mouth daily, Starting Fri 3/8/2024, Normal      VITAMIN E PO Take by mouth, Historical Med             No discharge procedures on file.    PDMP Review         Value Time User    PDMP Reviewed  Yes 9/11/2023  5:08 AM Sara Major DO            ED Provider  Electronically Signed by             Derian Crowder DO  05/22/24 7773

## 2024-05-23 ENCOUNTER — TELEPHONE (OUTPATIENT)
Age: 74
End: 2024-05-23

## 2024-05-23 ENCOUNTER — OFFICE VISIT (OUTPATIENT)
Age: 74
End: 2024-05-23
Payer: MEDICARE

## 2024-05-23 VITALS
HEIGHT: 64 IN | DIASTOLIC BLOOD PRESSURE: 78 MMHG | BODY MASS INDEX: 37.56 KG/M2 | TEMPERATURE: 98.3 F | WEIGHT: 220 LBS | SYSTOLIC BLOOD PRESSURE: 144 MMHG | RESPIRATION RATE: 17 BRPM | HEART RATE: 110 BPM | OXYGEN SATURATION: 97 %

## 2024-05-23 DIAGNOSIS — G91.2 NPH (NORMAL PRESSURE HYDROCEPHALUS) (HCC): Primary | ICD-10-CM

## 2024-05-23 DIAGNOSIS — R26.81 GAIT INSTABILITY: Primary | ICD-10-CM

## 2024-05-23 PROCEDURE — 99212 OFFICE O/P EST SF 10 MIN: CPT | Performed by: STUDENT IN AN ORGANIZED HEALTH CARE EDUCATION/TRAINING PROGRAM

## 2024-05-23 NOTE — TELEPHONE ENCOUNTER
This RN phoned pt to discuss NPH scheduling.  When patient was in the office today this RN was able to meet with patient and her spouse to discuss scheduling of the LP and follow up appointments.  Patient has an upcoming vacation scheduled and wanted to wait their

## 2024-05-23 NOTE — PROGRESS NOTES
Office Note - Neurosurgery   Bozena Armstrong 73 y.o. female MRN: [id number]      Assessment/Plan:    Bozena Armstrong is a 73 year old female with a history of frequent falls referred for evaluation of possible normal pressure hydrocephalus.    She was evaluated by cardiology since our last appointment with no significant concerns regarding cardiac function. As we discussed last time, NPH is a diagnosis of exclusion and she has had negative evaluations from a cardiac and vertigo standpoint for her episodic falls - as such it is reasonable to proceed with an LP trial given her CT scan demonstrating characteristic enlargement concerning for NPH. I did re-iterate the frustrating nature of the diagnosis, and that the LP trial will hopefully give us some objective information regarding her response to CSF diversion as well as a possible subjective improvement. If she does not experience much of a change we discussed that a shunt is likely more risk than benefit. If she experiencing a meaningful change, then could proceed with  shunt placement. We did briefly discuss the  shunt placement procedeure which would be done in concert with general surgery for abdominal access. Risks are low but not zero and include bleeding when the ventricular catheter is passed, infection, and the risks that she does not get meaningful benefit with chronic shunting.    She would like to proceed with an LP trial. Plan to return to see me after this is complete.       Subjective/Objective     Chief Complaint    Follow-up       HPI    Bozena Armstrong is a 73 year old female with a history of frequent falls referred for evaluation of possible normal pressure hydrocephalus.    JORGE PATEL personally reviewed and updated.    Review of Systems   HENT:  Negative for sinus pressure, sinus pain and tinnitus.    Eyes:  Negative for visual disturbance.   Musculoskeletal:  Positive for gait problem (fell yesterday, unsteady, imbalanced, ambulates w/  cane).        Hx PT currently, difficulty w/ balance, some improvement, can ambulate w/ cane   Skin:  Positive for wound (bruising L-sided ribs due to fall yesterday).   Neurological:  Positive for weakness (legs fells weak at end of day). Negative for dizziness, speech difficulty (trouble finding words), light-headedness, numbness and headaches.   All other systems reviewed and are negative.      Family History    Family History   Problem Relation Age of Onset    Arthritis Mother     Cancer Father     Heart disease Father     No Known Problems Sister     No Known Problems Daughter     No Known Problems Daughter     No Known Problems Daughter     No Known Problems Maternal Grandmother     No Known Problems Maternal Grandfather     No Known Problems Paternal Grandmother     No Known Problems Paternal Grandfather     Anemia Brother     No Known Problems Maternal Aunt     No Known Problems Maternal Aunt        Social History    Social History     Socioeconomic History    Marital status: /Civil Union     Spouse name: Not on file    Number of children: Not on file    Years of education: Not on file    Highest education level: Not on file   Occupational History    Not on file   Tobacco Use    Smoking status: Never     Passive exposure: Past    Smokeless tobacco: Never   Vaping Use    Vaping status: Never Used   Substance and Sexual Activity    Alcohol use: Never    Drug use: Never    Sexual activity: Not Currently   Other Topics Concern    Not on file   Social History Narrative    Not on file     Social Determinants of Health     Financial Resource Strain: Low Risk  (12/19/2023)    Overall Financial Resource Strain (CARDIA)     Difficulty of Paying Living Expenses: Not very hard   Food Insecurity: Not on file   Transportation Needs: No Transportation Needs (12/19/2023)    PRAPARE - Transportation     Lack of Transportation (Medical): No     Lack of Transportation (Non-Medical): No   Physical Activity: Not on file    Stress: Not on file   Social Connections: Not on file   Intimate Partner Violence: Not At Risk (2023)    Humiliation, Afraid, Rape, and Kick questionnaire     Fear of Current or Ex-Partner: No     Emotionally Abused: No     Physically Abused: No     Sexually Abused: No   Housing Stability: Not on file       Past Medical History    Past Medical History:   Diagnosis Date    Arthritis     Balance disorder     GERD (gastroesophageal reflux disease)     Hyperlipidemia     Hypertension     Peripheral neuropathy     Vertigo        Surgical History    Past Surgical History:   Procedure Laterality Date     SECTION      COLONOSCOPY      HYSTERECTOMY      HYSTEROSCOPY W/ ENDOMETRIAL ABLATION      JOINT REPLACEMENT      KNEE SURGERY      OK ARTHRP ACETBLR/PROX FEM PROSTC AGRFT/ALGRFT Right 2022    Procedure: ARTHROPLASTY HIP TOTAL ANTERIOR and all associated procedures;  Surgeon: Trisha Alonso MD;  Location:  MAIN OR;  Service: Orthopedics       Medications      Current Outpatient Medications:     ascorbic acid (VITAMIN C) 500 MG tablet, Take 1 tablet (500 mg total) by mouth 2 (two) times a day, Disp: 60 tablet, Rfl: 0    celecoxib (CeleBREX) 100 mg capsule, TAKE ONE CAPSULE BY MOUTH TWICE A DAY, Disp: 60 capsule, Rfl: 5    cholecalciferol (VITAMIN D3) 1,000 units tablet, Take 50 Units by mouth daily, Disp: , Rfl:     Doxylamine Succinate, Sleep, (SLEEP AID PO), Take 25 mg by mouth, Disp: , Rfl:     DULoxetine (CYMBALTA) 60 mg delayed release capsule, TAKE ONE CAPSULE BY MOUTH AT BEDTIME, Disp: 30 capsule, Rfl: 5    meclizine (ANTIVERT) 25 mg tablet, TAKE ONE TABLET BY MOUTH THREE TIMES A DAY AS NEEDED DIZZINESS, Disp: 60 tablet, Rfl: 1    Multiple Vitamins-Minerals (CENTRUM SILVER PO), Take by mouth, Disp: , Rfl:     potassium chloride (Klor-Con M20) 20 mEq tablet, TAKE ONE TABLET BY MOUTH EVERY DAY, Disp: 30 tablet, Rfl: 5    rosuvastatin (CRESTOR) 40 MG tablet, Take 1 tablet (40 mg total) by  "mouth daily, Disp: 90 tablet, Rfl: 3    vitamin B-12 (VITAMIN B-12) 1,000 mcg tablet, Take 1 tablet (1,000 mcg total) by mouth daily, Disp: 90 tablet, Rfl: 3    VITAMIN E PO, Take by mouth, Disp: , Rfl:     Allergies    No Known Allergies      Physical Exam    Vitals:  Blood pressure 144/78, pulse (!) 110, temperature 98.3 °F (36.8 °C), temperature source Temporal, resp. rate 17, height 5' 4\" (1.626 m), weight 99.8 kg (220 lb), SpO2 97%.,Body mass index is 37.76 kg/m².    Physical Exam  Neurologic Exam   Awake and alert  Oriented and appropriate  5/5 throughout  Ambulates with a cane  "

## 2024-05-24 ENCOUNTER — OFFICE VISIT (OUTPATIENT)
Facility: CLINIC | Age: 74
End: 2024-05-24
Payer: MEDICARE

## 2024-05-24 DIAGNOSIS — R42 VERTIGO: ICD-10-CM

## 2024-05-24 DIAGNOSIS — R26.89 IMBALANCE: ICD-10-CM

## 2024-05-24 DIAGNOSIS — G91.2 NORMAL PRESSURE HYDROCEPHALUS (HCC): Primary | ICD-10-CM

## 2024-05-24 PROCEDURE — 97112 NEUROMUSCULAR REEDUCATION: CPT

## 2024-05-24 NOTE — PROGRESS NOTES
Daily Note     Today's date: 2024  Patient name: Bozena Armstrong  : 1950  MRN: [id number]  Referring provider: Petty Kim MD  Dx:   Encounter Diagnosis     ICD-10-CM    1. Normal pressure hydrocephalus (HCC)  G91.2       2. Imbalance  R26.89       3. Vertigo  R42                          Subjective: Pt states that she had a fall on 2024. She did not hit her head. She missed a concrete step and fell, landing on her L side. She has L hip and rib pain. She was checked out in the ED and is okay. She also saw her neurosurgeon and they are going to proceed with the LP to begin assessing if she is a good candidate for surgery to treat her NPH. Her fall occurred on her front step and she forgot about it and she thought it was flat and she fell forward and it hurt quite a bit. She had a dentist appointment the next morning, told them what happened, and the dentist told her to go get checked out at the hospital. She states that it feels like she hurt a rib. Her surgery is . She is leaving for [place]       Objective: See treatment diary below      Assessment: Pt participated in a skilled PT session focused on balance, gait, and strengthening. Increased difficulty across tasks today. Pt exhibited significant difficulty with foam surface navigation. Object  tasks performed due to reports of instability when bending forward, paired with cognitive task to challenge dual tasking. Pt exhibited reduced pace with performance of tasks. Significant difficulty with sustained tandem stance. While pt was present for the full 45 minutes, 10 minutes of session were utilized for scheduling.   Pt will continue to benefit from skilled PT intervention focused on balance, gait, and strengthening so that she can navigate her environment with increased safety and stability.       Plan: Continue per plan of care.      Precautions: NPH, HTN, Peripheral Neuropathy, Hyperlipidemia, Vertigo, R hip  "replacement, falls risk  POC Expires: 06/19/2024      Tests and Measures 4/24 IE 5/8 5/16 5/24   10mWT 0.99m/s      TUG 14.34s      FGA 16/30      vitals  Sitting 130/84mmHg    Standing 120/92mmHg     Neuro Re-Ed       Compliant surface  Foam EC 3x30\" holds    Foam beam navigation forward & sideways 4x each in SOLO Foam EC 3x30\" holds    Foam navigation forward 6x intermittent error to step off    Foam navigation sideways + Ht6x each way Foam EC 3x30\" holds    Foam navigation forward 6x frequent errors   Half turn practice in SOLO       Jori navigation  Highest setting 3x5 hurdles step over, also trialed med and low settings but too easy     Tandem gait  SOLO 5x20ft 6x20ft    CC & CW hurdles       Sustained tandem stance    3x30\" sustained holds karen, significant difficulty. Performed to failure.    Gait + head turns   6x20ft     Backwards walking       [facility]   15 cones no LOB + OH reaching task   Picking up polyspots + naming foods that match their colors x11 spots   VORx1  H & V 2x30s added to HEP 3x30\"     Gait EC  SOLO 4x40ft 6x20ft    Ther Ex       STS                                                        Ther Activity                     Gait Training                     Modalities                          "

## 2024-06-03 NOTE — TELEPHONE ENCOUNTER
Patient contacted to discuss additional appointments after LP      MOCA assessment to be completed post LP at the [facility] for an 1130 appointment on 7/12/2024.     PT assessment to be completed at [facility] for a 1400 appointment on 7/12/2024.     Follow up with Dr. Smith is scheduled for 7/15/2024 at 6133    Letter for patient sent with the specific dates, times and locations noted for patient reference as discussed when this RN phoned to confirm appointment dates, times, and locations.     Patient was appreciative for assistance.

## 2024-06-04 ENCOUNTER — APPOINTMENT (OUTPATIENT)
Facility: CLINIC | Age: 74
End: 2024-06-04
Payer: MEDICARE

## 2024-06-05 ENCOUNTER — OFFICE VISIT (OUTPATIENT)
Facility: CLINIC | Age: 74
End: 2024-06-05
Payer: MEDICARE

## 2024-06-05 DIAGNOSIS — R26.89 IMBALANCE: ICD-10-CM

## 2024-06-05 DIAGNOSIS — G91.2 NORMAL PRESSURE HYDROCEPHALUS (HCC): Primary | ICD-10-CM

## 2024-06-05 DIAGNOSIS — R42 VERTIGO: ICD-10-CM

## 2024-06-05 PROCEDURE — 97112 NEUROMUSCULAR REEDUCATION: CPT

## 2024-06-05 NOTE — PROGRESS NOTES
"Daily Note     Today's date: 2024  Patient name: Bozena Armstrong  : 1950  MRN: [id number]  Referring provider: Petty Kim MD  Dx:   Encounter Diagnosis     ICD-10-CM    1. Normal pressure hydrocephalus (HCC)  G91.2       2. Imbalance  R26.89       3. Vertigo  R42                            Subjective: Pt states that she has not had any falls since she was last here. No other changes to report.       Objective: See treatment diary below      Assessment: Pt participated in a skilled PT session focused on balance, gait, and strengthening. Increased difficulty across tasks today. Added KB pick ups to challenge forward weight shifting when picking up a heavy object, pt required cueing to bend her knees and prevent excessive anterior weight shift. Jori navigation was performed and pt required them to be lowered today due to lack of L foot clearance. Significant improvement in foam beam navigation, however, pt still required cueing to reduce her forward trunk flexion and stand up tall.   Pt will continue to benefit from skilled PT intervention focused on balance, gait, and strengthening so that she can navigate her environment with increased safety and stability.       Plan: Continue per plan of care.      Precautions: NPH, HTN, Peripheral Neuropathy, Hyperlipidemia, Vertigo, R hip replacement, falls risk  POC Expires: 2024      Tests and Measures  IE    10mWT 0.99m/s       TUG 14.34s       FGA        vitals  Sitting 130/84mmHg    Standing 120/92mmHg      Neuro Re-Ed        Compliant surface  Foam EC 3x30\" holds    Foam beam navigation forward & sideways 4x each in SOLO Foam EC 3x30\" holds    Foam navigation forward 6x intermittent error to step off    Foam navigation sideways + Ht6x each way Foam EC 3x30\" holds    Foam navigation forward 6x frequent errors Foam EC 3x30\" holds    Foam navigation forward 6x frequent errors, sideways no errors.   Half turn practice in " "SOLO        Jori navigation  Highest setting 3x5 hurdles step over, also trialed med and low settings but too easy   Middle setting 6x5 hurdles step overs, could not clear highest setting today.    Tandem gait  SOLO 5x20ft 6x20ft     CC & CW hurdles        Sustained tandem stance    3x30\" sustained holds karen, significant difficulty. Performed to failure.     Gait + head turns   6x20ft      Backwards walking        [facility]   15 cones no LOB + OH reaching task   Picking up polyspots + naming foods that match their colors x11 spots    VORx1  H & V 2x30s added to HEP 3x30\"      Gait EC  SOLO 4x40ft 6x20ft     Ther Ex        STS                                                                Ther Activity        Squat KB pick ups   10lb KB 2x10             Gait Training                        Modalities                             "

## 2024-06-06 ENCOUNTER — OFFICE VISIT (OUTPATIENT)
Facility: CLINIC | Age: 74
End: 2024-06-06
Payer: MEDICARE

## 2024-06-06 DIAGNOSIS — G91.2 NORMAL PRESSURE HYDROCEPHALUS (HCC): Primary | ICD-10-CM

## 2024-06-06 DIAGNOSIS — R42 VERTIGO: ICD-10-CM

## 2024-06-06 DIAGNOSIS — R26.89 IMBALANCE: ICD-10-CM

## 2024-06-06 PROCEDURE — 97112 NEUROMUSCULAR REEDUCATION: CPT

## 2024-06-06 NOTE — PROGRESS NOTES
"Daily Note     Today's date: 2024  Patient name: Bozena Armstrong  : 1950  MRN: [id number]  Referring provider: Petty Kim MD  Dx:   Encounter Diagnosis     ICD-10-CM    1. Normal pressure hydrocephalus (HCC)  G91.2       2. Imbalance  R26.89       3. Vertigo  R42                              Subjective: No changes to report since she was here yesterday.  She reports no falls since she was here yesterday.       Objective: See treatment diary below      Assessment: Pt participated in a skilled PT session focused on balance, gait, and strengthening. Improved success with foam beam navigation today as compared to previous session. Gait + HT Pt was able to maintain upright body position. Minimal LOB noted with single limb cone taps. Pt exhibited significant forward trunk lean with tandem gait resulting in LOB.  Pt will continue to benefit from skilled PT intervention focused on balance, gait, and strengthening so that she can navigate her environment with increased safety and stability.       Plan: Continue per plan of care.      Precautions: NPH, HTN, Peripheral Neuropathy, Hyperlipidemia, Vertigo, R hip replacement, falls risk  POC Expires: 2024      Tests and Measures  IE  6   10mWT 0.99m/s        TUG 14.34s        FGA         vitals  Sitting 130/84mmHg    Standing 120/92mmHg       Neuro Re-Ed         Compliant surface  Foam EC 3x30\" holds    Foam beam navigation forward & sideways 4x each in SOLO Foam EC 3x30\" holds    Foam navigation forward 6x intermittent error to step off    Foam navigation sideways + Ht6x each way Foam EC 3x30\" holds    Foam navigation forward 6x frequent errors Foam EC 3x30\" holds    Foam navigation forward 6x frequent errors, sideways no errors. Foam EC 3x30\" holds    Foam beam navigation forward 6x frequent errors, sideways no errors   Half turn practice in SOLO         Jori navigation  Highest setting 3x5 hurdles step over, also " "trialed med and low settings but too easy   Middle setting 6x5 hurdles step overs, could not clear highest setting today.  Middle setting 6x5 hurdles step overs   Tandem gait  SOLO 5x20ft 6x20ft      CC & CW hurdles         Sustained tandem stance    3x30\" sustained holds karen, significant difficulty. Performed to failure.      Gait + head turns   6x20ft    6x20ft   Backwards walking         Single limb cone tap      2x10   [facility]   15 cones no LOB + OH reaching task   Picking up polyspots + naming foods that match their colors x11 spots     VORx1  H & V 2x30s added to HEP 3x30\"       Gait EC  SOLO 4x40ft 6x20ft      Ther Ex         STS                                                                        Ther Activity         Squat KB pick ups     10lb KB 2x10 10lb KB 2x10            Gait Training                           Modalities                                "

## 2024-06-09 NOTE — PROGRESS NOTES
PT Re-Evaluation     Today's date: 2024  Patient name: Bozena Armstrong  : 1950  MRN: [id number]  Referring provider: Petty Kim MD  Dx:   Encounter Diagnosis     ICD-10-CM    1. Normal pressure hydrocephalus (HCC)  G91.2       2. Imbalance  R26.89       3. Vertigo  R42                        Assessment  Impairments: abnormal gait, abnormal movement, activity intolerance, difficulty understanding, impaired balance, impaired physical strength, lacks appropriate home exercise program, safety issue and poor posture   Functional limitations: deconditioning, gait, and transfers    Assessment details: 6/10/2024: Pt is a 73 y.o. female who has received 7 visits of skilled PT to address balance deficits secondary to NPH. Pt exhibited significant improvement in balance confidence as indicated by improved ABC score. Pt also exhibited significant improvement in balance, as indicated by TUG and FGA score. Pt will be taking a month off from PT due to upcoming vacation. She will be having lumbar puncture surgery while she is away. Plan to reassess when she comes back.     At IE: Pt is a 73 y.o. female presenting to physical therapy with chief complaint of reduced endurance, poor activity tolerance, impaired transfers, and reduced functional participation secondary to NPH.  Furthermore, FGA & TUG testing reflect that pt is at increased risk of falls. 10mWT reflects that pt is classified as a limited community ambulator. Pending clearance from neurosurgeon, pt will benefit from skilled physical therapy intervention addressing muscle strength, endurance, activity tolerance, and functional deficits so that she may be able to navigate their environment with increased independence and safety.   Understanding of Dx/Px/POC: good     Prognosis: good    Goals  STG's: to be achieved within 4 weeks  -Pt will exhibit independence with HEP within 2 weeks. MET  -Pt will verbally express understanding of DOMS vs muscle pain  "within 3 visits. MET  -pt to establish plan with neurosurgeon regarding how to proceed with her care.  MET    LTG's: to be achieved within 8 weeks  -pt will improve FGA score >24/30 to reflect reduced risk of falls NOT MET  -pt will be able to resume ambulation at the grocery store with 0 instance of LOB without her SPC  NOT MET  -Pt will improve TUG score to <12s to reflect reduced risk of falls. MET  -Gait speed to be improved to >1.2 m/s to be able to cross the street.  MET    Plan  Patient would benefit from: skilled physical therapy  Planned modality interventions: electrical stimulation/Russian stimulation, thermotherapy: hydrocollator packs, biofeedback and cryotherapy    Planned therapy interventions: balance, ADL training, abdominal trunk stabilization, activity modification, balance/weight bearing training, behavior modification, body mechanics training, breathing training, canalith repositioning, neuromuscular re-education, patient education, therapeutic activities, therapeutic exercise, strengthening, sensory integrative techniques, gait training, home exercise program, postural training, graded exercise, graded activity and stretching    Frequency: 2x week  Duration in weeks: 8  Plan of Care beginning date: 4/24/2024  Plan of Care expiration date: 6/19/2024  Treatment plan discussed with: patient  Plan details: .TE: for strengthening, stretching, and flexibility  TA: for functional participation  NR: for balance, coordination, reaction time  MT: for STM, IASTM, joint mobilizations  Gait Training: to improve gait mechanics.        Subjective Evaluation    History of Present Illness  Date of onset: 11/24/2023  Mechanism of injury: 6/10/2024: Pt states that she is making improvement in balance. She has her surgery coming up so she is going to be returning in a few weeks.     At IE: Pt states that she found out a couple weeks ago that she has \"water on her brain.\"  Her symptoms began about 6 years ago. She " "sees a neurosurgeon tomorrow to discuss her options. She notes that she falls when she starts to lose her balance and she feels like cannot react fast enough. She notes that leaning forward to pick something up makes her keep going. She feels she has fallen at least 40 times in two years. She notes that she fell out of the shower today, she did not get hurt. It occurred when bending forward to pick something up. Leaning and turning quick can make her fall and she has to take her time. She is not having any pain at this time. She notes that she also has some arthritis. She notes that she is having dizziness. The very first time she had a flare up, she got out of bed in the middle of the night and \"a force just knocked her over.\" It was a \"strong shove.\" She states that that has happened a couple times. She states that she just drops. She has hit her head multiple times with this. She was in the ER for this a couple weeks ago and that is how she found out about the water in her brain. Furthermore, she notes that she has some memory issues and has difficulties with her short term memory. She is also noticing difficulty with word finding and attentions. She notes that she has some difficulty donning and doffing shoes and socks because of her knees and arthritis, but not because of the NPH. She also notes that her arthritis makes it difficult for her to button her shirts but she can do those things herself. She states that the numbness and tingling in her feet and hands happens a lot and impacts her balance. 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   Patient Goals  Patient goals for therapy: return to work and improved balance  Patient goal: be able to walk without her cane  Pain  No pain reported    Treatments  No previous or " "current treatments        Objective   Neuro Exam:     Functional outcomes   Functional outcome gait comment: Feet turned outwards, trendelenburg present karen, SURJIT  WNL, use of SPC intermittently. Notes she primarily walks without an AD but has been bringing an SPC since her falls increased.         ..[facility] Assessment:   Gait Quality :   Wide Base of Support:    Present or WNL  Outwardly rotated feet:  Present or WNL  Step Height Diminished:  Present or WNL     Gait Speed:  10 meter walk test: ________1.41_____m/s  (<1m/s predictive of falls) was originally 0.99m/s  Comments:    Fall Risk: ( Yes     /    No)    TUG  ____7.62_______  sec   (<12 sec predictive of falls) was originally 14.34s  FGA ____19_______/30 (< 24 predictive of falls) was originally 16  Number of falls in the last month: ___1_______  was originally 2    Cognition:     MOCA:  ____19______ /30 (<26 cognitive deficit present) per appointment on 3/8/2024 with Dr. Grimm  Comments: reports memory deficits, forgot her purse, cane, and water bottle when she left her appointment. Also has neuropathy in hands and feet       Urinary incontinence: Yes   /    No  Pt states she was having problems with it months ago but she is doing better now. She has a \"[id number]\" in her room that she utilizes.       Recommendations:  Physical Therapy: YES  Speech Therapy: YES  Occupational Therapy:  Not at this time, potentially in the future due to arthritis     Precautions: NPH, HTN, Peripheral Neuropathy, Hyperlipidemia, Vertigo, R hip replacement, falls risk  POC Expires: 06/19/2024      Tests and Measures 4/24 IE 5/8 5/16 5/24 6/5 6/6 6/11   10mWT 0.99m/s      1.41m/s   TUG 14.34s      7.62s   FGA 16/30 19/30   vitals  Sitting 130/84mmHg    Standing 120/92mmHg        Neuro Re-Ed          Compliant surface  Foam EC 3x30\" holds    Foam beam navigation forward & sideways 4x each in SOLO Foam EC 3x30\" holds    Foam navigation forward 6x intermittent error " "to step off    Foam navigation sideways + Ht6x each way Foam EC 3x30\" holds    Foam navigation forward 6x frequent errors Foam EC 3x30\" holds    Foam navigation forward 6x frequent errors, sideways no errors. Foam EC 3x30\" holds    Foam beam navigation forward 6x frequent errors, sideways no errors Foam EC 3x30\" holds    Foam beam navigation forward 6x frequent errors, sideways no errors    Half turn practice in SOLO          Jori navigation  Highest setting 3x5 hurdles step over, also trialed med and low settings but too easy   Middle setting 6x5 hurdles step overs, could not clear highest setting today.  Middle setting 6x5 hurdles step overs    Tandem gait  SOLO 5x20ft 6x20ft       CC & CW hurdles          Sustained tandem stance    3x30\" sustained holds karen, significant difficulty. Performed to failure.       Gait + head turns   6x20ft    6x20ft    Backwards walking          Single limb cone tap      2x10    Cone   15 cones no LOB + OH reaching task   Picking up polyspots + naming foods that match their colors x11 spots      VORx1  H & V 2x30s added to HEP 3x30\"        Tests and measures       FGA, TUG, ABC, 10mWT   Gait EC  SOLO 4x40ft 6x20ft       Ther Ex          STS                                                                                Ther Activity          Squat KB pick ups     10lb KB 2x10 10lb KB 2x10              Gait Training                              Modalities                                   "

## 2024-06-11 ENCOUNTER — OFFICE VISIT (OUTPATIENT)
Facility: CLINIC | Age: 74
End: 2024-06-11
Payer: MEDICARE

## 2024-06-11 DIAGNOSIS — R26.89 IMBALANCE: ICD-10-CM

## 2024-06-11 DIAGNOSIS — R42 VERTIGO: ICD-10-CM

## 2024-06-11 DIAGNOSIS — G91.2 NORMAL PRESSURE HYDROCEPHALUS (HCC): Primary | ICD-10-CM

## 2024-06-11 PROCEDURE — 97112 NEUROMUSCULAR REEDUCATION: CPT

## 2024-06-12 ENCOUNTER — TELEPHONE (OUTPATIENT)
Age: 74
End: 2024-06-12

## 2024-06-12 RX ORDER — MECLIZINE HYDROCHLORIDE 25 MG/1
TABLET ORAL
Qty: 60 TABLET | Refills: 1 | Status: SHIPPED | OUTPATIENT
Start: 2024-06-12

## 2024-06-12 NOTE — TELEPHONE ENCOUNTER
Patient phoned and left a message on the nurse line stating that she was under the impression that she would be spending the night on 7/12 but she has a PT appointment schedule.  She is requesting a call back to clarify.    This RN phoned the patient and discussed the plan for her upcoming LP.  Patient states that she thought the LP and the shunt were done on the same day and that is why she thought she was spending the night.  This RN again went through the steps with the patient.  This RN asked the patient who would be joining her for her appointments and she stated her spouse.  This RN stated that is she would like that this RN could run through the process with her and her spouse so he could help her remember.  The patient denied the desire for this at this juncture.  The patient stated that she had went and got her hair cut short because she thought she was getting the shunt placed.  The patient laughed with a sense of humor regarding this.  This RN supported and encourage the patient.  The patient was appreciative for the call back and explanation.

## 2024-06-14 DIAGNOSIS — R42 VERTIGO: ICD-10-CM

## 2024-06-14 RX ORDER — MECLIZINE HYDROCHLORIDE 25 MG/1
TABLET ORAL
Qty: 60 TABLET | Refills: 1 | OUTPATIENT
Start: 2024-06-14

## 2024-06-14 NOTE — TELEPHONE ENCOUNTER
Patient is calling to request a refill on the pended medication, confirmed pharmacy on file  Thank you

## 2024-07-08 ENCOUNTER — TELEPHONE (OUTPATIENT)
Dept: NEUROLOGY | Facility: CLINIC | Age: 74
End: 2024-07-08

## 2024-07-09 ENCOUNTER — OFFICE VISIT (OUTPATIENT)
Dept: NEUROLOGY | Facility: CLINIC | Age: 74
End: 2024-07-09
Payer: MEDICARE

## 2024-07-09 VITALS
SYSTOLIC BLOOD PRESSURE: 124 MMHG | HEIGHT: 64 IN | BODY MASS INDEX: 37.56 KG/M2 | DIASTOLIC BLOOD PRESSURE: 80 MMHG | WEIGHT: 220 LBS | HEART RATE: 98 BPM

## 2024-07-09 DIAGNOSIS — R26.89 IMBALANCE: ICD-10-CM

## 2024-07-09 DIAGNOSIS — G60.9 NEUROPATHY, IDIOPATHIC: ICD-10-CM

## 2024-07-09 DIAGNOSIS — G31.84 MCI (MILD COGNITIVE IMPAIRMENT): ICD-10-CM

## 2024-07-09 DIAGNOSIS — G91.2 NPH (NORMAL PRESSURE HYDROCEPHALUS) (HCC): Primary | ICD-10-CM

## 2024-07-09 PROCEDURE — 99214 OFFICE O/P EST MOD 30 MIN: CPT | Performed by: STUDENT IN AN ORGANIZED HEALTH CARE EDUCATION/TRAINING PROGRAM

## 2024-07-09 NOTE — PROGRESS NOTES
[facility]  PATIENT:  Bozena Armstrong  MRN:  [id number]  :  1950  DATE OF SERVICE:  2024  REFERRED BY: No ref. provider found  PMD: Hillary Grimm DO    Assessment/Plan:     Bozena Armstrong is a very pleasant 73 y.o. female who presents for evaluation     Mild cognitive impairment  -previous MOCA , prior to this  on 2023. She continues to be independent in all ADLs. Overall clinical picture c/w mild cognitive impairment  -MRI brain neuroquant showed no evidence of neurodegenerative changes. However, did show evidence of NPH with mild ventricular prominence with narrowing of the callosal angle and disproportionate enlargement of the sylvian fissures  -B12 previously 268. Advised PO supplementation  -symptoms possibly 2/2 the above findings. She was previously referred to NPH clinic and is awaiting LP on     Imbalance  Peripheral neuropathy  Lumbar radiculopathy  Positional dizziness  -exam notable for decreased sensation to LT, PP and vibration at the toes bilaterally c/w peripheral neuropathy which is likely contributing to her imbalance. Gait instability likely 2/2 both peripheral neuropathy and possibly NPH  -advised her to increase her water intake to goal of 60-80 oz water daily and make transitions in a slow fashion    CC:   Multiple complaints    History of Present Illness:     74 y/o female with h/o arthritis, nonspecific balance disorder, GERD, HTN, and HLD who arrives for evaluation of frequent falls.     Interval Hx  MRI brain neuroquant showed no evidence of neurodegenerative changes. However, did show evidence of NPH with mild ventricular prominence with narrowing of the callosal angle and disproportionate enlargement of the sylvian fissures. She was previouisly referred to NPH clinic and is awaiting LP on . Neuropathy symptoms overall unchanged.     Previous Hx  Patient states that she is currently falling approximately 2x/week which  often follows some lightheadedness that occurs after standing up from a seated position. Patient denies room spinning sensation or dizziness with rapid head movements. Patient was seen in the ED 1/11/2023 after having 3 falls in one day. Patient was evaluated, discharged and prescribed meclizine which the patient states has been helping by decreasing dizzy spells and falls. Patient states that she consumes approximately 30 oz of water daily. Patient states that she has been evaluated by ENT and ophthalmology but has not received a diagnosis. Patient has also worked with PT for balance and states that she was recently discharged from their services. She also complains of some memory loss which began 2-3 years ago and describes forgetting doctors appointments and forgetting to get certain items when grocery shopping. MoCA on 9/27/2023 was 27/30. She also reports some urinary incontinence that began 1 year ago. Patient denies seizures, tremors, numbness, tingling, vision changes, ear fullness, or changes to hearing. She drinks about 30 oz water daily.     Past Medical History:     Past Medical History:   Diagnosis Date    Arthritis     Balance disorder     GERD (gastroesophageal reflux disease)     Hyperlipidemia     Hypertension     Peripheral neuropathy     Vertigo        Patient Active Problem List   Diagnosis    Anemia    Constipation    Dyslipidemia    Essential hypertension    History of total knee arthroplasty, left    Osteoarthritis of knee, unilateral    Prediabetes    Dystonia    Chronic right hip pain    Primary osteoarthritis of right hip    Chronic bilateral low back pain without sciatica    Chronic pain syndrome    Obesity (BMI 35.0-39.9 without comorbidity)    Status post total hip replacement, right    Chronic pain of both shoulders    Obesity, morbid (HCC)    Sciatica    Neuropathy    Balance disorder    Memory deficit    Urinary incontinence       Medications:      Current Outpatient Medications    Medication Sig Dispense Refill    ascorbic acid (VITAMIN C) 500 MG tablet Take 1 tablet (500 mg total) by mouth 2 (two) times a day 60 tablet 0    celecoxib (CeleBREX) 100 mg capsule TAKE ONE CAPSULE BY MOUTH TWICE A DAY 60 capsule 5    cholecalciferol (VITAMIN D3) 1,000 units tablet Take 1,000 Units by mouth daily      Doxylamine Succinate, Sleep, (SLEEP AID PO) Take 25 mg by mouth      DULoxetine (CYMBALTA) 60 mg delayed release capsule TAKE ONE CAPSULE BY MOUTH AT BEDTIME 30 capsule 5    meclizine (ANTIVERT) 25 mg tablet TAKE ONE TABLET BY MOUTH THREE TIMES A DAY AS NEEDED DIZZINESS 60 tablet 1    Multiple Vitamins-Minerals (CENTRUM SILVER PO) Take by mouth      potassium chloride (Klor-Con M20) 20 mEq tablet TAKE ONE TABLET BY MOUTH EVERY DAY 30 tablet 5    rosuvastatin (CRESTOR) 40 MG tablet Take 1 tablet (40 mg total) by mouth daily 90 tablet 3    vitamin B-12 (VITAMIN B-12) 1,000 mcg tablet Take 1 tablet (1,000 mcg total) by mouth daily 90 tablet 3    VITAMIN E PO Take by mouth       No current facility-administered medications for this visit.        Allergies:    No Known Allergies    Family History:     Family History   Problem Relation Age of Onset    Arthritis Mother     Cancer Father     Heart disease Father     No Known Problems Sister     No Known Problems Daughter     No Known Problems Daughter     No Known Problems Daughter     No Known Problems Maternal Grandmother     No Known Problems Maternal Grandfather     No Known Problems Paternal Grandmother     No Known Problems Paternal Grandfather     Anemia Brother     No Known Problems Maternal Aunt     No Known Problems Maternal Aunt        Social History:       Social History     Socioeconomic History    Marital status: /Civil Union     Spouse name: Not on file    Number of children: Not on file    Years of education: Not on file    Highest education level: Not on file   Occupational History    Not on file   Tobacco Use    Smoking status:  "Never     Passive exposure: Past    Smokeless tobacco: Never   Vaping Use    Vaping status: Never Used   Substance and Sexual Activity    Alcohol use: Never    Drug use: Never    Sexual activity: Not Currently   Other Topics Concern    Not on file   Social History Narrative    Not on file     Social Determinants of Health     Financial Resource Strain: Low Risk  (12/19/2023)    Overall Financial Resource Strain (CARDIA)     Difficulty of Paying Living Expenses: Not very hard   Food Insecurity: Not on file   Transportation Needs: No Transportation Needs (12/19/2023)    PRAPARE - Transportation     Lack of Transportation (Medical): No     Lack of Transportation (Non-Medical): No   Physical Activity: Not on file   Stress: Not on file   Social Connections: Not on file   Intimate Partner Violence: Not At Risk (12/19/2023)    Humiliation, Afraid, Rape, and Kick questionnaire     Fear of Current or Ex-Partner: No     Emotionally Abused: No     Physically Abused: No     Sexually Abused: No   Housing Stability: Not on file         Objective:   /80 (BP Location: Left arm, Patient Position: Sitting, Cuff Size: Large)   Pulse 98   Ht 5' 4\" (1.626 m)   Wt 99.8 kg (220 lb)   BMI 37.76 kg/m²     General: Patient is not in any acute/apparent distress, well nourished, well developed and cooperative.   HEENT: normocephalic, atraumatic, moist membranes  Neck: supple  Extremities: no edema noted   Skin: no lesions or rash  Musculosketal: no bony abnormalities    Neurologic Examination:   Mental status: alert, awake, oriented X 3 and following commands.     Speech/Language: Speech is fluent without any dysarthria, no aphasia noted, can name, repeat, read and write and comprehension intact    Cranial Nerves:   CN I: smell not tested  CN II: Visual fields full to confrontation  CN III, IV, VI: Extraocular movements intact bilaterally. Pupils equal round and reactive to light bilaterally.  CN V: Facial sensation is normal.  CN " VII: Full and symmetric facial movement.  CN VIII: Hearing is normal.  CN IX, X: Palate elevates symmetrically.   CN XI: Shoulder shrug strength is normal.  CN XII: Tongue midline without atrophy or fasciculations.    Motor:   Strength 5/5 in all 4 extremities. Bulk/tone - normal.  Fasiculations - none    Sensory:   Sensation intact to soft touch, vibration and pinprick in BUE.   Decreased LT/pinprick sensation to right first toe, normal proprioception bilaterally,   Vibration sensation is decreased in feet as compared to lower leg.    Cerebellar:   Finger-to-nose intact, normal heel to shin.    Reflexes: 2+ in all 4 extremities  Pathologic reflexes - babinski reflex negative         Review of Systems:     ROS:    Review of Systems   Constitutional:  Positive for fatigue. Negative for appetite change and fever.   HENT: Negative.  Negative for hearing loss, tinnitus, trouble swallowing and voice change.    Eyes: Negative.  Negative for photophobia, pain and visual disturbance.   Respiratory: Negative.  Negative for shortness of breath.    Cardiovascular: Negative.  Negative for palpitations.   Gastrointestinal: Negative.  Negative for nausea and vomiting.   Endocrine: Negative.  Negative for cold intolerance.   Genitourinary: Negative.  Negative for dysuria, frequency and urgency.   Musculoskeletal:  Positive for back pain (Little bit, lower), gait problem, myalgias and neck pain. Negative for neck stiffness.   Skin: Negative.  Negative for rash.   Allergic/Immunologic: Negative.    Neurological:  Positive for numbness (Fingers and toes). Negative for dizziness, tremors, seizures, syncope, facial asymmetry, speech difficulty, weakness, light-headedness and headaches.   Hematological:  Bruises/bleeds easily (Bruises easily).   Psychiatric/Behavioral:  Positive for confusion. Negative for hallucinations and sleep disturbance.      I have spent a total time of 36 minutes on 07/09/24 in caring for this patient including  Risks and benefits of tx options, Instructions for management, Patient and family education, Risk factor reductions, Impressions, Documenting in the medical record, Reviewing / ordering tests, medicine, procedures  , and Obtaining or reviewing history  .

## 2024-07-12 ENCOUNTER — EVALUATION (OUTPATIENT)
Facility: CLINIC | Age: 74
End: 2024-07-12
Payer: MEDICARE

## 2024-07-12 ENCOUNTER — HOSPITAL ENCOUNTER (OUTPATIENT)
Dept: RADIOLOGY | Facility: HOSPITAL | Age: 74
Discharge: HOME | End: 2024-07-12
Attending: STUDENT IN AN ORGANIZED HEALTH CARE EDUCATION/TRAINING PROGRAM
Payer: MEDICARE

## 2024-07-12 VITALS
OXYGEN SATURATION: 95 % | RESPIRATION RATE: 18 BRPM | TEMPERATURE: 98.6 F | DIASTOLIC BLOOD PRESSURE: 79 MMHG | HEART RATE: 82 BPM | SYSTOLIC BLOOD PRESSURE: 146 MMHG

## 2024-07-12 DIAGNOSIS — G91.2 NPH (NORMAL PRESSURE HYDROCEPHALUS) (HCC): ICD-10-CM

## 2024-07-12 DIAGNOSIS — G91.2 NORMAL PRESSURE HYDROCEPHALUS (HCC): Primary | ICD-10-CM

## 2024-07-12 DIAGNOSIS — R26.89 IMBALANCE: ICD-10-CM

## 2024-07-12 DIAGNOSIS — R42 VERTIGO: ICD-10-CM

## 2024-07-12 LAB
APTT PPP: 26 SECONDS (ref 23–37)
INR PPP: 0.9 (ref 0.84–1.19)
PLATELET # BLD AUTO: 230 THOUSANDS/UL (ref 149–390)
PMV BLD AUTO: 10 FL (ref 8.9–12.7)
PROTHROMBIN TIME: 12.8 SECONDS (ref 11.6–14.5)

## 2024-07-12 PROCEDURE — 85049 AUTOMATED PLATELET COUNT: CPT | Performed by: RADIOLOGY

## 2024-07-12 PROCEDURE — 62329 THER SPI PNXR CSF FLUOR/CT: CPT

## 2024-07-12 PROCEDURE — 85610 PROTHROMBIN TIME: CPT | Performed by: RADIOLOGY

## 2024-07-12 PROCEDURE — 97112 NEUROMUSCULAR REEDUCATION: CPT

## 2024-07-12 PROCEDURE — 85730 THROMBOPLASTIN TIME PARTIAL: CPT | Performed by: RADIOLOGY

## 2024-07-12 RX ORDER — ACETAMINOPHEN 325 MG/1
650 TABLET ORAL EVERY 4 HOURS PRN
Status: DISCONTINUED | OUTPATIENT
Start: 2024-07-12 | End: 2024-07-13 | Stop reason: HOSPADM

## 2024-07-12 RX ORDER — LIDOCAINE HYDROCHLORIDE 10 MG/ML
5 INJECTION, SOLUTION EPIDURAL; INFILTRATION; INTRACAUDAL; PERINEURAL
Status: COMPLETED | OUTPATIENT
Start: 2024-07-12 | End: 2024-07-12

## 2024-07-12 RX ADMIN — LIDOCAINE HYDROCHLORIDE 3 ML: 10 INJECTION, SOLUTION EPIDURAL; INFILTRATION; INTRACAUDAL at 09:00

## 2024-07-12 NOTE — PROGRESS NOTES
PT Discharge    Today's date: 2024  Patient name: Bozena Armstrong  : 1950  MRN: [id number]  Referring provider: Petty iKm MD  Dx:   Encounter Diagnosis     ICD-10-CM    1. Normal pressure hydrocephalus (HCC)  G91.2       2. Imbalance  R26.89       3. Vertigo  R42                          Assessment  Impairments: abnormal gait, abnormal movement, activity intolerance, difficulty understanding, impaired balance, impaired physical strength, lacks appropriate home exercise program, safety issue and poor posture   Functional limitations: deconditioning, gait, and transfers    Assessment details: 2024: Pt is a 73 y.o. female returning to PT after 1 month break and underwent LP today for NPH. Her physician's office scheduled this appointment for today for reassessment of her initial NPH form. Today's assessment reflects improvement in balance measures since previous assessment, with FGA improving to 24/30 and TUG improving to 7.27s. Pt has not had a fall since her LP, granted this just happened a few hours ago. While pt's balance measures exhibited improvement, her MOCA scored <26 today, reflecting potential cognitive impairment. It is important to note that she had been administered the same test earlier in the day and still scored below a 30 despite having already been through the questions once that day. Therefore, recommend follow up with speech therapy for cognitive IE.  Skilled PT intervention not recommended at this time.     6/10/2024: Pt is a 73 y.o. female who has received 7 visits of skilled PT to address balance deficits secondary to NPH. Pt exhibited significant improvement in balance confidence as indicated by improved ABC score. Pt also exhibited significant improvement in balance, as indicated by TUG and FGA score. Pt will be taking a month off from PT due to upcoming vacation. She will be having lumbar puncture surgery while she is away. Plan to reassess when she comes back.      At IE: Pt is a 73 y.o. female presenting to physical therapy with chief complaint of reduced endurance, poor activity tolerance, impaired transfers, and reduced functional participation secondary to NPH.  Furthermore, FGA & TUG testing reflect that pt is at increased risk of falls. 10mWT reflects that pt is classified as a limited community ambulator. Pending clearance from neurosurgeon, pt will benefit from skilled physical therapy intervention addressing muscle strength, endurance, activity tolerance, and functional deficits so that she may be able to navigate their environment with increased independence and safety.   Understanding of Dx/Px/POC: good     Prognosis: good    Goals  STG's: to be achieved within 4 weeks  -Pt will exhibit independence with HEP within 2 weeks. MET  -Pt will verbally express understanding of DOMS vs muscle pain within 3 visits. MET  -pt to establish plan with neurosurgeon regarding how to proceed with her care.  MET    LTG's: to be achieved within 8 weeks  -pt will improve FGA score >24/30 to reflect reduced risk of falls MET  -pt will be able to resume ambulation at the grocery store with 0 instance of LOB without her SPC  MET  -Pt will improve TUG score to <12s to reflect reduced risk of falls. MET  -Gait speed to be improved to >1.2 m/s to be able to cross the street.  MET    Plan  Patient would benefit from: skilled physical therapy and speech eval  Planned modality interventions: electrical stimulation/Russian stimulation, thermotherapy: hydrocollator packs, biofeedback and cryotherapy    Planned therapy interventions: balance, ADL training, abdominal trunk stabilization, activity modification, balance/weight bearing training, behavior modification, body mechanics training, breathing training, canalith repositioning, neuromuscular re-education, patient education, therapeutic activities, therapeutic exercise, strengthening, sensory integrative techniques, gait training, home  "exercise program, postural training, graded exercise, graded activity and stretching    Frequency: 2x week  Duration in weeks: 8  Plan of Care beginning date: 7/12/2024  Plan of Care expiration date: 9/6/2024  Treatment plan discussed with: patient  Plan details: Discharge    Subjective Evaluation    History of Present Illness  Date of onset: 11/24/2023  Mechanism of injury: 7/12/2024: Pt is returning to PT after undergoing lumbar puncture for NPH this morning. Her PT appointment was scheduled for today by someone in the doctors office. Pt states that she slipped in the shower when water got under the mat. She wasn't slipping in the shower anymore after that but they are going to put a grab bar in that. She is feeling fine right now for \"having a hole in my back.\"     6/10/2024: Pt states that she is making improvement in balance. She has her surgery coming up so she is going to be returning in a few weeks.     At IE: Pt states that she found out a couple weeks ago that she has \"water on her brain.\"  Her symptoms began about 6 years ago. She sees a neurosurgeon tomorrow to discuss her options. She notes that she falls when she starts to lose her balance and she feels like cannot react fast enough. She notes that leaning forward to pick something up makes her keep going. She feels she has fallen at least 40 times in two years. She notes that she fell out of the shower today, she did not get hurt. It occurred when bending forward to pick something up. Leaning and turning quick can make her fall and she has to take her time. She is not having any pain at this time. She notes that she also has some arthritis. She notes that she is having dizziness. The very first time she had a flare up, she got out of bed in the middle of the night and \"a force just knocked her over.\" It was a \"strong shove.\" She states that that has happened a couple times. She states that she just drops. She has hit her head multiple times with this. " "She was in the ER for this a couple weeks ago and that is how she found out about the water in her brain. Furthermore, she notes that she has some memory issues and has difficulties with her short term memory. She is also noticing difficulty with word finding and attentions. She notes that she has some difficulty donning and doffing shoes and socks because of her knees and arthritis, but not because of the NPH. She also notes that her arthritis makes it difficult for her to button her shirts but she can do those things herself. She states that the numbness and tingling in her feet and hands happens a lot and impacts her balance. 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   Patient Goals  Patient goals for therapy: return to work and improved balance  Patient goal: be able to walk without her cane  Pain  No pain reported    Treatments  No previous or current treatments      Objective   Neuro Exam:     Functional outcomes   Functional outcome gait comment: Feet turned outwards, trendelenburg present karen, SURJIT  WNL, use of SPC intermittently. Notes she primarily walks without an AD but has been bringing an SPC since her falls increased.         ..[facility] Assessment:   Gait Quality :   Wide Base of Support:    Present or WNL  Outwardly rotated feet:  Present or WNL  Step Height Diminished:  Present or WNL     Gait Speed:  10 meter walk test: _____1.40________m/s  (<1m/s predictive of falls) 1.41 m/s on 6/11; was originally 0.99m/s  Comments:    Fall Risk: ( Yes     /    No)    TUG  _____7.27______  sec   (<12 sec predictive of falls) 7.62s on 6/11;  was originally 14.34s  FGA _____24______/30 (< 24 predictive of falls) 19/30 on 6/11; was originally 16  Number of falls in the last month: ______1___  was 1 on 6/11; was originally " "2  Note: pt's most recent fall occurred prior to her LP when she slipped in the shower. Her LP was this morning.     Cognition:     MOCA:  ____25______ /30 (<26 cognitive deficit present) per appointment on 3/8/2024 with Dr. Grimm  Comments: reports memory deficits, forgot her purse, cane, and water bottle when she left her appointment. Also has neuropathy in hands and feet       Urinary incontinence: Yes   /    No        Recommendations:  Physical Therapy: Not a this time  Speech Therapy: YES  Occupational Therapy:  Not at this time, potentially in the future due to arthritis     Precautions: NPH, HTN, Peripheral Neuropathy, Hyperlipidemia, Vertigo, R hip replacement, falls risk      Tests and Measures 4/24 IE 6/11 7/12   10mWT 0.99m/s 1.41m/s 1.40m/s   TUG 14.34s 7.62s 7.27   FGA 16/30 19/30 24/30   vitals      Neuro Re-Ed      Compliant surface  Foam EC 3x30\" holds    Foam beam navigation forward 6x frequent errors, sideways no errors     Half turn practice in SOLO      Jori navigation      Tandem gait      CC & CW hurdles      Sustained tandem stance      Gait + head turns      Backwards walking      Single limb cone tap      Cone       VORx1      Tests and measures  FGA, TUG, ABC, 10mWT FGA, TUG, ABC, 10mWT   Gait EC      Ther Ex      STS                                                Ther Activity      Squat KB pick ups            Gait Training                  Modalities                       "

## 2024-07-12 NOTE — NURSING NOTE
LP discharge instructions reviewed with patient. Patient verbalizes understanding and denies any questions. Will follow up with patient 7/15.

## 2024-07-15 ENCOUNTER — OFFICE VISIT (OUTPATIENT)
Age: 74
End: 2024-07-15
Payer: MEDICARE

## 2024-07-15 VITALS
DIASTOLIC BLOOD PRESSURE: 84 MMHG | HEIGHT: 64 IN | BODY MASS INDEX: 37.56 KG/M2 | SYSTOLIC BLOOD PRESSURE: 152 MMHG | OXYGEN SATURATION: 98 % | WEIGHT: 220 LBS | TEMPERATURE: 97.9 F | HEART RATE: 92 BPM

## 2024-07-15 DIAGNOSIS — R26.81 GAIT INSTABILITY: Primary | ICD-10-CM

## 2024-07-15 PROCEDURE — 99213 OFFICE O/P EST LOW 20 MIN: CPT | Performed by: STUDENT IN AN ORGANIZED HEALTH CARE EDUCATION/TRAINING PROGRAM

## 2024-07-15 NOTE — PROGRESS NOTES
Office Note - Neurosurgery   Bozena Armstrong 73 y.o. female MRN: [id number]      Assessment/Plan:    Bozena Armstrong is a 73 year old female with a history of falls and presumed NPH here to discuss the results of her LP trial. She reports that since her LP on Friday she has felt wonderful, with no dizziness and no falls. She did made some objective improvement on her PT evaluations, MOCA however remained poor.     Unfortunately she did misunderstand the object of the trial, and did think the the LP was a definitive treatment. We discussed that the body continuously makes CSF, and any effects from an LP would likely cease in 1-2 days, beyond which effects would likely be placebo. The goal of the shunt would be to provide continuous CSF diversion, and in turn continuous benefit. She was disappointed hearing this, as she does have some hesitancy in proceeding with a shunt. She would however be a reasonable candidate given her objective and subjective improvement after LP. For now she would like to see how much longer she has resolution of her symptoms. If her previous symptoms are truly NPH, I would expect them to return without shunting. I suggested a return visit in 2-3 weeks to discuss further and she was agreeable.    I have spent a total time of 20 minutes in caring for this patient on the day of the visit/encounter including Diagnostic results, Prognosis, Impressions, Counseling / Coordination of care, Documenting in the medical record, and Reviewing / ordering tests, medicine, procedures  .     Subjective/Objective     Chief Complaint    Follow-up (S/P LP, MOCA, and PT on 7/12/2024)       HPI    Bozena Armstrong is a 73 year old female with a history of falls and presumed NPH here to discuss the results of her LP trial. See assessment and plan.    ROS  ROS personally reviewed and updated.    Review of Systems   HENT:  Negative for sinus pressure, sinus pain and tinnitus.    Eyes:  Negative for visual  disturbance.   Musculoskeletal:  Positive for gait problem (fell yesterday, unsteady, imbalanced, ambulates w/ cane).        Hx PT currently, difficulty w/ balance, some improvement, can ambulate w/ cane   Skin:  Negative for wound (bruising L-sided ribs due to fall yesterday).   Neurological:  Positive for weakness (legs fells weak at end of day). Negative for dizziness, speech difficulty (trouble finding words), light-headedness, numbness and headaches.   All other systems reviewed and are negative.      Family History    Family History   Problem Relation Age of Onset    Arthritis Mother     Cancer Father     Heart disease Father     No Known Problems Sister     No Known Problems Daughter     No Known Problems Daughter     No Known Problems Daughter     No Known Problems Maternal Grandmother     No Known Problems Maternal Grandfather     No Known Problems Paternal Grandmother     No Known Problems Paternal Grandfather     Anemia Brother     No Known Problems Maternal Aunt     No Known Problems Maternal Aunt        Social History    Social History     Socioeconomic History    Marital status: /Civil Union     Spouse name: Not on file    Number of children: Not on file    Years of education: Not on file    Highest education level: Not on file   Occupational History    Not on file   Tobacco Use    Smoking status: Never     Passive exposure: Past    Smokeless tobacco: Never   Vaping Use    Vaping status: Never Used   Substance and Sexual Activity    Alcohol use: Never    Drug use: Never    Sexual activity: Not Currently   Other Topics Concern    Not on file   Social History Narrative    Not on file     Social Determinants of Health     Financial Resource Strain: Low Risk  (12/19/2023)    Overall Financial Resource Strain (CARDIA)     Difficulty of Paying Living Expenses: Not very hard   Food Insecurity: Not on file   Transportation Needs: No Transportation Needs (12/19/2023)    PRAPARE - Transportation     Lack  of Transportation (Medical): No     Lack of Transportation (Non-Medical): No   Physical Activity: Not on file   Stress: Not on file   Social Connections: Not on file   Intimate Partner Violence: Not At Risk (2023)    Humiliation, Afraid, Rape, and Kick questionnaire     Fear of Current or Ex-Partner: No     Emotionally Abused: No     Physically Abused: No     Sexually Abused: No   Housing Stability: Not on file       Past Medical History    Past Medical History:   Diagnosis Date    Arthritis     Balance disorder     GERD (gastroesophageal reflux disease)     Hyperlipidemia     Hypertension     Peripheral neuropathy     Vertigo        Surgical History    Past Surgical History:   Procedure Laterality Date     SECTION      COLONOSCOPY      FL LUMBAR PUNCTURE THERAPEUTIC  2024    HYSTERECTOMY      HYSTEROSCOPY W/ ENDOMETRIAL ABLATION      JOINT REPLACEMENT      KNEE SURGERY      ID ARTHRP ACETBLR/PROX FEM PROSTC AGRFT/ALGRFT Right 2022    Procedure: ARTHROPLASTY HIP TOTAL ANTERIOR and all associated procedures;  Surgeon: Trisha Alonso MD;  Location: [facility] OR;  Service: Orthopedics       Medications      Current Outpatient Medications:     ascorbic acid (VITAMIN C) 500 MG tablet, Take 1 tablet (500 mg total) by mouth 2 (two) times a day, Disp: 60 tablet, Rfl: 0    celecoxib (CeleBREX) 100 mg capsule, TAKE ONE CAPSULE BY MOUTH TWICE A DAY, Disp: 60 capsule, Rfl: 5    cholecalciferol (VITAMIN D3) 1,000 units tablet, Take 1,000 Units by mouth daily, Disp: , Rfl:     Doxylamine Succinate, Sleep, (SLEEP AID PO), Take 25 mg by mouth, Disp: , Rfl:     DULoxetine (CYMBALTA) 60 mg delayed release capsule, TAKE ONE CAPSULE BY MOUTH AT BEDTIME, Disp: 30 capsule, Rfl: 5    meclizine (ANTIVERT) 25 mg tablet, TAKE ONE TABLET BY MOUTH THREE TIMES A DAY AS NEEDED DIZZINESS, Disp: 60 tablet, Rfl: 1    Multiple Vitamins-Minerals (CENTRUM SILVER PO), Take by mouth, Disp: , Rfl:     potassium chloride  "(Klor-Con M20) 20 mEq tablet, TAKE ONE TABLET BY MOUTH EVERY DAY, Disp: 30 tablet, Rfl: 5    rosuvastatin (CRESTOR) 40 MG tablet, Take 1 tablet (40 mg total) by mouth daily, Disp: 90 tablet, Rfl: 3    vitamin B-12 (VITAMIN B-12) 1,000 mcg tablet, Take 1 tablet (1,000 mcg total) by mouth daily, Disp: 90 tablet, Rfl: 3    VITAMIN E PO, Take by mouth, Disp: , Rfl:     Allergies    No Known Allergies      Physical Exam    Vitals:  Blood pressure 152/84, pulse 92, temperature 97.9 °F (36.6 °C), temperature source Temporal, height 5' 4\" (1.626 m), weight 99.8 kg (220 lb), SpO2 98%.,Body mass index is 37.76 kg/m².    Physical Exam  Neurologic Exam  Awake and alert  Oriented and appropriate  Grossly 5/5 throughout  "

## 2024-07-16 NOTE — NURSING NOTE
Call placed to patient to follow up on Friday's lumbar procedure. Patient reports she feels well, no headache noted. She states she has some slight soreness at the site of the LP, but it is not bad enough to take tylenol for.

## 2024-08-05 ENCOUNTER — TELEPHONE (OUTPATIENT)
Age: 74
End: 2024-08-05

## 2024-08-05 ENCOUNTER — OFFICE VISIT (OUTPATIENT)
Age: 74
End: 2024-08-05
Payer: MEDICARE

## 2024-08-05 VITALS
OXYGEN SATURATION: 98 % | DIASTOLIC BLOOD PRESSURE: 80 MMHG | BODY MASS INDEX: 37.56 KG/M2 | HEART RATE: 112 BPM | HEIGHT: 64 IN | WEIGHT: 220 LBS | SYSTOLIC BLOOD PRESSURE: 110 MMHG

## 2024-08-05 DIAGNOSIS — R26.81 GAIT INSTABILITY: Primary | ICD-10-CM

## 2024-08-05 PROCEDURE — 99213 OFFICE O/P EST LOW 20 MIN: CPT | Performed by: STUDENT IN AN ORGANIZED HEALTH CARE EDUCATION/TRAINING PROGRAM

## 2024-08-05 NOTE — TELEPHONE ENCOUNTER
Pt called stating that she forgot to ask at her appt today when is she able to start driving very short distances like to the gas station or grocery store.  Attempted to warm transfer to nurse, but unable to do so.  Instructed the pt to leave her information on the VM when it starts and that I would also send out a note to the clinical team to follow-up with her.  Pt was appreciative.

## 2024-08-05 NOTE — TELEPHONE ENCOUNTER
Patient called the RX Refill Line. Message is being forwarded to the office.     Patient asked what time her appointment is today with Dr. Smith. She was informed it is scheduled for 3:45pm

## 2024-08-05 NOTE — TELEPHONE ENCOUNTER
Reached out to patient regarding her question on if she can drive. She stated she would have episodes that she spoke to Dr Smith about where he stated it may be better for her not to drive. Patient then stated she has decided that she does not want to move forward with the surgery. She reports not having an episode in weeks and cannot see herself not driving for the rest of her life. Advised she drive at her discretion and refrain on her bad days.     She stated an understanding and was appreciative of the call back,

## 2024-08-05 NOTE — PROGRESS NOTES
Assessment/Plan:    Bozena Armstrong is a 73 year old female with a history of gait instability and concern for NPH who returns to clinic to further discuss surgical options. To recap - she did have an LP after which she felt significantly more steady, and this has lasted up until today when she did have another fall. This fall was like her previous falls where she feels like she is being pushed to the floor. We again discussed that the LP was a temporary test, and not the definitve treatment. We also discussed that her symptoms are somewhat atypical for NPH. While proceeding with a shunt is not unreasonable given her improvements post LP - I did discuss that the lasting improvement she had was likely not related to the LP, possibly placebo. Her cognition did not change subjectively or on MOCA. She has no urinary symptoms. I did discuss with her that her case is somewhat in a gray zone, in that she has atypical symptoms but her stability did improve with LP.  shunt as a procedure is relatively low risk - but does include risk of cerebral hemorrhage and stroke, infection necessitating shunt removal, subdural hemorrhage from overhsunting, and the risk that it does not work. For now, she would like to hold off on proceeding so that she can consider further.     Subjective:     Bozena Armstrong is a 73 year old female with a history of gait instability and concern for NPH who returns to clinic to further discuss surgical options. See assessment and plan.      REVIEW OF SYSTEMS  Review of Systems   HENT:  Negative for sinus pressure, sinus pain and tinnitus.    Eyes:  Negative for visual disturbance.   Musculoskeletal:  Positive for gait problem (fell yesterday, unsteady, imbalanced, ambulates w/ cane).        Hx PT currently, difficulty w/ balance, some improvement, can ambulate w/ cane   Skin:  Negative for wound (bruising L-sided ribs due to fall yesterday).   Neurological:  Positive for weakness (legs fells weak at  end of day). Negative for dizziness, speech difficulty (trouble finding words), light-headedness, numbness and headaches.   All other systems reviewed and are negative.        Meds/Allergies     Current Outpatient Medications   Medication Sig Dispense Refill    ascorbic acid (VITAMIN C) 500 MG tablet Take 1 tablet (500 mg total) by mouth 2 (two) times a day 60 tablet 0    celecoxib (CeleBREX) 100 mg capsule TAKE ONE CAPSULE BY MOUTH TWICE A DAY 60 capsule 5    cholecalciferol (VITAMIN D3) 1,000 units tablet Take 1,000 Units by mouth daily      Doxylamine Succinate, Sleep, (SLEEP AID PO) Take 25 mg by mouth      DULoxetine (CYMBALTA) 60 mg delayed release capsule TAKE ONE CAPSULE BY MOUTH AT BEDTIME 30 capsule 5    meclizine (ANTIVERT) 25 mg tablet TAKE ONE TABLET BY MOUTH THREE TIMES A DAY AS NEEDED DIZZINESS 60 tablet 1    Multiple Vitamins-Minerals (CENTRUM SILVER PO) Take by mouth      potassium chloride (Klor-Con M20) 20 mEq tablet TAKE ONE TABLET BY MOUTH EVERY DAY 30 tablet 5    rosuvastatin (CRESTOR) 40 MG tablet Take 1 tablet (40 mg total) by mouth daily 90 tablet 3    vitamin B-12 (VITAMIN B-12) 1,000 mcg tablet Take 1 tablet (1,000 mcg total) by mouth daily 90 tablet 3    VITAMIN E PO Take by mouth       No current facility-administered medications for this visit.       No Known Allergies    PAST HISTORY    Past Medical History:   Diagnosis Date    Arthritis     Balance disorder     GERD (gastroesophageal reflux disease)     Hyperlipidemia     Hypertension     Peripheral neuropathy     Vertigo        Past Surgical History:   Procedure Laterality Date     SECTION      COLONOSCOPY      FL LUMBAR PUNCTURE THERAPEUTIC  2024    HYSTERECTOMY      HYSTEROSCOPY W/ ENDOMETRIAL ABLATION      JOINT REPLACEMENT      KNEE SURGERY      MN ARTHRP ACETBLR/PROX FEM PROSTC AGRFT/ALGRFT Right 2022    Procedure: ARTHROPLASTY HIP TOTAL ANTERIOR and all associated procedures;  Surgeon: Trisha Alonso MD;   Location:  MAIN OR;  Service: Orthopedics       Social History     Tobacco Use    Smoking status: Never     Passive exposure: Past    Smokeless tobacco: Never   Vaping Use    Vaping status: Never Used   Substance Use Topics    Alcohol use: Never    Drug use: Never       Family History   Problem Relation Age of Onset    Arthritis Mother     Cancer Father     Heart disease Father     No Known Problems Sister     No Known Problems Daughter     No Known Problems Daughter     No Known Problems Daughter     No Known Problems Maternal Grandmother     No Known Problems Maternal Grandfather     No Known Problems Paternal Grandmother     No Known Problems Paternal Grandfather     Anemia Brother     No Known Problems Maternal Aunt     No Known Problems Maternal Aunt        The following portions of the patient's history were reviewed and updated as appropriate: allergies, current medications, past family history, past medical history, past social history, past surgical history and problem list.      EXAM    Vitals:There were no vitals taken for this visit.,There is no height or weight on file to calculate BMI.     Physical Exam    Neurologic Exam  Awake and alert  Oriented and appropriate  Ambulatory with a cane  Grossly 5/5 throughout    Imaging Studies  FL lumbar puncture therapeutic    Result Date: 7/12/2024  Narrative: FLUOROSCOPICALLY GUIDED LUMBAR PUNCTURE INDICATION: Past medical history significant for frequent falls. Procedure ordered for large volume CSF removal. FLUOROSCOPY TIME:  126.9 seconds IMAGES: 6 TECHNIQUE: Consent was obtained after fully explaining the procedure to the patient. Risks and benefits of procedure were described and understood. Precautions to avoid spinal headache were reviewed. 1% lidocaine was infiltrated at the puncture site. Utilizing right paravertebral approach, a 7 inch 20 gauge spinal needle was advanced under fluoroscopic guidance into the subarachnoid space at the L3-L4 level,  utilizing sterile technique. Once in position, an opening pressure was measured as 23 mmHg and recorded. Approximately 40 cc of clear, colorless CSF were removed and placed into 4 tubes which were subsequently disposed of appropriately. No samples were sent to the lab for analysis. The closing pressure was measured as 21 mmHg. The needle was removed. The patient tolerated the procedure well. The patient was discharged from the department with appropriate instructions.     Impression: 1. Successful fluoroscopically guided lumbar puncture with removal of 40 cc of clear CSF. 2. Opening pressure: 23 mmHg. ATTESTATION I, the attending radiologist, was present for the critical and key portions of the procedure, was immediately available for the entire procedure. Guidance images were reviewed and I am in agreement with the findings on the report. Attending physician: Pallav Shah. Advanced practitioner: Bev Mayer. Resident:  Sai Pineda. Resident: SAI PINEDA I, the attending radiologist, have reviewed the images and agree with the final report above. Workstation performed: [id number]       I have personally reviewed pertinent reports.      I have spent a total time of 20 minutes on 08/05/24 in caring for this patient including Diagnostic results, Prognosis, Risks and benefits of tx options, and Documenting in the medical record.     PLEASE NOTE:  This encounter may have been completed utilizing the [employer] Direct Speech Voice Recognition Software. Grammatical errors, random word insertions, pronoun errors and incomplete sentences are occasional consequences of the system due to software limitations, ambient noise and hardware issues.These may be missed even after proof reading prior to affixing electronic signature. Please do not hesitate to contact me directly if you have any questions or concerns about the content, text or information contained within the body of this dictation.

## 2024-08-07 DIAGNOSIS — R42 VERTIGO: ICD-10-CM

## 2024-08-07 DIAGNOSIS — M25.512 LEFT SHOULDER PAIN: ICD-10-CM

## 2024-08-07 DIAGNOSIS — E87.6 HYPOKALEMIA: ICD-10-CM

## 2024-08-07 RX ORDER — CELECOXIB 100 MG/1
100 CAPSULE ORAL 2 TIMES DAILY
Qty: 60 CAPSULE | Refills: 0 | Status: CANCELLED | OUTPATIENT
Start: 2024-08-07

## 2024-08-07 NOTE — TELEPHONE ENCOUNTER
Reason for call:   [x] Refill   [] Prior Auth  [x] Other: patient is asking if Dr mercado thinks she should continue on this medication for joint issues, she also wants Dr mercado to know she has fluid on the brain and declined a shunt to remove. She wants to know if this medication will interfere with the fluid issue.     Office:   [] PCP/Provider -   [x] Specialty/Provider - Tony Mercado,  /[facility]     Medication:     celecoxib (CeleBREX) 100 mg capsule       Dose/Frequency: TAKE ONE CAPSULE BY MOUTH TWICE A DAY,     Quantity: 60    Pharmacy: [id number] [place] [address]     Does the patient have enough for 3 days?   [] Yes   [x] No - Send as HP to POD

## 2024-08-07 NOTE — TELEPHONE ENCOUNTER
patient is asking if Dr mercado thinks she should continue on this medication for joint issues, she also wants Dr mercado to know she has fluid on the brain and declined a shunt to remove. She wants to know if this medication will interfere with the fluid issue.

## 2024-08-08 RX ORDER — POTASSIUM CHLORIDE 20 MEQ/1
20 TABLET, EXTENDED RELEASE ORAL DAILY
Qty: 30 TABLET | Refills: 5 | Status: SHIPPED | OUTPATIENT
Start: 2024-08-08

## 2024-08-08 RX ORDER — MECLIZINE HYDROCHLORIDE 25 MG/1
TABLET ORAL
Qty: 30 TABLET | Refills: 0 | Status: SHIPPED | OUTPATIENT
Start: 2024-08-08

## 2024-08-12 ENCOUNTER — EVALUATION (OUTPATIENT)
Dept: SPEECH THERAPY | Facility: CLINIC | Age: 74
End: 2024-08-12
Payer: MEDICARE

## 2024-08-12 DIAGNOSIS — R48.8 OTHER SYMBOLIC DYSFUNCTIONS: Primary | ICD-10-CM

## 2024-08-12 DIAGNOSIS — R41.841 COGNITIVE COMMUNICATION DEFICIT: ICD-10-CM

## 2024-08-12 DIAGNOSIS — G91.2 NORMAL PRESSURE HYDROCEPHALUS (HCC): ICD-10-CM

## 2024-08-12 PROCEDURE — 96125 COGNITIVE TEST BY HC PRO: CPT

## 2024-08-12 NOTE — PROGRESS NOTES
Speech-Language Pathology Initial Evaluation    Today's date: 2024   Patient’s name: Bozena Armstrong  : 1950  MRN: [id number]  Safety measures:   Referring provider: Petty Kim MD    Encounter Diagnosis     ICD-10-CM    1. Other symbolic dysfunctions  R48.8       2. Normal pressure hydrocephalus (HCC)  G91.2 Ambulatory Referral to Speech Therapy      3. Cognitive communication deficit  R41.841           Assessment:  Patient presents with mild cognitive deficits as shown in RBANS-Form A results below.  Patient is most frustrated with her immediate and short term recall deficits. Patient did score low average in immediate memory, high average in visuospatial - constructional and average in attention, delayed memory and language. Patient scored average in overall score.  Patient would benefit from cognitive therapy 1-2 times weekly to focus on education, strategy training and cognitive retraining.         Short Term    Patient will complete complex auditory attention processing tasks (e.g., sentence unscramble, ranking numbers/words, etc.) to improve working memory with 80% accuracy.    Patient will facilitate planning by completing thought organization tasks (e.g., sequencing, deduction puzzles, etc.) with 80% accuracy to facilitate increased executive functioning, working memory, problem solving, and processing skills.    Patient will complete auditory immediate and short term memory tasks to 80% accuracy to facilitate increased ability to retell narratives and recall information within functional living environment.    To target mental manipulation and working memory, patient will participate in word finding activity (i.e., anagrams) with 80% accuracy.    Patient will answer questions regarding story read aloud with 80% accuracy to facilitate improved auditory comprehension and recall.    Patient will be educated on the use of internal and external memory aids and compensatory strategies with  80% accuracy to facilitate increased recall of routine, personal information, and recent events.      Long Term    Patient will complete cognitive-linguistic therapy that addresses patient's specific deficits in processing speed, short-term working memory, attention to detail, monitoring, sequencing, and organization skills, with instruction, to alleviate effects of executive functioning disorder deficits by discharge.     Patient will improve ability to facilitate cognitive function and communication skills including use of compensatory strategies in a variety of functional living tasks to improve quality of like and to maximize level of independence.       Plan:  Patient would benefit from outpatient skilled Speech Therapy services: Cognitive-linguistic therapy    Frequency: 1-2x weekly  Duration: 6-8 weeks    Intervention certification from: 8/12/2024  Intervention certification to: 9/23/24      Subjective:  History of present illness: Patient is a 73 y.o. female who was referred to outpatient skilled Speech Therapy services for a cognitive-linguistic evaluation. Patient with memory deficits likely from Normal Pressure Hydrocephalus.     Patient's goal(s):     Hearing: WFL  Vision: WFL    Home environment/lifestyle: Lives with , 6 children. 10 great children.  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        Objective (testing):  The Repeatable Battery for the Assessment of Neuropsychological Status (RBANS) is a brief, individually-administered assessment which measures attention, language, visuospatial/constructional abilities, and immediate & delayed memory. The RBANS is intended for use with adolescents to adults, ages 12 to 89 years. The following results were obtained during the administration of the assessment.    Form: A    Cognitive Domain/Subtest: Index Score: Percentile Rank: Classification:   IMMEDIATE MEMORY 83  13%ile Low Average        1. List Learning (21/40)        2. Story Memory (13/24)       VISUOSPATIAL/  CONSTRUCTIONAL 116 86%ile High Average        3. Figure Copy (19/20)        4. Line Orientation (19/20)       LANGUAGE 96 39%ile Average        5. Picture Naming (10/10)        6. Semantic Fluency (18/40)       ATTENTION 100 50%ile Average        7. Digit Span (14/16)        8. Coding (25/89)       DELAYED MEMORY 95 37%ile Average        9. List Recall (3/10)        10. List Recognition (20/20)        11. Story Recall (7/12)        12. Figure Recall (8/20)         Sum of Index Scores:  490   Total Score:  96   Percentile: 39%ile   Classification: Average       *Patient named 18 concrete category members in 60 sec (norm=15+). -- AVERAGE        Treatment:  Completed education of plan of care      Visit Tracking:  POC   Expires Auth Expiration Date ST Visit Limit   9/23/24 9/23/24 BOMN          Visit/Unit Tracking:  Auth Status Date 8/12/24   BOMN Used 1    Remaining BOMN       Intervention Comments:  Strategies, education, drills focused on immediate and short term memory, ways to keep brain active

## 2024-08-14 ENCOUNTER — OFFICE VISIT (OUTPATIENT)
Dept: SPEECH THERAPY | Facility: CLINIC | Age: 74
End: 2024-08-14
Payer: MEDICARE

## 2024-08-14 DIAGNOSIS — G91.2 NORMAL PRESSURE HYDROCEPHALUS (HCC): Primary | ICD-10-CM

## 2024-08-14 DIAGNOSIS — R41.841 COGNITIVE COMMUNICATION DEFICIT: ICD-10-CM

## 2024-08-14 DIAGNOSIS — R48.8 OTHER SYMBOLIC DYSFUNCTIONS: ICD-10-CM

## 2024-08-14 PROCEDURE — 97129 THER IVNTJ 1ST 15 MIN: CPT

## 2024-08-14 PROCEDURE — 97130 THER IVNTJ EA ADDL 15 MIN: CPT

## 2024-08-14 NOTE — PROGRESS NOTES
Daily Speech Treatment Note    Today's date: 2024   Patient’s name: Bozena Armstrong  : 1950  MRN: [id number]  Safety measures:   Referring provider: Petty Kim MD    Encounter Diagnosis     ICD-10-CM    1. Normal pressure hydrocephalus (HCC)  G91.2       2. Other symbolic dysfunctions  R48.8       3. Cognitive communication deficit  R41.841         Visit Trackin    Subjective/Behavioral:  -Pleasant/Cooperative    Objective/Assessment:  -Reviewed testing results and goals in plan care with patient. Patient is in agreement at this time.    Short-term goals:     Patient will complete complex auditory attention processing tasks (e.g., sentence unscramble, ranking numbers/words, etc.) to improve working memory with 80% accuracy.     Patient will facilitate planning by completing thought organization tasks (e.g., sequencing, deduction puzzles, etc.) with 80% accuracy to facilitate increased executive functioning, working memory, problem solving, and processing skills.     Patient will complete auditory immediate and short term memory tasks to 80% accuracy to facilitate increased ability to retell narratives and recall information within functional living environment.     To target mental manipulation and working memory, patient will participate in word finding activity (i.e., anagrams) with 80% accuracy.     Patient will answer questions regarding story read aloud with 80% accuracy to facilitate improved auditory comprehension and recall.     Patient will be educated on the use of internal and external memory aids and compensatory strategies with 80% accuracy to facilitate increased recall of routine, personal information, and recent events.  : Completed education of testing results- patient expressed understanding.  Also, completed education of memory strategies and how to maximize memory - provided handout. Patient requesting for list of 2nd opinion Neurologists, provided list of contact /  scheduling information since patient not able to use computer / online information.  Wife stated  with cognitive decline and encouraged her to review strategies with him and to perhaps schedule a cognitive evaluation also. [facility]  [facility] in the year.         Long Term     Patient will complete cognitive-linguistic therapy that addresses patient's specific deficits in processing speed, short-term working memory, attention to detail, monitoring, sequencing, and organization skills, with instruction, to alleviate effects of executive functioning disorder deficits by discharge.      Patient will improve ability to facilitate cognitive function and communication skills including use of compensatory strategies in a variety of functional living tasks to improve quality of like and to maximize level of independence.        Plan:  -Continue with current plan of care.

## 2024-08-14 NOTE — TELEPHONE ENCOUNTER
Tried calling several times, cannot get a hold of Bozena, cannot leave a voicemail, machine just repeats itself

## 2024-08-19 ENCOUNTER — EVALUATION (OUTPATIENT)
Facility: CLINIC | Age: 74
End: 2024-08-19
Payer: MEDICARE

## 2024-08-19 DIAGNOSIS — R26.89 BALANCE DISORDER: Primary | ICD-10-CM

## 2024-08-19 DIAGNOSIS — R26.81 GAIT INSTABILITY: ICD-10-CM

## 2024-08-19 PROCEDURE — 97162 PT EVAL MOD COMPLEX 30 MIN: CPT

## 2024-08-19 PROCEDURE — 97112 NEUROMUSCULAR REEDUCATION: CPT

## 2024-08-19 NOTE — PROGRESS NOTES
PT Evaluation     Today's date: 2024  Patient name: Bozena Armstrong  : 1950  MRN: [id number]  Referring provider: Donnell Smith MD  Dx:   Encounter Diagnosis     ICD-10-CM    1. Balance disorder  R26.89       2. Gait instability  R26.81 Ambulatory Referral to Physical Therapy          Start Time: 1724  Stop Time: 1800  Total time in clinic (min): 36 minutes    Assessment  Impairments: abnormal gait, impaired balance, participation limitations and activity limitations  Other impairment: Increased fall risk    Assessment details: Bozena is a 73 year old patient presenting to OPPT for evaluation regarding gait instability secondary to NPH. [place] improvement following lumbar puncture procedure, but had a fall which then regressed patient back to having increased balance concerns and difficulties. Upon evaluation they show impairments in the following areas: Decreased balance confidence regarding overall condition. FGA classifies patient as fall risk with core below 22/30. Gait speed below optimized walking speed of 1.2 m/s to function in community, but doesn't place patient at increased fall risk. Some decreased generalized strength throughout bilateral LE primarily in proximal musculature. Dual task cost greater than 10% during TUG-C comparison. Somatosensory integration impaired with inability to complete 30 seconds during 4th condition of MCTSIB. These impairments limit their ability to perform daily activities as well as their previous level of function causing decreased quality of life.    Patient requires continued skilled PT services in order to improve aforementioned impairments so that they are able to return to highest level of function possible. Without initiation of skilled PT services, patient will continue to have increased fall risk leading to increased likelihood of further falls and increased risk of healthcare cost and burden. .    Understanding of Dx/Px/POC: good     Prognosis:  "good    Goals  STG (4 weeks)   Improve (reduce) TUG score by 4 seconds indicating meaningful improvement in fall risk   Improve FGA score by 4 indicating meaningful improvement in fall risk   Improve/Reduce 5x STS score by 2.8 seconds indicating meaningful improvement in fall risk and power generation   Increase 6MWT by 34.4 meters indicating meaningful change in aerobic endurance (MCID per Felix Alvarado Rang 2012).     LTG (8 weeks)   Improve (reduce) TUG score to 10.5 seconds indicating meaningful improvement in fall risk   Improve FGA score to > 22/30 indicating meaningful improvement in fall risk   Improve/Reduce 5x STS score to < 10 seconds seconds indicating meaningful improvement in fall risk and power generation   Improve 6MWT score to align with age related norms demonstrating improvement in aerobic capacity and endurance  Improve gait speed to > 1.2 m/s       Plan  Planned modality interventions: cryotherapy and thermotherapy: hydrocollator packs    Planned therapy interventions: manual therapy, self care, strengthening, therapeutic exercise, therapeutic activities, stretching, home exercise program, gait training, graded exercise and balance    Frequency: 2x week  Plan of Care beginning date: 8/19/2024  Plan of Care expiration date: 10/19/2024  Treatment plan discussed with: patient        Subjective Evaluation    History of Present Illness  Mechanism of injury: Bozena is a 73 year old patient presenting to OPPT for evaluation regarding decreased balance and increased fall risk. Has been seen in this clinic previously for these concerns. Has balance has been getting worse. \"I can't walk straight without being off balance\". \"I average one fall a month\". \"Usually like it feels like I am getting pushed, I just have this force that knocks me around\". Gets tired towards the end of the day.     Hobbies: enjoys gardening. Can't kneel because of the knee replacement. Enjoys traveling. \"I wish I could think better\". " "    Per EMR: \"Bozena Armstrong is a 73 year old female with a history of gait instability and concern for NPH who returns to clinic to further discuss surgical options. To recap - she did have an LP after which she felt significantly more steady, and this has lasted up until today when she did have another fall. This fall was like her previous falls where she feels like she is being pushed to the floor. We again discussed that the LP was a temporary test, and not the definitve treatment. We also discussed that her symptoms are somewhat atypical for NPH. While proceeding with a shunt is not unreasonable given her improvements post LP - I did discuss that the lasting improvement she had was likely not related to the LP, possibly placebo. Her cognition did not change subjectively or on MOCA. She has no urinary symptoms. I did discuss with her that her case is somewhat in a gray zone, in that she has atypical symptoms but her stability did improve with LP.  shunt as a procedure is relatively low risk - but does include risk of cerebral hemorrhage and stroke, infection necessitating shunt removal, subdural hemorrhage from overhsunting, and the risk that it does not work. For now, she would like to hold off on proceeding so that she can consider further.\"   Patient Goals  Patient goals for therapy: improved balance, increased strength and return to work  Patient goal: Balance. Get back to working.  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\")  Exercise history: \"I can't walk far\", \"[facility]          Objective      Balance Test 8/19   6 Minute Walk Test (ft):    2 Minute Walk Test (ft):    Gait Speed (ft/s): 1.12 m/s    5x Sit To Stand (s): 13.97 seconds    FGA: 20/30   ABC:  71%    4 Square Step Test     Gait Disorientation Test:     MOCA:  "     TUG-C 12.2 seconds    TU.36 seconds          MCTSIB Number of Seconds   Feet Together, Eyes Open    Feet Together, Eyes Closed    Feet Together, Eyes Open Foam 30 seconds    Feet Together, Eyes Closed Foam 11 seconds      Flowsheet Rows      Flowsheet Row Most Recent Value   Functional Gait Assessment    Gait Level Surface  2  [6.4 seconds]   Change in GaiT Speed 3   Gait with horizontal head turns 2   Gait with vertical head turns 2   Gait and pivot tuen  3   Step over obstacle 2   Gait with narrow base of supprt 0   Gait with eyes closed 2   Ambulating backwards 2   Steps 2   Total score  20          Coordination Left Right   Heel To Shin     Finger To Nose Hypometric  Hypometric    Rapid Alternating Movement impaired Impaired    UE     LE         Sensation Left Right   Kinesthesia     Light Touch     Sharp/Dull     Graphesthesia      2 Point Discrimination         Muscle Tone Left Right   Modified Woo Scale 0 0   Hamstring     Gastroc     Quad       Deep Tendon Reflexes  Left Right   Patellar (L3/4)  Nt  Nt    Achilles (S1/2) Nt  Nt    Brachioradialis (C5/6)     Biceps (C5/6)     Triceps (C7/8)       Pathological Reflexes  Left Right   Luna      Babinski     Clonus        Manual Muscle Testing - Hip Left Right   Flexion 4 4   Extension 4 4   Abduction 4 4   External Rotation 4 4     Manual Muscle Testing - Knee Left Right   Flexion 4 4   Extension 4+ 4+      Manual Muscle Testing - Ankle Left Right   Doriflexion 4 4   Plantarflexion 4 4        Precautions: Hx L TKA, Dystonia, Memory deficit, fall risk     EPOC 10/19/24    Manuals             6MWT  NV                                                    Neuro Re-Ed             Static balance              Dynamic Balance              Compliant Surface              SLS progression              Resisted amb              Narrow base of support                                                     Education  NPH A+P, POC, walking program             Ther Ex             Sit <> Stand              Hip abd              Hip ext              HR/TR              Leg press                                                     Ther Activity                                       Gait Training                                       Modalities

## 2024-08-21 ENCOUNTER — OFFICE VISIT (OUTPATIENT)
Dept: SPEECH THERAPY | Facility: CLINIC | Age: 74
End: 2024-08-21
Payer: MEDICARE

## 2024-08-21 DIAGNOSIS — G91.2 NORMAL PRESSURE HYDROCEPHALUS (HCC): Primary | ICD-10-CM

## 2024-08-21 DIAGNOSIS — R41.841 COGNITIVE COMMUNICATION DEFICIT: ICD-10-CM

## 2024-08-21 DIAGNOSIS — R48.8 OTHER SYMBOLIC DYSFUNCTIONS: ICD-10-CM

## 2024-08-21 PROCEDURE — 97129 THER IVNTJ 1ST 15 MIN: CPT

## 2024-08-21 PROCEDURE — 97130 THER IVNTJ EA ADDL 15 MIN: CPT

## 2024-08-21 NOTE — PROGRESS NOTES
Daily Speech Treatment Note    Today's date: 2024   Patient’s name: Bozena Armstrong  : 1950  MRN: [id number]  Safety measures:   Referring provider: Petty Kim MD    Encounter Diagnosis     ICD-10-CM    1. Normal pressure hydrocephalus (HCC)  G91.2       2. Other symbolic dysfunctions  R48.8       3. Cognitive communication deficit  R41.841             Visit Tracking:  3    Subjective/Behavioral:  -Pleasant/Cooperative    Objective/Assessment:  -Reviewed testing results and goals in plan care with patient. Patient is in agreement at this time.    Short-term goals:     Patient will complete complex auditory attention processing tasks (e.g., sentence unscramble, ranking numbers/words, etc.) to improve working memory with 80% accuracy.  : Able to sort 4-6 words into pairs after teaching instructions to aid processing: out loud, repetition, visualization. Also, able to formulate words into sentences up to 4 words and recall those words while visualizing sentences. Next session: 5 words and progress to other working memory / short term recall tasks.     Patient will facilitate planning by completing thought organization tasks (e.g., sequencing, deduction puzzles, etc.) with 80% accuracy to facilitate increased executive functioning, working memory, problem solving, and processing skills.  : Completed organizing Sean's Closet: 100% accuracy, also completed scheduling of Shantel's Schedule: 57% accuracy, increased to 100% with cues to re read out loud, use repetition, check off completed items, take notes, etc. Provided more for hw.     Patient will complete auditory immediate and short term memory tasks to 80% accuracy to facilitate increased ability to retell narratives and recall information within functional living environment.     To target mental manipulation and working memory, patient will participate in word finding activity (i.e., anagrams) with 80% accuracy.     Patient will answer  questions regarding story read aloud with 80% accuracy to facilitate improved auditory comprehension and recall.     Patient will be educated on the use of internal and external memory aids and compensatory strategies with 80% accuracy to facilitate increased recall of routine, personal information, and recent events.  8/14: Completed education of testing results- patient expressed understanding.  Also, completed education of memory strategies and how to maximize memory - provided handout. Patient requesting for list of 2nd opinion Neurologists, provided list of contact / scheduling information since patient not able to use computer / online information.  Wife stated  with cognitive decline and encouraged her to review strategies with him and to perhaps schedule a cognitive evaluation also. [facility]  [facility] in the [facility].         Long Term     Patient will complete cognitive-linguistic therapy that addresses patient's specific deficits in processing speed, short-term working memory, attention to detail, monitoring, sequencing, and organization skills, with instruction, to alleviate effects of executive functioning disorder deficits by discharge.      Patient will improve ability to facilitate cognitive function and communication skills including use of compensatory strategies in a variety of functional living tasks to improve quality of like and to maximize level of independence.        Plan:  -Continue with current plan of care.

## 2024-08-26 DIAGNOSIS — R42 VERTIGO: ICD-10-CM

## 2024-08-26 RX ORDER — MECLIZINE HYDROCHLORIDE 25 MG/1
25 TABLET ORAL EVERY 8 HOURS PRN
Qty: 30 TABLET | Refills: 0 | Status: SHIPPED | OUTPATIENT
Start: 2024-08-26

## 2024-08-26 NOTE — TELEPHONE ENCOUNTER
Medication: meclizine (ANTIVERT) 25 mg tablet     Dose/Frequency: TAKE ONE TABLET BY MOUTH THREE TIMES A DAY AS NEEDED DIZZINESS     Quantity: 90 tablet  (Pt requesting 60-90 tablets as she takes 3 tablets per day).    Pharmacy: [id number] - [place] - [address]     Office:   [x] PCP/Provider -   [] Speciality/Provider -     Does the patient have enough for 3 days?   [] Yes   [x] No - Send as HP to POD

## 2024-08-28 ENCOUNTER — OFFICE VISIT (OUTPATIENT)
Dept: SPEECH THERAPY | Facility: CLINIC | Age: 74
End: 2024-08-28
Payer: MEDICARE

## 2024-08-28 DIAGNOSIS — R48.8 OTHER SYMBOLIC DYSFUNCTIONS: ICD-10-CM

## 2024-08-28 DIAGNOSIS — R41.841 COGNITIVE COMMUNICATION DEFICIT: ICD-10-CM

## 2024-08-28 DIAGNOSIS — G91.2 NORMAL PRESSURE HYDROCEPHALUS (HCC): Primary | ICD-10-CM

## 2024-08-28 PROCEDURE — 97129 THER IVNTJ 1ST 15 MIN: CPT

## 2024-08-28 PROCEDURE — 97130 THER IVNTJ EA ADDL 15 MIN: CPT

## 2024-08-28 NOTE — PROGRESS NOTES
"Daily Speech Treatment Note    Today's date: 2024   Patient’s name: Bozena Armstrong  : 1950  MRN: [id number]  Safety measures:   Referring provider: Petty Kim MD    Encounter Diagnosis     ICD-10-CM    1. Normal pressure hydrocephalus (HCC)  G91.2       2. Other symbolic dysfunctions  R48.8       3. Cognitive communication deficit  R41.841             Visit Trackin    Subjective/Behavioral:  -Pleasant/Cooperative    Objective/Assessment:  -Reviewed testing results and goals in plan care with patient. Patient is in agreement at this time.    Short-term goals:     Patient will complete complex auditory attention processing tasks (e.g., sentence unscramble, ranking numbers/words, etc.) to improve working memory with 80% accuracy.  : Able to sort 4-6 words into pairs after teaching instructions to aid processing: out loud, repetition, visualization. Also, able to formulate words into sentences up to 4 words and recall those words while visualizing sentences. Next session: 5 words and progress to other working memory / short term recall tasks. : Given 5 words auditorily, patient organized them in categories and recalled with 83% accuracy using repetition and putting words into sentences to improve recall     Patient will facilitate planning by completing thought organization tasks (e.g., sequencing, deduction puzzles, etc.) with 80% accuracy to facilitate increased executive functioning, working memory, problem solving, and processing skills.  : Completed organizing Sean's Closet: 100% accuracy, also completed scheduling of Shantel's Schedule: 57% accuracy, increased to 100% with cues to re read out loud, use repetition, check off completed items, take notes, etc. Provided more for hw. :  First, completed Tour of [facility] to improve thought organization: 100% accuracy independently; Next, completed deduction puzzle \"Kitchen Shelves\" with 75% accuracy independently, increasing " to 100% accuracy given verbal cues to re-read, take notes     Patient will complete auditory immediate and short term memory tasks to 80% accuracy to facilitate increased ability to retell narratives and recall information within functional living environment.     To target mental manipulation and working memory, patient will participate in word finding activity (i.e., anagrams) with 80% accuracy.     Patient will answer questions regarding story read aloud with 80% accuracy to facilitate improved auditory comprehension and recall.     Patient will be educated on the use of internal and external memory aids and compensatory strategies with 80% accuracy to facilitate increased recall of routine, personal information, and recent events.  8/14: Completed education of testing results- patient expressed understanding.  Also, completed education of memory strategies and how to maximize memory - provided handout. Patient requesting for list of 2nd opinion Neurologists, provided list of contact / scheduling information since patient not able to use computer / online information.  Wife stated  with cognitive decline and encouraged her to review strategies with him and to perhaps schedule a cognitive evaluation also. [facility]'s  [facility] in the year.         Long Term     Patient will complete cognitive-linguistic therapy that addresses patient's specific deficits in processing speed, short-term working memory, attention to detail, monitoring, sequencing, and organization skills, with instruction, to alleviate effects of executive functioning disorder deficits by discharge.      Patient will improve ability to facilitate cognitive function and communication skills including use of compensatory strategies in a variety of functional living tasks to improve quality of like and to maximize level of independence.        Plan:  -Continue with current plan of care.

## 2024-09-04 ENCOUNTER — OFFICE VISIT (OUTPATIENT)
Facility: CLINIC | Age: 74
End: 2024-09-04
Payer: MEDICARE

## 2024-09-04 ENCOUNTER — OFFICE VISIT (OUTPATIENT)
Dept: SPEECH THERAPY | Facility: CLINIC | Age: 74
End: 2024-09-04
Payer: MEDICARE

## 2024-09-04 DIAGNOSIS — R41.841 COGNITIVE COMMUNICATION DEFICIT: ICD-10-CM

## 2024-09-04 DIAGNOSIS — R42 VERTIGO: ICD-10-CM

## 2024-09-04 DIAGNOSIS — R26.89 BALANCE DISORDER: Primary | ICD-10-CM

## 2024-09-04 DIAGNOSIS — G91.2 NORMAL PRESSURE HYDROCEPHALUS (HCC): Primary | ICD-10-CM

## 2024-09-04 DIAGNOSIS — R26.81 GAIT INSTABILITY: ICD-10-CM

## 2024-09-04 DIAGNOSIS — R48.8 OTHER SYMBOLIC DYSFUNCTIONS: ICD-10-CM

## 2024-09-04 PROCEDURE — 97130 THER IVNTJ EA ADDL 15 MIN: CPT

## 2024-09-04 PROCEDURE — 97129 THER IVNTJ 1ST 15 MIN: CPT

## 2024-09-04 PROCEDURE — 97112 NEUROMUSCULAR REEDUCATION: CPT

## 2024-09-04 NOTE — TELEPHONE ENCOUNTER
Patient is calling this morning to request a refill on the pended medication  She would like a 90 day supply if possible, if not please inform patient ahead of time  Confirmed pharmacy on file

## 2024-09-04 NOTE — PROGRESS NOTES
"Daily Note     Today's date: 2024  Patient name: Bozena Armstrong  : 1950  MRN: [id number]  Referring provider: Donnell Smith MD  Dx:   Encounter Diagnosis     ICD-10-CM    1. Balance disorder  R26.89       2. Gait instability  R26.81                      Subjective: Pt has had 3 falls since she was here for her IE. She had one fall when turning too quick, one fall when stepping over stuff in her grandson's room, and then another one for balance. She states that she has been blowing through her meclizine 30 day supply, which has been helping, but she is going through it in 10-12 days and has requested a 90 day supply.       Objective: See treatment diary below      Assessment: Due to pt's report of her meclizine usage, recommended that she follow up with her doctor about appropriate dosage and frequency of meclizine use. 6MWT performed and pt scored just below aged matched normative values. Increased instability on foam of 30s holds. Pt exhibited pausing during gait + HT.  Pt will continue to benefit from skilled PT os that she can navigate her environment with increased safety and stability.       Plan: Continue per plan of care.      Precautions: Hx L TKA, Dystonia, Memory deficit, fall risk     EPOC 10/19/24    Manuals             6MWT  NV  1,296ft                                                  Neuro Re-Ed             Static balance              Dynamic Balance   Gait + HT           Compliant Surface   Foam EC 3x30\" holds    Foam beam navigation side stepping            SLS progression              Resisted amb              Narrow base of support              Tests and measures  6MWT                                     Education  NPH A+P, POC, walking program Importance of following up with physician about meclizine usage           Ther Ex             Sit <> Stand   3x10 10lb KB           Hip abd              Hip ext              HR/TR              Leg press                                    "                  Ther Activity                                       Gait Training                                       Modalities

## 2024-09-04 NOTE — PROGRESS NOTES
Daily Speech Treatment Note    Today's date: 2024   Patient’s name: Bozena Armstrong  : 1950  MRN: [id number]  Safety measures:   Referring provider: Petty Kim MD    Encounter Diagnosis     ICD-10-CM    1. Normal pressure hydrocephalus (HCC)  G91.2       2. Other symbolic dysfunctions  R48.8       3. Cognitive communication deficit  R41.841             Visit Trackin    Subjective/Behavioral:  -Pleasant/Cooperative    Patient interested in trying comprehending book comprehension.      Objective/Assessment:  -Reviewed testing results and goals in plan care with patient. Patient is in agreement at this time.    Short-term goals:     Patient will complete complex auditory attention processing tasks (e.g., sentence unscramble, ranking numbers/words, etc.) to improve working memory with 80% accuracy.  : Able to sort 4-6 words into pairs after teaching instructions to aid processing: out loud, repetition, visualization. Also, able to formulate words into sentences up to 4 words and recall those words while visualizing sentences. Next session: 5 words and progress to other working memory / short term recall tasks. : Given 5 words auditorily, patient organized them in categories and recalled with 83% accuracy using repetition and putting words into sentences to improve recall.  : Paired words immediate recall: 90% accuracy with increased time (attribute coding).   100% organizing 3 words into order, ,Memory & Mental Manipulation: organizing / recall of 4 words, helpful with repetition.      Patient will facilitate planning by completing thought organization tasks (e.g., sequencing, deduction puzzles, etc.) with 80% accuracy to facilitate increased executive functioning, working memory, problem solving, and processing skills.  : Completed organizing Sean's Closet: 100% accuracy, also completed scheduling of Shantel's Schedule: 57% accuracy, increased to 100% with cues to re read out loud,  "use repetition, check off completed items, take notes, etc. Provided more for hw. 8/28:  First, completed Tour of [facility] to improve thought organization: 100% accuracy independently; Next, completed deduction puzzle \"Kitchen Shelves\" with 75% accuracy independently, increasing to 100% accuracy given verbal cues to re-read, take notes     Patient will complete auditory immediate and short term memory tasks to 80% accuracy to facilitate increased ability to retell narratives and recall information within functional living environment.  9/4: Education of visualization strategy to use with short term recall. Able to recall 4/4 following 3 minutes.       To target mental manipulation and working memory, patient will participate in word finding activity (i.e., anagrams) with 80% accuracy.  9/4: Completed 80% accuracy, improved greatly with visualization cues.      Patient will answer questions regarding story read aloud with 80% accuracy to facilitate improved auditory comprehension and recall.     Patient will be educated on the use of internal and external memory aids and compensatory strategies with 80% accuracy to facilitate increased recall of routine, personal information, and recent events.  8/14: Completed education of testing results- patient expressed understanding.  Also, completed education of memory strategies and how to maximize memory - provided handout. Patient requesting for list of 2nd opinion Neurologists, provided list of contact / scheduling information since patient not able to use computer / online information.  Wife stated  with cognitive decline and encouraged her to review strategies with him and to perhaps schedule a cognitive evaluation also. [facility]  [facility] in the [facility].         Long Term     Patient will complete cognitive-linguistic therapy that addresses patient's specific deficits in processing speed, short-term working memory, " attention to detail, monitoring, sequencing, and organization skills, with instruction, to alleviate effects of executive functioning disorder deficits by discharge.      Patient will improve ability to facilitate cognitive function and communication skills including use of compensatory strategies in a variety of functional living tasks to improve quality of like and to maximize level of independence.        Plan:  -Continue with current plan of care.

## 2024-09-05 RX ORDER — MECLIZINE HYDROCHLORIDE 25 MG/1
25 TABLET ORAL EVERY 8 HOURS PRN
Qty: 30 TABLET | Refills: 0 | Status: SHIPPED | OUTPATIENT
Start: 2024-09-05

## 2024-09-05 RX ORDER — MECLIZINE HYDROCHLORIDE 25 MG/1
TABLET ORAL
Qty: 30 TABLET | Refills: 2 | Status: SHIPPED | OUTPATIENT
Start: 2024-09-05

## 2024-09-11 ENCOUNTER — OFFICE VISIT (OUTPATIENT)
Facility: CLINIC | Age: 74
End: 2024-09-11
Payer: MEDICARE

## 2024-09-11 ENCOUNTER — OFFICE VISIT (OUTPATIENT)
Dept: SPEECH THERAPY | Facility: CLINIC | Age: 74
End: 2024-09-11
Payer: MEDICARE

## 2024-09-11 DIAGNOSIS — R48.8 OTHER SYMBOLIC DYSFUNCTIONS: ICD-10-CM

## 2024-09-11 DIAGNOSIS — R41.841 COGNITIVE COMMUNICATION DEFICIT: ICD-10-CM

## 2024-09-11 DIAGNOSIS — G91.2 NORMAL PRESSURE HYDROCEPHALUS (HCC): Primary | ICD-10-CM

## 2024-09-11 DIAGNOSIS — R26.89 BALANCE DISORDER: Primary | ICD-10-CM

## 2024-09-11 DIAGNOSIS — R26.81 GAIT INSTABILITY: ICD-10-CM

## 2024-09-11 PROCEDURE — 97110 THERAPEUTIC EXERCISES: CPT

## 2024-09-11 PROCEDURE — 97130 THER IVNTJ EA ADDL 15 MIN: CPT

## 2024-09-11 PROCEDURE — 97112 NEUROMUSCULAR REEDUCATION: CPT

## 2024-09-11 PROCEDURE — 97129 THER IVNTJ 1ST 15 MIN: CPT

## 2024-09-11 NOTE — PROGRESS NOTES
"Daily Note     Today's date: 2024  Patient name: Bozena Armstrong  : 1950  MRN: [id number]  Referring provider: Donnell Smith MD  Dx:   Encounter Diagnosis     ICD-10-CM    1. Balance disorder  R26.89       2. Gait instability  R26.81                        Subjective: Pt has not had any falls since she was last here. She note she did not talk to her doctor about her meclizine usage.       Objective: See treatment diary below      Assessment: Pt reported feeling increased unsteadiness with KB pick ups. Minimal LOB with gait + HT. Foam beam navigation got progressively more unsteady with each trial. Pt will continue to benefit from skilled PT so that she can navigate her environment with increased safety and stability.       Plan: Continue per plan of care.      Precautions: Hx L TKA, Dystonia, Memory deficit, fall risk     EPOC 10/19/24    Manuals            6MWT  NV  1,296ft                                                  Neuro Re-Ed             Static balance              Dynamic Balance   Gait + HT Gait + HT          Compliant Surface   Foam EC 3x30\" holds    Foam beam navigation side stepping  Foam EC 3x30\" holds    Foam beam navigation side stepping           SLS progression              Resisted amb              Narrow base of support              Tests and measures  6MWT           Jori navigation   Mod step overs x6 laps                       Education  NPH A+P, POC, walking program Importance of following up with physician about meclizine usage           Ther Ex             Sit <> Stand   3x10 10lb KB 3x10 10lb KB           Hip abd              Hip ext              HR/TR              Leg press              Squat mechanics with KB lift   2x10 10lb KB                                     Ther Activity                                       Gait Training                                       Modalities                                            "

## 2024-09-11 NOTE — PROGRESS NOTES
Daily Speech Treatment Note    Today's date: 2024   Patient’s name: Bozena Armstrong  : 1950  MRN: [id number]  Safety measures:   Referring provider: Petty Kim MD    Encounter Diagnosis     ICD-10-CM    1. Normal pressure hydrocephalus (HCC)  G91.2       2. Other symbolic dysfunctions  R48.8       3. Cognitive communication deficit  R41.841             Visit Trackin    Subjective/Behavioral:  -Pleasant/Cooperative    Patient interested in trying comprehending book comprehension.      Objective/Assessment:  -Reviewed testing results and goals in plan care with patient. Patient is in agreement at this time.    Short-term goals:     Patient will complete complex auditory attention processing tasks (e.g., sentence unscramble, ranking numbers/words, etc.) to improve working memory with 80% accuracy.  : Able to sort 4-6 words into pairs after teaching instructions to aid processing: out loud, repetition, visualization. Also, able to formulate words into sentences up to 4 words and recall those words while visualizing sentences. Next session: 5 words and progress to other working memory / short term recall tasks. : Given 5 words auditorily, patient organized them in categories and recalled with 83% accuracy using repetition and putting words into sentences to improve recall.  : Paired words immediate recall: 90% accuracy with increased time (attribute coding).   100% organizing 3 words into order, ,Memory & Mental Manipulation: organizing / recall of 4 words, helpful with repetition.      Patient will facilitate planning by completing thought organization tasks (e.g., sequencing, deduction puzzles, etc.) with 80% accuracy to facilitate increased executive functioning, working memory, problem solving, and processing skills.  : Completed organizing Sean's Closet: 100% accuracy, also completed scheduling of Shantel's Schedule: 57% accuracy, increased to 100% with cues to re read out loud,  "use repetition, check off completed items, take notes, etc. Provided more for hw. 8/28:  First, completed Tour of [employer] [place] [employer] to improve thought organization: 100% accuracy independently; Next, completed deduction puzzle \"Kitchen Shelves\" with 75% accuracy independently, increasing to 100% accuracy given verbal cues to re-read, take notes. 9/11: Organizing of Garden Plot: Tolerated,,     Patient will complete auditory immediate and short term memory tasks to 80% accuracy to facilitate increased ability to retell narratives and recall information within functional living environment.  9/4: Education of visualization strategy to use with short term recall. Able to recall 4/4 following 3 minutes.  9/11: Recall of 5 pictures immediately, 100%, following 5 minutes:      Ranking 4 words and organizing words into sequence of ranking, 50% accuracy. (Began to mix up earlier words with current wording, etc.  Recall of 70% of details from paragraphs being read by SLP. Education provided from SLP- regarding ways to process auditory information.      To target mental manipulation and working memory, patient will participate in word finding activity (i.e., anagrams) with 80% accuracy.  9/4: Completed 80% accuracy, improved greatly with visualization cues.      Patient will answer questions regarding story read aloud with 80% accuracy to facilitate improved auditory comprehension and recall.     Patient will be educated on the use of internal and external memory aids and compensatory strategies with 80% accuracy to facilitate increased recall of routine, personal information, and recent events.  8/14: Completed education of testing results- patient expressed understanding.  Also, completed education of memory strategies and how to maximize memory - provided handout. Patient requesting for list of 2nd opinion Neurologists, provided list of contact / scheduling information since patient not able to use computer / online " information.  Wife stated  with cognitive decline and encouraged her to review strategies with him and to perhaps schedule a cognitive evaluation also. [facility]  [facility] in [facility].         Long Term     Patient will complete cognitive-linguistic therapy that addresses patient's specific deficits in processing speed, short-term working memory, attention to detail, monitoring, sequencing, and organization skills, with instruction, to alleviate effects of executive functioning disorder deficits by discharge.      Patient will improve ability to facilitate cognitive function and communication skills including use of compensatory strategies in a variety of functional living tasks to improve quality of like and to maximize level of independence.        Plan:  -Continue with current plan of care.

## 2024-09-13 ENCOUNTER — OFFICE VISIT (OUTPATIENT)
Dept: SPEECH THERAPY | Facility: CLINIC | Age: 74
End: 2024-09-13
Payer: MEDICARE

## 2024-09-13 DIAGNOSIS — G91.2 NORMAL PRESSURE HYDROCEPHALUS (HCC): Primary | ICD-10-CM

## 2024-09-13 DIAGNOSIS — R41.841 COGNITIVE COMMUNICATION DEFICIT: ICD-10-CM

## 2024-09-13 DIAGNOSIS — R48.8 OTHER SYMBOLIC DYSFUNCTIONS: ICD-10-CM

## 2024-09-13 PROCEDURE — 97130 THER IVNTJ EA ADDL 15 MIN: CPT

## 2024-09-13 PROCEDURE — 97129 THER IVNTJ 1ST 15 MIN: CPT

## 2024-09-13 NOTE — PROGRESS NOTES
Daily Speech Treatment Note    Today's date: 2024   Patient’s name: Bozena Armstrong  : 1950  MRN: [id number]  Safety measures:   Referring provider: Petty Kim MD    Encounter Diagnosis     ICD-10-CM    1. Normal pressure hydrocephalus (HCC)  G91.2       2. Other symbolic dysfunctions  R48.8       3. Cognitive communication deficit  R41.841             Visit Trackin    Subjective/Behavioral:  -Pleasant/Cooperative    Patient interested in trying comprehending book comprehension.      Objective/Assessment:  -Reviewed testing results and goals in plan care with patient. Patient is in agreement at this time.    Short-term goals:     Patient will complete complex auditory attention processing tasks (e.g., sentence unscramble, ranking numbers/words, etc.) to improve working memory with 80% accuracy.  : Able to sort 4-6 words into pairs after teaching instructions to aid processing: out loud, repetition, visualization. Also, able to formulate words into sentences up to 4 words and recall those words while visualizing sentences. Next session: 5 words and progress to other working memory / short term recall tasks. : Given 5 words auditorily, patient organized them in categories and recalled with 83% accuracy using repetition and putting words into sentences to improve recall.  : Paired words immediate recall: 90% accuracy with increased time (attribute coding).   100% organizing 3 words into order, ,Memory & Mental Manipulation: organizing / recall of 4 words, helpful with repetition.      Patient will facilitate planning by completing thought organization tasks (e.g., sequencing, deduction puzzles, etc.) with 80% accuracy to facilitate increased executive functioning, working memory, problem solving, and processing skills.  : Completed organizing Sean's Closet: 100% accuracy, also completed scheduling of Shantel's Schedule: 57% accuracy, increased to 100% with cues to re read out loud,  "use repetition, check off completed items, take notes, etc. Provided more for hw. 8/28:  First, completed Tour of [employer] to improve thought organization: 100% accuracy independently; Next, completed deduction puzzle \"Kitchen Shelves\" with 75% accuracy independently, increasing to 100% accuracy given verbal cues to re-read, take notes. 9/11: Organizing of Garden Plot: Tolerated,,     Patient will complete auditory immediate and short term memory tasks to 80% accuracy to facilitate increased ability to retell narratives and recall information within functional living environment.  9/4: Education of visualization strategy to use with short term recall. Able to recall 4/4 following 3 minutes.  9/11: Recall of 5 pictures immediately, 100%, following 5 minutes:      Ranking 4 words and organizing words into sequence of ranking, 50% accuracy. (Began to mix up earlier words with current wording, etc.  Recall of 70% of details from paragraphs being read by SLP. Education provided from SLP- regarding ways to process auditory information. 9/13: Functional Recall of Specific Information Retention:  78% immediate recall with SLP questions.  Recall of 5/7 pictures following 8 minutes using story recall strategy.       To target mental manipulation and working memory, patient will participate in word finding activity (i.e., anagrams) with 80% accuracy.  9/4: Completed 80% accuracy, improved greatly with visualization cues.      Patient will answer questions regarding story read aloud with 80% accuracy to facilitate improved auditory comprehension and recall.     Patient will be educated on the use of internal and external memory aids and compensatory strategies with 80% accuracy to facilitate increased recall of routine, personal information, and recent events.  8/14: Completed education of testing results- patient expressed understanding.  Also, completed education of memory strategies and how to maximize memory - provided " handout. Patient requesting for list of 2nd opinion Neurologists, provided list of contact / scheduling information since patient not able to use computer / online information.  Wife stated  with cognitive decline and encouraged her to review strategies with him and to perhaps schedule a cognitive evaluation also. [facility]  [facility] in the [facility]. 9/13: Reviewed apps/workbooks list.  Provided word retrieval exercises.        Long Term     Patient will complete cognitive-linguistic therapy that addresses patient's specific deficits in processing speed, short-term working memory, attention to detail, monitoring, sequencing, and organization skills, with instruction, to alleviate effects of executive functioning disorder deficits by discharge.      Patient will improve ability to facilitate cognitive function and communication skills including use of compensatory strategies in a variety of functional living tasks to improve quality of like and to maximize level of independence.        Plan:  -Continue with current plan of care.

## 2024-09-18 ENCOUNTER — OFFICE VISIT (OUTPATIENT)
Dept: SPEECH THERAPY | Facility: CLINIC | Age: 74
End: 2024-09-18
Payer: MEDICARE

## 2024-09-18 ENCOUNTER — OFFICE VISIT (OUTPATIENT)
Facility: CLINIC | Age: 74
End: 2024-09-18
Payer: MEDICARE

## 2024-09-18 DIAGNOSIS — R26.81 GAIT INSTABILITY: ICD-10-CM

## 2024-09-18 DIAGNOSIS — G91.2 NORMAL PRESSURE HYDROCEPHALUS (HCC): Primary | ICD-10-CM

## 2024-09-18 DIAGNOSIS — R26.89 BALANCE DISORDER: Primary | ICD-10-CM

## 2024-09-18 DIAGNOSIS — R48.8 OTHER SYMBOLIC DYSFUNCTIONS: ICD-10-CM

## 2024-09-18 DIAGNOSIS — R41.841 COGNITIVE COMMUNICATION DEFICIT: ICD-10-CM

## 2024-09-18 PROCEDURE — 97129 THER IVNTJ 1ST 15 MIN: CPT

## 2024-09-18 PROCEDURE — 97112 NEUROMUSCULAR REEDUCATION: CPT

## 2024-09-18 PROCEDURE — 97110 THERAPEUTIC EXERCISES: CPT

## 2024-09-18 PROCEDURE — 97130 THER IVNTJ EA ADDL 15 MIN: CPT

## 2024-09-18 NOTE — PROGRESS NOTES
Daily Speech Treatment Note    Today's date: 2024   Patient’s name: Bozena Armstrong  : 1950  MRN: [id number]  Safety measures:   Referring provider: Petty Kim MD    Encounter Diagnosis     ICD-10-CM    1. Normal pressure hydrocephalus (HCC)  G91.2       2. Other symbolic dysfunctions  R48.8       3. Cognitive communication deficit  R41.841             Visit Trackin    Subjective/Behavioral:  -Pleasant/Cooperative      Objective/Assessment:  Completed structured activities with patient to target goals below.  Results as stated below.     Completed cognitive activities well with great accuracy. Next session: complete visual memory exercises from auditory processing workbook, review logical solutions from folder and also complete smoky bear paragraph recall.         Short-term goals:     Patient will complete complex auditory attention processing tasks (e.g., sentence unscramble, ranking numbers/words, etc.) to improve working memory with 80% accuracy.  : Able to sort 4-6 words into pairs after teaching instructions to aid processing: out loud, repetition, visualization. Also, able to formulate words into sentences up to 4 words and recall those words while visualizing sentences. Next session: 5 words and progress to other working memory / short term recall tasks. : Given 5 words auditorily, patient organized them in categories and recalled with 83% accuracy using repetition and putting words into sentences to improve recall.  : Paired words immediate recall: 90% accuracy with increased time (attribute coding).   100% organizing 3 words into order, ,Memory & Mental Manipulation: organizing / recall of 4 words, helpful with repetition.      Patient will facilitate planning by completing thought organization tasks (e.g., sequencing, deduction puzzles, etc.) with 80% accuracy to facilitate increased executive functioning, working memory, problem solving, and processing skills.  :  "Completed organizing Sean's Closet: 100% accuracy, also completed scheduling of Shantel's Schedule: 57% accuracy, increased to 100% with cues to re read out loud, use repetition, check off completed items, take notes, etc. Provided more for hw. 8/28:  First, completed Tour of [facility] to improve thought organization: 100% accuracy independently; Next, completed deduction puzzle \"Kitchen Shelves\" with 75% accuracy independently, increasing to 100% accuracy given verbal cues to re-read, take notes. 9/11: Organizing of Garden Plot: Tolerated.  9/18: Organizing letter and number back and forth: timed: page 151 Exec. Functioning book: 2 min, 14 seconds.      Patient will complete auditory immediate and short term memory tasks to 80% accuracy to facilitate increased ability to retell narratives and recall information within functional living environment.  9/4: Education of visualization strategy to use with short term recall. Able to recall 4/4 following 3 minutes.  9/11: Recall of 5 pictures immediately, 100%, following 5 minutes:      Ranking 4 words and organizing words into sequence of ranking, 50% accuracy. (Began to mix up earlier words with current wording, etc.  Recall of 70% of details from paragraphs being read by SLP. Education provided from SLP- regarding ways to process auditory information. 9/13: Functional Recall of Specific Information Retention:  78% immediate recall with SLP questions.  Recall of 5/7 pictures following 8 minutes using story recall strategy.  9/18: Recall of pictures following visual imagery:  Recalled 7/7 following visual imagery.  9/18: Reading of paragraph and then answering questions: 80% accuracy in answering correct questions, 2nd paragraph: 90% accuracy in answering questions.              To target mental manipulation and working memory, patient will participate in word finding activity (i.e., anagrams) with 80% accuracy.  9/4: Completed 80% accuracy, improved greatly with " visualization cues.      Patient will answer questions regarding story read aloud with 80% accuracy to facilitate improved auditory comprehension and recall.  9/18: Patient listening to short paragraphs and answered questions with 100% accuracy.       Patient will be educated on the use of internal and external memory aids and compensatory strategies with 80% accuracy to facilitate increased recall of routine, personal information, and recent events.  8/14: Completed education of testing results- patient expressed understanding.  Also, completed education of memory strategies and how to maximize memory - provided handout. Patient requesting for list of 2nd opinion Neurologists, provided list of contact / scheduling information since patient not able to use computer / online information.  Wife stated  with cognitive decline and encouraged her to review strategies with him and to perhaps schedule a cognitive evaluation also. [facility]  [facility] the [facility]. 9/13: Reviewed apps/workbooks list.  Provided word retrieval exercises.        Long Term     Patient will complete cognitive-linguistic therapy that addresses patient's specific deficits in processing speed, short-term working memory, attention to detail, monitoring, sequencing, and organization skills, with instruction, to alleviate effects of executive functioning disorder deficits by discharge.      Patient will improve ability to facilitate cognitive function and communication skills including use of compensatory strategies in a variety of functional living tasks to improve quality of like and to maximize level of independence.        Plan:  -Continue with current plan of care.

## 2024-09-18 NOTE — PROGRESS NOTES
"Daily Note     Today's date: 2024  Patient name: Bozena Armstrong  : 1950  MRN: [id number]  Referring provider: Donnell Smith MD  Dx:   Encounter Diagnosis     ICD-10-CM    1. Balance disorder  R26.89       2. Gait instability  R26.81                          Subjective: Pt had a frustrating experience with getting her blood drawn because she didn't know how to finish checking kiosk. They ended up not being able to get the blood drawn. She does not report any falls since she was last here. She notices she has not had any falls or balance problems. 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 She took herself off of the meclizine. She [place] the mall.     Objective: See treatment diary below      Assessment: Pt participated in a skilled PT session focused on balance, gait, and strengthening. Increased difficulty performing STS today and difficulty remembering how to set up position for STS. Occasional posterior LOB on foam beams that required verbal cueing and Sheila to assist with anterior weight shift. Pt will continue to benefit from skilled PT so that she can navigate her environment with increased safety and stability.       Plan: Continue per plan of care.      Precautions: Hx L TKA, Dystonia, Memory deficit, fall risk     EPOC 10/19/24    Manuals           6MWT  NV  1,296ft                                                  Neuro Re-Ed             Static balance              Dynamic Balance   Gait + HT Gait + HT Gait + HT          Compliant Surface   Foam EC 3x30\" holds    Foam beam navigation side stepping  Foam EC 3x30\" holds    Foam beam navigation side stepping  Foam EC 3x30\" holds    Foam beam navigation side stepping         SLS progression              Resisted amb              Narrow base of support              Tests and measures  6MWT           Jori navigation   Mod step overs x6 laps Mod step overs x 6 laps                       Education "  NPH A+P, POC, walking program Importance of following up with physician about meclizine usage           Ther Ex             Sit <> Stand   3x10 10lb KB 3x10 10lb KB  3x10 10lb KB          Hip abd              Hip ext              HR/TR              Leg press              Squat mechanics with KB lift   2x10 10lb KB  2x10 10lb KB                                    Ther Activity                                       Gait Training                                       Modalities

## 2024-09-20 ENCOUNTER — OFFICE VISIT (OUTPATIENT)
Dept: SPEECH THERAPY | Facility: CLINIC | Age: 74
End: 2024-09-20
Payer: MEDICARE

## 2024-09-20 DIAGNOSIS — R48.8 OTHER SYMBOLIC DYSFUNCTIONS: ICD-10-CM

## 2024-09-20 DIAGNOSIS — R41.841 COGNITIVE COMMUNICATION DEFICIT: ICD-10-CM

## 2024-09-20 DIAGNOSIS — G91.2 NORMAL PRESSURE HYDROCEPHALUS (HCC): Primary | ICD-10-CM

## 2024-09-20 PROCEDURE — 97129 THER IVNTJ 1ST 15 MIN: CPT

## 2024-09-20 PROCEDURE — 97130 THER IVNTJ EA ADDL 15 MIN: CPT

## 2024-09-20 NOTE — PROGRESS NOTES
Daily Speech Treatment Note    Today's date: 2024   Patient’s name: Bozena Armstrong  : 1950  MRN: [id number]  Safety measures:   Referring provider: Petty Kim MD    Encounter Diagnosis     ICD-10-CM    1. Normal pressure hydrocephalus (HCC)  G91.2       2. Other symbolic dysfunctions  R48.8       3. Cognitive communication deficit  R41.841                 Visit Trackin    Subjective/Behavioral:  -Pleasant/Cooperative      Objective/Assessment:  Completed structured activities with patient to target goals below.  Results as stated below.     Completed cognitive activities well with great accuracy. Next session: complete visual memory exercises from auditory processing workbook, review logical solutions from folder and also complete smoky bear paragraph recall.         Short-term goals:     Patient will complete complex auditory attention processing tasks (e.g., sentence unscramble, ranking numbers/words, etc.) to improve working memory with 80% accuracy.  : Able to sort 4-6 words into pairs after teaching instructions to aid processing: out loud, repetition, visualization. Also, able to formulate words into sentences up to 4 words and recall those words while visualizing sentences. Next session: 5 words and progress to other working memory / short term recall tasks. : Given 5 words auditorily, patient organized them in categories and recalled with 83% accuracy using repetition and putting words into sentences to improve recall.  : Paired words immediate recall: 90% accuracy with increased time (attribute coding).   100% organizing 3 words into order, ,Memory & Mental Manipulation: organizing / recall of 4 words, helpful with repetition.      Patient will facilitate planning by completing thought organization tasks (e.g., sequencing, deduction puzzles, etc.) with 80% accuracy to facilitate increased executive functioning, working memory, problem solving, and processing skills.   "8/21: Completed organizing Sean's Closet: 100% accuracy, also completed scheduling of Shantel's Schedule: 57% accuracy, increased to 100% with cues to re read out loud, use repetition, check off completed items, take notes, etc. Provided more for hw. 8/28:  First, completed Tour of [facility] to improve thought organization: 100% accuracy independently; Next, completed deduction puzzle \"Kitchen Shelves\" with 75% accuracy independently, increasing to 100% accuracy given verbal cues to re-read, take notes. 9/11: Organizing of Garden Plot: Tolerated.  9/18: Organizing letter and number back and forth: timed: page 151 Exec. Functioning book: 2 min, 14 seconds.      Patient will complete auditory immediate and short term memory tasks to 80% accuracy to facilitate increased ability to retell narratives and recall information within functional living environment.  9/4: Education of visualization strategy to use with short term recall. Able to recall 4/4 following 3 minutes.  9/11: Recall of 5 pictures immediately, 100%, following 5 minutes:      Ranking 4 words and organizing words into sequence of ranking, 50% accuracy. (Began to mix up earlier words with current wording, etc.  Recall of 70% of details from paragraphs being read by SLP. Education provided from SLP- regarding ways to process auditory information. 9/13: Functional Recall of Specific Information Retention:  78% immediate recall with SLP questions.  Recall of 5/7 pictures following 8 minutes using story recall strategy.  9/18: Recall of pictures following visual imagery:  Recalled 7/7 following visual imagery.  9/18: Reading of paragraph and then answering questions: 80% accuracy in answering correct questions, 2nd paragraph: 90% accuracy in answering questions.  9/20: Recall of 8 pictures using visual / story imagery: Recall of 5/8 pictures following 2 minutes.        To target mental manipulation and working memory, patient will participate in word finding " activity (i.e., anagrams) with 80% accuracy.  9/4: Completed 80% accuracy, improved greatly with visualization cues.      Patient will answer questions regarding story read aloud with 80% accuracy to facilitate improved auditory comprehension and recall.  9/18: Patient listening to short paragraphs and answered questions with 100% accuracy.  9/20: Recall of information following reading of Sm[place] Bear Paragraph: 30% recall, patient not able to process and recall a large amount of information. Patient able to state back compensatory stratgies to utilize (writing down repetition) effectively.     Patient will be educated on the use of internal and external memory aids and compensatory strategies with 80% accuracy to facilitate increased recall of routine, personal information, and recent events.  8/14: Completed education of testing results- patient expressed understanding.  Also, completed education of memory strategies and how to maximize memory - provided handout. Patient requesting for list of 2nd opinion Neurologists, provided list of contact / scheduling information since patient not able to use computer / online information.  Wife stated  with cognitive decline and encouraged her to review strategies with him and to perhaps schedule a cognitive evaluation also. [facility]  [facility] in the [facility]. 9/13: Reviewed apps/workbooks list.  Provided word retrieval exercises.        Long Term     Patient will complete cognitive-linguistic therapy that addresses patient's specific deficits in processing speed, short-term working memory, attention to detail, monitoring, sequencing, and organization skills, with instruction, to alleviate effects of executive functioning disorder deficits by discharge.      Patient will improve ability to facilitate cognitive function and communication skills including use of compensatory strategies in a variety of functional living tasks to  improve quality of like and to maximize level of independence.        Plan:  -Continue with current plan of care.

## 2024-09-25 ENCOUNTER — OFFICE VISIT (OUTPATIENT)
Dept: SPEECH THERAPY | Facility: CLINIC | Age: 74
End: 2024-09-25
Payer: MEDICARE

## 2024-09-25 ENCOUNTER — OFFICE VISIT (OUTPATIENT)
Facility: CLINIC | Age: 74
End: 2024-09-25
Payer: MEDICARE

## 2024-09-25 DIAGNOSIS — R26.89 BALANCE DISORDER: Primary | ICD-10-CM

## 2024-09-25 DIAGNOSIS — R48.8 OTHER SYMBOLIC DYSFUNCTIONS: ICD-10-CM

## 2024-09-25 DIAGNOSIS — R26.81 GAIT INSTABILITY: ICD-10-CM

## 2024-09-25 DIAGNOSIS — R41.841 COGNITIVE COMMUNICATION DEFICIT: ICD-10-CM

## 2024-09-25 DIAGNOSIS — G91.2 NORMAL PRESSURE HYDROCEPHALUS (HCC): Primary | ICD-10-CM

## 2024-09-25 PROCEDURE — 97110 THERAPEUTIC EXERCISES: CPT

## 2024-09-25 PROCEDURE — 97130 THER IVNTJ EA ADDL 15 MIN: CPT

## 2024-09-25 PROCEDURE — 97112 NEUROMUSCULAR REEDUCATION: CPT

## 2024-09-25 PROCEDURE — 97129 THER IVNTJ 1ST 15 MIN: CPT

## 2024-09-25 NOTE — PROGRESS NOTES
"Daily Note     Today's date: 2024  Patient name: Bozena Armstrong  : 1950  MRN: [id number]  Referring provider: Donnell Smith MD  Dx:   Encounter Diagnosis     ICD-10-CM    1. Balance disorder  R26.89       2. Gait instability  R26.81                 Start Time: 1505  Stop Time: 1545  Total time in clinic (min): 40 minutes    Subjective: Pt reports no falls over the past few weeks.     Objective: See treatment diary below      Assessment: Bozena was challenged with complaint surface with narrow base of support, needing mild UE assist from trek pole. With other tasks shows no overt losses of balance or instability. Slowed turns at times or slowed gait with head movement but no instances of tripping, mild path deviation. Shows good form with squat with cue to drop KB straight down. Pt will continue to benefit from skilled PT so that she can navigate her environment with increased safety and stability.       Plan: Continue per plan of care.      Precautions: Hx L TKA, Dystonia, Memory deficit, fall risk     EPOC 10/19/24    Manuals          6MWT  NV  1,296ft                                                  Neuro Re-Ed             Static balance              Dynamic Balance   Gait + HT Gait + HT Gait + HT  Gait HT 30ft x4, diagonal HT 30ft x2, HN 30ft x2    180* turn fwd/bwd 30ft x4        Compliant Surface   Foam EC 3x30\" holds    Foam beam navigation side stepping  Foam EC 3x30\" holds    Foam beam navigation side stepping  Foam EC 3x30\" holds    Foam beam navigation side stepping Foam beam lat step 8 laps CGA cues for head position,  fwd 1 trek pole 8 laps CGA        SLS progression              Resisted amb              Narrow base of support              Tests and measures  6MWT           Jori navigation   Mod step overs x6 laps Mod step overs x 6 laps  Med step through 8 laps CGA                     Education  NPH A+P, POC, walking program Importance of following up with physician " about meclizine usage           Ther Ex             Sit <> Stand   3x10 10lb KB 3x10 10lb KB  3x10 10lb KB  3x10lb KB        Hip abd              Hip ext              HR/TR              Leg press              Squat mechanics with KB lift   2x10 10lb KB  2x10 10lb KB  2x10 10lb kb                                  Ther Activity                                       Gait Training                                       Modalities

## 2024-09-25 NOTE — PROGRESS NOTES
Reevaluation    Today's date: 2024   Patient’s name: Bozena Armstrong  : 1950  MRN: [id number]  Safety measures:   Referring provider: Petty Kim MD    Encounter Diagnosis     ICD-10-CM    1. Normal pressure hydrocephalus (HCC)  G91.2       2. Other symbolic dysfunctions  R48.8       3. Cognitive communication deficit  R41.841                 Visit Trackin    Subjective/Behavioral:  -Pleasant/Cooperative      Objective/Assessment:  Completed structured activities with patient to target goals below.  Results as stated below.     Completed education regarding NPH, 2nd opinion and cognition. Completed visualization exercises, 70% accuracy following 10 sentences.    Patient completed x 10 sessions focused on Cognitive Retraining and education / training of compensatory strategies.  Patient would benefit from a final certification period for 4-6 weeks to complete cognitive goals.      Plan:  Patient would benefit from outpatient skilled Speech Therapy services: Cognitive-linguistic therapy     Frequency: 1-2x weekly  Duration: 4-6 weeks     Intervention certification from: 2024  Intervention certification to: 24        Short-term goals:     Patient will complete complex auditory attention processing tasks (e.g., sentence unscramble, ranking numbers/words, etc.) to improve working memory with 80% accuracy.  : Able to sort 4-6 words into pairs after teaching instructions to aid processing: out loud, repetition, visualization. Also, able to formulate words into sentences up to 4 words and recall those words while visualizing sentences. Next session: 5 words and progress to other working memory / short term recall tasks. : Given 5 words auditorily, patient organized them in categories and recalled with 83% accuracy using repetition and putting words into sentences to improve recall.  : Paired words immediate recall: 90% accuracy with increased time (attribute coding).   100%  "organizing 3 words into order, ,Memory & Mental Manipulation: organizing / recall of 4 words, helpful with repetition.  ACHIEVED, CONTINUE AS APPROPRIATE     Patient will facilitate planning by completing thought organization tasks (e.g., sequencing, deduction puzzles, etc.) with 80% accuracy to facilitate increased executive functioning, working memory, problem solving, and processing skills.  8/21: Completed organizing Sean's Closet: 100% accuracy, also completed scheduling of Shantel's Schedule: 57% accuracy, increased to 100% with cues to re read out loud, use repetition, check off completed items, take notes, etc. Provided more for hw. 8/28:  First, completed Tour of [facility] to improve thought organization: 100% accuracy independently; Next, completed deduction puzzle \"Kitchen Shelves\" with 75% accuracy independently, increasing to 100% accuracy given verbal cues to re-read, take notes. 9/11: Organizing of Garden Plot: Tolerated.  9/18: Organizing letter and number back and forth: timed: page 151 Exec. Functioning book: 2 min, 14 seconds. ACHIEVED, CONTINUE AS APPROPRIATE     Patient will complete auditory immediate and short term memory tasks to 80% accuracy to facilitate increased ability to retell narratives and recall information within functional living environment.  9/4: Education of visualization strategy to use with short term recall. Able to recall 4/4 following 3 minutes.  9/11: Recall of 5 pictures immediately, 100%, following 5 minutes:      Ranking 4 words and organizing words into sequence of ranking, 50% accuracy. (Began to mix up earlier words with current wording, etc.  Recall of 70% of details from paragraphs being read by SLP. Education provided from SLP- regarding ways to process auditory information. 9/13: Functional Recall of Specific Information Retention:  78% immediate recall with SLP questions.  Recall of 5/7 pictures following 8 minutes using story recall strategy.  9/18: Recall of " pictures following visual imagery:  Recalled 7/7 following visual imagery.  9/18: Reading of paragraph and then answering questions: 80% accuracy in answering correct questions, 2nd paragraph: 90% accuracy in answering questions.  9/20: Recall of 8 pictures using visual / story imagery: Recall of 5/8 pictures following 2 minutes.   9/25: Recall of details of sentences, 10 immediately: 70% accuracy. 3 step following of commands:  100% accuracy, 4 step with repetition, 100% ONGOING, CONTINUE     To target mental manipulation and working memory, patient will participate in word finding activity (i.e., anagrams) with 80% accuracy.  9/4: Completed 80% accuracy, improved greatly with visualization cues. ACHIEVED, DISCONTINUE     Patient will answer questions regarding story read aloud with 80% accuracy to facilitate improved auditory comprehension and recall.  9/18: Patient listening to short paragraphs and answered questions with 100% accuracy.  9/20: Recall of information following reading of [place] Paragraph: 30% recall, patient not able to process and recall a large amount of information. Patient able to state back compensatory stratgies to utilize (writing down repetition) effectively.  NOT ACHIEVED, ONGOING AS APPROPRIATE     Patient will be educated on the use of internal and external memory aids and compensatory strategies with 80% accuracy to facilitate increased recall of routine, personal information, and recent events.  8/14: Completed education of testing results- patient expressed understanding.  Also, completed education of memory strategies and how to maximize memory - provided handout. Patient requesting for list of 2nd opinion Neurologists, provided list of contact / scheduling information since patient not able to use computer / online information.  Wife stated  with cognitive decline and encouraged her to review strategies with him and to perhaps schedule a cognitive evaluation also.  [facility] in the year. 9/13: Reviewed apps/workbooks list.  Provided word retrieval exercises.  ACHIEVED -DISCONTINUE        Long Term     Patient will complete cognitive-linguistic therapy that addresses patient's specific deficits in processing speed, short-term working memory, attention to detail, monitoring, sequencing, and organization skills, with instruction, to alleviate effects of executive functioning disorder deficits by discharge. CONTINUE     Patient will improve ability to facilitate cognitive function and communication skills including use of compensatory strategies in a variety of functional living tasks to improve quality of like and to maximize level of independence. CONTINUE       Plan:  -Continue with current plan of care.

## 2024-09-26 ENCOUNTER — TELEPHONE (OUTPATIENT)
Age: 74
End: 2024-09-26

## 2024-09-26 NOTE — TELEPHONE ENCOUNTER
Faxed last office visit note to fax number [phone] as requested. Called patient to notify her of such. Left voicemail

## 2024-09-26 NOTE — TELEPHONE ENCOUNTER
Pt called looking for office notes to be sent to her second opinion provider, she states that they are not sending separate request.    Provider is [facility]- Dr Juan Weaver at fax number .  Can we provide please?

## 2024-09-27 ENCOUNTER — OFFICE VISIT (OUTPATIENT)
Dept: SPEECH THERAPY | Facility: CLINIC | Age: 74
End: 2024-09-27
Payer: MEDICARE

## 2024-09-27 DIAGNOSIS — G91.2 NORMAL PRESSURE HYDROCEPHALUS (HCC): Primary | ICD-10-CM

## 2024-09-27 DIAGNOSIS — R41.841 COGNITIVE COMMUNICATION DEFICIT: ICD-10-CM

## 2024-09-27 DIAGNOSIS — R48.8 OTHER SYMBOLIC DYSFUNCTIONS: ICD-10-CM

## 2024-09-27 PROCEDURE — 97129 THER IVNTJ 1ST 15 MIN: CPT

## 2024-09-27 PROCEDURE — 97130 THER IVNTJ EA ADDL 15 MIN: CPT

## 2024-09-27 NOTE — PROGRESS NOTES
Daily Progress Note    Today's date: 2024   Patient’s name: Bozena Armstrong  : 1950  MRN: [id number]  Safety measures:   Referring provider: Petty Kim MD    Encounter Diagnosis     ICD-10-CM    1. Normal pressure hydrocephalus (HCC)  G91.2       2. Other symbolic dysfunctions  R48.8       3. Cognitive communication deficit  R41.841                 Visit Trackin    Subjective/Behavioral:  -Pleasant/Cooperative      Objective/Assessment:  Completed structured activities with patient to target goals below.  Results as stated below.     Having a 2nd opinion of Neuro surgery - [place] in October.  Patient noting difficulty with burning in throat/ reflux: provided education on reflux precautions.     Patient noted she will be away with  and friends to [place] Oct 19th week.              Short-term goals:     Patient will complete complex auditory attention processing tasks (e.g., sentence unscramble, ranking numbers/words, etc.) to improve working memory with 80% accuracy. : Sequential Directions: Able to follow 3-5 step dirctions: 80% accuracy, difficulty with ending 5 steps.      Patient will facilitate planning by completing thought organization tasks (e.g., sequencing, deduction puzzles, etc.) with 80% accuracy to facilitate increased executive functioning, working memory, problem solving, and processing skills.       Patient will complete auditory immediate and short term memory tasks to 80% accuracy to facilitate increased ability to retell narratives and recall information within functional living environment.  : Recall of 70% of details from visual sentences (10) provided from SLP.              Long Term     Patient will complete cognitive-linguistic therapy that addresses patient's specific deficits in processing speed, short-term working memory, attention to detail, monitoring, sequencing, and organization skills, with instruction, to alleviate effects of  executive functioning disorder deficits by discharge.      Patient will improve ability to facilitate cognitive function and communication skills including use of compensatory strategies in a variety of functional living tasks to improve quality of like and to maximize level of independence.        Plan:  -Continue with current plan of care.   ? Swallow eval and schedule (danielle) next session...

## 2024-10-02 ENCOUNTER — OFFICE VISIT (OUTPATIENT)
Facility: CLINIC | Age: 74
End: 2024-10-02
Payer: MEDICARE

## 2024-10-02 ENCOUNTER — OFFICE VISIT (OUTPATIENT)
Dept: SPEECH THERAPY | Facility: CLINIC | Age: 74
End: 2024-10-02
Payer: MEDICARE

## 2024-10-02 DIAGNOSIS — R26.81 GAIT INSTABILITY: ICD-10-CM

## 2024-10-02 DIAGNOSIS — R48.8 OTHER SYMBOLIC DYSFUNCTIONS: ICD-10-CM

## 2024-10-02 DIAGNOSIS — G91.2 NORMAL PRESSURE HYDROCEPHALUS (HCC): Primary | ICD-10-CM

## 2024-10-02 DIAGNOSIS — R26.89 BALANCE DISORDER: Primary | ICD-10-CM

## 2024-10-02 DIAGNOSIS — R41.841 COGNITIVE COMMUNICATION DEFICIT: ICD-10-CM

## 2024-10-02 PROCEDURE — 97112 NEUROMUSCULAR REEDUCATION: CPT

## 2024-10-02 PROCEDURE — 92507 TX SP LANG VOICE COMM INDIV: CPT

## 2024-10-02 PROCEDURE — 92610 EVALUATE SWALLOWING FUNCTION: CPT

## 2024-10-02 NOTE — PROGRESS NOTES
"Daily Note     Today's date: 10/2/2024  Patient name: Bozena Armstrong  : 1950  MRN: [id number]  Referring provider: Donnell Smith MD  Dx:   Encounter Diagnosis     ICD-10-CM    1. Balance disorder  R26.89       2. Gait instability  R26.81                              Subjective: Pt reports no falls over the past few weeks. She is going to the [facility] on  for a week. She is going to [place].     Objective: See treatment diary below      Assessment: Pt required occasional cueing for smooth direction with head turns today. Improved form with STS today. Held squat mechanics with KB lift practice due to increased knee p! Today. No trek pole needed for beam today, however, pt did exhibit occasional LOB.improved stability as per reduced sway on foam square today as compared to previous visits.  Pt will continue to benefit from skilled PT so that she can navigate her environment with increased safety and stability.       Plan: Continue per plan of care.      Precautions: Hx L TKA, Dystonia, Memory deficit, fall risk     EPOC 10/19/24    Manuals  9/4 9/11 9/18 9/25 10/2       6MWT  NV  1,296ft                                                  Neuro Re-Ed             Static balance              Dynamic Balance   Gait + HT Gait + HT Gait + HT  Gait HT 30ft x4, diagonal HT 30ft x2, HN 30ft x2    180* turn fwd/bwd 30ft x4 Gait HT 30ft x4 diagonal HT 30ft x2, HN, 30ft x 2    180* turn fwd/bwd 30ft x4       Compliant Surface   Foam EC 3x30\" holds    Foam beam navigation side stepping  Foam EC 3x30\" holds    Foam beam navigation side stepping  Foam EC 3x30\" holds    Foam beam navigation side stepping Foam beam lat step 8 laps CGA cues for head position,  fwd 1 trek pole 8 laps CGA Foam beam last step 8 laps CGA cues for head position, fwd 1 trek pole 8 laps CGA       SLS progression              Resisted amb              Narrow base of support              Tests and measures  6MWT           Jori " navigation   Mod step overs x6 laps Mod step overs x 6 laps  Med step through 8 laps CGA Med step through 8 laps CGA                    Education  NPH A+P, POC, walking program Importance of following up with physician about meclizine usage           Ther Ex             Sit <> Stand   3x10 10lb KB 3x10 10lb KB  3x10 10lb KB  3x10lb KB 3x10 10lb KB       Hip abd              Hip ext              HR/TR              Leg press              Squat mechanics with KB lift   2x10 10lb KB  2x10 10lb KB  2x10 10lb kb                                  Ther Activity                                       Gait Training                                       Modalities

## 2024-10-02 NOTE — PROGRESS NOTES
Daily Progress Note    Today's date: 10/2/2024   Patient’s name: Bozena Armstrong  : 1950  MRN: [id number]  Safety measures:   Referring provider: Petty Kim MD    Encounter Diagnosis     ICD-10-CM    1. Normal pressure hydrocephalus (HCC)  G91.2       2. Other symbolic dysfunctions  R48.8       3. Cognitive communication deficit  R41.841           Visit Trackin    Subjective/Behavioral:  -Pleasant/Cooperative      Objective/Assessment:  Completed structured activities with patient to target goals below.  Results as stated below.      Dysphagia Evaluation: Completed 10/2/24    -Reason for referral: Signs/symptoms of dysphagia    -Subjective report of swallowing difficulty: Coughing    -Difficulty swallowing: Liquids    -Current diet (solids): Regular  -Current diet (liquids): Thin  -Current pill intake method: with liquid  -Alternative Feeding Method?: No    -Facial appearance Symmetrical   -Mandible function Adequate ROM   -Dentition Adequate   -Labial function WFL   -Lingual function WFL   -Velar function Symmetrical   -Oral apraxia? Absent   -Vocal quality Clear/adequate   -Volitional cough Strong/productive   -Respiration WFL   -Drooling? No   -Tremor/involuntary movement? Not present     LIQUID CONSISTENCY TESTING:   Item(s) tested: (Thin) - water  Administered by: Cup, Straw, and Self-fed    Clinical findings:  -Oral phase impairments: N/A, patient WFL  -Pharyngeal phase impairments: N/A, patient WFL    Other notes: N/A    Strategies, attempts, and responses: small sips      SOLID CONSISTENCY TESTING:  Item(s) tested: (Regular, Mixed, and Puree) -  Administered by: Self-fed    Clinical findings:  -Oral phase impairments: oral residue after the swallow - Patient with tendency to take extra liquid prior to swallow is completed of solids.. she noted she coughs often because of this. Advised to clear oral cavity first and swallow. Then, swallow small sips of liquids. Patient able to complete  and will practice this at home. If not effective and coughing still occurring, consider VFSS.   -Pharyngeal phase impairments: delayed swallow initiation    Other notes: N/A    Strategies, attempts, and responses: small bites and small sips      -Factors affecting performance: None    -Safety concerns: Risk for aspiration if not using strategies    -Risk factors: GERD      SWALLOWING SAFETY PRECAUTIONS:  -Recommended solids: Regular    -Recommended liquids: Thin    -Recommended medication form: with liquid    -Frequent/thorough oral care     -Aspiration precautions   *Monitor for signs/symptoms concerning for aspiration (e.g., low grade fever, increase in WBC, change in chest x-ray, increased congestion, increased coughing with P.O. intake)    -Reflux precautions     -Strategies: Small sips and bites when eating and Slow rate, swallow between bites    -Positioning: Upright position during meals    -Compensatory strategies: complete / clear oral cavity and swallow solids THEN take small sips of liquid    -Supervision: Independent     -Referrals: None at this time.     Short-term goals:     Patient will complete complex auditory attention processing tasks (e.g., sentence unscramble, ranking numbers/words, etc.) to improve working memory with 80% accuracy. 9/27: Sequential Directions: Able to follow 3-5 step dirctions: 80% accuracy, difficulty with ending 5 steps.      Patient will facilitate planning by completing thought organization tasks (e.g., sequencing, deduction puzzles, etc.) with 80% accuracy to facilitate increased executive functioning, working memory, problem solving, and processing skills.  10/2: Completed Shantel's Schedule: 43% indep. Accuracy, improved to 100% with moderate cues helping with organizational strategies, using finger to guide line placement, crossing off lines, reading out loud, 1 sentence at at time.     Patient will complete auditory immediate and short term memory tasks to 80% accuracy to  facilitate increased ability to retell narratives and recall information within functional living environment.  9/27: Recall of 70% of details from visual sentences (10) provided from SLP.              Long Term     Patient will complete cognitive-linguistic therapy that addresses patient's specific deficits in processing speed, short-term working memory, attention to detail, monitoring, sequencing, and organization skills, with instruction, to alleviate effects of executive functioning disorder deficits by discharge.      Patient will improve ability to facilitate cognitive function and communication skills including use of compensatory strategies in a variety of functional living tasks to improve quality of like and to maximize level of independence.        Plan:  -Continue with current plan of care.

## 2024-10-03 ENCOUNTER — OFFICE VISIT (OUTPATIENT)
Dept: FAMILY MEDICINE CLINIC | Facility: CLINIC | Age: 74
End: 2024-10-03
Payer: MEDICARE

## 2024-10-03 VITALS
HEART RATE: 100 BPM | DIASTOLIC BLOOD PRESSURE: 76 MMHG | SYSTOLIC BLOOD PRESSURE: 112 MMHG | WEIGHT: 246 LBS | BODY MASS INDEX: 42 KG/M2 | TEMPERATURE: 99 F | OXYGEN SATURATION: 95 % | HEIGHT: 64 IN | RESPIRATION RATE: 20 BRPM

## 2024-10-03 DIAGNOSIS — R26.89 BALANCE DISORDER: ICD-10-CM

## 2024-10-03 DIAGNOSIS — R41.3 MEMORY DEFICIT: ICD-10-CM

## 2024-10-03 DIAGNOSIS — K21.9 GASTROESOPHAGEAL REFLUX DISEASE, UNSPECIFIED WHETHER ESOPHAGITIS PRESENT: ICD-10-CM

## 2024-10-03 DIAGNOSIS — I10 ESSENTIAL HYPERTENSION: Primary | ICD-10-CM

## 2024-10-03 DIAGNOSIS — D64.9 ANEMIA, UNSPECIFIED TYPE: ICD-10-CM

## 2024-10-03 DIAGNOSIS — E78.5 DYSLIPIDEMIA: ICD-10-CM

## 2024-10-03 PROCEDURE — 99214 OFFICE O/P EST MOD 30 MIN: CPT | Performed by: FAMILY MEDICINE

## 2024-10-03 PROCEDURE — G2211 COMPLEX E/M VISIT ADD ON: HCPCS | Performed by: FAMILY MEDICINE

## 2024-10-03 NOTE — PROGRESS NOTES
Bozena Armstrong 1950 female MRN: [id number]    Family Medicine Acute Visit    ASSESSMENT/PLAN  Problem List Items Addressed This Visit          Cardiovascular and Mediastinum    Essential hypertension - Primary     Well controlled today           Relevant Orders    Lipid panel    Comprehensive metabolic panel    CBC and differential    TSH, 3rd generation with Free T4 reflex    Iron Panel (Includes Ferritin, Iron Sat%, Iron, and TIBC)       Digestive    GERD (gastroesophageal reflux disease)     Offered medications  She declines at this time  Wishes trial of diet changes  She will let me know if symptoms are not improving             Blood    Anemia     Patient reports fatigue  Will recheck labs         Relevant Orders    CBC and differential    Iron Panel (Includes Ferritin, Iron Sat%, Iron, and TIBC)       Neurology/Sleep    Balance disorder     Continue follow up with neurology and therapy with PT         Memory deficit     Continue routine follow up with neurology             Other    Dyslipidemia    Relevant Orders    Lipid panel         Follow up for AWV     Future Appointments   Date Time Provider Department Center   10/4/2024  9:45 AM Jenelle Reveles, SLP [facility]   10/4/2024 10:30 AM Tushar Barrientos, PT [facility]   10/9/2024 11:00 AM Jenelle Reveles, SLP [facility]   10/9/2024 11:45 AM Tushar Barrientos, PT [facility]   10/11/2024  1:00 PM Jenelle Reveles, SLP [facility]   10/11/2024  2:15 PM Tushar Barrientos, PT [facility]   10/16/2024 11:00 AM Jenelle Reveles, SLP [facility]   10/16/2024 11:45 AM Tushar Barrientos, PT [facility]   10/18/2024 11:45 AM Tushar Barrientos, PT [facility]   10/30/2024 11:00 AM Tushar Barrientos, PT [facility]   10/30/2024 11:45 AM Jenelle Reveles, SLP [facility]   11/6/2024  4:00 PM Felix Heard MD [facility]   11/7/2024  4:15 PM BE US RBC 2  BE RBC US BE RBC   2025  2:00 PM Petty Kim MD [facility]   1/10/2025 12:20 PM DO JESUS Tompkins  [facility]          SUBJECTIVE  CC: Heartburn (At night ) and Follow-up (Would like to discuss meclizine-she stopped medication and reports she is feeling better )      HPI:  Bozena Armstrong is a 73 y.o. female who presents for heart burn  Was having trouble sleeping due to issues with her GERD      Review of Systems   Constitutional:  Negative for chills, fatigue and fever.   HENT:  Negative for congestion, postnasal drip, rhinorrhea and sinus pressure.    Eyes:  Negative for photophobia and visual disturbance.   Respiratory:  Negative for cough and shortness of breath.    Cardiovascular:  Negative for chest pain, palpitations and leg swelling.   Gastrointestinal:  Negative for abdominal pain, constipation, diarrhea, nausea and vomiting.   Genitourinary:  Negative for difficulty urinating and dysuria.   Musculoskeletal:  Negative for arthralgias and myalgias.   Skin:  Negative for color change and rash.   Neurological:  Negative for dizziness, weakness, light-headedness and headaches.       Historical Information   The patient history was reviewed as follows:  Past Medical History:   Diagnosis Date    Arthritis     Balance disorder     GERD (gastroesophageal reflux disease)     Hyperlipidemia     Hypertension     Peripheral neuropathy     Vertigo          Past Surgical History:   Procedure Laterality Date     SECTION      COLONOSCOPY      FL LUMBAR PUNCTURE THERAPEUTIC  2024    HYSTERECTOMY      HYSTEROSCOPY W/ ENDOMETRIAL ABLATION      JOINT REPLACEMENT      KNEE SURGERY      NV ARTHRP ACETBLR/PROX FEM PROSTC AGRFT/ALGRFT Right 2022    Procedure: ARTHROPLASTY HIP TOTAL ANTERIOR and all associated procedures;  Surgeon: Trisha Alonso MD;  Location:  MAIN OR;  Service: Orthopedics     Family History   Problem Relation Age of Onset    Arthritis Mother      Cancer Father     Heart disease Father     No Known Problems Sister     No Known Problems Daughter     No Known Problems Daughter     No Known Problems Daughter     No Known Problems Maternal Grandmother     No Known Problems Maternal Grandfather     No Known Problems Paternal Grandmother     No Known Problems Paternal Grandfather     Anemia Brother     No Known Problems Maternal Aunt     No Known Problems Maternal Aunt       Social History   Social History     Substance and Sexual Activity   Alcohol Use Never     Social History     Substance and Sexual Activity   Drug Use Never     Social History     Tobacco Use   Smoking Status Never    Passive exposure: Past   Smokeless Tobacco Never       Medications:     Current Outpatient Medications:     ascorbic acid (VITAMIN C) 500 MG tablet, Take 1 tablet (500 mg total) by mouth 2 (two) times a day, Disp: 60 tablet, Rfl: 0    celecoxib (CeleBREX) 100 mg capsule, TAKE ONE CAPSULE BY MOUTH TWICE A DAY, Disp: 60 capsule, Rfl: 5    cholecalciferol (VITAMIN D3) 1,000 units tablet, Take 1,000 Units by mouth daily, Disp: , Rfl:     Doxylamine Succinate, Sleep, (SLEEP AID PO), Take 25 mg by mouth, Disp: , Rfl:     DULoxetine (CYMBALTA) 60 mg delayed release capsule, TAKE ONE CAPSULE BY MOUTH AT BEDTIME, Disp: 30 capsule, Rfl: 5    Multiple Vitamins-Minerals (CENTRUM SILVER PO), Take by mouth, Disp: , Rfl:     potassium chloride (Klor-Con M20) 20 mEq tablet, TAKE ONE TABLET BY MOUTH EVERY DAY, Disp: 30 tablet, Rfl: 5    rosuvastatin (CRESTOR) 40 MG tablet, Take 1 tablet (40 mg total) by mouth daily, Disp: 90 tablet, Rfl: 3    vitamin B-12 (VITAMIN B-12) 1,000 mcg tablet, Take 1 tablet (1,000 mcg total) by mouth daily, Disp: 90 tablet, Rfl: 3    VITAMIN E PO, Take by mouth, Disp: , Rfl:     meclizine (ANTIVERT) 25 mg tablet, Take 1 tablet (25 mg total) by mouth every 8 (eight) hours as needed for dizziness (Patient not taking: Reported on 10/3/2024), Disp: 30 tablet, Rfl: 0     "meclizine (ANTIVERT) 25 mg tablet, TAKE ONE TABLET BY MOUTH EVERY 8 HOURS AS NEEDED FOR DIZZINESS (Patient not taking: Reported on 10/3/2024), Disp: 30 tablet, Rfl: 2    No Known Allergies    OBJECTIVE  Vitals:   Vitals:    10/03/24 1438   BP: 112/76   BP Location: Left arm   Patient Position: Sitting   Cuff Size: Large   Pulse: 100   Resp: 20   Temp: 99 °F (37.2 °C)   TempSrc: Tympanic   SpO2: 95%   Weight: 112 kg (246 lb)   Height: 5' 4\" (1.626 m)         Physical Exam  Constitutional:       Appearance: She is well-developed.   HENT:      Head: Normocephalic and atraumatic.   Eyes:      Pupils: Pupils are equal, round, and reactive to light.   Cardiovascular:      Rate and Rhythm: Normal rate and regular rhythm.      Heart sounds: Normal heart sounds.   Pulmonary:      Effort: Pulmonary effort is normal. No respiratory distress.      Breath sounds: Normal breath sounds. No wheezing.   Abdominal:      General: Bowel sounds are normal. There is no distension.      Palpations: Abdomen is soft.      Tenderness: There is no abdominal tenderness.   Musculoskeletal:         General: No tenderness. Normal range of motion.      Cervical back: Normal range of motion and neck supple.   Skin:     General: Skin is warm and dry.   Neurological:      General: No focal deficit present.      Mental Status: She is alert. Mental status is at baseline.   Psychiatric:         Behavior: Behavior normal.                    Hillary Grimm,     10/3/2024      "

## 2024-10-04 ENCOUNTER — OFFICE VISIT (OUTPATIENT)
Facility: CLINIC | Age: 74
End: 2024-10-04
Payer: MEDICARE

## 2024-10-04 ENCOUNTER — OFFICE VISIT (OUTPATIENT)
Dept: SPEECH THERAPY | Facility: CLINIC | Age: 74
End: 2024-10-04
Payer: MEDICARE

## 2024-10-04 DIAGNOSIS — R41.841 COGNITIVE COMMUNICATION DEFICIT: ICD-10-CM

## 2024-10-04 DIAGNOSIS — R26.89 BALANCE DISORDER: Primary | ICD-10-CM

## 2024-10-04 DIAGNOSIS — R26.81 GAIT INSTABILITY: ICD-10-CM

## 2024-10-04 DIAGNOSIS — G91.2 NORMAL PRESSURE HYDROCEPHALUS (HCC): Primary | ICD-10-CM

## 2024-10-04 DIAGNOSIS — R48.8 OTHER SYMBOLIC DYSFUNCTIONS: ICD-10-CM

## 2024-10-04 PROCEDURE — 97129 THER IVNTJ 1ST 15 MIN: CPT

## 2024-10-04 PROCEDURE — 97112 NEUROMUSCULAR REEDUCATION: CPT

## 2024-10-04 PROCEDURE — 97130 THER IVNTJ EA ADDL 15 MIN: CPT

## 2024-10-04 PROCEDURE — 97110 THERAPEUTIC EXERCISES: CPT

## 2024-10-04 NOTE — PROGRESS NOTES
"Daily Note     Today's date: 10/4/2024  Patient name: Bozena Armstrong  : 1950  MRN: [id number]  Referring provider: Donnell Smith MD  Dx:   Encounter Diagnosis     ICD-10-CM    1. Balance disorder  R26.89       2. Gait instability  R26.81                                Subjective: Pt doesn't have anything new to report. She has not had any falls. She doesn't like a slant.     Objective: See treatment diary below      Assessment: Pt noted some increased LE pain with STS today and noted increased tenderness over her knee. Pt was able to get all the way across the foam beam without LOB today x2. She reported no dizziness with that. Pt stated that she wants to improve her endurance so ambulation with AW were performed.  Pt exhibited occasional reduced foot clearance. Pt will continue to benefit from skilled PT so that she can navigate her environment with increased safety and stability.       Plan: Continue per plan of care.      Precautions: Hx L TKA, Dystonia, Memory deficit, fall risk     EPOC 10/19/24    Manuals  9/4 9/11 9/18 9/25 10/2 10/4      6MWT  NV  1,296ft                                                  Neuro Re-Ed             Static balance              Dynamic Balance   Gait + HT Gait + HT Gait + HT  Gait HT 30ft x4, diagonal HT 30ft x2, HN 30ft x2    180* turn fwd/bwd 30ft x4 Gait HT 30ft x4 diagonal HT 30ft x2, HN, 30ft x 2    180* turn fwd/bwd 30ft x4 Gait HT 30ft x 4 diagonal HT 30ft x2 HN, 30ft x2    180 turn fwd/bwd 30ft x4      Compliant Surface   Foam EC 3x30\" holds    Foam beam navigation side stepping  Foam EC 3x30\" holds    Foam beam navigation side stepping  Foam EC 3x30\" holds    Foam beam navigation side stepping Foam beam lat step 8 laps CGA cues for head position,  fwd 1 trek pole 8 laps CGA Foam beam last step 8 laps CGA cues for head position, fwd 1 trek pole 8 laps CGA Foam beam last step 8 laps CGA cues for head position, 8 laps CGA    Foam EC 3x30\"       SLS progression   "            Resisted amb              Narrow base of support              Tests and measures  6MWT           Jori navigation   Mod step overs x6 laps Mod step overs x 6 laps  Med step through 8 laps CGA Med step through 8 laps CGA Med step through 8 laps CGA                   Education  NPH A+P, POC, walking program Importance of following up with physician about meclizine usage           Ther Ex             Sit <> Stand   3x10 10lb KB 3x10 10lb KB  3x10 10lb KB  3x10lb KB 3x10 10lb KB 3x10 10lb KB      Hip abd              Hip ext              HR/TR              Leg press              Squat mechanics with KB lift   2x10 10lb KB  2x10 10lb KB  2x10 10lb kb        Resisted walking with AW       4# x4 laps on ortho side                   Ther Activity                                       Gait Training                                       Modalities

## 2024-10-04 NOTE — PROGRESS NOTES
Daily Progress Note    Today's date: 10/4/2024   Patient’s name: Bozena Armstrong  : 1950  MRN: [id number]  Safety measures:   Referring provider: Petty Kim MD    Encounter Diagnosis     ICD-10-CM    1. Normal pressure hydrocephalus (HCC)  G91.2       2. Other symbolic dysfunctions  R48.8       3. Cognitive communication deficit  R41.841           Visit Trackin    Subjective/Behavioral:  -Pleasant/Cooperative      Objective/Assessment:  Completed structured activities with patient to target goals below.  Results as stated below.      Dysphagia Evaluation: Completed 10/2/24    -Reason for referral: Signs/symptoms of dysphagia    -Subjective report of swallowing difficulty: Coughing    -Difficulty swallowing: Liquids    -Current diet (solids): Regular  -Current diet (liquids): Thin  -Current pill intake method: with liquid  -Alternative Feeding Method?: No    -Facial appearance Symmetrical   -Mandible function Adequate ROM   -Dentition Adequate   -Labial function WFL   -Lingual function WFL   -Velar function Symmetrical   -Oral apraxia? Absent   -Vocal quality Clear/adequate   -Volitional cough Strong/productive   -Respiration WFL   -Drooling? No   -Tremor/involuntary movement? Not present     LIQUID CONSISTENCY TESTING:   Item(s) tested: (Thin) - water  Administered by: Cup, Straw, and Self-fed    Clinical findings:  -Oral phase impairments: N/A, patient WFL  -Pharyngeal phase impairments: N/A, patient WFL    Other notes: N/A    Strategies, attempts, and responses: small sips      SOLID CONSISTENCY TESTING:  Item(s) tested: (Regular, Mixed, and Puree) -  Administered by: Self-fed    Clinical findings:  -Oral phase impairments: oral residue after the swallow - Patient with tendency to take extra liquid prior to swallow is completed of solids.. she noted she coughs often because of this. Advised to clear oral cavity first and swallow. Then, swallow small sips of liquids. Patient able to complete  and will practice this at home. If not effective and coughing still occurring, consider VFSS.   -Pharyngeal phase impairments: delayed swallow initiation    Other notes: N/A    Strategies, attempts, and responses: small bites and small sips      -Factors affecting performance: None    -Safety concerns: Risk for aspiration if not using strategies    -Risk factors: GERD      SWALLOWING SAFETY PRECAUTIONS:  -Recommended solids: Regular    -Recommended liquids: Thin    -Recommended medication form: with liquid    -Frequent/thorough oral care     -Aspiration precautions   *Monitor for signs/symptoms concerning for aspiration (e.g., low grade fever, increase in WBC, change in chest x-ray, increased congestion, increased coughing with P.O. intake)    -Reflux precautions     -Strategies: Small sips and bites when eating and Slow rate, swallow between bites    -Positioning: Upright position during meals    -Compensatory strategies: complete / clear oral cavity and swallow solids THEN take small sips of liquid    -Supervision: Independent     -Referrals: None at this time.     Short-term goals:     Patient will complete complex auditory attention processing tasks (e.g., sentence unscramble, ranking numbers/words, etc.) to improve working memory with 80% accuracy. 9/27: Sequential Directions: Able to follow 3-5 step dirctions: 80% accuracy, difficulty with ending 5 steps.      Patient will facilitate planning by completing thought organization tasks (e.g., sequencing, deduction puzzles, etc.) with 80% accuracy to facilitate increased executive functioning, working memory, problem solving, and processing skills.  10/2: Completed Shantel's Schedule: 43% indep. Accuracy, improved to 100% with moderate cues helping with organizational strategies, using finger to guide line placement, crossing off lines, reading out loud, 1 sentence at at time.  10/4: Completed furniture schedule schedule: 67% accuracy independ, required moderate  verbal / visual cues (moderate). Trained in process of elimination tasks: Moderate - max cues initially then decreased to moderate cues. Provided for hw.      Patient will complete auditory immediate and short term memory tasks to 80% accuracy to facilitate increased ability to retell narratives and recall information within functional living environment.  9/27: Recall of 70% of details from visual sentences (10) provided from SLP.         10/4: Word retrieval exercises, min cues to help assist with word retrieval, responsive naming tasks.      Long Term     Patient will complete cognitive-linguistic therapy that addresses patient's specific deficits in processing speed, short-term working memory, attention to detail, monitoring, sequencing, and organization skills, with instruction, to alleviate effects of executive functioning disorder deficits by discharge.      Patient will improve ability to facilitate cognitive function and communication skills including use of compensatory strategies in a variety of functional living tasks to improve quality of like and to maximize level of independence.        Plan:  -Continue with current plan of care.

## 2024-10-07 ENCOUNTER — APPOINTMENT (OUTPATIENT)
Dept: LAB | Facility: CLINIC | Age: 74
End: 2024-10-07
Payer: MEDICARE

## 2024-10-07 ENCOUNTER — TELEPHONE (OUTPATIENT)
Dept: FAMILY MEDICINE CLINIC | Facility: CLINIC | Age: 74
End: 2024-10-07

## 2024-10-07 DIAGNOSIS — D64.9 ANEMIA, UNSPECIFIED TYPE: ICD-10-CM

## 2024-10-07 DIAGNOSIS — E78.5 DYSLIPIDEMIA: ICD-10-CM

## 2024-10-07 DIAGNOSIS — M54.50 CHRONIC LOW BACK PAIN, UNSPECIFIED BACK PAIN LATERALITY, UNSPECIFIED WHETHER SCIATICA PRESENT: Primary | ICD-10-CM

## 2024-10-07 DIAGNOSIS — R26.89 IMBALANCE: ICD-10-CM

## 2024-10-07 DIAGNOSIS — I10 ESSENTIAL HYPERTENSION: ICD-10-CM

## 2024-10-07 DIAGNOSIS — G60.9 NEUROPATHY, IDIOPATHIC: ICD-10-CM

## 2024-10-07 DIAGNOSIS — G89.29 CHRONIC LOW BACK PAIN, UNSPECIFIED BACK PAIN LATERALITY, UNSPECIFIED WHETHER SCIATICA PRESENT: Primary | ICD-10-CM

## 2024-10-07 LAB
ALBUMIN SERPL BCG-MCNC: 4.1 G/DL (ref 3.5–5)
ALP SERPL-CCNC: 88 U/L (ref 34–104)
ALT SERPL W P-5'-P-CCNC: 21 U/L (ref 7–52)
ANION GAP SERPL CALCULATED.3IONS-SCNC: 9 MMOL/L (ref 4–13)
AST SERPL W P-5'-P-CCNC: 17 U/L (ref 13–39)
BASOPHILS # BLD AUTO: 0.05 THOUSANDS/ΜL (ref 0–0.1)
BASOPHILS NFR BLD AUTO: 1 % (ref 0–1)
BILIRUB SERPL-MCNC: 0.86 MG/DL (ref 0.2–1)
BUN SERPL-MCNC: 13 MG/DL (ref 5–25)
CALCIUM SERPL-MCNC: 9.2 MG/DL (ref 8.4–10.2)
CHLORIDE SERPL-SCNC: 100 MMOL/L (ref 96–108)
CHOLEST SERPL-MCNC: 315 MG/DL
CO2 SERPL-SCNC: 29 MMOL/L (ref 21–32)
CREAT SERPL-MCNC: 0.5 MG/DL (ref 0.6–1.3)
EOSINOPHIL # BLD AUTO: 0.1 THOUSAND/ΜL (ref 0–0.61)
EOSINOPHIL NFR BLD AUTO: 2 % (ref 0–6)
ERYTHROCYTE [DISTWIDTH] IN BLOOD BY AUTOMATED COUNT: 13.2 % (ref 11.6–15.1)
FERRITIN SERPL-MCNC: 88 NG/ML (ref 11–307)
GFR SERPL CREATININE-BSD FRML MDRD: 97 ML/MIN/1.73SQ M
GLUCOSE P FAST SERPL-MCNC: 104 MG/DL (ref 65–99)
HCT VFR BLD AUTO: 43.4 % (ref 34.8–46.1)
HDLC SERPL-MCNC: 51 MG/DL
HGB BLD-MCNC: 14.2 G/DL (ref 11.5–15.4)
IMM GRANULOCYTES # BLD AUTO: 0.02 THOUSAND/UL (ref 0–0.2)
IMM GRANULOCYTES NFR BLD AUTO: 0 % (ref 0–2)
IRON SATN MFR SERPL: 38 % (ref 15–50)
IRON SERPL-MCNC: 119 UG/DL (ref 50–212)
LDLC SERPL CALC-MCNC: 210 MG/DL (ref 0–100)
LYMPHOCYTES # BLD AUTO: 2.25 THOUSANDS/ΜL (ref 0.6–4.47)
LYMPHOCYTES NFR BLD AUTO: 36 % (ref 14–44)
MCH RBC QN AUTO: 30.8 PG (ref 26.8–34.3)
MCHC RBC AUTO-ENTMCNC: 32.7 G/DL (ref 31.4–37.4)
MCV RBC AUTO: 94 FL (ref 82–98)
MONOCYTES # BLD AUTO: 0.45 THOUSAND/ΜL (ref 0.17–1.22)
MONOCYTES NFR BLD AUTO: 7 % (ref 4–12)
NEUTROPHILS # BLD AUTO: 3.32 THOUSANDS/ΜL (ref 1.85–7.62)
NEUTS SEG NFR BLD AUTO: 54 % (ref 43–75)
NONHDLC SERPL-MCNC: 264 MG/DL
NRBC BLD AUTO-RTO: 0 /100 WBCS
PLATELET # BLD AUTO: 265 THOUSANDS/UL (ref 149–390)
PMV BLD AUTO: 10.7 FL (ref 8.9–12.7)
POTASSIUM SERPL-SCNC: 4.1 MMOL/L (ref 3.5–5.3)
PROT SERPL-MCNC: 6.8 G/DL (ref 6.4–8.4)
RBC # BLD AUTO: 4.61 MILLION/UL (ref 3.81–5.12)
SODIUM SERPL-SCNC: 138 MMOL/L (ref 135–147)
TIBC SERPL-MCNC: 315 UG/DL (ref 250–450)
TRIGL SERPL-MCNC: 270 MG/DL
TSH SERPL DL<=0.05 MIU/L-ACNC: 1.61 UIU/ML (ref 0.45–4.5)
UIBC SERPL-MCNC: 196 UG/DL (ref 155–355)
WBC # BLD AUTO: 6.19 THOUSAND/UL (ref 4.31–10.16)

## 2024-10-07 PROCEDURE — 83540 ASSAY OF IRON: CPT

## 2024-10-07 PROCEDURE — 83550 IRON BINDING TEST: CPT

## 2024-10-07 PROCEDURE — 80061 LIPID PANEL: CPT

## 2024-10-07 PROCEDURE — 36415 COLL VENOUS BLD VENIPUNCTURE: CPT

## 2024-10-07 PROCEDURE — 85025 COMPLETE CBC W/AUTO DIFF WBC: CPT

## 2024-10-07 PROCEDURE — 80053 COMPREHEN METABOLIC PANEL: CPT

## 2024-10-07 PROCEDURE — 82728 ASSAY OF FERRITIN: CPT

## 2024-10-07 PROCEDURE — 84443 ASSAY THYROID STIM HORMONE: CPT

## 2024-10-07 NOTE — TELEPHONE ENCOUNTER
Patient stopped up.  She said that PT told her she should see [facility] regarding her low back and knees.  She needs an ambulatory referral.  I told her I would leave a message for you.  Any questions, I can call her back.  Thank you.

## 2024-10-08 ENCOUNTER — IMMUNIZATIONS (OUTPATIENT)
Dept: FAMILY MEDICINE CLINIC | Facility: CLINIC | Age: 74
End: 2024-10-08
Payer: MEDICARE

## 2024-10-08 DIAGNOSIS — Z23 ENCOUNTER FOR IMMUNIZATION: Primary | ICD-10-CM

## 2024-10-08 PROCEDURE — G0008 ADMIN INFLUENZA VIRUS VAC: HCPCS

## 2024-10-08 PROCEDURE — 90662 IIV NO PRSV INCREASED AG IM: CPT

## 2024-10-09 ENCOUNTER — OFFICE VISIT (OUTPATIENT)
Dept: SPEECH THERAPY | Facility: CLINIC | Age: 74
End: 2024-10-09
Payer: MEDICARE

## 2024-10-09 ENCOUNTER — OFFICE VISIT (OUTPATIENT)
Facility: CLINIC | Age: 74
End: 2024-10-09
Payer: MEDICARE

## 2024-10-09 DIAGNOSIS — R26.89 BALANCE DISORDER: Primary | ICD-10-CM

## 2024-10-09 DIAGNOSIS — R41.841 COGNITIVE COMMUNICATION DEFICIT: ICD-10-CM

## 2024-10-09 DIAGNOSIS — R26.81 GAIT INSTABILITY: ICD-10-CM

## 2024-10-09 DIAGNOSIS — R48.8 OTHER SYMBOLIC DYSFUNCTIONS: ICD-10-CM

## 2024-10-09 DIAGNOSIS — G91.2 NORMAL PRESSURE HYDROCEPHALUS (HCC): Primary | ICD-10-CM

## 2024-10-09 PROCEDURE — 97112 NEUROMUSCULAR REEDUCATION: CPT

## 2024-10-09 PROCEDURE — 97129 THER IVNTJ 1ST 15 MIN: CPT

## 2024-10-09 PROCEDURE — 97130 THER IVNTJ EA ADDL 15 MIN: CPT

## 2024-10-09 PROCEDURE — 97110 THERAPEUTIC EXERCISES: CPT

## 2024-10-09 NOTE — PROGRESS NOTES
Daily Progress Note    Today's date: 10/9/2024   Patient’s name: Bozena Armstrong  : 1950  MRN: [id number]  Safety measures:   Referring provider: Petty Kim MD    Encounter Diagnosis     ICD-10-CM    1. Normal pressure hydrocephalus (HCC)  G91.2       2. Other symbolic dysfunctions  R48.8       3. Cognitive communication deficit  R41.841           Visit Trackin    Subjective/Behavioral:  -Pleasant/Cooperative  Patient noted she is having shunt now for NPH by Dr. Crooks on .  Patient noted she is having more incontience issues.      Objective/Assessment:  Completed structured activities with patient to target goals below.  Results as stated below.      Patient will complete complex auditory attention processing tasks (e.g., sentence unscramble, ranking numbers/words, etc.) to improve working memory with 80% accuracy. : Sequential Directions: Able to follow 3-5 step dirctions: 80% accuracy, difficulty with ending 5 steps.      Patient will facilitate planning by completing thought organization tasks (e.g., sequencing, deduction puzzles, etc.) with 80% accuracy to facilitate increased executive functioning, working memory, problem solving, and processing skills.  10/2: Completed Shantel's Schedule: 43% indep. Accuracy, improved to 100% with moderate cues helping with organizational strategies, using finger to guide line placement, crossing off lines, reading out loud, 1 sentence at at time.  10/4: Completed furniture schedule schedule: 67% accuracy independ, required moderate verbal / visual cues (moderate). Trained in process of elimination tasks: Moderate - max cues initially then decreased to moderate cues. Provided for hw. 10/9: Solving of owner/houses/process of elimination task: Moderate-max cues to assist, Min cues - moderate for reminders of strategies. Provided typed strategies paper for home practice.     Patient will complete auditory immediate and short term memory tasks to  80% accuracy to facilitate increased ability to retell narratives and recall information within functional living environment.  9/27: Recall of 70% of details from visual sentences (10) provided from SLP.         10/4: Word retrieval exercises, min cues to help assist with word retrieval, responsive naming tasks.      Long Term     Patient will complete cognitive-linguistic therapy that addresses patient's specific deficits in processing speed, short-term working memory, attention to detail, monitoring, sequencing, and organization skills, with instruction, to alleviate effects of executive functioning disorder deficits by discharge.      Patient will improve ability to facilitate cognitive function and communication skills including use of compensatory strategies in a variety of functional living tasks to improve quality of like and to maximize level of independence.        Plan:  -Continue with current plan of care.

## 2024-10-09 NOTE — PROGRESS NOTES
"Daily Note     Today's date: 10/9/2024  Patient name: Bozena Armstrong  : 1950  MRN: [id number]  Referring provider: Donnell Smith MD  Dx:   Encounter Diagnosis     ICD-10-CM    1. Balance disorder  R26.89       2. Gait instability  R26.81                                  Subjective: Pt states that she saw another neurosurgeon and she is going to have her shunt put in . She states she wants to skip her walking with head turns today because her neck arthritis is acting up.     Objective: See treatment diary below      Assessment: Pt participated in a skilled PT session focused on balance, gait, and strengthening. Pt required increased verbal cueing to maintain upright posture. Occasional forward LOB but this corrected with changes in posture. Increased postural sway on foam EC today. Pt will continue to benefit from skilled PT so that she can navigate her environment with increased safety and stability.       Plan: Continue per plan of care.      Precautions: Hx L TKA, Dystonia, Memory deficit, fall risk     EPOC 10/19/24    Manuals  9/4 9/11 9/18 9/25 10/2 10/4 10/9     6MWT  NV  1,296ft                                                  Neuro Re-Ed             Static balance              Dynamic Balance   Gait + HT Gait + HT Gait + HT  Gait HT 30ft x4, diagonal HT 30ft x2, HN 30ft x2    180* turn fwd/bwd 30ft x4 Gait HT 30ft x4 diagonal HT 30ft x2, HN, 30ft x 2    180* turn fwd/bwd 30ft x4 Gait HT 30ft x 4 diagonal HT 30ft x2 HN, 30ft x2    180 turn fwd/bwd 30ft x4 180 turn fwd/bwd      Compliant Surface   Foam EC 3x30\" holds    Foam beam navigation side stepping  Foam EC 3x30\" holds    Foam beam navigation side stepping  Foam EC 3x30\" holds    Foam beam navigation side stepping Foam beam lat step 8 laps CGA cues for head position,  fwd 1 trek pole 8 laps CGA Foam beam last step 8 laps CGA cues for head position, fwd 1 trek pole 8 laps CGA Foam beam last step 8 laps CGA cues for head position, " "8 laps CGA    Foam EC 3x30\"  Foam square 3x30\" holds EC     SLS progression              Resisted amb              Narrow base of support              Tests and measures  6MWT           Jori navigation   Mod step overs x6 laps Mod step overs x 6 laps  Med step through 8 laps CGA Med step through 8 laps CGA Med step through 8 laps CGA Med step through 8 laps CGA                   Education  NPH A+P, POC, walking program Importance of following up with physician about meclizine usage           Ther Ex             Sit <> Stand   3x10 10lb KB 3x10 10lb KB  3x10 10lb KB  3x10lb KB 3x10 10lb KB 3x10 10lb KB 3x10 10lb KB      Hip abd              Hip ext              HR/TR              Leg press              Squat mechanics with KB lift   2x10 10lb KB  2x10 10lb KB  2x10 10lb kb        Resisted walking with AW       4# x4 laps on ortho side 4# x5 laps on ortho side                   Ther Activity                                       Gait Training                                       Modalities                                            "

## 2024-10-11 ENCOUNTER — APPOINTMENT (OUTPATIENT)
Dept: SPEECH THERAPY | Facility: CLINIC | Age: 74
End: 2024-10-11
Payer: MEDICARE

## 2024-10-11 ENCOUNTER — APPOINTMENT (OUTPATIENT)
Facility: CLINIC | Age: 74
End: 2024-10-11
Payer: MEDICARE

## 2024-10-14 ENCOUNTER — TELEPHONE (OUTPATIENT)
Dept: FAMILY MEDICINE CLINIC | Facility: CLINIC | Age: 74
End: 2024-10-14

## 2024-10-14 NOTE — TELEPHONE ENCOUNTER
Name of Procedure:Right  sghunt with Laparoscopic assistance    Date of [id number]    Location of Procedure [facility] ass    Type of Anaesthesia unsure    Forms- in provider bin    Pre Op appt Date and Time and Provider 10-17-24 with Dr Grimm    Labs required?    EKG required?    Cardiac clearance needed?

## 2024-10-16 ENCOUNTER — APPOINTMENT (OUTPATIENT)
Facility: CLINIC | Age: 74
End: 2024-10-16
Payer: MEDICARE

## 2024-10-16 ENCOUNTER — APPOINTMENT (OUTPATIENT)
Dept: SPEECH THERAPY | Facility: CLINIC | Age: 74
End: 2024-10-16
Payer: MEDICARE

## 2024-10-17 ENCOUNTER — CONSULT (OUTPATIENT)
Dept: FAMILY MEDICINE CLINIC | Facility: CLINIC | Age: 74
End: 2024-10-17
Payer: MEDICARE

## 2024-10-17 VITALS
HEART RATE: 94 BPM | TEMPERATURE: 97.7 F | HEIGHT: 64 IN | SYSTOLIC BLOOD PRESSURE: 120 MMHG | DIASTOLIC BLOOD PRESSURE: 86 MMHG | BODY MASS INDEX: 41.73 KG/M2 | RESPIRATION RATE: 20 BRPM | WEIGHT: 244.4 LBS | OXYGEN SATURATION: 95 %

## 2024-10-17 DIAGNOSIS — I10 ESSENTIAL HYPERTENSION: ICD-10-CM

## 2024-10-17 DIAGNOSIS — Z01.818 PRE-OP EXAMINATION: Primary | ICD-10-CM

## 2024-10-17 DIAGNOSIS — G89.4 CHRONIC PAIN SYNDROME: ICD-10-CM

## 2024-10-17 DIAGNOSIS — E78.5 DYSLIPIDEMIA: ICD-10-CM

## 2024-10-17 DIAGNOSIS — G91.2 NPH (NORMAL PRESSURE HYDROCEPHALUS) (HCC): ICD-10-CM

## 2024-10-17 PROCEDURE — G2211 COMPLEX E/M VISIT ADD ON: HCPCS | Performed by: FAMILY MEDICINE

## 2024-10-17 PROCEDURE — 99214 OFFICE O/P EST MOD 30 MIN: CPT | Performed by: FAMILY MEDICINE

## 2024-10-17 NOTE — PROGRESS NOTES
[facility] PRE-OPERATIVE EVALUATION  [facility] - [facility]    NAME: Bozena Armstrong  AGE: 74 y.o. SEX: female  : 1950     DATE: 10/21/2024    [facility] Pre-Operative Evaluation      Chief Complaint: Pre-operative Evaluation     Surgery: Ventriculoperitoneal Shunt Placement with Laparoscopic assistance   Anticipated Date of Surgery: 24  Referring Provider: Dr. Bobby Crooks     History of Present Illness:     Bozena Armstrong is a 74 y.o. female who presents to the office today for a preoperative consultation at the request of surgeon, Dr. Crooks, who plans on performing  Shunt surgery on 24. Planned anesthesia is general. Patient has a bleeding risk of: no recent abnormal bleeding. Patient does not have objections to receiving blood products if needed. Current anti-platelet/anti-coagulation medications that the patient is prescribed includes:  none .      Assessment of Chronic Conditions:        Assessment of Cardiac Risk:  Denies unstable or severe angina or MI in the last 6 weeks or history of stent placement in the last year   Denies decompensated heart failure (e.g. New onset heart failure, NYHA functional class IV heart failure, or worsening existing heart failure)  Denies significant arrhythmias such as high grade AV block, symptomatic ventricular arrhythmia, newly recognized ventricular tachycardia, supraventricular tachycardia with resting heart rate >100, or symptomatic bradycardia  Denies severe heart valve disease including aortic stenosis or symptomatic mitral stenosis     Exercise Capacity:  Able to walk 4 blocks without symptoms?: Yes  Able to walk 2 flights without symptoms?: Yes    Prior Anesthesia Reactions: No     Personal history of venous thromboembolic disease? No    History of steroid use for >2 weeks within last year? No         Review of Systems:     Review of Systems   Constitutional:  Negative for chills, fatigue and  fever.   HENT:  Negative for congestion, postnasal drip, rhinorrhea and sinus pressure.    Eyes:  Negative for photophobia and visual disturbance.   Respiratory:  Negative for cough and shortness of breath.    Cardiovascular:  Negative for chest pain, palpitations and leg swelling.   Gastrointestinal:  Negative for abdominal pain, constipation, diarrhea, nausea and vomiting.   Genitourinary:  Negative for difficulty urinating and dysuria.   Musculoskeletal:  Negative for arthralgias and myalgias.   Skin:  Negative for color change and rash.   Neurological:  Negative for dizziness, weakness, light-headedness and headaches.       Current Problem List:     Patient Active Problem List   Diagnosis    Anemia    Constipation    Dyslipidemia    Essential hypertension    History of total knee arthroplasty, left    Osteoarthritis of knee, unilateral    Prediabetes    Dystonia    Chronic right hip pain    Primary osteoarthritis of right hip    Chronic bilateral low back pain without sciatica    Chronic pain syndrome    Obesity (BMI 35.0-39.9 without comorbidity)    Status post total hip replacement, right    Chronic pain of both shoulders    Obesity, morbid (HCC)    Sciatica    Neuropathy    Balance disorder    Memory deficit    Urinary incontinence    GERD (gastroesophageal reflux disease)    NPH (normal pressure hydrocephalus) ([facility])    Pre-op examination       Allergies:     No Known Allergies    Current Medications:       Current Outpatient Medications:     ascorbic acid (VITAMIN C) 500 MG tablet, Take 1 tablet (500 mg total) by mouth 2 (two) times a day, Disp: 60 tablet, Rfl: 0    celecoxib (CeleBREX) 100 mg capsule, TAKE ONE CAPSULE BY MOUTH TWICE A DAY, Disp: 60 capsule, Rfl: 5    cholecalciferol (VITAMIN D3) 1,000 units tablet, Take 1,000 Units by mouth daily, Disp: , Rfl:     Doxylamine Succinate, Sleep, (SLEEP AID PO), Take 25 mg by mouth, Disp: , Rfl:     DULoxetine (CYMBALTA) 60 mg delayed release capsule, TAKE ONE  CAPSULE BY MOUTH AT BEDTIME, Disp: 30 capsule, Rfl: 5    Multiple Vitamins-Minerals (CENTRUM SILVER PO), Take by mouth, Disp: , Rfl:     potassium chloride (Klor-Con M20) 20 mEq tablet, TAKE ONE TABLET BY MOUTH EVERY DAY, Disp: 30 tablet, Rfl: 5    rosuvastatin (CRESTOR) 40 MG tablet, Take 1 tablet (40 mg total) by mouth daily, Disp: 90 tablet, Rfl: 3    vitamin B-12 (VITAMIN B-12) 1,000 mcg tablet, Take 1 tablet (1,000 mcg total) by mouth daily, Disp: 90 tablet, Rfl: 3    VITAMIN E PO, Take by mouth, Disp: , Rfl:     meclizine (ANTIVERT) 25 mg tablet, Take 1 tablet (25 mg total) by mouth every 8 (eight) hours as needed for dizziness (Patient not taking: Reported on 10/3/2024), Disp: 30 tablet, Rfl: 0    meclizine (ANTIVERT) 25 mg tablet, TAKE ONE TABLET BY MOUTH EVERY 8 HOURS AS NEEDED FOR DIZZINESS (Patient not taking: Reported on 10/3/2024), Disp: 30 tablet, Rfl: 2    Past Medical History:       Past Medical History:   Diagnosis Date    Arthritis     Balance disorder     GERD (gastroesophageal reflux disease)     Hyperlipidemia     Hypertension     Peripheral neuropathy     Vertigo         Past Surgical History:   Procedure Laterality Date     SECTION      COLONOSCOPY      FL LUMBAR PUNCTURE THERAPEUTIC  2024    HYSTERECTOMY      HYSTEROSCOPY W/ ENDOMETRIAL ABLATION      JOINT REPLACEMENT      KNEE SURGERY      AL ARTHRP ACETBLR/PROX FEM PROSTC AGRFT/ALGRFT Right 2022    Procedure: ARTHROPLASTY HIP TOTAL ANTERIOR and all associated procedures;  Surgeon: Trisha Alonso MD;  Location: [facility];  Service: Orthopedics        Family History   Problem Relation Age of Onset    Arthritis Mother     Cancer Father     Heart disease Father     No Known Problems Sister     No Known Problems Daughter     No Known Problems Daughter     No Known Problems Daughter     No Known Problems Maternal Grandmother     No Known Problems Maternal Grandfather     No Known Problems Paternal Grandmother     No Known  "Problems Paternal Grandfather     Anemia Brother     No Known Problems Maternal Aunt     No Known Problems Maternal Aunt         Social History     Socioeconomic History    Marital status: /Civil Union     Spouse name: Not on file    Number of children: Not on file    Years of education: Not on file    Highest education level: Not on file   Occupational History    Not on file   Tobacco Use    Smoking status: Never     Passive exposure: Past    Smokeless tobacco: Never   Vaping Use    Vaping status: Never Used   Substance and Sexual Activity    Alcohol use: Never    Drug use: Never    Sexual activity: Not Currently   Other Topics Concern    Not on file   Social History Narrative    Not on file     Social Determinants of Health     Financial Resource Strain: Low Risk  (12/19/2023)    Overall Financial Resource Strain (CARDIA)     Difficulty of Paying Living Expenses: Not very hard   Food Insecurity: Not on file   Transportation Needs: No Transportation Needs (12/19/2023)    PRAPARE - Transportation     Lack of Transportation (Medical): No     Lack of Transportation (Non-Medical): No   Physical Activity: Not on file   Stress: Not on file   Social Connections: Not on file   Intimate Partner Violence: Not At Risk (12/19/2023)    Humiliation, Afraid, Rape, and Kick questionnaire     Fear of Current or Ex-Partner: No     Emotionally Abused: No     Physically Abused: No     Sexually Abused: No   Housing Stability: Not on file        Physical Exam:     /86 (BP Location: Left arm, Patient Position: Sitting, Cuff Size: Large)   Pulse 94   Temp 97.7 °F (36.5 °C) (Tympanic)   Resp 20   Ht 5' 4\" (1.626 m)   Wt 111 kg (244 lb 6.4 oz)   SpO2 95%   BMI 41.95 kg/m²     Physical Exam  Constitutional:       Appearance: She is well-developed.   HENT:      Head: Normocephalic and atraumatic.   Eyes:      Pupils: Pupils are equal, round, and reactive to light.   Cardiovascular:      Rate and Rhythm: Normal rate and " regular rhythm.      Heart sounds: Normal heart sounds.   Pulmonary:      Effort: Pulmonary effort is normal. No respiratory distress.      Breath sounds: Normal breath sounds. No wheezing.   Abdominal:      General: Bowel sounds are normal. There is no distension.      Palpations: Abdomen is soft.      Tenderness: There is no abdominal tenderness.   Musculoskeletal:         General: No tenderness. Normal range of motion.      Cervical back: Normal range of motion and neck supple.   Skin:     General: Skin is warm and dry.   Neurological:      Mental Status: She is alert and oriented to person, place, and time.   Psychiatric:         Behavior: Behavior normal.          Data:     Pre-operative work-up    Laboratory Results: I have personally reviewed the pertinent laboratory results/reports                Assessment & Recommendations:     1. Pre-op examination        2. NPH (normal pressure hydrocephalus) (HCC)        3. Dyslipidemia        4. Chronic pain syndrome        5. Essential hypertension            Pre-Op Evaluation Assessment  74 y.o. female with planned surgery:  Ventriculoperitoneal Shunt Placement with Laparoscopic assistance       Pre-Op Evaluation Plan  1. Further preoperative workup as follows:   - None; no further preoperative work-up is required    2. Medication Management/Recommendations:   - Patient has been instructed to avoid aspirin containing medications or non-steroidal anti-inflammatory drugs for the week preceding surgery.    3. Prophylaxis for cardiac events with perioperative beta-blockers: not indicated.    4. Patient requires further consultation with: None    Clearance  Patient is CLEARED for surgery without any additional cardiac testing.  See Attached for clearance from patient's cardiologist.     Hillary Grimm DO  [address] [address]  Phone#  [phone]  Fax#  [phone]

## 2024-10-18 ENCOUNTER — TELEPHONE (OUTPATIENT)
Age: 74
End: 2024-10-18

## 2024-10-18 ENCOUNTER — APPOINTMENT (OUTPATIENT)
Facility: CLINIC | Age: 74
End: 2024-10-18
Payer: MEDICARE

## 2024-10-18 NOTE — TELEPHONE ENCOUNTER
[facility] will like a copy of the patient lab and office note and any other ultra sound for the patient due to appointment she has with the practice. Please send to [phone] Attn: Nataliia. Thank you so much

## 2024-10-21 ENCOUNTER — TELEPHONE (OUTPATIENT)
Age: 74
End: 2024-10-21

## 2024-10-21 ENCOUNTER — TELEPHONE (OUTPATIENT)
Dept: FAMILY MEDICINE CLINIC | Facility: CLINIC | Age: 74
End: 2024-10-21

## 2024-10-21 NOTE — TELEPHONE ENCOUNTER
Dr Office called on pt having surgery next week, office had called last week, lost call. Await for call back.

## 2024-10-21 NOTE — TELEPHONE ENCOUNTER
Spoke with Parvin one of the Cardiology Nurses she stated she is going to start a letter and send it to Dr Genao to confirm if patient is cleared for her surgery on 11/08/2024.

## 2024-10-22 ENCOUNTER — TELEPHONE (OUTPATIENT)
Age: 74
End: 2024-10-22

## 2024-10-22 NOTE — TELEPHONE ENCOUNTER
Caller: Eloise with [facility]    Doctor/Office: Dr Genao/Markie    #: [phone]      What needs to be faxed: copy of EKG     ATTN to: Eloise    Fax#: [phone]

## 2024-10-23 ENCOUNTER — APPOINTMENT (OUTPATIENT)
Facility: CLINIC | Age: 74
End: 2024-10-23
Payer: MEDICARE

## 2024-10-23 ENCOUNTER — APPOINTMENT (OUTPATIENT)
Dept: SPEECH THERAPY | Facility: CLINIC | Age: 74
End: 2024-10-23
Payer: MEDICARE

## 2024-10-25 ENCOUNTER — APPOINTMENT (OUTPATIENT)
Facility: CLINIC | Age: 74
End: 2024-10-25
Payer: MEDICARE

## 2024-10-25 ENCOUNTER — APPOINTMENT (OUTPATIENT)
Dept: SPEECH THERAPY | Facility: CLINIC | Age: 74
End: 2024-10-25
Payer: MEDICARE

## 2024-10-28 ENCOUNTER — TELEPHONE (OUTPATIENT)
Age: 74
End: 2024-10-28

## 2024-10-28 NOTE — TELEPHONE ENCOUNTER
Pt rep from [facility] is requesting all notes/images from pre-op clearance appt 11/1/24 to please be faxed to fax # [phone], thank you.

## 2024-10-30 ENCOUNTER — APPOINTMENT (OUTPATIENT)
Dept: SPEECH THERAPY | Facility: CLINIC | Age: 74
End: 2024-10-30
Payer: MEDICARE

## 2024-10-30 ENCOUNTER — APPOINTMENT (OUTPATIENT)
Facility: CLINIC | Age: 74
End: 2024-10-30
Payer: MEDICARE

## 2024-11-01 ENCOUNTER — LAB (OUTPATIENT)
Dept: LAB | Facility: CLINIC | Age: 74
End: 2024-11-01
Payer: MEDICARE

## 2024-11-01 ENCOUNTER — CONSULT (OUTPATIENT)
Dept: FAMILY MEDICINE CLINIC | Facility: CLINIC | Age: 74
End: 2024-11-01
Payer: MEDICARE

## 2024-11-01 VITALS
BODY MASS INDEX: 41.48 KG/M2 | SYSTOLIC BLOOD PRESSURE: 130 MMHG | HEIGHT: 64 IN | OXYGEN SATURATION: 94 % | HEART RATE: 89 BPM | WEIGHT: 243 LBS | DIASTOLIC BLOOD PRESSURE: 75 MMHG | TEMPERATURE: 98.3 F | RESPIRATION RATE: 16 BRPM

## 2024-11-01 DIAGNOSIS — G91.2 NPH (NORMAL PRESSURE HYDROCEPHALUS) (HCC): ICD-10-CM

## 2024-11-01 DIAGNOSIS — R73.03 PREDIABETES: ICD-10-CM

## 2024-11-01 DIAGNOSIS — E66.01 OBESITY, MORBID (HCC): ICD-10-CM

## 2024-11-01 DIAGNOSIS — G91.2 NPH (NORMAL PRESSURE HYDROCEPHALUS) (HCC): Primary | ICD-10-CM

## 2024-11-01 DIAGNOSIS — I10 ESSENTIAL HYPERTENSION: ICD-10-CM

## 2024-11-01 DIAGNOSIS — E78.5 DYSLIPIDEMIA: ICD-10-CM

## 2024-11-01 LAB
ATRIAL RATE: 85 BPM
P AXIS: -13 DEGREES
PR INTERVAL: 112 MS
QRS AXIS: 14 DEGREES
QRSD INTERVAL: 72 MS
QT INTERVAL: 372 MS
QTC INTERVAL: 442 MS
T WAVE AXIS: 24 DEGREES
VENTRICULAR RATE: 85 BPM

## 2024-11-01 PROCEDURE — 99213 OFFICE O/P EST LOW 20 MIN: CPT | Performed by: STUDENT IN AN ORGANIZED HEALTH CARE EDUCATION/TRAINING PROGRAM

## 2024-11-01 PROCEDURE — 93010 ELECTROCARDIOGRAM REPORT: CPT | Performed by: INTERNAL MEDICINE

## 2024-11-01 PROCEDURE — 93005 ELECTROCARDIOGRAM TRACING: CPT

## 2024-11-01 PROCEDURE — G2211 COMPLEX E/M VISIT ADD ON: HCPCS | Performed by: STUDENT IN AN ORGANIZED HEALTH CARE EDUCATION/TRAINING PROGRAM

## 2024-11-01 NOTE — PROGRESS NOTES
Pre-operative Clearance  Name: Bozena Armstrong      : 1950      MRN: [id number]  Encounter Provider: Dilcia Saha MD  Encounter Date: 2024   Encounter department: [facility]    Assessment & Plan  NPH (normal pressure hydrocephalus) (HCC)  73 yo F w/ dmitriy of HTN, NPH, GERD, anemia, HLD, preDM presenting for preoperative assessment for ventriculperitoneal shunt for NPH on 2024    The surgery specific risk of cardiovascular complication including MI and cardiac death is 0.3% using the Caba surgical risk calculator for preoperative myocardial infarction or cardiac  arrest.     RCRI score of 1.3%    Pt endorses a good functional status (METs >4) w/ DASI score of >6 METs    Moderate risk patient for a moderate risk procedure.     This was discussed with patient and patient agreed to continue with surgery.     Patient aware to stop NSAIDs and OTC supplements/vitamins 7 days before surgery.     Labs 2024 wnl; renal function normal   Pending EKG     Orders:    ECG 12 lead; Future      Essential hypertension           Prediabetes           Dyslipidemia           Obesity, morbid (HCC)            Pre-operative Clearance:     Revised Cardiac Risk Index:  RCI RISK CLASS II (1 risk factor, risk of major cardiac complications approximately 1.3%)    Clearance:  Patient is medically optimized (CLEARED) for proposed surgery without any additional cardiac testing.      Medication Instructions:   - Avoid herbs or non-directed vitamins one week prior to surgery    - Avoid aspirin containing medications or non-steroidal anti-inflammatory drugs one week preceding surgery    - Antidepressants: Continue to take this medication on your normal schedule.  - Hyperlipidemia meds: Continue to take this medication on your normal schedule.       History of Present Illness     73 yo F w/ dmitriy of HTN, NPH, GERD, anemia, HLD, preDM presenting for preoperative assessment for ventriculperitoneal  shunt for NPH on 08Nov2024    Anesthesia: Generalized     Denies any recent fevers, chills, SOB, COLINDRES, CP, GI sx.     Pre-op Exam  Surgery: ventriculperitoneal shunt for NPH  Anticipated Date of Surgery: 08Nov2024  Surgeon: Maurisio Crooks    Previous history of bleeding disorders or clots?: No  Previous Anesthesia reaction?: No  Prolonged steroid use in the last 6 months?: No    Assessment of Cardiac Risk:   - Unstable or severe angina or MI in the last 6 weeks or history of stent placement in the last year?: No   - Decompensated heart failure (e.g. New onset heart failure, NYHA  Class IV heart failure, or worsening existing heart failure)?: No  - Significant arrhythmias such as high grade AV block, symptomatic ventricular arrhythmia, newly recognized ventricular tachycardia, supraventricular tachycardia with resting heart rate >100, or symptomatic bradycardia?: No  - Severe heart valve disease including aortic stenosis or symptomatic mitral stenosis?: No      Pre-operative Risk Factors:  Elevated-risk surgery: Yes    History of cerebrovascular disease: No    History of ischemic heart disease: No  Pre-operative treatment with insulin: No  Pre-operative creatinine >2 mg/dL: No    History of congestive heart failure: No    Duke Activity Status Index (DASI):   DASI Total Score: 18.95  METs: 5.1    Review of Systems   Constitutional:  Negative for chills and fever.   HENT:  Negative for facial swelling.    Eyes:  Negative for visual disturbance.   Respiratory:  Negative for cough and shortness of breath.    Cardiovascular:  Negative for chest pain and palpitations.   Gastrointestinal:  Negative for abdominal pain and vomiting.   Genitourinary:  Negative for dysuria and hematuria.   Musculoskeletal:  Negative for arthralgias and back pain.   Skin:  Negative for color change and rash.   Neurological:  Negative for syncope and weakness.   All other systems reviewed and are negative.    Past Medical History   Past  Medical History:   Diagnosis Date    Arthritis     Balance disorder     GERD (gastroesophageal reflux disease)     Hyperlipidemia     Hypertension     Peripheral neuropathy     Vertigo      Past Surgical History:   Procedure Laterality Date     SECTION      COLONOSCOPY      FL LUMBAR PUNCTURE THERAPEUTIC  2024    HYSTERECTOMY      HYSTEROSCOPY W/ ENDOMETRIAL ABLATION      JOINT REPLACEMENT      KNEE SURGERY      [place] ARTHRP ACETBLR/PROX FEM PROSTC AGRFT/ALGRFT Right 2022    Procedure: ARTHROPLASTY HIP TOTAL ANTERIOR and all associated procedures;  Surgeon: Trisha Alonso MD;  Location:  MAIN OR;  Service: Orthopedics     Family History   Problem Relation Age of Onset    Arthritis Mother     Cancer Father     Heart disease Father     No Known Problems Sister     No Known Problems Daughter     No Known Problems Daughter     No Known Problems Daughter     No Known Problems Maternal Grandmother     No Known Problems Maternal Grandfather     No Known Problems Paternal Grandmother     No Known Problems Paternal Grandfather     Anemia Brother     No Known Problems Maternal Aunt     No Known Problems Maternal Aunt      Social History     Tobacco Use    Smoking status: Never     Passive exposure: Past    Smokeless tobacco: Never   Vaping Use    Vaping status: Never Used   Substance and Sexual Activity    Alcohol use: Never    Drug use: Never    Sexual activity: Not Currently     Current Outpatient Medications on File Prior to Visit   Medication Sig    ascorbic acid (VITAMIN C) 500 MG tablet Take 1 tablet (500 mg total) by mouth 2 (two) times a day    celecoxib (CeleBREX) 100 mg capsule TAKE ONE CAPSULE BY MOUTH TWICE A DAY    cholecalciferol (VITAMIN D3) 1,000 units tablet Take 1,000 Units by mouth daily    Doxylamine Succinate, Sleep, (SLEEP AID PO) Take 25 mg by mouth    DULoxetine (CYMBALTA) 60 mg delayed release capsule TAKE ONE CAPSULE BY MOUTH AT BEDTIME    Multiple Vitamins-Minerals (CENTRUM  "SILVER PO) Take by mouth    potassium chloride (Klor-Con M20) 20 mEq tablet TAKE ONE TABLET BY MOUTH EVERY DAY    rosuvastatin (CRESTOR) 40 MG tablet Take 1 tablet (40 mg total) by mouth daily    vitamin B-12 (VITAMIN B-12) 1,000 mcg tablet Take 1 tablet (1,000 mcg total) by mouth daily    VITAMIN E PO Take by mouth    meclizine (ANTIVERT) 25 mg tablet Take 1 tablet (25 mg total) by mouth every 8 (eight) hours as needed for dizziness (Patient not taking: Reported on 10/3/2024)    meclizine (ANTIVERT) 25 mg tablet TAKE ONE TABLET BY MOUTH EVERY 8 HOURS AS NEEDED FOR DIZZINESS (Patient not taking: Reported on 10/3/2024)     No Known Allergies  Objective     BP (!) 171/80   Pulse 89   Temp 98.3 °F (36.8 °C) (Tympanic)   Resp 16   Ht 5' 4\" (1.626 m)   Wt 110 kg (243 lb)   SpO2 94%   BMI 41.71 kg/m²     Physical Exam  Vitals and nursing note reviewed.   Constitutional:       General: She is not in acute distress.     Appearance: Normal appearance. She is well-developed.   HENT:      Head: Normocephalic and atraumatic.      Nose: Nose normal.      Mouth/Throat:      Mouth: Mucous membranes are moist.      Pharynx: Oropharynx is clear.   Eyes:      Conjunctiva/sclera: Conjunctivae normal.   Cardiovascular:      Rate and Rhythm: Normal rate and regular rhythm.      Pulses: Normal pulses.      Heart sounds: Normal heart sounds. No murmur heard.  Pulmonary:      Effort: Pulmonary effort is normal. No respiratory distress.      Breath sounds: Normal breath sounds.   Abdominal:      General: Bowel sounds are normal. There is no distension.      Palpations: Abdomen is soft.      Tenderness: There is no abdominal tenderness.   Musculoskeletal:         General: No swelling. Normal range of motion.      Cervical back: Normal range of motion and neck supple.   Skin:     General: Skin is warm and dry.      Capillary Refill: Capillary refill takes less than 2 seconds.   Neurological:      Mental Status: She is alert. "   Psychiatric:         Mood and Affect: Mood normal.       Administrative Statements   I have spent a total time of 20 minutes in caring for this patient on the day of the visit/encounter including Diagnostic results, Prognosis, Risks and benefits of tx options, Counseling / Coordination of care, Documenting in the medical record, and Reviewing / ordering tests, medicine, procedures  .    Dilcia Saha MD

## 2024-11-01 NOTE — ASSESSMENT & PLAN NOTE
73 yo F w/ dmitriy of HTN, NPH, GERD, anemia, HLD, preDM presenting for preoperative assessment for ventriculperitoneal shunt for NPH on 08Nov2024    The surgery specific risk of cardiovascular complication including MI and cardiac death is 0.3% using the Caba surgical risk calculator for preoperative myocardial infarction or cardiac  arrest.     RCRI score of 1.3%    Pt endorses a good functional status (METs >4) w/ DASI score of >6 METs    Moderate risk patient for a moderate risk procedure.     This was discussed with patient and patient agreed to continue with surgery.     Patient aware to stop NSAIDs and OTC supplements/vitamins 7 days before surgery.     Labs 07Oct2024 wnl; renal function normal   Pending EKG     Orders:    ECG 12 lead; Future

## 2024-11-01 NOTE — PATIENT INSTRUCTIONS
Pre-operative Medication Instructions    Avoid herbs or non-directed vitamins one week prior to surgery  Avoid aspirin containing medications or non-steroidal anti-inflammatory drugs one week preceding surgery  May take tylenol for pain up until the night before surgery    Antidepressant Meds     Medication Name     DULoxetine (CYMBALTA) 60 mg delayed release capsule      Continue to take this medication on your normal schedule.  If this is an oral medication and you take it in the morning, then you may take this medicine with a sip of water.    Cholesterol lowering meds     Medication Name     rosuvastatin (CRESTOR) 40 MG tablet      Continue to take this medication on your normal schedule.  If this is an oral medication and you take it in the morning, then you may take this medicine with a sip of water.

## 2024-11-07 ENCOUNTER — HOSPITAL ENCOUNTER (OUTPATIENT)
Dept: ULTRASOUND IMAGING | Facility: CLINIC | Age: 74
Discharge: HOME | End: 2024-11-07
Payer: MEDICARE

## 2024-11-07 DIAGNOSIS — R92.8 ABNORMAL FINDINGS ON DIAGNOSTIC IMAGING OF BREAST: ICD-10-CM

## 2024-11-07 PROCEDURE — 76642 ULTRASOUND BREAST LIMITED: CPT

## 2024-11-08 DIAGNOSIS — G89.4 CHRONIC PAIN SYNDROME: ICD-10-CM

## 2024-11-08 DIAGNOSIS — M16.11 PRIMARY OSTEOARTHRITIS OF RIGHT HIP: ICD-10-CM

## 2024-11-08 DIAGNOSIS — R92.8 ABNORMAL ULTRASOUND OF BREAST: Primary | ICD-10-CM

## 2024-11-08 RX ORDER — DULOXETIN HYDROCHLORIDE 60 MG/1
CAPSULE, DELAYED RELEASE ORAL
Qty: 30 CAPSULE | Refills: 5 | Status: SHIPPED | OUTPATIENT
Start: 2024-11-08

## 2024-11-25 ENCOUNTER — CONSULT (OUTPATIENT)
Dept: FAMILY MEDICINE CLINIC | Facility: CLINIC | Age: 74
End: 2024-11-25
Payer: MEDICARE

## 2024-11-25 ENCOUNTER — TELEPHONE (OUTPATIENT)
Dept: FAMILY MEDICINE CLINIC | Facility: CLINIC | Age: 74
End: 2024-11-25

## 2024-11-25 VITALS
RESPIRATION RATE: 20 BRPM | WEIGHT: 248.2 LBS | SYSTOLIC BLOOD PRESSURE: 140 MMHG | TEMPERATURE: 98.4 F | OXYGEN SATURATION: 96 % | BODY MASS INDEX: 42.37 KG/M2 | DIASTOLIC BLOOD PRESSURE: 90 MMHG | HEIGHT: 64 IN | HEART RATE: 110 BPM

## 2024-11-25 DIAGNOSIS — E78.5 DYSLIPIDEMIA: ICD-10-CM

## 2024-11-25 DIAGNOSIS — I10 ESSENTIAL HYPERTENSION: ICD-10-CM

## 2024-11-25 DIAGNOSIS — G91.2 NPH (NORMAL PRESSURE HYDROCEPHALUS) (HCC): ICD-10-CM

## 2024-11-25 DIAGNOSIS — Z01.818 PRE-OPERATIVE EXAMINATION: Primary | ICD-10-CM

## 2024-11-25 DIAGNOSIS — R73.03 PREDIABETES: ICD-10-CM

## 2024-11-25 PROCEDURE — G2211 COMPLEX E/M VISIT ADD ON: HCPCS | Performed by: STUDENT IN AN ORGANIZED HEALTH CARE EDUCATION/TRAINING PROGRAM

## 2024-11-25 PROCEDURE — 99213 OFFICE O/P EST LOW 20 MIN: CPT | Performed by: STUDENT IN AN ORGANIZED HEALTH CARE EDUCATION/TRAINING PROGRAM

## 2024-11-25 NOTE — TELEPHONE ENCOUNTER
I called patients surgeons office, they do not require a clearance form to be filled out.  Just the H & P and visit summary stating that she is cleared for the surgery.  They asked when it is done to fax it back to [phone] Attn:  Chapis

## 2024-11-25 NOTE — PROGRESS NOTES
Pre-operative Clearance  Name: Boznea Armstrong      : 1950      MRN: [id number]  Encounter Provider: Dilcia Saha MD  Encounter Date: 2024   Encounter department: [facility]    Assessment & Plan  Pre-operative examination         NPH (normal pressure hydrocephalus) (HCC)  75 yo F w/ dmitriy of HTN, NPH, GERD, anemia, HLD, preDM presenting for preoperative assessment for ventriculperitoneal shunt for NPH on 2024     The surgery specific risk of cardiovascular complication including MI and cardiac death is 0.3% using the [employer] surgical risk calculator for preoperative myocardial infarction or cardiac  arrest.      RCRI score of 1.3%     Pt endorses a good functional status (METs >4) w/ DASI score of >5 METs     Moderate risk patient for a moderate risk procedure.      This was discussed with patient and patient agreed to continue with surgery.      Patient aware to stop NSAIDs and OTC supplements/vitamins 7 days before surgery.      Labs 2024 wnl; renal function normal   EKG   NSR, no ischemic changes noted       Dyslipidemia  C/w rosuvastatin 40 mg daily; tolerating current tx       Essential hypertension         Prediabetes           Pre-operative Clearance:     Revised Cardiac Risk Index:  RCI RISK CLASS I (0 risk factors, risk of major cardiac complications approximately 0.5%)    Clearance:  Patient is medically optimized (CLEARED) for proposed surgery without any additional cardiac testing.      Medication Instructions:   - Avoid herbs or non-directed vitamins one week prior to surgery    - Avoid aspirin containing medications or non-steroidal anti-inflammatory drugs one week preceding surgery    - May take tylenol for pain up until the night before surgery    - Antidepressants: Continue to take this medication on your normal schedule.  - Hyperlipidemia meds: Continue to take this medication on your normal schedule.       History of Present Illness      73 yo F w/ dmitriy of HTN, NPH, GERD, anemia, HLD, preDM presenting for preoperative assessment for ventriculperitoneal shunt for NPH on 08Nov2024     Anesthesia: Generalized      Denies any recent fevers, chills, SOB, COLINDRES, CP, GI sx.          Pre-op Exam  Surgery: ventriculperitoneal shunt for NPH  Anticipated Date of Surgery: 20Dec2024  Surgeon: Maurisio Crooks    Previous history of bleeding disorders or clots?: No  Previous Anesthesia reaction?: No  Prolonged steroid use in the last 6 months?: No    Assessment of Cardiac Risk:   - Unstable or severe angina or MI in the last 6 weeks or history of stent placement in the last year?: No   - Decompensated heart failure (e.g. New onset heart failure, NYHA  Class IV heart failure, or worsening existing heart failure)?: No  - Significant arrhythmias such as high grade AV block, symptomatic ventricular arrhythmia, newly recognized ventricular tachycardia, supraventricular tachycardia with resting heart rate >100, or symptomatic bradycardia?: No  - Severe heart valve disease including aortic stenosis or symptomatic mitral stenosis?: No      Pre-operative Risk Factors:  Elevated-risk surgery: No    History of cerebrovascular disease: No    History of ischemic heart disease: No  Pre-operative treatment with insulin: No  Pre-operative creatinine >2 mg/dL: No    History of congestive heart failure: No    [facility] Activity Status Index (DASI):   DASI Total Score: 18.95  METs: 5.1    Review of Systems   Constitutional:  Negative for chills and fever.   HENT:  Negative for congestion, nosebleeds and postnasal drip.    Eyes:  Negative for visual disturbance.   Respiratory:  Negative for cough, shortness of breath, wheezing and stridor.    Cardiovascular:  Negative for chest pain, palpitations and leg swelling.   Gastrointestinal:  Negative for abdominal pain.   Genitourinary:  Negative for difficulty urinating.   Skin:  Negative for color change and rash.   Neurological:  Negative  for syncope and light-headedness.   Hematological:  Does not bruise/bleed easily.   All other systems reviewed and are negative.    Past Medical History   Past Medical History:   Diagnosis Date    Arthritis     Balance disorder     GERD (gastroesophageal reflux disease)     Hyperlipidemia     Hypertension     Peripheral neuropathy     Vertigo      Past Surgical History:   Procedure Laterality Date     SECTION      COLONOSCOPY      FL LUMBAR PUNCTURE THERAPEUTIC  2024    HYSTERECTOMY      HYSTEROSCOPY W/ ENDOMETRIAL ABLATION      JOINT REPLACEMENT      KNEE SURGERY      AR ARTHRP ACETBLR/PROX FEM PROSTC AGRFT/ALGRFT Right 2022    Procedure: ARTHROPLASTY HIP TOTAL ANTERIOR and all associated procedures;  Surgeon: Trisha Alonso MD;  Location: [facility] OR;  Service: Orthopedics     Family History   Problem Relation Age of Onset    Arthritis Mother     Cancer Father     Heart disease Father     No Known Problems Sister     No Known Problems Daughter     No Known Problems Daughter     No Known Problems Daughter     No Known Problems Maternal Grandmother     No Known Problems Maternal Grandfather     No Known Problems Paternal Grandmother     No Known Problems Paternal Grandfather     Anemia Brother     No Known Problems Maternal Aunt     No Known Problems Maternal Aunt      Social History     Tobacco Use    Smoking status: Never     Passive exposure: Past    Smokeless tobacco: Never   Vaping Use    Vaping status: Never Used   Substance and Sexual Activity    Alcohol use: Never    Drug use: Never    Sexual activity: Not Currently     Current Outpatient Medications on File Prior to Visit   Medication Sig    ascorbic acid (VITAMIN C) 500 MG tablet Take 1 tablet (500 mg total) by mouth 2 (two) times a day    celecoxib (CeleBREX) 100 mg capsule TAKE ONE CAPSULE BY MOUTH TWICE A DAY    cholecalciferol (VITAMIN D3) 1,000 units tablet Take 1,000 Units by mouth daily    Doxylamine Succinate, Sleep, (SLEEP  "AID PO) Take 25 mg by mouth    DULoxetine (CYMBALTA) 60 mg delayed release capsule TAKE ONE CAPSULE BY MOUTH AT BEDTIME    Multiple Vitamins-Minerals (CENTRUM SILVER PO) Take by mouth    potassium chloride (Klor-Con M20) 20 mEq tablet TAKE ONE TABLET BY MOUTH EVERY DAY    rosuvastatin (CRESTOR) 40 MG tablet Take 1 tablet (40 mg total) by mouth daily    vitamin B-12 (VITAMIN B-12) 1,000 mcg tablet Take 1 tablet (1,000 mcg total) by mouth daily    VITAMIN E PO Take by mouth     No Known Allergies  Objective   /90 (BP Location: Left arm, Patient Position: Sitting, Cuff Size: Large)   Pulse (!) 110   Temp 98.4 °F (36.9 °C) (Tympanic)   Resp 20   Ht 5' 4\" (1.626 m)   Wt 113 kg (248 lb 3.2 oz)   SpO2 96%   BMI 42.60 kg/m²     Physical Exam  Vitals and nursing note reviewed.   Constitutional:       General: She is not in acute distress.     Appearance: Normal appearance. She is well-developed.   HENT:      Head: Normocephalic and atraumatic.      Nose: Nose normal.      Mouth/Throat:      Mouth: Mucous membranes are moist.      Pharynx: Oropharynx is clear.   Eyes:      Extraocular Movements: Extraocular movements intact.      Conjunctiva/sclera: Conjunctivae normal.      Pupils: Pupils are equal, round, and reactive to light.   Cardiovascular:      Rate and Rhythm: Normal rate and regular rhythm.      Pulses: Normal pulses.      Heart sounds: Normal heart sounds. No murmur heard.  Pulmonary:      Effort: Pulmonary effort is normal. No respiratory distress.      Breath sounds: Normal breath sounds. No wheezing or rales.   Abdominal:      General: There is no distension.      Palpations: Abdomen is soft.      Tenderness: There is no abdominal tenderness.   Musculoskeletal:         General: No swelling. Normal range of motion.      Cervical back: Neck supple.   Skin:     General: Skin is warm and dry.      Capillary Refill: Capillary refill takes less than 2 seconds.   Neurological:      Mental Status: She is " alert.   Psychiatric:         Mood and Affect: Mood normal.       Administrative Statements   I have spent a total time of 30 minutes in caring for this patient on the day of the visit/encounter including Diagnostic results, Prognosis, Risks and benefits of tx options, Instructions for management, Patient and family education, Importance of tx compliance, Risk factor reductions, Impressions, Counseling / Coordination of care, Documenting in the medical record, and Obtaining or reviewing history  .     Dilcia Saha MD

## 2024-11-25 NOTE — ASSESSMENT & PLAN NOTE
75 yo F w/ dmitriy of HTN, NPH, GERD, anemia, HLD, preDM presenting for preoperative assessment for ventriculperitoneal shunt for NPH on 08Nov2024     The surgery specific risk of cardiovascular complication including MI and cardiac death is 0.3% using the [employer] surgical risk calculator for preoperative myocardial infarction or cardiac  arrest.      RCRI score of 1.3%     Pt endorses a good functional status (METs >4) w/ DASI score of >5 METs     Moderate risk patient for a moderate risk procedure.      This was discussed with patient and patient agreed to continue with surgery.      Patient aware to stop NSAIDs and OTC supplements/vitamins 7 days before surgery.      Labs 07Oct2024 wnl; renal function normal   EKG Nov2024  NSR, no ischemic changes noted

## 2024-12-05 ENCOUNTER — OFFICE VISIT (OUTPATIENT)
Dept: FAMILY MEDICINE CLINIC | Facility: CLINIC | Age: 74
End: 2024-12-05
Payer: MEDICARE

## 2024-12-05 VITALS
TEMPERATURE: 98.8 F | HEIGHT: 64 IN | OXYGEN SATURATION: 96 % | HEART RATE: 98 BPM | SYSTOLIC BLOOD PRESSURE: 126 MMHG | WEIGHT: 245.4 LBS | RESPIRATION RATE: 16 BRPM | DIASTOLIC BLOOD PRESSURE: 80 MMHG | BODY MASS INDEX: 41.89 KG/M2

## 2024-12-05 DIAGNOSIS — R05.1 ACUTE COUGH: ICD-10-CM

## 2024-12-05 DIAGNOSIS — J06.9 VIRAL UPPER RESPIRATORY TRACT INFECTION: Primary | ICD-10-CM

## 2024-12-05 PROCEDURE — 99213 OFFICE O/P EST LOW 20 MIN: CPT

## 2024-12-05 PROCEDURE — G2211 COMPLEX E/M VISIT ADD ON: HCPCS

## 2024-12-05 RX ORDER — BENZONATATE 200 MG/1
200 CAPSULE ORAL 3 TIMES DAILY PRN
Qty: 20 CAPSULE | Refills: 0 | Status: SHIPPED | OUTPATIENT
Start: 2024-12-05

## 2024-12-05 NOTE — PROGRESS NOTES
"Name: Bozena Armstrong      : 1950      MRN: [id number]  Encounter Provider: LUCIA Carranza  Encounter Date: 2024   Encounter department: [facility]  :  Assessment & Plan  Viral upper respiratory tract infection  Discussed nature of viral illness and antibiotics not indicated at this time. Benzonatate 200 mg ordered for cough. Home supportive care encouraged through increased fluid intake, hot tea with honey, humidifier, cough drops, Vicks vaporub. Follow up as needed or if symptoms worsen or fail to improve.   Orders:    benzonatate (TESSALON) 200 MG capsule; Take 1 capsule (200 mg total) by mouth 3 (three) times a day as needed for cough    Acute cough    Orders:    benzonatate (TESSALON) 200 MG capsule; Take 1 capsule (200 mg total) by mouth 3 (three) times a day as needed for cough           History of Present Illness     Bozena Armstrong is a 74 y.o. year old female who presents today with a concern of a cough and increased mucous and sneezing. No fevers. Sore throat at night - \"irritating but not very painful\". No medications tried. Symptoms started 3 days ago. Denies chest pain, sob, abdominal pain, C/V/N/D. She has an upcoming surgery in 2 weeks and is concerned about feeling better.        Review of Systems   Constitutional:  Positive for fatigue. Negative for chills and fever.   HENT:  Positive for congestion and sinus pressure. Negative for ear pain, postnasal drip, rhinorrhea, sinus pain, sore throat and trouble swallowing.    Eyes: Negative.  Negative for pain and visual disturbance.   Respiratory:  Positive for cough. Negative for shortness of breath and wheezing.    Cardiovascular: Negative.  Negative for chest pain, palpitations and leg swelling.   Gastrointestinal:  Negative for constipation and nausea.   Endocrine: Negative.    Genitourinary: Negative.  Negative for dysuria, frequency and urgency.   Musculoskeletal: Negative.  Negative for " "back pain and myalgias.   Skin: Negative.  Negative for rash.   Allergic/Immunologic: Negative.    Neurological: Negative.  Negative for dizziness, weakness and light-headedness.   Hematological: Negative.    Psychiatric/Behavioral: Negative.            Objective   /80 (BP Location: Left arm, Patient Position: Sitting, Cuff Size: Standard)   Pulse 98   Temp 98.8 °F (37.1 °C) (Tympanic)   Resp 16   Ht 5' 4\" (1.626 m)   Wt 111 kg (245 lb 6.4 oz)   SpO2 96%   BMI 42.12 kg/m²      Physical Exam  Vitals and nursing note reviewed.   Constitutional:       General: She is not in acute distress.     Appearance: Normal appearance. She is ill-appearing.   HENT:      Head: Normocephalic and atraumatic.      Right Ear: Hearing, tympanic membrane, ear canal and external ear normal.      Left Ear: Ear canal and external ear normal. A middle ear effusion is present. Tympanic membrane is bulging. Tympanic membrane is not erythematous.      Nose: Congestion present. No rhinorrhea.      Mouth/Throat:      Mouth: Mucous membranes are moist.      Pharynx: Oropharynx is clear. No oropharyngeal exudate or posterior oropharyngeal erythema.   Eyes:      Extraocular Movements: Extraocular movements intact.      Conjunctiva/sclera: Conjunctivae normal.      Pupils: Pupils are equal, round, and reactive to light.   Cardiovascular:      Rate and Rhythm: Normal rate and regular rhythm.      Heart sounds: Normal heart sounds. No murmur heard.  Pulmonary:      Effort: Pulmonary effort is normal. No respiratory distress.      Breath sounds: Normal breath sounds. Transmitted upper airway sounds present. No decreased air movement. No decreased breath sounds, wheezing, rhonchi or rales.   Musculoskeletal:         General: Normal range of motion.      Cervical back: Normal range of motion and neck supple.   Lymphadenopathy:      Cervical: No cervical adenopathy.   Skin:     General: Skin is warm and dry.      Capillary Refill: Capillary " refill takes less than 2 seconds.   Neurological:      General: No focal deficit present.      Mental Status: She is alert and oriented to person, place, and time.   Psychiatric:         Mood and Affect: Mood normal.         Behavior: Behavior normal.

## 2024-12-10 ENCOUNTER — TELEPHONE (OUTPATIENT)
Age: 74
End: 2024-12-10

## 2024-12-10 NOTE — TELEPHONE ENCOUNTER
Pt called back. Advise that she will need to be seen again. Called office who was able to get her scheduled. I accidentally disconnected prior to telling her she was scheduled. Attempted to call back but she did not answer. Left message apologizing and letting her know she is scheudled.

## 2024-12-10 NOTE — TELEPHONE ENCOUNTER
Patient called. Pt was last seen on 12/5/24 for URI. Pt states since then symptoms of cough and congestion worsened. Requesting further recommendations.      Please review and advise.  Thank you

## 2024-12-11 ENCOUNTER — APPOINTMENT (OUTPATIENT)
Dept: RADIOLOGY | Facility: CLINIC | Age: 74
End: 2024-12-11
Payer: MEDICARE

## 2024-12-11 ENCOUNTER — OFFICE VISIT (OUTPATIENT)
Dept: FAMILY MEDICINE CLINIC | Facility: CLINIC | Age: 74
End: 2024-12-11
Payer: MEDICARE

## 2024-12-11 VITALS
HEIGHT: 64 IN | WEIGHT: 241.4 LBS | RESPIRATION RATE: 20 BRPM | BODY MASS INDEX: 41.21 KG/M2 | DIASTOLIC BLOOD PRESSURE: 70 MMHG | SYSTOLIC BLOOD PRESSURE: 126 MMHG | TEMPERATURE: 99.1 F | OXYGEN SATURATION: 97 % | HEART RATE: 110 BPM

## 2024-12-11 DIAGNOSIS — R06.2 WHEEZING: ICD-10-CM

## 2024-12-11 DIAGNOSIS — J06.9 UPPER RESPIRATORY TRACT INFECTION, UNSPECIFIED TYPE: ICD-10-CM

## 2024-12-11 DIAGNOSIS — J06.9 UPPER RESPIRATORY TRACT INFECTION, UNSPECIFIED TYPE: Primary | ICD-10-CM

## 2024-12-11 PROCEDURE — 71046 X-RAY EXAM CHEST 2 VIEWS: CPT

## 2024-12-11 PROCEDURE — G2211 COMPLEX E/M VISIT ADD ON: HCPCS

## 2024-12-11 PROCEDURE — 99214 OFFICE O/P EST MOD 30 MIN: CPT

## 2024-12-11 RX ORDER — PREDNISONE 10 MG/1
10 TABLET ORAL DAILY
Qty: 21 TABLET | Refills: 0 | Status: SHIPPED | OUTPATIENT
Start: 2024-12-11

## 2024-12-11 RX ORDER — AMOXICILLIN 500 MG/1
500 CAPSULE ORAL EVERY 12 HOURS SCHEDULED
Qty: 20 CAPSULE | Refills: 0 | Status: SHIPPED | OUTPATIENT
Start: 2024-12-11 | End: 2024-12-21

## 2024-12-11 RX ORDER — GUAIFENESIN 600 MG/1
1200 TABLET, EXTENDED RELEASE ORAL EVERY 12 HOURS SCHEDULED
Qty: 120 TABLET | Refills: 0 | Status: SHIPPED | OUTPATIENT
Start: 2024-12-11

## 2024-12-11 RX ORDER — AZITHROMYCIN 250 MG/1
TABLET, FILM COATED ORAL
Qty: 6 TABLET | Refills: 0 | Status: SHIPPED | OUTPATIENT
Start: 2024-12-11 | End: 2024-12-15

## 2024-12-11 NOTE — PROGRESS NOTES
Name: Bozena Armstrong      : 1950      MRN: [id number]  Encounter Provider: LUCIA Carranza  Encounter Date: 2024   Encounter department: [facility]  :  Assessment & Plan  Upper respiratory tract infection, unspecified type  Suspected pneumonia. Amoxicillin and azithromycin as prescribed today. Guaifenesin as ordered for cough. Pt to obtain the CXR as ordered today. The patient will be notified of results when available and also any further recommendations. Continue supportive care measures through increased fluids, humidifier, and cough drops. Follow up as needed or if symptoms worsen or fail to improve.   Orders:    guaiFENesin (MUCINEX) 600 mg 12 hr tablet; Take 2 tablets (1,200 mg total) by mouth every 12 (twelve) hours    XR chest pa and lateral; Future    amoxicillin (AMOXIL) 500 mg capsule; Take 1 capsule (500 mg total) by mouth every 12 (twelve) hours for 10 days    azithromycin (ZITHROMAX) 250 mg tablet; Take 2 tablets today then 1 tablet daily x 4 days    Wheezing  Pt to complete prednisone taper as ordered. CXR ordered today. Bozena will be notified of results and recommendations when available.   Orders:    XR chest pa and lateral; Future    predniSONE 10 mg tablet; Take 1 tablet (10 mg total) by mouth daily On day one take 6 tablets, day two take 5 tablets, day three take 4 tablets, day four take 3 tablets, day five take 2 tablets, day six take 1 tablet          Depression Screening and Follow-up Plan: Patient was screened for depression during today's encounter. They screened negative with a PHQ-2 score of 0.    Falls Plan of Care: balance, strength, and gait training instructions were provided.       History of Present Illness     Bozena Armstrong is a 74 y.o. year old female who presents today with a concern of a cough that has been ongoing since Thankgi and not improving.       Cough  This is a new problem. The current episode started 1 to 4  "weeks ago. The problem has been gradually worsening. The problem occurs every few minutes. The cough is Productive of sputum. Associated symptoms include nasal congestion, rhinorrhea, shortness of breath and wheezing. Pertinent negatives include no chest pain, chills, ear pain, fever, headaches, myalgias, postnasal drip, rash or sore throat. The symptoms are aggravated by lying down. She has tried prescription cough suppressant and rest for the symptoms. The treatment provided no relief. There is no history of asthma, bronchitis, COPD, environmental allergies or pneumonia.     Review of Systems   Constitutional:  Positive for fatigue. Negative for chills and fever.   HENT:  Positive for rhinorrhea. Negative for congestion, ear pain, postnasal drip and sore throat.    Eyes: Negative.  Negative for pain and visual disturbance.   Respiratory:  Positive for cough, shortness of breath and wheezing.    Cardiovascular: Negative.  Negative for chest pain, palpitations and leg swelling.   Gastrointestinal:  Negative for abdominal pain, constipation, diarrhea, nausea and vomiting.   Endocrine: Negative.    Genitourinary: Negative.  Negative for dysuria, frequency and urgency.   Musculoskeletal: Negative.  Negative for back pain and myalgias.   Skin: Negative.  Negative for rash.   Allergic/Immunologic: Negative.  Negative for environmental allergies.   Neurological: Negative.  Negative for dizziness, weakness, light-headedness and headaches.   Hematological: Negative.    Psychiatric/Behavioral: Negative.         Objective   /70 (BP Location: Left arm, Patient Position: Sitting, Cuff Size: Large)   Pulse (!) 110   Temp 99.1 °F (37.3 °C) (Tympanic)   Resp 20   Ht 5' 4\" (1.626 m)   Wt 109 kg (241 lb 6.4 oz)   SpO2 97%   BMI 41.44 kg/m²      Physical Exam  Vitals and nursing note reviewed.   Constitutional:       General: She is not in acute distress.     Appearance: Normal appearance. She is ill-appearing.   HENT: "      Head: Normocephalic and atraumatic.      Right Ear: Tympanic membrane, ear canal and external ear normal.      Left Ear: Tympanic membrane, ear canal and external ear normal.      Nose: Nose normal. No congestion.      Mouth/Throat:      Mouth: Mucous membranes are moist.      Pharynx: Oropharynx is clear. No posterior oropharyngeal erythema.   Eyes:      Extraocular Movements: Extraocular movements intact.      Conjunctiva/sclera: Conjunctivae normal.      Pupils: Pupils are equal, round, and reactive to light.   Cardiovascular:      Rate and Rhythm: Normal rate and regular rhythm.      Heart sounds: Normal heart sounds. No murmur heard.  Pulmonary:      Effort: Pulmonary effort is normal. No respiratory distress.      Breath sounds: No decreased air movement. Examination of the left-upper field reveals wheezing. Examination of the right-middle field reveals wheezing and rhonchi. Examination of the left-middle field reveals wheezing and rhonchi. Examination of the right-lower field reveals wheezing. Wheezing and rhonchi present. No decreased breath sounds or rales.   Chest:      Chest wall: No tenderness.   Abdominal:      General: Abdomen is flat. Bowel sounds are normal.      Palpations: Abdomen is soft.      Tenderness: There is no abdominal tenderness.   Musculoskeletal:         General: No tenderness. Normal range of motion.      Cervical back: Normal range of motion and neck supple. No tenderness.   Lymphadenopathy:      Cervical: No cervical adenopathy.   Skin:     General: Skin is warm and dry.      Capillary Refill: Capillary refill takes less than 2 seconds.      Findings: No bruising or rash.   Neurological:      General: No focal deficit present.      Mental Status: She is alert and oriented to person, place, and time.   Psychiatric:         Mood and Affect: Mood normal.         Behavior: Behavior normal.

## 2024-12-12 ENCOUNTER — RESULTS FOLLOW-UP (OUTPATIENT)
Dept: FAMILY MEDICINE CLINIC | Facility: CLINIC | Age: 74
End: 2024-12-12

## 2025-01-03 ENCOUNTER — TELEPHONE (OUTPATIENT)
Age: 75
End: 2025-01-03

## 2025-01-03 NOTE — TELEPHONE ENCOUNTER
Patient fell and hit her head on the corner of furniture and she would like to have an x-ray order to check for damage.       Please advise.

## 2025-01-03 NOTE — TELEPHONE ENCOUNTER
Informed pt. It's not her head but she hit her back and she told me It's purple and she also have swelling. Pt  also told me the pain level is 5. I told her to keep an eye on the back just incase If its getting bad go to ER right away. Pt knew that she have an appointment with sarah. She told me It's not that bad and she can wait till Monday.

## 2025-01-03 NOTE — TELEPHONE ENCOUNTER
Patient fell and hit her head on the corner of furniture and she would like to have an x-ray order to check for damage.   Do you want me to book an appointment for her. She was last seen on 12/11/24

## 2025-01-06 ENCOUNTER — CONSULT (OUTPATIENT)
Dept: FAMILY MEDICINE CLINIC | Facility: CLINIC | Age: 75
End: 2025-01-06
Payer: MEDICARE

## 2025-01-06 ENCOUNTER — APPOINTMENT (OUTPATIENT)
Dept: LAB | Facility: CLINIC | Age: 75
End: 2025-01-06
Payer: MEDICARE

## 2025-01-06 ENCOUNTER — APPOINTMENT (OUTPATIENT)
Dept: RADIOLOGY | Facility: CLINIC | Age: 75
End: 2025-01-06
Payer: MEDICARE

## 2025-01-06 VITALS
RESPIRATION RATE: 16 BRPM | SYSTOLIC BLOOD PRESSURE: 128 MMHG | HEIGHT: 64 IN | TEMPERATURE: 98.2 F | OXYGEN SATURATION: 90 % | DIASTOLIC BLOOD PRESSURE: 70 MMHG | HEART RATE: 91 BPM | BODY MASS INDEX: 42.14 KG/M2 | WEIGHT: 246.8 LBS

## 2025-01-06 DIAGNOSIS — M54.50 ACUTE LEFT-SIDED LOW BACK PAIN WITHOUT SCIATICA: ICD-10-CM

## 2025-01-06 DIAGNOSIS — Z01.818 PRE-OPERATIVE EXAMINATION: Primary | ICD-10-CM

## 2025-01-06 DIAGNOSIS — R41.3 MEMORY DEFICIT: ICD-10-CM

## 2025-01-06 DIAGNOSIS — Z91.81 HISTORY OF FALLING: ICD-10-CM

## 2025-01-06 DIAGNOSIS — E66.01 OBESITY, MORBID (HCC): ICD-10-CM

## 2025-01-06 DIAGNOSIS — R26.89 BALANCE DISORDER: ICD-10-CM

## 2025-01-06 DIAGNOSIS — G91.2 NPH (NORMAL PRESSURE HYDROCEPHALUS) (HCC): ICD-10-CM

## 2025-01-06 DIAGNOSIS — I10 ESSENTIAL HYPERTENSION: ICD-10-CM

## 2025-01-06 DIAGNOSIS — E78.5 DYSLIPIDEMIA: ICD-10-CM

## 2025-01-06 DIAGNOSIS — Z01.818 PRE-OPERATIVE EXAMINATION: ICD-10-CM

## 2025-01-06 LAB
ALBUMIN SERPL BCG-MCNC: 4.1 G/DL (ref 3.5–5)
ALP SERPL-CCNC: 86 U/L (ref 34–104)
ALT SERPL W P-5'-P-CCNC: 19 U/L (ref 7–52)
ANION GAP SERPL CALCULATED.3IONS-SCNC: 10 MMOL/L (ref 4–13)
AST SERPL W P-5'-P-CCNC: 16 U/L (ref 13–39)
BASOPHILS # BLD AUTO: 0.05 THOUSANDS/ΜL (ref 0–0.1)
BASOPHILS NFR BLD AUTO: 1 % (ref 0–1)
BILIRUB SERPL-MCNC: 0.87 MG/DL (ref 0.2–1)
BUN SERPL-MCNC: 13 MG/DL (ref 5–25)
CALCIUM SERPL-MCNC: 9.3 MG/DL (ref 8.4–10.2)
CHLORIDE SERPL-SCNC: 101 MMOL/L (ref 96–108)
CO2 SERPL-SCNC: 27 MMOL/L (ref 21–32)
CREAT SERPL-MCNC: 0.53 MG/DL (ref 0.6–1.3)
CRP SERPL QL: 3.8 MG/L
EOSINOPHIL # BLD AUTO: 0.08 THOUSAND/ΜL (ref 0–0.61)
EOSINOPHIL NFR BLD AUTO: 1 % (ref 0–6)
ERYTHROCYTE [DISTWIDTH] IN BLOOD BY AUTOMATED COUNT: 13.2 % (ref 11.6–15.1)
ERYTHROCYTE [SEDIMENTATION RATE] IN BLOOD: 28 MM/HOUR (ref 0–29)
FOLATE SERPL-MCNC: >22.3 NG/ML
GFR SERPL CREATININE-BSD FRML MDRD: 95 ML/MIN/1.73SQ M
GLUCOSE P FAST SERPL-MCNC: 109 MG/DL (ref 65–99)
HCT VFR BLD AUTO: 42.5 % (ref 34.8–46.1)
HGB BLD-MCNC: 14 G/DL (ref 11.5–15.4)
IMM GRANULOCYTES # BLD AUTO: 0.02 THOUSAND/UL (ref 0–0.2)
IMM GRANULOCYTES NFR BLD AUTO: 0 % (ref 0–2)
LYMPHOCYTES # BLD AUTO: 2.18 THOUSANDS/ΜL (ref 0.6–4.47)
LYMPHOCYTES NFR BLD AUTO: 31 % (ref 14–44)
MCH RBC QN AUTO: 31 PG (ref 26.8–34.3)
MCHC RBC AUTO-ENTMCNC: 32.9 G/DL (ref 31.4–37.4)
MCV RBC AUTO: 94 FL (ref 82–98)
MONOCYTES # BLD AUTO: 0.53 THOUSAND/ΜL (ref 0.17–1.22)
MONOCYTES NFR BLD AUTO: 8 % (ref 4–12)
NEUTROPHILS # BLD AUTO: 4.12 THOUSANDS/ΜL (ref 1.85–7.62)
NEUTS SEG NFR BLD AUTO: 59 % (ref 43–75)
NRBC BLD AUTO-RTO: 0 /100 WBCS
PLATELET # BLD AUTO: 276 THOUSANDS/UL (ref 149–390)
PMV BLD AUTO: 10.6 FL (ref 8.9–12.7)
POTASSIUM SERPL-SCNC: 3.8 MMOL/L (ref 3.5–5.3)
PROT SERPL-MCNC: 6.9 G/DL (ref 6.4–8.4)
RBC # BLD AUTO: 4.51 MILLION/UL (ref 3.81–5.12)
SODIUM SERPL-SCNC: 138 MMOL/L (ref 135–147)
TSH SERPL DL<=0.05 MIU/L-ACNC: 1.79 UIU/ML (ref 0.45–4.5)
VIT B12 SERPL-MCNC: 703 PG/ML (ref 180–914)
WBC # BLD AUTO: 6.98 THOUSAND/UL (ref 4.31–10.16)

## 2025-01-06 PROCEDURE — 86038 ANTINUCLEAR ANTIBODIES: CPT

## 2025-01-06 PROCEDURE — 86235 NUCLEAR ANTIGEN ANTIBODY: CPT

## 2025-01-06 PROCEDURE — 84443 ASSAY THYROID STIM HORMONE: CPT

## 2025-01-06 PROCEDURE — 86225 DNA ANTIBODY NATIVE: CPT

## 2025-01-06 PROCEDURE — 82746 ASSAY OF FOLIC ACID SERUM: CPT

## 2025-01-06 PROCEDURE — 86039 ANTINUCLEAR ANTIBODIES (ANA): CPT

## 2025-01-06 PROCEDURE — G2211 COMPLEX E/M VISIT ADD ON: HCPCS

## 2025-01-06 PROCEDURE — 99214 OFFICE O/P EST MOD 30 MIN: CPT

## 2025-01-06 PROCEDURE — 36415 COLL VENOUS BLD VENIPUNCTURE: CPT

## 2025-01-06 PROCEDURE — 85025 COMPLETE CBC W/AUTO DIFF WBC: CPT

## 2025-01-06 PROCEDURE — 82607 VITAMIN B-12: CPT

## 2025-01-06 PROCEDURE — 85652 RBC SED RATE AUTOMATED: CPT

## 2025-01-06 PROCEDURE — 72100 X-RAY EXAM L-S SPINE 2/3 VWS: CPT

## 2025-01-06 RX ORDER — ROSUVASTATIN CALCIUM 40 MG/1
40 TABLET, COATED ORAL DAILY
Qty: 90 TABLET | Refills: 3 | Status: SHIPPED | OUTPATIENT
Start: 2025-01-06

## 2025-01-06 NOTE — PROGRESS NOTES
Pre-operative Clearance  Name: Bozena Armstrong      : 1950      MRN: [id number]  Encounter Provider: LUCIA Carranza  Encounter Date: 2025   Encounter department: [facility]    Assessment & Plan  Pre-operative examination  Most recent lab work 2024. Will update today. The patient will obtain the lab work ordered today. The patient will be notified of results when available and also any further recommendations. Clearance pending labs. Patient aware to stop NSAIDs and OTC supplements/vitamins 7 days before surgery.     Addendum 2025: CBC and CMP (collected instead of BMP) reviewed. No red flags.     Orders:    Basic metabolic panel; Future    CBC and differential; Future    NPH (normal pressure hydrocephalus) (HCC)  Following with Neurosurgery through [place]. EKG 2024, NSR, no ischemic changes noted - stable from previous. Most recent lab work 2024. Will update today. The patient will obtain the lab work ordered today. The patient will be notified of results when available and also any further recommendations. Clearance pending labs. Patient aware to stop NSAIDs and OTC supplements/vitamins 7 days before surgery.   Orders:    Basic metabolic panel; Future    CBC and differential; Future    Essential hypertension  Stable. No current medications. Continue heart healthy diet.       Dyslipidemia  Tolerating current tx. Continue rosuvastatin 40 mg as prescribed. Refill provided today. Continue heart healthy dietary changes.  Orders:    rosuvastatin (CRESTOR) 40 MG tablet; Take 1 tablet (40 mg total) by mouth daily    Balance disorder  Following with Neurosurgery.       Memory deficit  Following with Neurosurgery.       History of falling  XR ordered today to make sure no fx present. The patient will obtain the imaging ordered today. The patient will be notified of results when available and also any further recommendations.  Orders:    XR  spine lumbar 2 or 3 views injury; Future    Acute left-sided low back pain without sciatica  XR ordered today to make sure no fx present. The patient will obtain the imaging ordered today. The patient will be notified of results when available and also any further recommendations. Continue OTC pain medication, ice, rest.  Orders:    XR spine lumbar 2 or 3 views injury; Future    Obesity, morbid (HCC)  Continue healthy lifestyle modifications and increased physical activity as tolerated.        Pre-operative Clearance:     Revised Cardiac Risk Index:  RCI RISK CLASS II (1 risk factor, risk of major cardiac complications approximately 1.3%)    Clearance:  Patient is medically optimized (CLEARED) for proposed surgery without any additional cardiac testing.       Pending labs as ordered today.      Addendum 1/7/2025: CBC and CMP (collected instead of BMP) reviewed. No red flags.       Medication Instructions:   - Avoid herbs or non-directed vitamins one week prior to surgery    - Avoid aspirin containing medications or non-steroidal anti-inflammatory drugs one week preceding surgery    - May take tylenol for pain up until the night before surgery    - Antidepressants: Continue to take this medication on your normal schedule.  - Corticosteroids: Continue to take this medication on your normal schedule.  - Hyperlipidemia meds: Continue to take this medication on your normal schedule.      Depression Screening and Follow-up Plan: Patient was screened for depression during today's encounter. They screened negative with a PHQ-2 score of 0.    Falls Plan of Care: balance, strength, and gait training instructions were provided.       History of Present Illness     Bozena Armstrong is a 74 y.o. year old female who presents today for preoperative clearance for a shunt placement. Reports she fell on New Years E[place]en and hit the corner of a table when she fell backwards. Reports continued lower back pain that is slowly improving.        Pre-op Exam  Surgery: Ventriculoperitoneal Shunt Placement with Laparoscopic assistance  Anticipated Date of Surgery: 1/23/2025  Surgeon: Dr. Crooks    Pt reports balance issues, incontinence, memory problems.     Previous history of bleeding disorders or clots?: No  Previous Anesthesia reaction?: No  Prolonged steroid use in the last 6 months?: No    Assessment of Cardiac Risk:   - Unstable or severe angina or MI in the last 6 weeks or history of stent placement in the last year?: No   - Decompensated heart failure (e.g. New onset heart failure, NYHA  Class IV heart failure, or worsening existing heart failure)?: No  - Significant arrhythmias such as high grade AV block, symptomatic ventricular arrhythmia, newly recognized ventricular tachycardia, supraventricular tachycardia with resting heart rate >100, or symptomatic bradycardia?: No  - Severe heart valve disease including aortic stenosis or symptomatic mitral stenosis?: No      Pre-operative Risk Factors:  Elevated-risk surgery: Yes    History of cerebrovascular disease: No    History of ischemic heart disease: No  Pre-operative treatment with insulin: No  Pre-operative creatinine >2 mg/dL: No    History of congestive heart failure: No    Duke Activity Status Index (DASI):   DASI Total Score: 16.2  METs: 4.7    Medications of Perioperative Concern:   NSAIDs    Review of Systems   Constitutional: Negative.  Negative for chills, fatigue and fever.   HENT: Negative.  Negative for congestion, ear pain, rhinorrhea and sore throat.    Eyes: Negative.  Negative for pain and visual disturbance.   Respiratory: Negative.  Negative for cough and shortness of breath.    Cardiovascular: Negative.  Negative for chest pain, palpitations and leg swelling.   Gastrointestinal:  Negative for abdominal pain, constipation, diarrhea, nausea and vomiting.   Endocrine: Negative.    Genitourinary:  Positive for frequency and urgency. Negative for dysuria.   Musculoskeletal:   Positive for gait problem. Negative for back pain and myalgias.   Skin: Negative.  Negative for rash.   Allergic/Immunologic: Negative.    Neurological:  Negative for dizziness, weakness, light-headedness and headaches.   Hematological: Negative.    Psychiatric/Behavioral: Negative.       Past Medical History   Past Medical History:   Diagnosis Date    Arthritis     Balance disorder     GERD (gastroesophageal reflux disease)     Hyperlipidemia     Hypertension     Peripheral neuropathy     Vertigo      Past Surgical History:   Procedure Laterality Date     SECTION      COLONOSCOPY      FL LUMBAR PUNCTURE THERAPEUTIC  2024    HYSTERECTOMY      HYSTEROSCOPY W/ ENDOMETRIAL ABLATION      JOINT REPLACEMENT      KNEE SURGERY      VT ARTHRP ACETBLR/PROX FEM PROSTC AGRFT/ALGRFT Right 2022    Procedure: ARTHROPLASTY HIP TOTAL ANTERIOR and all associated procedures;  Surgeon: Trisha Alonos MD;  Location:  MAIN OR;  Service: Orthopedics     Family History   Problem Relation Age of Onset    Arthritis Mother     Cancer Father     Heart disease Father     No Known Problems Sister     No Known Problems Daughter     No Known Problems Daughter     No Known Problems Daughter     No Known Problems Maternal Grandmother     No Known Problems Maternal Grandfather     No Known Problems Paternal Grandmother     No Known Problems Paternal Grandfather     Anemia Brother     No Known Problems Maternal Aunt     No Known Problems Maternal Aunt      Social History     Tobacco Use    Smoking status: Never     Passive exposure: Past    Smokeless tobacco: Never   Vaping Use    Vaping status: Never Used   Substance and Sexual Activity    Alcohol use: Never    Drug use: Never    Sexual activity: Not Currently     Current Outpatient Medications on File Prior to Visit   Medication Sig    ascorbic acid (VITAMIN C) 500 MG tablet Take 1 tablet (500 mg total) by mouth 2 (two) times a day    benzonatate (TESSALON) 200 MG capsule  "Take 1 capsule (200 mg total) by mouth 3 (three) times a day as needed for cough    celecoxib (CeleBREX) 100 mg capsule TAKE ONE CAPSULE BY MOUTH TWICE A DAY    cholecalciferol (VITAMIN D3) 1,000 units tablet Take 1,000 Units by mouth daily    Doxylamine Succinate, Sleep, (SLEEP AID PO) Take 25 mg by mouth    DULoxetine (CYMBALTA) 60 mg delayed release capsule TAKE ONE CAPSULE BY MOUTH AT BEDTIME    guaiFENesin (MUCINEX) 600 mg 12 hr tablet Take 2 tablets (1,200 mg total) by mouth every 12 (twelve) hours    Multiple Vitamins-Minerals (CENTRUM SILVER PO) Take by mouth    potassium chloride (Klor-Con M20) 20 mEq tablet TAKE ONE TABLET BY MOUTH EVERY DAY    predniSONE 10 mg tablet Take 1 tablet (10 mg total) by mouth daily On day one take 6 tablets, day two take 5 tablets, day three take 4 tablets, day four take 3 tablets, day five take 2 tablets, day six take 1 tablet    vitamin B-12 (VITAMIN B-12) 1,000 mcg tablet Take 1 tablet (1,000 mcg total) by mouth daily    VITAMIN E PO Take by mouth    [DISCONTINUED] rosuvastatin (CRESTOR) 40 MG tablet Take 1 tablet (40 mg total) by mouth daily     No Known Allergies  Objective   /70 (BP Location: Left arm, Patient Position: Sitting, Cuff Size: Standard)   Pulse 91   Temp 98.2 °F (36.8 °C) (Tympanic)   Resp 16   Ht 5' 4\" (1.626 m)   Wt 112 kg (246 lb 12.8 oz)   SpO2 90%   BMI 42.36 kg/m²     Physical Exam  Vitals and nursing note reviewed.   Constitutional:       General: She is not in acute distress.     Appearance: Normal appearance. She is obese. She is not ill-appearing.   HENT:      Head: Normocephalic and atraumatic.      Right Ear: Tympanic membrane, ear canal and external ear normal.      Left Ear: Tympanic membrane, ear canal and external ear normal.      Nose: Nose normal. No congestion.      Mouth/Throat:      Mouth: Mucous membranes are moist.      Pharynx: Oropharynx is clear. No posterior oropharyngeal erythema.   Eyes:      Extraocular Movements: " Extraocular movements intact.      Conjunctiva/sclera: Conjunctivae normal.      Pupils: Pupils are equal, round, and reactive to light.   Cardiovascular:      Rate and Rhythm: Normal rate and regular rhythm.      Heart sounds: Normal heart sounds. No murmur heard.  Pulmonary:      Effort: Pulmonary effort is normal. No respiratory distress.      Breath sounds: Normal breath sounds. No wheezing.   Abdominal:      General: Abdomen is flat. Bowel sounds are normal.      Palpations: Abdomen is soft.      Tenderness: There is no abdominal tenderness.   Musculoskeletal:         General: Swelling, tenderness and signs of injury present.      Cervical back: Normal range of motion and neck supple. No tenderness.      Lumbar back: Swelling and tenderness present. No deformity, spasms or bony tenderness. Decreased range of motion.   Lymphadenopathy:      Cervical: No cervical adenopathy.   Skin:     General: Skin is warm and dry.      Capillary Refill: Capillary refill takes less than 2 seconds.      Findings: Abrasion, bruising, erythema and signs of injury present. No rash.             Comments: Bruising and swelling with 5 cm abrasion present   Neurological:      General: No focal deficit present.      Mental Status: She is alert and oriented to person, place, and time.   Psychiatric:         Mood and Affect: Mood normal.         Behavior: Behavior normal.           LUCIA Carranza

## 2025-01-06 NOTE — PATIENT INSTRUCTIONS
Pre-operative Medication Instructions    Avoid herbs or non-directed vitamins one week prior to surgery  Avoid aspirin containing medications or non-steroidal anti-inflammatory drugs one week preceding surgery  May take tylenol for pain up until the night before surgery    Antidepressant Meds       Medication Name     DULoxetine (CYMBALTA) 60 mg delayed release capsule        Continue to take this medication on your normal schedule.  If this is an oral medication and you take it in the morning, then you may take this medicine with a sip of water.    Steroid Medications       Medication Name     predniSONE 10 mg tablet        Continue to take this medication on your normal schedule.  If this is an oral medication and you take it in the morning, then you may take this medicine with a sip of water.    Cholesterol lowering meds       Medication Name     rosuvastatin (CRESTOR) 40 MG tablet     rosuvastatin (CRESTOR) 40 MG tablet (Discontinued)        Continue to take this medication on your normal schedule.  If this is an oral medication and you take it in the morning, then you may take this medicine with a sip of water.

## 2025-01-06 NOTE — ASSESSMENT & PLAN NOTE
Following with Neurosurgery through [place]. EKG Nov 2024, NSR, no ischemic changes noted - stable from previous. Most recent lab work October 2024. Will update today. The patient will obtain the lab work ordered today. The patient will be notified of results when available and also any further recommendations. Clearance pending labs. Patient aware to stop NSAIDs and OTC supplements/vitamins 7 days before surgery.   Orders:    Basic metabolic panel; Future    CBC and differential; Future

## 2025-01-06 NOTE — ASSESSMENT & PLAN NOTE
Tolerating current tx. Continue rosuvastatin 40 mg as prescribed. Refill provided today. Continue heart healthy dietary changes.  Orders:    rosuvastatin (CRESTOR) 40 MG tablet; Take 1 tablet (40 mg total) by mouth daily

## 2025-01-07 ENCOUNTER — TELEPHONE (OUTPATIENT)
Age: 75
End: 2025-01-07

## 2025-01-07 ENCOUNTER — RESULTS FOLLOW-UP (OUTPATIENT)
Dept: FAMILY MEDICINE CLINIC | Facility: CLINIC | Age: 75
End: 2025-01-07

## 2025-01-07 LAB
B BURGDOR IGG+IGM SER QL IA: NEGATIVE
DSDNA IGG SERPL IA-ACNC: 0.9 IU/ML (ref ?–15)
NUCLEAR IGG SER IA-RTO: 0.9 RATIO (ref ?–1)

## 2025-01-08 LAB
ALBUMIN SERPL ELPH-MCNC: 4.08 G/DL (ref 3.2–5.1)
ALBUMIN SERPL ELPH-MCNC: 60.9 % (ref 48–70)
ALPHA1 GLOB SERPL ELPH-MCNC: 0.25 G/DL (ref 0.15–0.47)
ALPHA1 GLOB SERPL ELPH-MCNC: 3.8 % (ref 1.8–7)
ALPHA2 GLOB SERPL ELPH-MCNC: 0.76 G/DL (ref 0.42–1.04)
ALPHA2 GLOB SERPL ELPH-MCNC: 11.3 % (ref 5.9–14.9)
ANA HOMOGEN SER QL IF: NORMAL
ANA HOMOGEN TITR SER: NORMAL {TITER}
ANA SER QL IF: POSITIVE
BETA GLOB ABNORMAL SERPL ELPH-MCNC: 0.44 G/DL (ref 0.31–0.57)
BETA1 GLOB SERPL ELPH-MCNC: 6.5 % (ref 4.7–7.7)
BETA2 GLOB SERPL ELPH-MCNC: 6.1 % (ref 3.1–7.9)
BETA2+GAMMA GLOB SERPL ELPH-MCNC: 0.41 G/DL (ref 0.2–0.58)
CENTROMERE B AB SER-ACNC: 14 U/ML (ref ?–10)
ENA JO1 AB SER IA-ACNC: <0.5 U/ML (ref ?–10)
ENA SCL70 IGG SER IA-ACNC: 0.6 U/ML (ref ?–10)
ENA SM IGG SER IA-ACNC: <0.8 U/ML (ref ?–10)
ENA SS-A AB SER IA-ACNC: <0.5 U/ML (ref ?–10)
ENA SS-B IGG SER IA-ACNC: <0.6 U/ML (ref ?–10)
GAMMA GLOB ABNORMAL SERPL ELPH-MCNC: 0.76 G/DL (ref 0.4–1.66)
GAMMA GLOB SERPL ELPH-MCNC: 11.4 % (ref 6.9–22.3)
IGG/ALB SER: 1.56 {RATIO} (ref 1.1–1.8)
PROT PATTERN SERPL ELPH-IMP: NORMAL
PROT SERPL-MCNC: 6.7 G/DL (ref 6.4–8.2)
U1 SNRNP IGG SER IA-ACNC: 4.3 U/ML (ref ?–10)
VIT B6 SERPL-MCNC: 15.4 UG/L (ref 3.4–65.2)

## 2025-01-08 PROCEDURE — 84165 PROTEIN E-PHORESIS SERUM: CPT | Performed by: STUDENT IN AN ORGANIZED HEALTH CARE EDUCATION/TRAINING PROGRAM

## 2025-01-08 PROCEDURE — 84166 PROTEIN E-PHORESIS/URINE/CSF: CPT | Performed by: STUDENT IN AN ORGANIZED HEALTH CARE EDUCATION/TRAINING PROGRAM

## 2025-01-08 NOTE — TELEPHONE ENCOUNTER
Yesenia from [facility] called to state that she did receive the office note for the patient but they also needed the lab results most recent labs done for patient as well so that she can have her procedure. Can this please be faxed to [phone]     thank you

## 2025-01-09 ENCOUNTER — OFFICE VISIT (OUTPATIENT)
Dept: NEUROLOGY | Facility: CLINIC | Age: 75
End: 2025-01-09
Payer: MEDICARE

## 2025-01-09 VITALS
HEART RATE: 82 BPM | SYSTOLIC BLOOD PRESSURE: 140 MMHG | DIASTOLIC BLOOD PRESSURE: 80 MMHG | BODY MASS INDEX: 42 KG/M2 | WEIGHT: 246 LBS | HEIGHT: 64 IN

## 2025-01-09 DIAGNOSIS — G91.2 NPH (NORMAL PRESSURE HYDROCEPHALUS) (HCC): ICD-10-CM

## 2025-01-09 DIAGNOSIS — M25.50 JOINT PAIN: ICD-10-CM

## 2025-01-09 DIAGNOSIS — G31.84 MCI (MILD COGNITIVE IMPAIRMENT): Primary | ICD-10-CM

## 2025-01-09 LAB
ALBUMIN UR ELPH-MCNC: 42.7 %
ALPHA1 GLOB MFR UR ELPH: 7.9 %
ALPHA2 GLOB MFR UR ELPH: 8.6 %
B-GLOBULIN MFR UR ELPH: 10.3 %
GAMMA GLOB MFR UR ELPH: 30.5 %
INTERPRETATION UR IFE-IMP: NORMAL
PROT PATTERN UR ELPH-IMP: NORMAL
PROT UR-MCNC: 20.2 MG/DL

## 2025-01-09 PROCEDURE — 99214 OFFICE O/P EST MOD 30 MIN: CPT | Performed by: STUDENT IN AN ORGANIZED HEALTH CARE EDUCATION/TRAINING PROGRAM

## 2025-01-09 PROCEDURE — 84166 PROTEIN E-PHORESIS/URINE/CSF: CPT | Performed by: STUDENT IN AN ORGANIZED HEALTH CARE EDUCATION/TRAINING PROGRAM

## 2025-01-09 PROCEDURE — 86335 IMMUNFIX E-PHORSIS/URINE/CSF: CPT | Performed by: STUDENT IN AN ORGANIZED HEALTH CARE EDUCATION/TRAINING PROGRAM

## 2025-01-09 NOTE — PROGRESS NOTES
Name: Bozena Armstrong      : 1950      MRN: [id number]  Encounter Provider: Petty Kim MD  Encounter Date: 2025   Encounter department: [facility]  :  Assessment & Plan  NPH (normal pressure hydrocephalus) (HCC)  -awaiting VPS placement 25 at [facility]  -f/u with neurosurgery       MCI (mild cognitive impairment)  -MOCA today . Previous MOCA , prior to this  on 2023. She continues to be independent in all ADLs. Overall clinical picture c/w mild cognitive impairment. B12 deficiency found on labs previously. She endorses compliance with repletion - recently 703. Recommended continuing 1000 mcg daily. Possible contribution of NPH to her cognitive impairment - awaiting VPS as above. Will plan to re-assess after her procedure         CC:   Multiple complaints    History of Present Illness:     72 y/o female with h/o arthritis, nonspecific balance disorder, GERD, HTN, and HLD who arrives for evaluation of frequent falls.     Interval Hx  Since her last visit, she has been evaluated through NPH clinic at [facility] and also had a second opinion at [facility]. She endorsed significant improvement in her gait after LP. VPS was discussed at her appointment with neurosurgery in August. She presented for a second opinion at [facility], and they had recommended proceeding with VPS. Per review of the records she is scheduled for VPS placement 25 at [facility]. She does not notice a significant difference in her cognitive function after LP was performed.     Last visit  MRI brain neuroquant showed no evidence of neurodegenerative changes. However, did show evidence of NPH with mild ventricular prominence with narrowing of the callosal angle and disproportionate enlargement of the sylvian fissures. She was previouisly referred to NPH clinic and is awaiting LP on . Neuropathy symptoms overall unchanged.     Previous Hx  Patient states that she is  currently falling approximately 2x/week which often follows some lightheadedness that occurs after standing up from a seated position. Patient denies room spinning sensation or dizziness with rapid head movements. Patient was seen in the ED 1/11/2023 after having 3 falls in one day. Patient was evaluated, discharged and prescribed meclizine which the patient states has been helping by decreasing dizzy spells and falls. Patient states that she consumes approximately 30 oz of water daily. Patient states that she has been evaluated by ENT and ophthalmology but has not received a diagnosis. Patient has also worked with PT for balance and states that she was recently discharged from their services. She also complains of some memory loss which began 2-3 years ago and describes forgetting doctors appointments and forgetting to get certain items when grocery shopping. MoCA on 9/27/2023 was 27/30. She also reports some urinary incontinence that began 1 year ago. Patient denies seizures, tremors, numbness, tingling, vision changes, ear fullness, or changes to hearing. She drinks about 30 oz water daily.     Past Medical History:     Past Medical History:   Diagnosis Date    Arthritis     Balance disorder     GERD (gastroesophageal reflux disease)     Hyperlipidemia     Hypertension     Peripheral neuropathy     Vertigo        Patient Active Problem List   Diagnosis    Anemia    Constipation    Dyslipidemia    Essential hypertension    History of total knee arthroplasty, left    Osteoarthritis of knee, unilateral    Prediabetes    Dystonia    Chronic right hip pain    Primary osteoarthritis of right hip    Chronic bilateral low back pain without sciatica    Chronic pain syndrome    Obesity (BMI 35.0-39.9 without comorbidity)    Status post total hip replacement, right    Chronic pain of both shoulders    Obesity, morbid (HCC)    Sciatica    Neuropathy    Balance disorder    Memory deficit    Urinary incontinence    GERD  (gastroesophageal reflux disease)    NPH (normal pressure hydrocephalus) ([facility])    Pre-op examination       Medications:      Current Outpatient Medications   Medication Sig Dispense Refill    ascorbic acid (VITAMIN C) 500 MG tablet Take 1 tablet (500 mg total) by mouth 2 (two) times a day 60 tablet 0    benzonatate (TESSALON) 200 MG capsule Take 1 capsule (200 mg total) by mouth 3 (three) times a day as needed for cough 20 capsule 0    celecoxib (CeleBREX) 100 mg capsule TAKE ONE CAPSULE BY MOUTH TWICE A DAY 60 capsule 5    cholecalciferol (VITAMIN D3) 1,000 units tablet Take 1,000 Units by mouth daily      Doxylamine Succinate, Sleep, (SLEEP AID PO) Take 25 mg by mouth      DULoxetine (CYMBALTA) 60 mg delayed release capsule TAKE ONE CAPSULE BY MOUTH AT BEDTIME 30 capsule 5    guaiFENesin (MUCINEX) 600 mg 12 hr tablet Take 2 tablets (1,200 mg total) by mouth every 12 (twelve) hours 120 tablet 0    Multiple Vitamins-Minerals (CENTRUM SILVER PO) Take by mouth      potassium chloride (Klor-Con M20) 20 mEq tablet TAKE ONE TABLET BY MOUTH EVERY DAY 30 tablet 5    rosuvastatin (CRESTOR) 40 MG tablet Take 1 tablet (40 mg total) by mouth daily 90 tablet 3    vitamin B-12 (VITAMIN B-12) 1,000 mcg tablet Take 1 tablet (1,000 mcg total) by mouth daily 90 tablet 3    VITAMIN E PO Take by mouth      predniSONE 10 mg tablet Take 1 tablet (10 mg total) by mouth daily On day one take 6 tablets, day two take 5 tablets, day three take 4 tablets, day four take 3 tablets, day five take 2 tablets, day six take 1 tablet (Patient not taking: Reported on 1/9/2025) 21 tablet 0     No current facility-administered medications for this visit.        Allergies:    No Known Allergies    Family History:     Family History   Problem Relation Age of Onset    Arthritis Mother     Cancer Father     Heart disease Father     No Known Problems Sister     No Known Problems Daughter     No Known Problems Daughter     No Known Problems Daughter     No  "Known Problems Maternal Grandmother     No Known Problems Maternal Grandfather     No Known Problems Paternal Grandmother     No Known Problems Paternal Grandfather     Anemia Brother     No Known Problems Maternal Aunt     No Known Problems Maternal Aunt        Social History:       Social History     Socioeconomic History    Marital status: /Civil Union     Spouse name: Not on file    Number of children: Not on file    Years of education: Not on file    Highest education level: Not on file   Occupational History    Not on file   Tobacco Use    Smoking status: Never     Passive exposure: Past    Smokeless tobacco: Never   Vaping Use    Vaping status: Never Used   Substance and Sexual Activity    Alcohol use: Never    Drug use: Never    Sexual activity: Not Currently   Other Topics Concern    Not on file   Social History Narrative    Not on file     Social Drivers of Health     Financial Resource Strain: Low Risk  (12/19/2023)    Overall Financial Resource Strain (CARDIA)     Difficulty of Paying Living Expenses: Not very hard   Food Insecurity: Not on file   Transportation Needs: No Transportation Needs (12/19/2023)    PRAPARE - Transportation     Lack of Transportation (Medical): No     Lack of Transportation (Non-Medical): No   Physical Activity: Not on file   Stress: Not on file   Social Connections: Not on file   Intimate Partner Violence: Not At Risk (12/19/2023)    Humiliation, Afraid, Rape, and Kick questionnaire     Fear of Current or Ex-Partner: No     Emotionally Abused: No     Physically Abused: No     Sexually Abused: No   Housing Stability: Not on file         Objective:   /80 (BP Location: Left arm, Patient Position: Sitting, Cuff Size: Adult)   Pulse 82   Ht 5' 4\" (1.626 m)   Wt 112 kg (246 lb)   BMI 42.23 kg/m²     General: Patient is not in any acute/apparent distress, well nourished, well developed and cooperative.   HEENT: normocephalic, atraumatic, moist membranes  Neck: " supple  Extremities: no edema noted   Skin: no lesions or rash  Musculosketal: no bony abnormalities    Neurologic Examination:   Mental status: alert, awake, oriented X 3 and following commands.     Speech/Language: Speech is fluent without any dysarthria, no aphasia noted, can name, repeat, read and write and comprehension intact    Cranial Nerves:   CN I: smell not tested  CN II: Visual fields full to confrontation  CN III, IV, VI: Extraocular movements intact bilaterally. Pupils equal round and reactive to light bilaterally.  CN V: Facial sensation is normal.  CN VII: Full and symmetric facial movement.  CN VIII: Hearing is normal.  CN IX, X: Palate elevates symmetrically.   CN XI: Shoulder shrug strength is normal.  CN XII: Tongue midline without atrophy or fasciculations.    Motor:   Strength 5/5 in all 4 extremities. Bulk/tone - normal.  Fasiculations - none    Sensory:   Sensation intact to soft touch, vibration and pinprick in BUE.   Decreased LT/pinprick sensation to right first toe, normal proprioception bilaterally,   Vibration sensation is decreased in feet as compared to lower leg.    Cerebellar:   Finger-to-nose intact, normal heel to shin.    Reflexes: 2+ in all 4 extremities  Pathologic reflexes - babinski reflex negative         Review of Systems:     Review of Systems   Constitutional:  Negative for appetite change, fatigue and fever.   HENT: Negative.  Negative for hearing loss, tinnitus, trouble swallowing and voice change.    Eyes: Negative.  Negative for photophobia, pain and visual disturbance.   Respiratory: Negative.  Negative for shortness of breath.    Cardiovascular: Negative.  Negative for palpitations.   Gastrointestinal: Negative.  Negative for nausea and vomiting.   Endocrine: Negative.  Negative for cold intolerance.   Genitourinary:  Positive for frequency. Negative for dysuria and urgency.   Musculoskeletal:  Positive for gait problem. Negative for back pain, myalgias, neck pain  and neck stiffness.   Skin: Negative.  Negative for rash.   Allergic/Immunologic: Negative.    Neurological:  Negative for dizziness, tremors, seizures, syncope, facial asymmetry, speech difficulty, weakness, light-headedness, numbness and headaches.   Hematological: Negative.  Does not bruise/bleed easily.   Psychiatric/Behavioral:  Positive for confusion. Negative for hallucinations and sleep disturbance.     I have personally reviewed the MA's review of systems and made changes as necessary.    I have spent a total time of 30 minutes in caring for this patient on the day of the visit/encounter including Risks and benefits of tx options, Instructions for management, Patient and family education, Risk factor reductions, Impressions, Documenting in the medical record, Reviewing / ordering tests, medicine, procedures  , and Obtaining or reviewing history  .

## 2025-01-10 ENCOUNTER — OFFICE VISIT (OUTPATIENT)
Dept: FAMILY MEDICINE CLINIC | Facility: CLINIC | Age: 75
End: 2025-01-10
Payer: MEDICARE

## 2025-01-10 VITALS
RESPIRATION RATE: 18 BRPM | HEART RATE: 90 BPM | SYSTOLIC BLOOD PRESSURE: 122 MMHG | HEIGHT: 64 IN | DIASTOLIC BLOOD PRESSURE: 86 MMHG | OXYGEN SATURATION: 96 % | WEIGHT: 246 LBS | TEMPERATURE: 99.1 F | BODY MASS INDEX: 42 KG/M2

## 2025-01-10 DIAGNOSIS — Z00.00 MEDICARE ANNUAL WELLNESS VISIT, SUBSEQUENT: Primary | ICD-10-CM

## 2025-01-10 DIAGNOSIS — R32 URINARY INCONTINENCE, UNSPECIFIED TYPE: ICD-10-CM

## 2025-01-10 PROCEDURE — G0439 PPPS, SUBSEQ VISIT: HCPCS | Performed by: FAMILY MEDICINE

## 2025-01-10 NOTE — PATIENT INSTRUCTIONS
Medicare Preventive Visit Patient Instructions  Thank you for completing your Welcome to Medicare Visit or Medicare Annual Wellness Visit today. Your next wellness visit will be due in one year (1/11/2026).  The screening/preventive services that you may require over the next 5-10 years are detailed below. Some tests may not apply to you based off risk factors and/or age. Screening tests ordered at today's visit but not completed yet may show as past due. Also, please note that scanned in results may not display below.  Preventive Screenings:  Service Recommendations Previous Testing/Comments   Colorectal Cancer Screening  * Colonoscopy    * Fecal Occult Blood Test (FOBT)/Fecal Immunochemical Test (FIT)  * Fecal DNA/Cologuard Test  * Flexible Sigmoidoscopy Age: 45-75 years old   Colonoscopy: every 10 years (may be performed more frequently if at higher risk)  OR  FOBT/FIT: every 1 year  OR  Cologuard: every 3 years  OR  Sigmoidoscopy: every 5 years  Screening may be recommended earlier than age 45 if at higher risk for colorectal cancer. Also, an individualized decision between you and your healthcare provider will decide whether screening between the ages of 76-85 would be appropriate. Colonoscopy: 11/30/2023  FOBT/FIT: Not on file  Cologuard: 06/14/2023  Sigmoidoscopy: Not on file    Screening Current     Breast Cancer Screening Age: 40+ years old  Frequency: every 1-2 years  Not required if history of left and right mastectomy Mammogram: 02/21/2024    Screening Current   Cervical Cancer Screening Between the ages of 21-29, pap smear recommended once every 3 years.   Between the ages of 30-65, can perform pap smear with HPV co-testing every 5 years.   Recommendations may differ for women with a history of total hysterectomy, cervical cancer, or abnormal pap smears in past. Pap Smear: Not on file    Screening Not Indicated   Hepatitis C Screening Once for adults born between 1945 and 1965  More frequently in  patients at high risk for Hepatitis C Hep C Antibody: Not on file        Diabetes Screening 1-2 times per year if you're at risk for diabetes or have pre-diabetes Fasting glucose: 109 mg/dL (1/6/2025)  A1C: 5.8 % (1/16/2023)  Screening Current   Cholesterol Screening Once every 5 years if you don't have a lipid disorder. May order more often based on risk factors. Lipid panel: 10/07/2024    Screening Not Indicated  History Lipid Disorder     Other Preventive Screenings Covered by Medicare:  Abdominal Aortic Aneurysm (AAA) Screening: covered once if your at risk. You're considered to be at risk if you have a family history of AAA.  Lung Cancer Screening: covers low dose CT scan once per year if you meet all of the following conditions: (1) Age 55-77; (2) No signs or symptoms of lung cancer; (3) Current smoker or have quit smoking within the last 15 years; (4) You have a tobacco smoking history of at least 20 pack years (packs per day multiplied by number of years you smoked); (5) You get a written order from a healthcare provider.  Glaucoma Screening: covered annually if you're considered high risk: (1) You have diabetes OR (2) Family history of glaucoma OR (3)  aged 50 and older OR (4)  American aged 65 and older  Osteoporosis Screening: covered every 2 years if you meet one of the following conditions: (1) You're estrogen deficient and at risk for osteoporosis based off medical history and other findings; (2) Have a vertebral abnormality; (3) On glucocorticoid therapy for more than 3 months; (4) Have primary hyperparathyroidism; (5) On osteoporosis medications and need to assess response to drug therapy.   Last bone density test (DXA Scan): 02/21/2024.  HIV Screening: covered annually if you're between the age of 15-65. Also covered annually if you are younger than 15 and older than 65 with risk factors for HIV infection. For pregnant patients, it is covered up to 3 times per  pregnancy.    Immunizations:  Immunization Recommendations   Influenza Vaccine Annual influenza vaccination during flu season is recommended for all persons aged >= 6 months who do not have contraindications   Pneumococcal Vaccine   * Pneumococcal conjugate vaccine = PCV13 (Prevnar 13), PCV15 (Vaxneuvance), PCV20 (Prevnar 20)  * Pneumococcal polysaccharide vaccine = PPSV23 (Pneumovax) Adults 19-63 yo with certain risk factors or if 65+ yo  If never received any pneumonia vaccine: recommend Prevnar 20 (PCV20)  Give PCV20 if previously received 1 dose of PCV13 or PPSV23   Hepatitis B Vaccine 3 dose series if at intermediate or high risk (ex: diabetes, end stage renal disease, liver disease)   Respiratory syncytial virus (RSV) Vaccine - COVERED BY MEDICARE PART D  * RSVPreF3 (Arexvy) CDC recommends that adults 60 years of age and older may receive a single dose of RSV vaccine using shared clinical decision-making (SCDM)   Tetanus (Td) Vaccine - COST NOT COVERED BY MEDICARE PART B Following completion of primary series, a booster dose should be given every 10 years to maintain immunity against tetanus. Td may also be given as tetanus wound prophylaxis.   Tdap Vaccine - COST NOT COVERED BY MEDICARE PART B Recommended at least once for all adults. For pregnant patients, recommended with each pregnancy.   Shingles Vaccine (Shingrix) - COST NOT COVERED BY MEDICARE PART B  2 shot series recommended in those 19 years and older who have or will have weakened immune systems or those 50 years and older     Health Maintenance Due:      Topic Date Due   • Hepatitis C Screening  Never done   • Breast Cancer Screening: Mammogram  02/21/2025   • Colorectal Cancer Screening  11/28/2030     Immunizations Due:      Topic Date Due   • COVID-19 Vaccine (5 - 2024-25 season) 09/01/2024     Advance Directives   What are advance directives?  Advance directives are legal documents that state your wishes and plans for medical care. These plans  are made ahead of time in case you lose your ability to make decisions for yourself. Advance directives can apply to any medical decision, such as the treatments you want, and if you want to donate organs.   What are the types of advance directives?  There are many types of advance directives, and each state has rules about how to use them. You may choose a combination of any of the following:  Living will:  This is a written record of the treatment you want. You can also choose which treatments you do not want, which to limit, and which to stop at a certain time. This includes surgery, medicine, IV fluid, and tube feedings.   Durable power of  for healthcare (DPAHC):  This is a written record that states who you want to make healthcare choices for you when you are unable to make them for yourself. This person, called a proxy, is usually a family member or a friend. You may choose more than 1 proxy.  Do not resuscitate (DNR) order:  A DNR order is used in case your heart stops beating or you stop breathing. It is a request not to have certain forms of treatment, such as CPR. A DNR order may be included in other types of advance directives.  Medical directive:  This covers the care that you want if you are in a coma, near death, or unable to make decisions for yourself. You can list the treatments you want for each condition. Treatment may include pain medicine, surgery, blood transfusions, dialysis, IV or tube feedings, and a ventilator (breathing machine).  Values history:  This document has questions about your views, beliefs, and how you feel and think about life. This information can help others choose the care that you would choose.  Why are advance directives important?  An advance directive helps you control your care. Although spoken wishes may be used, it is better to have your wishes written down. Spoken wishes can be misunderstood, or not followed. Treatments may be given even if you do not want  them. An advance directive may make it easier for your family to make difficult choices about your care.   Fall Prevention    Fall prevention  includes ways to make your home and other areas safer. It also includes ways you can move more carefully to prevent a fall. Health conditions that cause changes in your blood pressure, vision, or muscle strength and coordination may increase your risk for falls. Medicines may also increase your risk for falls if they make you dizzy, weak, or sleepy.   Fall prevention tips:   Stand or sit up slowly.    Use assistive devices as directed.    Wear shoes that fit well and have soles that .    Wear a personal alarm.    Stay active.    Manage your medical conditions.    Home Safety Tips:  Add items to prevent falls in the bathroom.    Keep paths clear.    Install bright lights in your home.    Keep items you use often on shelves within reach.    Paint or place reflective tape on the edges of your stairs.    Urinary Incontinence   Urinary incontinence (UI)  is when you lose control of your bladder. UI develops because your bladder cannot store or empty urine properly. The 3 most common types of UI are stress incontinence, urge incontinence, or both.  Medicines:   May be given to help strengthen your bladder control. Report any side effects of medication to your healthcare provider.  Do pelvic muscle exercises often:  Your pelvic muscles help you stop urinating. Squeeze these muscles tight for 5 seconds, then relax for 5 seconds. Gradually work up to squeezing for 10 seconds. Do 3 sets of 15 repetitions a day, or as directed. This will help strengthen your pelvic muscles and improve bladder control.  Train your bladder:  Go to the bathroom at set times, such as every 2 hours, even if you do not feel the urge to go. You can also try to hold your urine when you feel the urge to go. For example, hold your urine for 5 minutes when you feel the urge to go. As that becomes easier, hold  your urine for 10 minutes.   Self-care:   Keep a UI record.  Write down how often you leak urine and how much you leak. Make a note of what you were doing when you leaked urine.  Drink liquids as directed. You may need to limit the amount of liquid you drink to help control your urine leakage. Do not drink any liquid right before you go to bed. Limit or do not have drinks that contain caffeine or alcohol.   Prevent constipation.  Eat a variety of high-fiber foods. Good examples are high-fiber cereals, beans, vegetables, and whole-grain breads. Walking is the best way to trigger your intestines to have a bowel movement.  Exercise regularly and maintain a healthy weight.  Weight loss and exercise will decrease pressure on your bladder and help you control your leakage.   Use a catheter as directed  to help empty your bladder. A catheter is a tiny, plastic tube that is put into your bladder to drain your urine.   Go to behavior therapy as directed.  Behavior therapy may be used to help you learn to control your urge to urinate.    Weight Management   Why it is important to manage your weight:  Being overweight increases your risk of health conditions such as heart disease, high blood pressure, type 2 diabetes, and certain types of cancer. It can also increase your risk for osteoarthritis, sleep apnea, and other respiratory problems. Aim for a slow, steady weight loss. Even a small amount of weight loss can lower your risk of health problems.  How to lose weight safely:  A safe and healthy way to lose weight is to eat fewer calories and get regular exercise. You can lose up about 1 pound a week by decreasing the number of calories you eat by 500 calories each day.   Healthy meal plan for weight management:  A healthy meal plan includes a variety of foods, contains fewer calories, and helps you stay healthy. A healthy meal plan includes the following:  Eat whole-grain foods more often.  A healthy meal plan should contain  fiber. Fiber is the part of grains, fruits, and vegetables that is not broken down by your body. Whole-grain foods are healthy and provide extra fiber in your diet. Some examples of whole-grain foods are whole-wheat breads and pastas, oatmeal, brown rice, and bulgur.  Eat a variety of vegetables every day.  Include dark, leafy greens such as spinach, kale, moises greens, and mustard greens. Eat yellow and orange vegetables such as carrots, sweet potatoes, and winter squash.   Eat a variety of fruits every day.  Choose fresh or canned fruit (canned in its own juice or light syrup) instead of juice. Fruit juice has very little or no fiber.  Eat low-fat dairy foods.  Drink fat-free (skim) milk or 1% milk. Eat fat-free yogurt and low-fat cottage cheese. Try low-fat cheeses such as mozzarella and other reduced-fat cheeses.  Choose meat and other protein foods that are low in fat.  Choose beans or other legumes such as split peas or lentils. Choose fish, skinless poultry (chicken or turkey), or lean cuts of red meat (beef or pork). Before you cook meat or poultry, cut off any visible fat.   Use less fat and oil.  Try baking foods instead of frying them. Add less fat, such as margarine, sour cream, regular salad dressing and mayonnaise to foods. Eat fewer high-fat foods. Some examples of high-fat foods include french fries, doughnuts, ice cream, and cakes.  Eat fewer sweets.  Limit foods and drinks that are high in sugar. This includes candy, cookies, regular soda, and sweetened drinks.  Exercise:  Exercise at least 30 minutes per day on most days of the week. Some examples of exercise include walking, biking, dancing, and swimming. You can also fit in more physical activity by taking the stairs instead of the elevator or parking farther away from stores. Ask your healthcare provider about the best exercise plan for you.      © Copyright [employer] 2018 Information is for End User's use only and may not be sold,  "redistributed or otherwise used for commercial purposes. All illustrations and images included in CareNotes® are the copyrighted property of [employer] or [employer]      Patient Education     Urinary incontinence in females   The Basics   Written by the doctors and editors at [facility]   What is urinary incontinence? -- Urinary incontinence is the medical term for when a person leaks urine or loses bladder control.  Incontinence is a very common problem, but it is not a normal part of aging. If you have this problem, there are treatments that can help. There are also things that you can do on your own to stop or reduce urine leakage so you don't have to \"just live with it.\"  What are the symptoms of incontinence? -- There are different types of incontinence. Each causes different symptoms. The 3 most common types are:   Stress incontinence - With stress incontinence, you leak urine when you laugh, cough, sneeze, or do anything that \"stresses\" the belly. Stress incontinence is most common in females, especially those who have had a baby.   Urgency incontinence - With urgency incontinence, you feel a strong need to urinate all of a sudden. This is also known as \"urge incontinence.\" Often, the \"urge\" is so strong that you can't make it to the bathroom in time. \"Overactive bladder\" is another term for having a sudden, frequent urge to urinate. People with overactive bladder might or might not actually leak urine.   Mixed incontinence - With mixed incontinence, you have symptoms of both stress and urgency incontinence.  Is there anything I can do on my own to feel better? -- Yes. Here are some things that can help reduce urine leaks:   Reduce the amount of liquid that you drink, especially a few hours before bed.   Cut down on any foods or drinks that make your symptoms worse. Some people find that alcohol, caffeine, or spicy or acidic foods irritate the bladder.   Try to lose weight, if you are overweight. " "Your doctor or nurse can help you do this in a healthy way.   If you have diabetes, keep your blood sugar as close to your goal level as possible.   If you take medicines called diuretics, plan ahead. These medicines increase the need to urinate. Try to take them when you know you will be near a bathroom for a few hours. If you keep having problems with leakage because of diuretics, ask your doctor if you can take a lower dose or switch to a different medicine.  These techniques can also help improve bladder control:   Bladder retraining - During bladder retraining, you go to the bathroom at scheduled times. For instance, you might decide that you will go every hour. Make yourself go every hour, even if you don't feel like you need to. Try to wait the whole hour, even if you need to go sooner. Then, once you get used to going every hour, increase the amount of time you wait in between bathroom visits. Over time, you might be able to \"retrain\" your bladder to wait 3 or 4 hours between bathroom visits.   Pelvic floor muscle training - This involves learning exercises to strengthen and relax your pelvic muscles. These include the muscles that control the flow of urine and bowel movements. When done right, these exercises can help. But people often do them wrong. Ask your doctor or nurse how to do them right. Your doctor might suggest working with a physical therapist who has special training in these exercises.  Should I see my doctor or nurse? -- Yes. Your doctor or nurse can find out what might be causing your incontinence. They can also suggest ways to relieve the problem.  When you speak to your doctor or nurse, ask if any of the medicines you take could be causing your symptoms. Some medicines can cause incontinence or make it worse.  Some people choose to wear pads or special underwear. These can help if you accidentally leak urine once in a while. But they can also cause skin irritation if you use them a lot. If " you have incontinence, ask your doctor or nurse how to treat it.  How is incontinence treated? -- The treatment options differ depending on what type of incontinence you have. Some of the options include:   Medicines to relax the bladder   Surgery to repair the tissues that support the bladder or to improve the flow of urine   Electrical stimulation of the nerves that relax the bladder  Urinary incontinence is more common in people who have been through menopause. (Menopause is when you stop having monthly periods). Some people have vaginal dryness after menopause. If this is the case for you, a treatment called vaginal estrogen might help.  What will my life be like? -- Many people with incontinence can regain bladder control or at least reduce the amount of leakage they have. The most important thing is to tell your doctor or nurse. Then, work with them to find an approach that helps you.  All topics are updated as new evidence becomes available and our peer review process is complete.  This topic retrieved from [employer] on: Feb 26, 2024.  Topic 26630 Version 18.0  Release: 32.2.4 - C32.56  © 2024 UpToDate, Inc. and/or its affiliates. All rights reserved.  figure 1: Location of the bladder     This drawing shows the side view of a woman's body. The bladder is in front of the vagina. The urethra is the tube that carries urine from the bladder out of the body.  Graphic 070780 Version 1.0  Consumer Information Use and Disclaimer   Disclaimer: This generalized information is a limited summary of diagnosis, treatment, and/or medication information. It is not meant to be comprehensive and should be used as a tool to help the user understand and/or assess potential diagnostic and treatment options. It does NOT include all information about conditions, treatments, medications, side effects, or risks that may apply to a specific patient. It is not intended to be medical advice or a substitute for the medical advice,  diagnosis, or treatment of a health care provider based on the health care provider's examination and assessment of a patient's specific and unique circumstances. Patients must speak with a health care provider for complete information about their health, medical questions, and treatment options, including any risks or benefits regarding use of medications. This information does not endorse any treatments or medications as safe, effective, or approved for treating a specific patient. Up[employer]Date, Inc. and its affiliates disclaim any warranty or liability relating to this information or the use thereof.The use of this information is governed by the Terms of Use, available at https://www.[employer].com/en/know/clinical-effectiveness-terms. 2024© [employer]Date, Inc. and its affiliates and/or licensors. All rights reserved.  Copyright   © 2024 [employer], Inc. and/or its affiliates. All rights reserved.

## 2025-01-10 NOTE — PROGRESS NOTES
Name: Bozena Armstrong      : 1950      MRN: [id number]  Encounter Provider: Hillary Grimm DO  Encounter Date: 1/10/2025   Encounter department: [facility]    Assessment & Plan  Medicare annual wellness visit, subsequent         Urinary incontinence, unspecified type            Preventive health issues were discussed with patient, and age appropriate screening tests were ordered as noted in patient's After Visit Summary. Personalized health advice and appropriate referrals for health education or preventive services given if needed, as noted in patient's After Visit Summary.    History of Present Illness     HPI   Patient Care Team:  Hillary Grimm DO as PCP - General (Family Medicine)    Review of Systems   Constitutional:  Negative for chills, fatigue and fever.   HENT:  Negative for congestion, postnasal drip, rhinorrhea and sinus pressure.    Eyes:  Negative for photophobia and visual disturbance.   Respiratory:  Negative for cough and shortness of breath.    Cardiovascular:  Negative for chest pain, palpitations and leg swelling.   Gastrointestinal:  Negative for abdominal pain, constipation, diarrhea, nausea and vomiting.   Genitourinary:  Negative for difficulty urinating and dysuria.   Musculoskeletal:  Negative for arthralgias and myalgias.   Skin:  Negative for color change and rash.   Neurological:  Negative for dizziness, weakness, light-headedness and headaches.     Medical History Reviewed by provider this encounter:  Tobacco  Allergies  Meds  Problems  Med Hx  Surg Hx  Fam Hx       Annual Wellness Visit Questionnaire   Bozena is here for her Subsequent Wellness visit. Last Medicare Wellness visit information reviewed, patient interviewed, no change since last AWV.     Health Risk Assessment:   Patient rates overall health as fair. Patient feels that their physical health rating is slightly worse. Patient is satisfied with their life. Eyesight was  rated as same. Hearing was rated as same. Patient feels that their emotional and mental health rating is same. Patients states they are sometimes angry. Patient states they are always unusually tired/fatigued. Pain experienced in the last 7 days has been none. Patient states that she has experienced no weight loss or gain in last 6 months. Fell SANTIAGO, pain to tailbone - has resolved.     Depression Screening:   PHQ-2 Score: 0      Fall Risk Screening:   In the past year, patient has experienced: history of falling in past year    Number of falls: 2 or more  Injured during fall?: No    Feels unsteady when standing or walking?: Yes    Worried about falling?: Yes      Urinary Incontinence Screening:   Patient has leaked urine accidently in the last six months.     Home Safety:  Patient does not have trouble with stairs inside or outside of their home. Patient has working smoke alarms and has working carbon monoxide detector. Home safety hazards include: none. Takes time with stairs. Wears rubber bottom shoes.     Nutrition:   Current diet is Regular.     Medications:   Patient is currently taking over-the-counter supplements. OTC medications include: see medication list. Patient is not able to manage medications.     Activities of Daily Living (ADLs)/Instrumental Activities of Daily Living (IADLs):   Walk and transfer into and out of bed and chair?: Yes  Dress and groom yourself?: Yes    Bathe or shower yourself?: Yes    Feed yourself? Yes  Do your laundry/housekeeping?: Yes  Manage your money, pay your bills and track your expenses?: Yes  Make your own meals?: Yes    Do your own shopping?: Yes    Previous Hospitalizations:   Any hospitalizations or ED visits within the last 12 months?: Yes    How many hospitalizations have you had in the last year?: 1-2    Advance Care Planning:   Living will: No    Durable POA for healthcare: No    Advanced directive: No    Advanced directive counseling given: Yes      Cognitive  Screening:   Provider or family/friend/caregiver concerned regarding cognition?: No    PREVENTIVE SCREENINGS      Cardiovascular Screening:    General: Screening Not Indicated, History Lipid Disorder and Risks and Benefits Discussed      Diabetes Screening:     General: Screening Current      Colorectal Cancer Screening:     General: Screening Current      Breast Cancer Screening:     General: Screening Current      Cervical Cancer Screening:    General: Screening Not Indicated      Lung Cancer Screening:     General: Screening Not Indicated    Screening, Brief Intervention, and Referral to Treatment (SBIRT)    Screening  Typical number of drinks in a day: 0  Typical number of drinks in a week: 0  Interpretation: Low risk drinking behavior.    Single Item Drug Screening:  How often have you used an illegal drug (including marijuana) or a prescription medication for non-medical reasons in the past year? never    Single Item Drug Screen Score: 0  Interpretation: Negative screen for possible drug use disorder    Social Drivers of Health     Financial Resource Strain: Low Risk  (12/19/2023)    Overall Financial Resource Strain (CARDIA)     Difficulty of Paying Living Expenses: Not very hard   Food Insecurity: No Food Insecurity (1/10/2025)    Hunger Vital Sign     Worried About Running Out of Food in the Last Year: Never true     Ran Out of Food in the Last Year: Never true   Transportation Needs: No Transportation Needs (1/10/2025)    PRAPARE - Transportation     Lack of Transportation (Medical): No     Lack of Transportation (Non-Medical): No   Housing Stability: Low Risk  (1/10/2025)    Housing Stability Vital Sign     Unable to Pay for Housing in the Last Year: No     Number of Times Moved in the Last Year: 0     Homeless in the Last Year: No   Utilities: Not At Risk (1/10/2025)    [facility] Utilities     Threatened with loss of utilities: No     No results found.    Objective   /86 (BP Location: Left arm, Patient  "Position: Sitting, Cuff Size: Adult)   Pulse 90   Temp 99.1 °F (37.3 °C) (Tympanic)   Resp 18   Ht 5' 4\" (1.626 m)   Wt 112 kg (246 lb)   SpO2 96%   BMI 42.23 kg/m²     Physical Exam  Constitutional:       General: She is not in acute distress.     Appearance: Normal appearance. She is not ill-appearing, toxic-appearing or diaphoretic.   HENT:      Head: Normocephalic and atraumatic.      Right Ear: Tympanic membrane and ear canal normal.      Left Ear: Tympanic membrane and ear canal normal.      Nose: Nose normal. No congestion.      Mouth/Throat:      Mouth: Mucous membranes are moist.      Pharynx: Oropharynx is clear. No oropharyngeal exudate.   Eyes:      Extraocular Movements: Extraocular movements intact.      Conjunctiva/sclera: Conjunctivae normal.      Pupils: Pupils are equal, round, and reactive to light.   Cardiovascular:      Rate and Rhythm: Normal rate and regular rhythm.      Pulses: Normal pulses.      Heart sounds: No murmur heard.  Pulmonary:      Effort: Pulmonary effort is normal.      Breath sounds: Normal breath sounds. No wheezing, rhonchi or rales.   Abdominal:      General: Bowel sounds are normal. There is no distension.      Palpations: Abdomen is soft.      Tenderness: There is no abdominal tenderness.   Musculoskeletal:         General: No swelling or tenderness. Normal range of motion.      Cervical back: Normal range of motion and neck supple.   Skin:     General: Skin is warm and dry.      Capillary Refill: Capillary refill takes less than 2 seconds.   Neurological:      General: No focal deficit present.      Mental Status: She is alert and oriented to person, place, and time.      Cranial Nerves: No cranial nerve deficit.   Psychiatric:         Mood and Affect: Mood normal.         Behavior: Behavior normal.         Thought Content: Thought content normal.         "

## 2025-01-14 ENCOUNTER — TELEPHONE (OUTPATIENT)
Age: 75
End: 2025-01-14

## 2025-01-14 LAB — METHYLMALONATE SERPL-SCNC: 156 NMOL/L (ref 0–378)

## 2025-01-14 NOTE — TELEPHONE ENCOUNTER
Kendra from Dr Crooks 's office called to request pre op clearance completed on 01/06/25 to be faxed over at     Fax# [phone]    Please advise

## 2025-03-12 ENCOUNTER — EVALUATION (OUTPATIENT)
Facility: CLINIC | Age: 75
End: 2025-03-12
Payer: MEDICARE

## 2025-03-12 DIAGNOSIS — Z98.2 S/P VENTRICULOPERITONEAL SHUNT: Primary | ICD-10-CM

## 2025-03-12 DIAGNOSIS — G91.2 NPH (NORMAL PRESSURE HYDROCEPHALUS) (HCC): ICD-10-CM

## 2025-03-12 PROCEDURE — 97162 PT EVAL MOD COMPLEX 30 MIN: CPT

## 2025-03-12 PROCEDURE — 97112 NEUROMUSCULAR REEDUCATION: CPT

## 2025-03-12 NOTE — PROGRESS NOTES
PT Evaluation     Today's date: 3/12/2025  Patient name: Bozena Armstrong  : 1950  MRN: [id number]  Referring provider: Bobby Crooks MD  Dx:   Encounter Diagnosis     ICD-10-CM    1. S/P ventriculoperitoneal shunt  Z98.2       2. NPH (normal pressure hydrocephalus) ([facility])  G91.2           Start Time: 1545  Stop Time: 1630  Total time in clinic (min): 45 minutes    Assessment  Impairments: abnormal coordination, abnormal movement, impaired balance, impaired physical strength, lacks appropriate home exercise program and safety issue    Assessment details: Patient is a 74 y.o. year old female presenting to OPPT s/p ventriculoperitoneal shunt that occurred on 25.  Patient demonstrates decreased strength and balance which impacts her overall functional mobility. Per the 5xSTS they show reduced LE strength and power, 14.46s seconds. This time is also above the cutoff compared to her age matched norms. Her FGA score of 21/30 also classifies her to be at a higher risk for falls. Noted significant path deviation when ambulating with head turns as well as increased difficulty with tandem walking. Given written HEP with focus on strengthening and balance exercises. She will benefit from skilled PT interventions to maximize return to PLOF and independence as able. Thank you for this referral and the ability to participate in the care of this patient.    Understanding of Dx/Px/POC: excellent     Prognosis: excellent    Goals  In 5 weeks  Pt will be able to complete 5STS from standard arm chair with no BUE support </= 12s to demonstrate improved LE strength to assist with safety during transfers.   Pt will be able to become independent with HEP with printed handout for LE strength  and balance to promote improved activity levels outside of therapy  Pt will be able to improve FGA score >/= 23/30 to demonstrate improved balance and reduce fall risk    In 4-10 weeks:  Pt will be able to improve FGA score >/= 25/30  to demonstrate improved balance and to no longer be classified as a higher risk for falls.  Demonstrate consistent carryover with HEP and walking program.  Improve gait speed by at least  0.12 m/s to meet MCID value for older adults and reduce fall risk.  Improve ABC to at least above 94% to show reduced risk of falling and improved balance confidence.    All data taken from [facility] Section or Rehab Measures, UDPT Normative Values sheet     LIU test: 46/56                                                    5 x STS Test:  MDC: 6 points                                                                       MDC: 2.3 seconds   age norms                                                                           Age Norms   60-69 year old = M: 55, F: 55                                             60-69 year old: 11.4 seconds   70-79 year old = M 54,  F: 53                                             70-79 year old: 12.6 seconds    80-89 year old = M53,   F: 50                                             80-89 year old: 14.8 seconds      TUG test:                                                                     10 Meter Walk Test:  MDC: 4.14 seconds                                                                MDC: .59 ft/sec  Cut off score for Falls                                                  Age Norms  > 13.5 seconds community dwelling adults                20-29; M: 4.56 ft/sec F: 4.62 ft/sec  > 32.2 Frail Elderly                                                     30-39: M 4.76 ft/sec  F: 4.68 ft/sec                                                                                                     40-49: M: 4.79 ft/sec  F: 4.62 ft/sec  6 Minute Walk Test                                                              50-59: M: 4.76 ft/sec  F: 4.56 ft/sec  MDC: 190 feet                                                                        60-69: M: 4.56 ft/sec  F: 4.26 ft/sec  Age Norms                                                                               70-+    M: 4.36 ft/sec  F: 4.16 ft/sec  60-69:    M: 1876 F: 1765  70-79:    M: 1729 F: 1545  80-89 +: M: 1368 F; 1286                                                                                                                                  FGA  Age matched Norms  -40-49 years= 28.9/ 50-59 years=28.4  -60-69 years=27.1/70-79 years=24.9  -80/89 years=20.8  MCID: Vestibular disorders: 8 points  MDC: Parkinsons: 0.61, Stroke: 4.2 points  Cut-offs:  -Community dwelling older adults              -22/30 : predict falls (Sensitivity 85%, Specificity 86%)              -20/30 (unexplained falls in the next 6 months) (Sensitivity 100%, Specificity 76%)  -Parkinson's              -5/30 (identify fallers in Parkinson's)        Plan  Patient would benefit from: skilled physical therapy    Planned therapy interventions: neuromuscular re-education, manual therapy, patient education, home exercise program, therapeutic exercise, therapeutic activities, gait training and patient/caregiver education    Frequency: 2x week  Duration in weeks: 10  Plan of Care beginning date: 3/12/2025  Plan of Care expiration date: 5/21/2025  Treatment plan discussed with: patient        Subjective Evaluation    History of Present Illness  Date of surgery: 1/23/2025  Mechanism of injury: surgery  Mechanism of injury: Pt presents to OPPT s/p ventriculoperitoneal shunt that occurred on 1/23/25. She reports that she has been falling less that prior to the surgery but reports she had 2 falls with no injuries since the surgery. Her chief complaint is her balance. 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 She also reports that once she falls she needs assistance from either another person or from furniture to help stand back up. She reports no difficulty with stairs or curbs. She also reports that she has cane but only uses it if she feels  like she needs to. She also stated that [facility].   Quality of life: good    Patient Goals  Patient goals for therapy: improved balance    Pain  No pain reported    Social Support  Steps to enter house: yes  2  Stairs in house: yes   12  Lives in: one-story house  Lives with: spouse    Employment status: not working  Hand dominance: right    Treatments  Previous treatment: physical therapy        Objective     Strength/Myotome Testing     Left Hip   Planes of Motion   Flexion: 4-    Right Hip   Planes of Motion   Flexion: 4-    Left Knee   Flexion: 4-  Extension: 4    Right Knee   Flexion: 4  Extension: 4    Left Ankle/Foot   Dorsiflexion: 4  Plantar flexion: WFL.     Right Ankle/Foot   Dorsiflexion: 4  Plantar flexion: WFL.             Daily Treatment Diary     Precautions: Fall risk  CO-MORBIDITIES:  HEP ACCESS CODE:   FOTO Completed On:     POC Expires Reeval for Medicare to be completed  Unit Limit Auth Expiration Date PT/OT/STVisit Limit   5/21/25 By visit 10 BOMN NA BOMN    Completed on visit                    Auth Status DATE 3/12        NA Visit # 1         Remaining         FOM 5STS: 14.46s     FGA: 21/30     ABC: 76.88        MANUAL THERAPY                                                               THERAPEUTIC EXERCISE HEP          STS, semitandem EO 30s, kettlebell lifts         STS          Lunges          Step ups                                                                                                                        NEUROMUSCULAR REEDUCATION           Static balance          Dynamic balance          Foam beam          Gait +HT           Blaze pods                                                                                                    THERAPEUTIC ACTIVITY                                                  GAIT TRAINING                                                  MODALITIES

## 2025-03-12 NOTE — LETTER
2025    Bobby Crooks MD  [address] [place], [place] [address]    Patient: Bozena Armstrong   YOB: 1950   Date of Visit: 3/12/2025     Encounter Diagnosis     ICD-10-CM    1. S/P ventriculoperitoneal shunt  Z98.2       2. NPH (normal pressure hydrocephalus) (HCC)  G91.2           Dear Dr. Crooks:    Thank you for your recent referral of Bozena Armstrong. Please review the attached evaluation summary from Bozena's recent visit.     Please verify that you agree with the plan of care by signing the attached order.     If you have any questions or concerns, please do not hesitate to call.     I sincerely appreciate the opportunity to share in the care of one of your patients and hope to have another opportunity to work with you in the near future.       Sincerely,    Margarita Valladares, PT      Referring Provider:      I certify that I have read the below Plan of Care and certify the need for these services furnished under this plan of treatment while under my care.                    Bobby Crooks MD  [address] [place], [place] [address]  Via Fax: [phone]          PT Evaluation     Today's date: 3/12/2025  Patient name: Bozena Armstrong  : 1950  MRN: [id number]  Referring provider: Bobby Crooks MD  Dx:   Encounter Diagnosis     ICD-10-CM    1. S/P ventriculoperitoneal shunt  Z98.2       2. NPH (normal pressure hydrocephalus) (HCC)  G91.2           Start Time: 1545  Stop Time: 1630  Total time in clinic (min): 45 minutes    Assessment  Impairments: abnormal coordination, abnormal movement, impaired balance, impaired physical strength, lacks appropriate home exercise program and safety issue    Assessment details: Patient is a 74 y.o. year old female presenting to OPPT s/p ventriculoperitoneal shunt that occurred on 25.  Patient demonstrates decreased strength and balance which impacts her overall  functional mobility. Per the 5xSTS they show reduced LE strength and power, 14.46s seconds. This time is also above the cutoff compared to her age matched norms. Her FGA score of 21/30 also classifies her to be at a higher risk for falls. Noted significant path deviation when ambulating with head turns as well as increased difficulty with tandem walking. Given written HEP with focus on strengthening and balance exercises. She will benefit from skilled PT interventions to maximize return to PLOF and independence as able. Thank you for this referral and the ability to participate in the care of this patient.    Understanding of Dx/Px/POC: excellent     Prognosis: excellent    Goals  In 5 weeks  Pt will be able to complete 5STS from standard arm chair with no BUE support </= 12s to demonstrate improved LE strength to assist with safety during transfers.   Pt will be able to become independent with HEP with printed handout for LE strength  and balance to promote improved activity levels outside of therapy  Pt will be able to improve FGA score >/= 23/30 to demonstrate improved balance and reduce fall risk    In 4-10 weeks:  Pt will be able to improve FGA score >/= 25/30 to demonstrate improved balance and to no longer be classified as a higher risk for falls.  Demonstrate consistent carryover with HEP and walking program.  Improve gait speed by at least  0.12 m/s to meet MCID value for older adults and reduce fall risk.  Improve ABC to at least above 94% to show reduced risk of falling and improved balance confidence.    All data taken from [facility] Section or Rehab Measures, UDPT Normative Values sheet     LIU test: 46/56                                                    5 x STS Test:  MDC: 6 points                                                                       MDC: 2.3 seconds   age norms                                                                           Age Norms   60-69 year old = M: 55, F: 55                                              60-69 year old: 11.4 seconds   70-79 year old = M 54,  F: 53                                             70-79 year old: 12.6 seconds    80-89 year old = M53,   F: 50                                             80-89 year old: 14.8 seconds      TUG test:                                                                     10 Meter Walk Test:  MDC: 4.14 seconds                                                                MDC: .59 ft/sec  Cut off score for Falls                                                  Age Norms  > 13.5 seconds community dwelling adults                20-29; M: 4.56 ft/sec F: 4.62 ft/sec  > 32.2 Frail Elderly                                                     30-39: M 4.76 ft/sec  F: 4.68 ft/sec                                                                                                     40-49: M: 4.79 ft/sec  F: 4.62 ft/sec  6 Minute Walk Test                                                              50-59: M: 4.76 ft/sec  F: 4.56 ft/sec  MDC: 190 feet                                                                        60-69: M: 4.56 ft/sec  F: 4.26 ft/sec  Age Norms                                                                              70-+    M: 4.36 ft/sec  F: 4.16 ft/sec  60-69:    M: 1876 F: 1765  70-79:    M: 1729 F: 1545  80-89 +: M: 1368 F; 1286                                                                                                                                  FGA  Age matched Norms  -40-49 years= 28.9/ 50-59 years=28.4  -60-69 years=27.1/70-79 years=24.9  -80/89 years=20.8  MCID: Vestibular disorders: 8 points  MDC: Parkinsons: 0.61, Stroke: 4.2 points  Cut-offs:  -Community dwelling older adults              -22/30 : predict falls (Sensitivity 85%, Specificity 86%)              -20/30 (unexplained falls in the next 6 months) (Sensitivity 100%, Specificity 76%)  -Parkinson's              -5/30 (identify fallers in  Parkinson's)        Plan  Patient would benefit from: skilled physical therapy    Planned therapy interventions: neuromuscular re-education, manual therapy, patient education, home exercise program, therapeutic exercise, therapeutic activities, gait training and patient/caregiver education    Frequency: 2x week  Duration in weeks: 10  Plan of Care beginning date: 3/12/2025  Plan of Care expiration date: 5/21/2025  Treatment plan discussed with: patient        Subjective Evaluation    History of Present Illness  Date of surgery: 1/23/2025  Mechanism of injury: surgery  Mechanism of injury: Pt presents to OPPT s/p ventriculoperitoneal shunt that occurred on 1/23/25. She reports that she has been falling less that prior to the surgery but reports she had 2 falls with no injuries since the surgery. Her chief complaint is her balance. 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 She also reports that once she falls she needs assistance from either another person or from furniture to help stand back up. She reports no difficulty with stairs or curbs. She also reports that she has cane but only uses it if she feels like she needs to. She also stated that [facility].   Quality of life: good    Patient Goals  Patient goals for therapy: improved balance    Pain  No pain reported    Social Support  Steps to enter house: yes  2  Stairs in house: yes   12  Lives in: one-story house  Lives with: spouse    Employment status: not working  Hand dominance: right    Treatments  Previous treatment: physical therapy        Objective     Strength/Myotome Testing     Left Hip   Planes of Motion   Flexion: 4-    Right Hip   Planes of Motion   Flexion: 4-    Left Knee   Flexion: 4-  Extension: 4    Right Knee   Flexion: 4  Extension: 4    Left Ankle/Foot   Dorsiflexion: 4  Plantar flexion: WFL.     Right Ankle/Foot   Dorsiflexion: 4  Plantar  flexion: WFL.             Daily Treatment Diary     Precautions: Fall risk  CO-MORBIDITIES:  HEP ACCESS CODE:   FOTO Completed On:     POC Expires Reeval for Medicare to be completed  Unit Limit Auth Expiration Date PT/OT/STVisit Limit   5/21/25 By visit 10 BOMN NA BOMN    Completed on visit                    Auth Status DATE 3/12        NA Visit # 1         Remaining         FOM 5STS: 14.46s     FGA: 21/30     ABC: 76.88        MANUAL THERAPY                                                               THERAPEUTIC EXERCISE HEP          STS, semitandem EO 30s, kettlebell lifts         STS          Lunges          Step ups                                                                                                                        NEUROMUSCULAR REEDUCATION           Static balance          Dynamic balance          Foam beam          Gait +HT           Blaze pods                                                                                                    THERAPEUTIC ACTIVITY                                                  GAIT TRAINING                                                  MODALITIES

## 2025-03-17 ENCOUNTER — OFFICE VISIT (OUTPATIENT)
Facility: CLINIC | Age: 75
End: 2025-03-17
Payer: MEDICARE

## 2025-03-17 DIAGNOSIS — G91.2 NPH (NORMAL PRESSURE HYDROCEPHALUS) (HCC): ICD-10-CM

## 2025-03-17 DIAGNOSIS — Z98.2 S/P VENTRICULOPERITONEAL SHUNT: Primary | ICD-10-CM

## 2025-03-17 PROCEDURE — 97112 NEUROMUSCULAR REEDUCATION: CPT

## 2025-03-17 NOTE — PROGRESS NOTES
Daily Note     Today's date: 3/17/2025  Patient name: Bozena Armstrong  : 1950  MRN: [id number]  Referring provider: Bobby Crooks MD  Dx:   Encounter Diagnosis     ICD-10-CM    1. S/P ventriculoperitoneal shunt  Z98.2       2. NPH (normal pressure hydrocephalus) ([facility])  G91.2           Start Time: 1616  Stop Time: 1700  Total time in clinic (min): 44 minutes    Subjective: Pt offered no new complaints and is agreeable to PT session.       Objective: See treatment diary below      Assessment: Pt demos excessive forward flexed posture during balance interventions causing her to lose balance forward and requiring occasional Sheila. Pt req consistent Vcs for upright posture with poor carry over. Noted appropriate hip and ankle strategies with lateral steps on foam. Pt will benefit from continued skilled PT in order to maximize safety and independence with overall functional mobility.       Plan: Continue per plan of care.      Daily Treatment Diary     Precautions: Fall risk  CO-MORBIDITIES:  HEP ACCESS CODE:   FOTO Completed On:     POC Expires Reeval for Medicare to be completed  Unit Limit Auth Expiration Date PT/OT/STVisit Limit   25 By visit 10 BOMN NA BOMN    Completed on visit                    Auth Status DATE 3/12 3/17       NA Visit # 1 2        Remaining         FOM 5STS: 14.46s     FGA:      ABC: 76.88        MANUAL THERAPY                                                               THERAPEUTIC EXERCISE HEP          STS, semitandem EO 30s, kettlebell lifts         STS          Lunges          Step ups                                                                                                                        NEUROMUSCULAR REEDUCATION           Static balance          Dynamic balance          Foam beam   Side steps on foam x3 laps        Gait +HT    20ft x3 laps H/V ea       Blaze pods          Tandem walk   Semitandem walk 20ft x2        Hurdles   SOLO 6 middle setting  "hurdles       Step ups   Step to foam onto 4\"step onto foam on other side SOLO x4                                                                    THERAPEUTIC ACTIVITY                                                  GAIT TRAINING                                                  MODALITIES                                        "

## 2025-03-21 DIAGNOSIS — E87.6 HYPOKALEMIA: ICD-10-CM

## 2025-03-21 RX ORDER — POTASSIUM CHLORIDE 1500 MG/1
20 TABLET, EXTENDED RELEASE ORAL DAILY
Qty: 30 TABLET | Refills: 1 | Status: SHIPPED | OUTPATIENT
Start: 2025-03-21

## 2025-03-21 NOTE — TELEPHONE ENCOUNTER
Medication: potassium chloride (Klor-Con M20)     Dose/Frequency: 20 meq    Quantity: 30 Tablet     Pharmacy: [facility] - [address] [place]      Office:   [x] PCP/Provider -   [] Speciality/Provider -     Does the patient have enough for 3 days?   [] Yes   [x] No - Send as HP to POD

## 2025-03-24 ENCOUNTER — OFFICE VISIT (OUTPATIENT)
Facility: CLINIC | Age: 75
End: 2025-03-24
Payer: MEDICARE

## 2025-03-24 DIAGNOSIS — Z98.2 S/P VENTRICULOPERITONEAL SHUNT: Primary | ICD-10-CM

## 2025-03-24 DIAGNOSIS — G91.2 NPH (NORMAL PRESSURE HYDROCEPHALUS) (HCC): ICD-10-CM

## 2025-03-24 PROCEDURE — 97112 NEUROMUSCULAR REEDUCATION: CPT

## 2025-03-24 NOTE — PROGRESS NOTES
Daily Note     Today's date: 3/24/2025  Patient name: Bozena Armstrong  : 1950  MRN: [id number]  Referring provider: Bobby Crooks MD  Dx:   Encounter Diagnosis     ICD-10-CM    1. S/P ventriculoperitoneal shunt  Z98.2       2. NPH (normal pressure hydrocephalus) (HCC)  G91.2             Start Time: 1615  Stop Time: 1700  Total time in clinic (min): 45 minutes    Subjective: Pt offered no new complaints and is agreeable to PT session.       Objective: See treatment diary below      Assessment: Pt demos improvement in forward flexed posture this session, however requires consistent Vcs to correct once fatigue begins. Required occasional Sheila during balance interventions to prevent total LOB during SLS on foam. Also required consistent Vcs for forward eye gaze with good carry over. Pt will benefit from continued skilled PT in order to maximize safety and independence with overall functional mobility.       Plan: Continue per plan of care.      Daily Treatment Diary     Precautions: Fall risk  CO-MORBIDITIES:  HEP ACCESS CODE:   FOTO Completed On:     POC Expires Reeval for Medicare to be completed  Unit Limit Auth Expiration Date PT/OT/STVisit Limit   25 By visit 10 BOMN NA BOMN    Completed on visit                    Auth Status DATE 3/12 3/17 3/24      NA Visit # 1 2 3       Remaining         FOM 5STS: 14.46s     FGA:      ABC: 76.88        MANUAL THERAPY                                                               THERAPEUTIC EXERCISE HEP          STS, semitandem EO 30s, kettlebell lifts         STS          Lunges          Step ups                                                                                                                        NEUROMUSCULAR REEDUCATION           Static balance          Dynamic balance          Foam beam   Side steps on foam x3 laps  Side steps on foam x 4 laps      Gait +HT    20ft x3 laps H/V ea       Blaze pods    Standing on foam tapping 4 pods on  "8\" step 1 min x4 occ Sheila      Tandem walk   Semitandem walk 20ft x2        Hurdles   SOLO 6 middle setting hurdles       Step ups   Step to foam onto 4\"step onto foam on other side SOLO x4  Step to  foam onto 4\"step onto foam on other side 3# aw b/l SOLO x4 progressed to tidal tank x4 and then step over step pattern x5       Resisted walking    20 ft x3 laps PT resisted SOLO 3# aw b/l     20ft x4 laps 3# aw b/l with band around back pulling into flexion                                                        THERAPEUTIC ACTIVITY                                                  GAIT TRAINING                                                  MODALITIES                                        "

## 2025-03-26 ENCOUNTER — OFFICE VISIT (OUTPATIENT)
Facility: CLINIC | Age: 75
End: 2025-03-26
Payer: MEDICARE

## 2025-03-26 DIAGNOSIS — G91.2 NPH (NORMAL PRESSURE HYDROCEPHALUS) (HCC): ICD-10-CM

## 2025-03-26 DIAGNOSIS — Z98.2 S/P VENTRICULOPERITONEAL SHUNT: Primary | ICD-10-CM

## 2025-03-26 PROCEDURE — 97112 NEUROMUSCULAR REEDUCATION: CPT

## 2025-03-26 NOTE — PROGRESS NOTES
Daily Note     Today's date: 3/26/2025  Patient name: Bozena Armstrong  : 1950  MRN: [id number]  Referring provider: Bobby Crooks MD  Dx:   Encounter Diagnosis     ICD-10-CM    1. S/P ventriculoperitoneal shunt  Z98.2       2. NPH (normal pressure hydrocephalus) (HCC)  G91.2             Start Time: 1621  Stop Time: 1700  Total time in clinic (min): 39 minutes    Subjective: Pt offered no new complaints and is agreeable to PT session.       Objective: See treatment diary below      Assessment: Added external cue of mirror to mitigate excessive forward flexed posture with fair success. 1 LOB when stepping of foam pad requiring maxAx2 to prevent fall. Vcs for upright posture and to slow movements down prior to initiating step with poor carry over. Pt will benefit from continued skilled PT in order to maximize safety and independence with overall functional mobility.       Plan: Continue per plan of care.      Daily Treatment Diary     Precautions: Fall risk  CO-MORBIDITIES:  HEP ACCESS CODE:   FOTO Completed On:     POC Expires Reeval for Medicare to be completed  Unit Limit Auth Expiration Date PT/OT/STVisit Limit   25 By visit 10 BOMN NA BOMN    Completed on visit                    Auth Status DATE 3/12 3/17 3/24 3/26     NA Visit # 1 2 3 4      Remaining         FOM 5STS: 14.46s     FGA:      ABC: 76.88        MANUAL THERAPY                                                               THERAPEUTIC EXERCISE HEP          STS, semitandem EO 30s, kettlebell lifts         STS          Lunges          Step ups                                                                                                                        NEUROMUSCULAR REEDUCATION           Static balance          Dynamic balance          Foam beam   Side steps on foam x3 laps  Side steps on foam x 4 laps      Gait +HT    20ft x3 laps H/V ea  20ft x3 laps H/V ea + tidal tank     Blaze pods    Standing on foam tapping 4 pods on  "8\" step 1 min x4 occ Sheila SOLO in // 6 pods on 4\" 2 min x2      Tandem walk   Semitandem walk 20ft x2        Hurdles   SOLO 6 middle setting hurdles  SOLO 6 middle setting hurdles 3# aw b/l x3 laps ea fwd and lateral      Step ups   Step to foam onto 4\"step onto foam on other side SOLO x4  Step to  foam onto 4\"step onto foam on other side 3# aw b/l SOLO x4 progressed to tidal tank x4 and then step over step pattern x5       Resisted walking    20 ft x3 laps PT resisted SOLO 3# aw b/l     20ft x4 laps 3# aw b/l with band around back pulling into flexion      Self ball toss     SOLO 20ft x3 laps                                              THERAPEUTIC ACTIVITY                                                  GAIT TRAINING                                                  MODALITIES                                        "

## 2025-03-31 ENCOUNTER — OFFICE VISIT (OUTPATIENT)
Facility: CLINIC | Age: 75
End: 2025-03-31
Payer: MEDICARE

## 2025-03-31 DIAGNOSIS — Z98.2 S/P VENTRICULOPERITONEAL SHUNT: Primary | ICD-10-CM

## 2025-03-31 DIAGNOSIS — G91.2 NPH (NORMAL PRESSURE HYDROCEPHALUS) (HCC): ICD-10-CM

## 2025-03-31 PROCEDURE — 97112 NEUROMUSCULAR REEDUCATION: CPT

## 2025-03-31 NOTE — PROGRESS NOTES
Daily Note     Today's date: 3/31/2025  Patient name: Bozena Armstrong  : 1950  MRN: [id number]  Referring provider: Bobby Crooks MD  Dx:   Encounter Diagnosis     ICD-10-CM    1. S/P ventriculoperitoneal shunt  Z98.2       2. NPH (normal pressure hydrocephalus) (HCC)  G91.2           Start Time: 1730  Stop Time: 1815  Total time in clinic (min): 45 minutes    Subjective: Pt reported that she has to wait about 5 minutes in the morning before she get up fully or else her legs give way.        Objective: See treatment diary below      Assessment: Pt continues to require consistent Vcs for upright posture and forward eye gaze with better carry over this session. Noted difficulty with ambulating bwd+ head turns noted by requiring occ Sheila at trunk for steadying and Vcs for wider SURJIT. Pt will benefit from continued skilled PT in order to maximize safety and independence with overall functional mobility.       Plan: Continue per plan of care.      Daily Treatment Diary     Precautions: Fall risk  CO-MORBIDITIES:  HEP ACCESS CODE:   FOTO Completed On:     POC Expires Reeval for Medicare to be completed  Unit Limit Auth Expiration Date PT/OT/STVisit Limit   25 By visit 10 BOMN NA BOMN    Completed on visit                    Auth Status DATE 3/12 3/17 3/24 3/26 3/31    NA Visit # 1 2 3 4 5     Remaining         FOM 5STS: 14.46s     FGA:      ABC: 76.88        MANUAL THERAPY                                                               THERAPEUTIC EXERCISE HEP          STS, semitandem EO 30s, kettlebell lifts         STS          Lunges          Step ups                                                                                                                        NEUROMUSCULAR REEDUCATION           Static balance          Dynamic balance          Foam beam   Side steps on foam x3 laps  Side steps on foam x 4 laps      Gait +HT    20ft x3 laps H/V ea  20ft x3 laps H/V ea + tidal tank Bwd walking  "+ HT SOLO 20ft x3    Blaze pods    Standing on foam tapping 4 pods on 8\" step 1 min x4 occ Sheila SOLO in // 6 pods on 4\" 2 min x2      Tandem walk   Semitandem walk 20ft x2        Hurdles   SOLO 6 middle setting hurdles  SOLO 6 middle setting hurdles 3# aw b/l x3 laps ea fwd and lateral      Step ups   Step to foam onto 4\"step onto foam on other side SOLO x4  Step to  foam onto 4\"step onto foam on other side 3# aw b/l SOLO x4 progressed to tidal tank x4 and then step over step pattern x5   SOLO Step to  foam onto 4\"step onto foam on other side fwd x6 and lateral x6     Resisted walking    20 ft x3 laps PT resisted SOLO 3# aw b/l     20ft x4 laps 3# aw b/l with band around back pulling into flexion  20 ft x3 laps PT resisted SOLO 3# aw b/l     Self ball toss     SOLO 20ft x3 laps  SOLO 20ft x3 laps bounce and toss    4 square      Solo 3# aw b/l over flat hurdles    Med ball slams       Overhead slams using 8# med ball x10                        THERAPEUTIC ACTIVITY                                                  GAIT TRAINING                                                  MODALITIES                                        "

## 2025-04-03 ENCOUNTER — APPOINTMENT (OUTPATIENT)
Facility: CLINIC | Age: 75
End: 2025-04-03
Payer: MEDICARE

## 2025-04-07 ENCOUNTER — OFFICE VISIT (OUTPATIENT)
Facility: CLINIC | Age: 75
End: 2025-04-07
Payer: MEDICARE

## 2025-04-07 DIAGNOSIS — Z98.2 S/P VENTRICULOPERITONEAL SHUNT: Primary | ICD-10-CM

## 2025-04-07 DIAGNOSIS — G91.2 NPH (NORMAL PRESSURE HYDROCEPHALUS) (HCC): ICD-10-CM

## 2025-04-07 PROCEDURE — 97112 NEUROMUSCULAR REEDUCATION: CPT

## 2025-04-07 NOTE — PROGRESS NOTES
Daily Note     Today's date: 2025  Patient name: Bozena Armstrong  : 1950  MRN: [id number]  Referring provider: Bobby Crooks MD  Dx:   Encounter Diagnosis     ICD-10-CM    1. S/P ventriculoperitoneal shunt  Z98.2       2. NPH (normal pressure hydrocephalus) (HCC)  G91.2             Start Time: 1300  Stop Time: 1345  Total time in clinic (min): 45 minutes    Subjective: Pt reported that she is feeling better compared to last week, however is still feeling tired.       Objective: See treatment diary below      Assessment: Pt able to demo appropriate reactive steps to obstacle negotiation on TM, however required consistent Vcs for increased step length to stay close to front of TM as well as increased upright posture. Also required Vcs to slow down movements with balance interventions as well as increased step height with lateral steps with fair carry over. Pt will benefit from continued skilled PT in order to maximize safety and independence with overall functional mobility.       Plan: Continue per plan of care.      Daily Treatment Diary     Precautions: Fall risk  CO-MORBIDITIES:  HEP ACCESS CODE:   FOTO Completed On:     POC Expires Reeval for Medicare to be completed  Unit Limit Auth Expiration Date PT/OT/STVisit Limit   25 By visit 10 BOMN NA BOMN    Completed on visit                    Auth Status DATE 3/12 3/17 3/24 3/26 3/31 4/7   NA Visit # 1 2 3 4 5 6    Remaining         FOM 5STS: 14.46s     FGA:      ABC: 76.88        MANUAL THERAPY                                                               THERAPEUTIC EXERCISE HEP          STS, semitandem EO 30s, kettlebell lifts         STS          Lunges          Step ups                                                                                                                        NEUROMUSCULAR REEDUCATION           Static balance       SOLO on foam wedge+ HT 30s x3 occ Sheila   Dynamic balance          Foam beam   Side steps on  "foam x3 laps  Side steps on foam x 4 laps      Gait +HT    20ft x3 laps H/V ea  20ft x3 laps H/V ea + tidal tank Bwd walking + HT SOLO 20ft x3 Bwd walking + HT SOLO 20ft x3   Blaze pods    Standing on foam tapping 4 pods on 8\" step 1 min x4 occ Sheila SOLO in // 6 pods on 4\" 2 min x2   SOLO 2 min x2 color catch on series of foam pads w/ pods arranged in line in front    PT resisted fwd /bwd walking 2 min x2 color catch   Tandem walk   Semitandem walk 20ft x2        Hurdles   SOLO 6 middle setting hurdles  SOLO 6 middle setting hurdles 3# aw b/l x3 laps ea fwd and lateral      Step ups   Step to foam onto 4\"step onto foam on other side SOLO x4  Step to  foam onto 4\"step onto foam on other side 3# aw b/l SOLO x4 progressed to tidal tank x4 and then step over step pattern x5   SOLO Step to  foam onto 4\"step onto foam on other side fwd x6 and lateral x6     Resisted walking    20 ft x3 laps PT resisted SOLO 3# aw b/l     20ft x4 laps 3# aw b/l with band around back pulling into flexion  20 ft x3 laps PT resisted SOLO 3# aw b/l     Self ball toss     SOLO 20ft x3 laps  SOLO 20ft x3 laps bounce and toss    4 square      Solo 3# aw b/l over flat hurdles    Med ball slams       Overhead slams using 8# med ball x10    TM        SOLO .6mph BUE progressed to UUE stepping over obstacles x2 min total             THERAPEUTIC ACTIVITY                                                  GAIT TRAINING                                                  MODALITIES                                        "

## 2025-04-10 ENCOUNTER — OFFICE VISIT (OUTPATIENT)
Facility: CLINIC | Age: 75
End: 2025-04-10
Payer: MEDICARE

## 2025-04-10 DIAGNOSIS — G91.2 NPH (NORMAL PRESSURE HYDROCEPHALUS) (HCC): ICD-10-CM

## 2025-04-10 DIAGNOSIS — Z98.2 S/P VENTRICULOPERITONEAL SHUNT: Primary | ICD-10-CM

## 2025-04-10 PROCEDURE — 97112 NEUROMUSCULAR REEDUCATION: CPT

## 2025-04-10 NOTE — PROGRESS NOTES
Daily Note     Today's date: 4/10/2025  Patient name: Bozena Armstrong  : 1950  MRN: [id number]  Referring provider: Bobby Crooks MD  Dx:   Encounter Diagnosis     ICD-10-CM    1. S/P ventriculoperitoneal shunt  Z98.2       2. NPH (normal pressure hydrocephalus) (HCC)  G91.2           Start Time: 174  Stop Time:   Total time in clinic (min): 44 minutes    Subjective: Pt reported that she is feeling good since she took a nap before therapy.       Objective: See treatment diary below      Assessment: Pt able to demo appropriate reactive steps with resisted cone weaving, required Vcs for wider SURJIT to prevent crossing over of feet with fair carry over. Noted improvement with static stance on complaint uneven surface evident by requiring only 1 instance on Sheila to correct posterior LOB. Pt will benefit from continued skilled PT in order to maximize safety and independence with overall functional mobility.       Plan: Continue per plan of care.      Daily Treatment Diary     Precautions: Fall risk  CO-MORBIDITIES:  HEP ACCESS CODE:   FOTO Completed On:     POC Expires Reeval for Medicare to be completed  Unit Limit Auth Expiration Date PT/OT/STVisit Limit   25 By visit 10 BOMN NA BOMN    Completed on visit                    Auth Status DATE 3/12 3/17 3/24 3/26 3/31 4/7 4/10   NA Visit # 1 2 3 4 5 6 7    Remaining          FOM 5STS: 14.46s     FGA:      ABC: 76.88         MANUAL THERAPY                                                                      THERAPEUTIC EXERCISE HEP           STS, semitandem EO 30s, kettlebell lifts          STS           Lunges           Step ups           TM        SOLO 1.0mph progressed to 1.3mph BUE support 5 min total                                                                                                                  NEUROMUSCULAR REEDUCATION            Static balance       SOLO on foam wedge+ HT 30s x3 occ Sheila SOLO on foam wedge+ HT 45s x3   "  Dynamic balance           Foam beam   Side steps on foam x3 laps  Side steps on foam x 4 laps       Gait +HT    20ft x3 laps H/V ea  20ft x3 laps H/V ea + tidal tank Bwd walking + HT SOLO 20ft x3 Bwd walking + HT SOLO 20ft x3    Blaze pods    Standing on foam tapping 4 pods on 8\" step 1 min x4 occ Sheila SOLO in // 6 pods on 4\" 2 min x2   SOLO 2 min x2 color catch on series of foam pads w/ pods arranged in line in front    PT resisted fwd /bwd walking 2 min x2 color catch SOLO 2 min x2 color catch on series of foam pads w/ pods arranged in line in front   Tandem walk   Semitandem walk 20ft x2         Hurdles   SOLO 6 middle setting hurdles  SOLO 6 middle setting hurdles 3# aw b/l x3 laps ea fwd and lateral       Step ups   Step to foam onto 4\"step onto foam on other side SOLO x4  Step to  foam onto 4\"step onto foam on other side 3# aw b/l SOLO x4 progressed to tidal tank x4 and then step over step pattern x5   SOLO Step to  foam onto 4\"step onto foam on other side fwd x6 and lateral x6      Resisted walking    20 ft x3 laps PT resisted SOLO 3# aw b/l     20ft x4 laps 3# aw b/l with band around back pulling into flexion  20 ft x3 laps PT resisted SOLO 3# aw b/l      Self ball toss     SOLO 20ft x3 laps  SOLO 20ft x3 laps bounce and toss     4 square      Solo 3# aw b/l over flat hurdles     Med ball slams       Overhead slams using 8# med ball x10     TM        SOLO .6mph BUE progressed to UUE stepping over obstacles x2 min total    Cone weaving        SOLO 20ft x3 PT resisted w/ maroon TB    THERAPEUTIC ACTIVITY                                                       GAIT TRAINING                                                       MODALITIES                                           "

## 2025-04-14 ENCOUNTER — OFFICE VISIT (OUTPATIENT)
Facility: CLINIC | Age: 75
End: 2025-04-14
Payer: MEDICARE

## 2025-04-14 DIAGNOSIS — G91.2 NPH (NORMAL PRESSURE HYDROCEPHALUS) (HCC): ICD-10-CM

## 2025-04-14 DIAGNOSIS — Z98.2 S/P VENTRICULOPERITONEAL SHUNT: Primary | ICD-10-CM

## 2025-04-14 PROCEDURE — 97164 PT RE-EVAL EST PLAN CARE: CPT

## 2025-04-14 PROCEDURE — 97112 NEUROMUSCULAR REEDUCATION: CPT

## 2025-04-14 NOTE — PROGRESS NOTES
PT Evaluation     Today's date: 2025  Patient name: Bozena Armstrong  : 1950  MRN: [id number]  Referring provider: Bobby Crooks MD  Dx:   Encounter Diagnosis     ICD-10-CM    1. S/P ventriculoperitoneal shunt  Z98.2       2. NPH (normal pressure hydrocephalus) ([facility])  G91.2                      Assessment  Impairments: abnormal coordination, abnormal movement, impaired balance and safety issue    Assessment details: : Patient is a 74 y.o. year old female presenting to OPPT s/p ventriculoperitoneal shunt that occurred on 25. Bozena is making steady progress towards her goals. Upon completion of 5STS her time as improved to 10.09s which demos improved BLE strength and she is on par with her age-matched norms. Her FGA score no longer classifies her to be at a higher risk for falls, however still shows slight balance deficits. Due to patient's progress it is reccommded to drop down to once a week with greater focus on community ambulation such as navigating uneven surfaces to facilitate return to PLOF.Thank you for this referral and the ability to participate in the care of this patient.    IE: Patient is a 74 y.o. year old female presenting to OPPT s/p ventriculoperitoneal shunt that occurred on 25.  Patient demonstrates decreased strength and balance which impacts her overall functional mobility. Per the 5xSTS they show reduced LE strength and power, 14.46s seconds. This time is also above the cutoff compared to her age matched norms. Her FGA score of 21/30 also classifies her to be at a higher risk for falls. Noted significant path deviation when ambulating with head turns as well as increased difficulty with tandem walking. Given written HEP with focus on strengthening and balance exercises. She will benefit from skilled PT interventions to maximize return to PLOF and independence as able. Thank you for this referral and the ability to participate in the care of this  patient.    Understanding of Dx/Px/POC: excellent     Prognosis: excellent    Goals  In 5 weeks  Pt will be able to complete 5STS from standard arm chair with no BUE support </= 12s to demonstrate improved LE strength to assist with safety during transfers. Met 4/14  Pt will be able to become independent with HEP with printed handout for LE strength  and balance to promote improved activity levels outside of therapy met 4/14   Pt will be able to improve FGA score >/= 23/30 to demonstrate improved balance and reduce fall risk met 4/14    In 4-10 weeks:  Pt will be able to improve FGA score >/= 25/30 to demonstrate improved balance and to no longer be classified as a higher risk for falls. Ongoing 4/14  Demonstrate consistent carryover with HEP and walking program.  Improve gait speed by at least  0.12 m/s to meet MCID value for older adults and reduce fall risk. DISCONTINUED 4/14  Improve ABC to at least above 94% to show reduced risk of falling and improved balance confidence. Ongoing 4/14    All data taken from [facility] Neuro Section or Rehab Measures, UDPT Normative Values sheet     LIU test: 46/56                                                    5 x STS Test:  MDC: 6 points                                                                       MDC: 2.3 seconds   age norms                                                                           Age Norms   60-69 year old = M: 55, F: 55                                             60-69 year old: 11.4 seconds   70-79 year old = M 54,  F: 53                                             70-79 year old: 12.6 seconds    80-89 year old = M53,   F: 50                                             80-89 year old: 14.8 seconds      TUG test:                                                                     10 Meter Walk Test:  MDC: 4.14 seconds                                                                MDC: .59 ft/sec  Cut off score for Falls                                                   Age Norms  > 13.5 seconds community dwelling adults                20-29; M: 4.56 ft/sec F: 4.62 ft/sec  > 32.2 Frail Elderly                                                     30-39: M 4.76 ft/sec  F: 4.68 ft/sec                                                                                                     40-49: M: 4.79 ft/sec  F: 4.62 ft/sec  6 Minute Walk Test                                                              50-59: M: 4.76 ft/sec  F: 4.56 ft/sec  MDC: 190 feet                                                                        60-69: M: 4.56 ft/sec  F: 4.26 ft/sec  Age Norms                                                                              70-+    M: 4.36 ft/sec  F: 4.16 ft/sec  60-69:    M: 1876 F: 1765  70-79:    M: 1729 F: 1545  80-89 +: M: 1368 F; 1286                                                                                                                                  FGA  Age matched Norms  -40-49 years= 28.9/ 50-59 years=28.4  -60-69 years=27.1/70-79 years=24.9  -80/89 years=20.8  MCID: Vestibular disorders: 8 points  MDC: Parkinsons: 0.61, Stroke: 4.2 points  Cut-offs:  -Community dwelling older adults              -22/30 : predict falls (Sensitivity 85%, Specificity 86%)              -20/30 (unexplained falls in the next 6 months) (Sensitivity 100%, Specificity 76%)  -Parkinson's              -5/30 (identify fallers in Parkinson's)        Plan  Patient would benefit from: skilled physical therapy    Planned therapy interventions: neuromuscular re-education, manual therapy, patient education, home exercise program, therapeutic exercise, therapeutic activities, gait training and patient/caregiver education    Frequency: 1x week  Duration in weeks: 10  Plan of Care beginning date: 3/12/2025  Plan of Care expiration date: 5/21/2025  Treatment plan discussed with: patient      Subjective Evaluation    History of Present Illness  Date of surgery:  1/23/2025  Mechanism of injury: surgery  Mechanism of injury: 4/14: Pt reports that she feels like her balance has improved immensely. Since starting physical therapy she denies having any falls and tries not to restrict herself in her day to day life. However she notes that she is often slower and more cautious in busy and uneven environments. She also notes no difficulty with stairs as long as she is able to hold on.     IE: Pt presents to OPPT s/p ventriculoperitoneal shunt that occurred on 1/23/25. She reports that she has been falling less that prior to the surgery but reports she had 2 falls with no injuries since the surgery. Her chief complaint is her balance. 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 She also reports that once she falls she needs assistance from either another person or from furniture to help stand back up. She reports no difficulty with stairs or curbs. She also reports that she has cane but only uses it if she feels like she needs to. She also stated [facility].   Quality of life: good    Patient Goals  Patient goals for therapy: improved balance    Pain  No pain reported    Social Support  Steps to enter house: yes  2  Stairs in house: yes   12  Lives in: one-story house  Lives with: spouse    Employment status: not working  Hand dominance: right    Treatments  Previous treatment: physical therapy      Objective          Daily Treatment Diary     Precautions: Fall risk  CO-MORBIDITIES:  HEP ACCESS CODE:   FOTO Completed On:     POC Expires Reeval for Medicare to be completed  Unit Limit Auth Expiration Date PT/OT/STVisit Limit   5/21/25 By visit 10 BOMN NA BOMN    Completed on visit                  Auth Status DATE 3/12 3/17 3/24 3/26 3/31 4/7 4/10 4/14   NA Visit # 1 2 3 4 5 6 7 8    Remaining           FOM 5STS: 14.46s     FGA: 21/30     ABC: 76.88       5STS: 10.09s    FGA:  "24/30     ABC: 88.13   MANUAL THERAPY                                                                             THERAPEUTIC EXERCISE HEP            STS, semitandem EO 30s, kettlebell lifts           STS            Lunges            Step ups            TM        SOLO 1.0mph progressed to 1.3mph BUE support 5 min total                                                                                                                              NEUROMUSCULAR REEDUCATION             Static balance       SOLO on foam wedge+ HT 30s x3 occ Sheila SOLO on foam wedge+ HT 45s x3     Dynamic balance            Foam beam   Side steps on foam x3 laps  Side steps on foam x 4 laps        Gait +HT    20ft x3 laps H/V ea  20ft x3 laps H/V ea + tidal tank Bwd walking + HT SOLO 20ft x3 Bwd walking + HT SOLO 20ft x3     Blaze pods    Standing on foam tapping 4 pods on 8\" step 1 min x4 occ Sheila SOLO in // 6 pods on 4\" 2 min x2   SOLO 2 min x2 color catch on series of foam pads w/ pods arranged in line in front    PT resisted fwd /bwd walking 2 min x2 color catch SOLO 2 min x2 color catch on series of foam pads w/ pods arranged in line in front    Tandem walk   Semitandem walk 20ft x2          Hurdles   SOLO 6 middle setting hurdles  SOLO 6 middle setting hurdles 3# aw b/l x3 laps ea fwd and lateral     SOLO 6 middle setting hurdles alt over foam fwd and lateral x3 laps ea   Step ups   Step to foam onto 4\"step onto foam on other side SOLO x4  Step to  foam onto 4\"step onto foam on other side 3# aw b/l SOLO x4 progressed to tidal tank x4 and then step over step pattern x5   SOLO Step to  foam onto 4\"step onto foam on other side fwd x6 and lateral x6       Resisted walking    20 ft x3 laps PT resisted SOLO 3# aw b/l     20ft x4 laps 3# aw b/l with band around back pulling into flexion  20 ft x3 laps PT resisted SOLO 3# aw b/l       Self ball toss     SOLO 20ft x3 laps  SOLO 20ft x3 laps bounce and toss      4 square      Solo 3# aw b/l over " flat hurdles      Med ball slams       Overhead slams using 8# med ball x10      TM        SOLO .6mph BUE progressed to UUE stepping over obstacles x2 min total  SOLO 1.0mph UUE/ no UE support 5 min total   Cone weaving        SOLO 20ft x3 PT resisted w/ maroon TB     THERAPEUTIC ACTIVITY                                                            GAIT TRAINING                                                            MODALITIES

## 2025-04-17 ENCOUNTER — APPOINTMENT (OUTPATIENT)
Facility: CLINIC | Age: 75
End: 2025-04-17
Payer: MEDICARE

## 2025-04-21 ENCOUNTER — OFFICE VISIT (OUTPATIENT)
Facility: CLINIC | Age: 75
End: 2025-04-21
Payer: MEDICARE

## 2025-04-21 DIAGNOSIS — G91.2 NPH (NORMAL PRESSURE HYDROCEPHALUS) (HCC): ICD-10-CM

## 2025-04-21 DIAGNOSIS — Z98.2 S/P VENTRICULOPERITONEAL SHUNT: Primary | ICD-10-CM

## 2025-04-21 PROCEDURE — 97112 NEUROMUSCULAR REEDUCATION: CPT

## 2025-04-21 NOTE — PROGRESS NOTES
Daily Note     Today's date: 2025  Patient name: Bozena Armstrong  : 1950  MRN: [id number]  Referring provider: Bobby Crooks MD  Dx:   Encounter Diagnosis     ICD-10-CM    1. S/P ventriculoperitoneal shunt  Z98.2       2. NPH (normal pressure hydrocephalus) (HCC)  G91.2           Start Time: 1701  Stop Time: 1746  Total time in clinic (min): 45 minutes    Subjective: Pt offered no new complaints and is agreeable to PT session.       Objective: See treatment diary below      Assessment: Noted increased difficulty with balance intervention this session evident by requiring increased Vcs for upright posture with poor carry over as well as increased Sheila throughout session. Most likely due to time of day of session. Pt able to demo appropriate reactive steps with ambulation over uneven surfaces, however continues to demo poor posture which impacts her balance and worsens with fatigue. Pt will benefit from continued skilled PT in order to maximize safety and independence with overall functional mobility.       Plan: Continue per plan of care.      Daily Treatment Diary     Precautions: Fall risk  CO-MORBIDITIES:  HEP ACCESS CODE:   FOTO Completed On:     POC Expires Reeval for Medicare to be completed  Unit Limit Auth Expiration Date PT/OT/STVisit Limit   25 By visit 10 BOMN   NA BOMN    Completed on visit                  Auth Status DATE 3/12 3/17 3/24 3/26 3/31 4/7 4/10 4/14 4/21   NA Visit # 1 2 3 4 5 6 7 8 9    Remaining            FOM 5STS: 14.46s     FGA:      ABC: 76.88       5STS: 10.09s    FGA:      ABC: 88.13    MANUAL THERAPY                                                                                    THERAPEUTIC EXERCISE HEP             STS, semitandem EO 30s, kettlebell lifts            STS             Lunges             Step ups             TM        SOLO 1.0mph progressed to 1.3mph BUE support 5 min total                                                                 "                                                                         NEUROMUSCULAR REEDUCATION              Static balance       SOLO on foam wedge+ HT 30s x3 occ Sheila SOLO on foam wedge+ HT 45s x3      Dynamic balance             Foam beam   Side steps on foam x3 laps  Side steps on foam x 4 laps         Gait +HT    20ft x3 laps H/V ea  20ft x3 laps H/V ea + tidal tank Bwd walking + HT SOLO 20ft x3 Bwd walking + HT SOLO 20ft x3      Blaze pods    Standing on foam tapping 4 pods on 8\" step 1 min x4 occ Sheila SOLO in // 6 pods on 4\" 2 min x2   SOLO 2 min x2 color catch on series of foam pads w/ pods arranged in line in front    PT resisted fwd /bwd walking 2 min x2 color catch SOLO 2 min x2 color catch on series of foam pads w/ pods arranged in line in front  SOLO Over ground 1 min x2 tapping elvis arranged in semicircle    SOLO FT tapping pod with same colored tri color hand eye coordination  1 min x3 and listing items of same color    Tandem walk   Semitandem walk 20ft x2           Hurdles   SOLO 6 middle setting hurdles  SOLO 6 middle setting hurdles 3# aw b/l x3 laps ea fwd and lateral     SOLO 6 middle setting hurdles alt over foam fwd and lateral x3 laps ea    Step ups   Step to foam onto 4\"step onto foam on other side SOLO x4  Step to  foam onto 4\"step onto foam on other side 3# aw b/l SOLO x4 progressed to tidal tank x4 and then step over step pattern x5   SOLO Step to  foam onto 4\"step onto foam on other side fwd x6 and lateral x6        Resisted walking    20 ft x3 laps PT resisted SOLO 3# aw b/l     20ft x4 laps 3# aw b/l with band around back pulling into flexion  20 ft x3 laps PT resisted SOLO 3# aw b/l        Self ball toss     SOLO 20ft x3 laps  SOLO 20ft x3 laps bounce and toss       Uneven surfaces          SOLO fwd resisted amb w/ maroon TB over uneven mat with hidden obstacles x4     Lateral x2    4 square      Solo 3# aw b/l over flat hurdles       Med ball slams       Overhead slams using " 8# med ball x10       TM        SOLO .6mph BUE progressed to UUE stepping over obstacles x2 min total  SOLO 1.0mph UUE/ no UE support 5 min total    Cone weaving          SOLO 20ft x3 PT resisted w/ maroon TB   SOLO 20ft x3 w/ 3# aw b/l   THERAPEUTIC ACTIVITY                                                                 GAIT TRAINING                                                                 MODALITIES

## 2025-04-24 ENCOUNTER — APPOINTMENT (OUTPATIENT)
Facility: CLINIC | Age: 75
End: 2025-04-24
Payer: MEDICARE

## 2025-04-28 ENCOUNTER — OFFICE VISIT (OUTPATIENT)
Facility: CLINIC | Age: 75
End: 2025-04-28
Payer: MEDICARE

## 2025-04-28 DIAGNOSIS — G91.2 NPH (NORMAL PRESSURE HYDROCEPHALUS) (HCC): ICD-10-CM

## 2025-04-28 DIAGNOSIS — Z98.2 S/P VENTRICULOPERITONEAL SHUNT: Primary | ICD-10-CM

## 2025-04-28 PROCEDURE — 97112 NEUROMUSCULAR REEDUCATION: CPT

## 2025-04-28 NOTE — PROGRESS NOTES
Daily Note     Today's date: 2025  Patient name: Bozena Armstrong  : 1950  MRN: [id number]  Referring provider: Bobby Crooks MD  Dx:   Encounter Diagnosis     ICD-10-CM    1. S/P ventriculoperitoneal shunt  Z98.2       2. NPH (normal pressure hydrocephalus) (HCC)  G91.2         Start Time: 1515  Stop Time: 1600  Total time in clinic (min): 45 minutes    Subjective: Pt reports no recent falls, however she states that she often slides out of bed when she is in a poor positions and slides to the floor.      Objective: See treatment diary below      Assessment: Pt continues to be challenged by complaint surfaces evident by requiring occ Sheila for steadying of trunk. Vcs for upright posture with fair carry over to assist in maintaining balance. Also required frequent Vcs for continuous motions and noted slight path deviation with addition of dual task. Pt will benefit from continued skilled PT in order to maximize safety and independence with overall functional mobility.       Plan: Continue per plan of care.      Daily Treatment Diary     Precautions: Fall risk  CO-MORBIDITIES:  HEP ACCESS CODE:   FOTO Completed On:     POC Expires Reeval for Medicare to be completed  Unit Limit Auth Expiration Date PT/OT/STVisit Limit   25 By visit 10 BOMN   NA BOMN    Completed on visit                  Auth Status DATE 3/12 3/17 3/24 3/26 3/31 4/7 4/10 4/14 4/21 4/28   NA Visit # 1 2 3 4 5 6 7 8 9 10    Remaining             FOM 5STS: 14.46s     FGA:      ABC: 76.88       5STS: 10.09s    FGA:      ABC: 88.13     MANUAL THERAPY                                                                                           THERAPEUTIC EXERCISE HEP              STS, semitandem EO 30s, kettlebell lifts             STS              Lunges              Step ups              TM        SOLO 1.0mph progressed to 1.3mph BUE support 5 min total                                                                             "                                                                        NEUROMUSCULAR REEDUCATION               Static balance       SOLO on foam wedge+ HT 30s x3 occ Sheila SOLO on foam wedge+ HT 45s x3       Dynamic balance           SOLO 4 pods arranged on uneven surface with foam pads underneath occ Sheila [id number] x2   Foam beam   Side steps on foam x3 laps  Side steps on foam x 4 laps          Gait +HT    20ft x3 laps H/V ea  20ft x3 laps H/V ea + tidal tank Bwd walking + HT SOLO 20ft x3 Bwd walking + HT SOLO 20ft x3    Bwd walking + HT SOLO 20ft x3 +dual task listing colors/stores   Blaze pods    Standing on foam tapping 4 pods on 8\" step 1 min x4 occ Sheila SOLO in // 6 pods on 4\" 2 min x2   SOLO 2 min x2 color catch on series of foam pads w/ pods arranged in line in front    PT resisted fwd /bwd walking 2 min x2 color catch SOLO 2 min x2 color catch on series of foam pads w/ pods arranged in line in front  SOLO Over ground 1 min x2 tapping pods arranged in semicircle    SOLO FT tapping pod with same colored tri color hand eye coordination  1 min x3 and listing items of same color  SOLO 6 pods over 20ft 3# aw b/l focusing on quick turns   Tandem walk   Semitandem walk 20ft x2            Hurdles   SOLO 6 middle setting hurdles  SOLO 6 middle setting hurdles 3# aw b/l x3 laps ea fwd and lateral     SOLO 6 middle setting hurdles alt over foam fwd and lateral x3 laps ea     Step ups   Step to foam onto 4\"step onto foam on other side SOLO x4  Step to  foam onto 4\"step onto foam on other side 3# aw b/l SOLO x4 progressed to tidal tank x4 and then step over step pattern x5   SOLO Step to  foam onto 4\"step onto foam on other side fwd x6 and lateral x6         Resisted walking    20 ft x3 laps PT resisted SOLO 3# aw b/l     20ft x4 laps 3# aw b/l with band around back pulling into flexion  20 ft x3 laps PT resisted SOLO 3# aw b/l         Self ball toss     SOLO 20ft x3 laps  SOLO 20ft x3 laps bounce and toss      "   Uneven surfaces          SOLO fwd resisted amb w/ maroon TB over uneven mat with hidden obstacles x4     Lateral x2     4 square      Solo 3# aw b/l over flat hurdles        Med ball slams       Overhead slams using 8# med ball x10        TM        SOLO .6mph BUE progressed to UUE stepping over obstacles x2 min total  SOLO 1.0mph UUE/ no UE support 5 min total     Cone weaving          SOLO 20ft x3 PT resisted w/ maroon TB   SOLO 20ft x3 w/ 3# aw b/l    THERAPEUTIC ACTIVITY                                                                      GAIT TRAINING                                                                      MODALITIES

## 2025-05-01 ENCOUNTER — APPOINTMENT (OUTPATIENT)
Facility: CLINIC | Age: 75
End: 2025-05-01
Payer: MEDICARE

## 2025-05-05 ENCOUNTER — OFFICE VISIT (OUTPATIENT)
Facility: CLINIC | Age: 75
End: 2025-05-05
Attending: NEUROLOGICAL SURGERY
Payer: MEDICARE

## 2025-05-05 ENCOUNTER — HOSPITAL ENCOUNTER (OUTPATIENT)
Dept: ULTRASOUND IMAGING | Facility: CLINIC | Age: 75
Discharge: HOME | End: 2025-05-05
Payer: MEDICARE

## 2025-05-05 ENCOUNTER — HOSPITAL ENCOUNTER (OUTPATIENT)
Dept: MAMMOGRAPHY | Facility: CLINIC | Age: 75
Discharge: HOME | End: 2025-05-05
Payer: MEDICARE

## 2025-05-05 VITALS — BODY MASS INDEX: 42 KG/M2 | HEIGHT: 64 IN | WEIGHT: 246 LBS

## 2025-05-05 DIAGNOSIS — R92.8 ABNORMAL ULTRASOUND OF BREAST: ICD-10-CM

## 2025-05-05 DIAGNOSIS — Z98.2 S/P VENTRICULOPERITONEAL SHUNT: Primary | ICD-10-CM

## 2025-05-05 DIAGNOSIS — G91.2 NPH (NORMAL PRESSURE HYDROCEPHALUS) (HCC): ICD-10-CM

## 2025-05-05 PROCEDURE — 76642 ULTRASOUND BREAST LIMITED: CPT

## 2025-05-05 PROCEDURE — 97112 NEUROMUSCULAR REEDUCATION: CPT

## 2025-05-05 PROCEDURE — G0279 TOMOSYNTHESIS, MAMMO: HCPCS

## 2025-05-05 PROCEDURE — 77066 DX MAMMO INCL CAD BI: CPT

## 2025-05-05 NOTE — PROGRESS NOTES
Daily Note     Today's date: 2025  Patient name: Bozena Armstrong  : 1950  MRN: [id number]  Referring provider: Bobby Crooks MD  Dx:   Encounter Diagnosis     ICD-10-CM    1. S/P ventriculoperitoneal shunt  Z98.2       2. NPH (normal pressure hydrocephalus) ([facility])  G91.2           Start Time: 1445  Stop Time: 1530  Total time in clinic (min): 45 minutes    Subjective: Pt reports that she not had any incident with sliding out of bed since last visit.       Objective: See treatment diary below      Assessment: Noted improvement with upright posture with complaint surfaces ambulation and obstacle negotiation  evident by progressing activity with step over step pattern thus encouraging increased SLS on complaint surfaces. However continues to demo difficulty with hidden complaint surfaces evident by requiring occ min/modA at trunk for steadying and preventing total LOB. Pt will benefit from continued skilled PT in order to maximize safety and independence with overall functional mobility.       Plan: Continue per plan of care.      Daily Treatment Diary     Precautions: Fall risk  CO-MORBIDITIES:  HEP ACCESS CODE:   FOTO Completed On:     POC Expires Reeval for Medicare to be completed  Unit Limit Auth Expiration Date PT/OT/STVisit Limit   25 By visit 10 BOMN   NA BOMN    Completed on visit                  Auth Status DATE    NA Visit # 8 9 10 11    Remaining       FOM 5STS: 10.09s    FGA:      ABC: 88.13      MANUAL THERAPY                                                 THERAPEUTIC EXERCISE HEP        STS, semitandem EO 30s, kettlebell lifts       STS        Lunges        Step ups        TM                                                                                         NEUROMUSCULAR REEDUCATION         Static balance        Dynamic balance    SOLO 4 pods arranged on uneven surface with foam pads underneath occ Sheila [id number] x2 SOLO 4 pods arranged on uneven surface  with foam pads underneath occ Sheila [id number] x2     1 round over even ground   Foam beam        Gait +HT     Bwd walking + HT SOLO 20ft x3 +dual task listing colors/stores    Blaze pods   SOLO Over ground 1 min x2 tapping pods arranged in semicircle    SOLO FT tapping pod with same colored tri color hand eye coordination  1 min x3 and listing items of same color  SOLO 6 pods over 20ft 3# aw b/l focusing on quick turns    Tandem walk        Hurdles  SOLO 6 middle setting hurdles alt over foam fwd and lateral x3 laps ea   SOLO 6 middle setting hurdles alt over foam fwd  and lateral x4 ea   : progressed to step over step with fwd   Step ups        Resisted walking        Self ball toss        Uneven surfaces   SOLO fwd resisted amb w/ maroon TB over uneven mat with hidden obstacles x4     Lateral x2      4 square        Med ball slams         TM   SOLO 1.0mph UUE/ no UE support 5 min total      [employer] weaving   SOLO 20ft x3 w/ 3# aw b/l     Bwd walking     SOLO bwd amb over series of foam pads x5 laps   THERAPEUTIC ACTIVITY                                        GAIT TRAINING                                        MODALITIES

## 2025-05-12 ENCOUNTER — OFFICE VISIT (OUTPATIENT)
Facility: CLINIC | Age: 75
End: 2025-05-12
Attending: NEUROLOGICAL SURGERY
Payer: MEDICARE

## 2025-05-12 DIAGNOSIS — Z98.2 S/P VENTRICULOPERITONEAL SHUNT: Primary | ICD-10-CM

## 2025-05-12 DIAGNOSIS — G91.2 NPH (NORMAL PRESSURE HYDROCEPHALUS) (HCC): ICD-10-CM

## 2025-05-12 PROCEDURE — 97110 THERAPEUTIC EXERCISES: CPT

## 2025-05-12 PROCEDURE — 97112 NEUROMUSCULAR REEDUCATION: CPT

## 2025-05-12 PROCEDURE — 97530 THERAPEUTIC ACTIVITIES: CPT

## 2025-05-12 NOTE — PROGRESS NOTES
Daily Note     Today's date: 2025  Patient name: Bozena Armstrong  : 1950  MRN: [id number]  Referring provider: Bobby Crooks MD  Dx:   Encounter Diagnosis     ICD-10-CM    1. S/P ventriculoperitoneal shunt  Z98.2       2. NPH (normal pressure hydrocephalus) (HCC)  G91.2           Start Time: 1445  Stop Time: 1530  Total time in clinic (min): 45 minutes    Subjective: Pt reports no new complaints and is agreeable to PT session. Reports that she wants to garden soon with the nicer weather.  Pt reports that she would have someone with her as she gardens and has something to hold on to as she goes down as well as a pillow for her LLE.       Objective: See treatment diary below      Assessment: Completed floor transfers this session in order to facilitate return to PLOF. Attempted to complete without UE support, however pt demos increased fear and anxiety with falling forward as well as weakness to stand back up without support. Pt also demos improvement with amb over complaint surfaces evident by decreased reactive steps off of foam to maintain balance. Continues to require Vcs for upright posture. Pt will benefit from continued skilled PT in order to maximize safety and independence with overall functional mobility.       Plan: Continue per plan of care.      Daily Treatment Diary     Precautions: Fall risk  CO-MORBIDITIES:  HEP ACCESS CODE:   FOTO Completed On:     POC Expires Reeval for Medicare to be completed  Unit Limit Auth Expiration Date PT/OT/STVisit Limit   25 By visit 10 BOMN   NA BOMN    Completed on visit                  Auth Status DATE    NA Visit # 8 9 10 11 12    Remaining        FOM 5STS: 10.09s    FGA:      ABC: 88.13       MANUAL THERAPY                                                        THERAPEUTIC EXERCISE HEP         STS, semitandem EO 30s, kettlebell lifts        STS         Lunges         Step ups         TM          Nu step       L5 x8  min SPM goal >80                                                                                     NEUROMUSCULAR REEDUCATION          Static balance         Dynamic balance    SOLO 4 pods arranged on uneven surface with foam pads underneath occ Sheila [id number] x2 SOLO 4 pods arranged on uneven surface with foam pads underneath occ Shiela [id number] x2     1 round over even ground SOLO fwd/bwd walking over series of foam pads 1 min 30s color catch game    Foam beam         Gait +HT     Bwd walking + HT SOLO 20ft x3 +dual task listing colors/stores     Blaze pods   SOLO Over ground 1 min x2 tapping pods arranged in semicircle    SOLO FT tapping pod with same colored tri color hand eye coordination  1 min x3 and listing items of same color  SOLO 6 pods over 20ft 3# aw b/l focusing on quick turns     Tandem walk         Hurdles  SOLO 6 middle setting hurdles alt over foam fwd and lateral x3 laps ea   SOLO 6 middle setting hurdles alt over foam fwd  and lateral x4 ea   : progressed to step over step with fwd    Step ups         Resisted walking         Self ball toss         Uneven surfaces   SOLO fwd resisted amb w/ maroon TB over uneven mat with hidden obstacles x4     Lateral x2       4 square         Med ball slams          TM   SOLO 1.0mph UUE/ no UE support 5 min total       [employer] weaving   SOLO 20ft x3 w/ 3# aw b/l      Bwd walking     SOLO bwd amb over series of foam pads x5 laps    THERAPEUTIC ACTIVITY         Floor transfer      UUE support on chair with 2 foam pads underneath LLE x15 min total                               GAIT TRAINING                                             MODALITIES

## 2025-05-13 ENCOUNTER — OFFICE VISIT (OUTPATIENT)
Dept: FAMILY MEDICINE CLINIC | Facility: CLINIC | Age: 75
End: 2025-05-13
Payer: MEDICARE

## 2025-05-13 VITALS
HEIGHT: 64 IN | DIASTOLIC BLOOD PRESSURE: 80 MMHG | OXYGEN SATURATION: 99 % | RESPIRATION RATE: 20 BRPM | TEMPERATURE: 98.9 F | SYSTOLIC BLOOD PRESSURE: 118 MMHG | BODY MASS INDEX: 43.23 KG/M2 | HEART RATE: 89 BPM | WEIGHT: 253.2 LBS

## 2025-05-13 DIAGNOSIS — E78.5 DYSLIPIDEMIA: Primary | ICD-10-CM

## 2025-05-13 DIAGNOSIS — M79.89 SOFT TISSUE MASS: ICD-10-CM

## 2025-05-13 DIAGNOSIS — G89.4 CHRONIC PAIN SYNDROME: ICD-10-CM

## 2025-05-13 DIAGNOSIS — I10 ESSENTIAL HYPERTENSION: ICD-10-CM

## 2025-05-13 DIAGNOSIS — R79.9 ABNORMAL FINDING OF BLOOD CHEMISTRY, UNSPECIFIED: ICD-10-CM

## 2025-05-13 DIAGNOSIS — G91.2 NPH (NORMAL PRESSURE HYDROCEPHALUS) (HCC): ICD-10-CM

## 2025-05-13 PROCEDURE — 99214 OFFICE O/P EST MOD 30 MIN: CPT | Performed by: FAMILY MEDICINE

## 2025-05-13 PROCEDURE — G2211 COMPLEX E/M VISIT ADD ON: HCPCS | Performed by: FAMILY MEDICINE

## 2025-05-13 NOTE — ASSESSMENT & PLAN NOTE
S/p  shunt placement 1/23/25 at [place]  She has been cleared from a neurosurgery standpoint   She still follows with neurology  Next apt is in July

## 2025-05-13 NOTE — PROGRESS NOTES
Bozena Armstrong 1950 female MRN: [id number]    Family Medicine Follow-up Visit    ASSESSMENT/PLAN  Problem List Items Addressed This Visit          Cardiovascular and Mediastinum    Essential hypertension    Controlled off medication at this time          Relevant Orders    Lipid panel    Comprehensive metabolic panel    Hemoglobin A1C       Nervous and Auditory    NPH (normal pressure hydrocephalus) ([facility])    S/p  shunt placement 1/23/25 at [place]  She has been cleared from a neurosurgery standpoint   She still follows with neurology  Next apt is in July          Relevant Orders    Lipid panel    Comprehensive metabolic panel    Hemoglobin A1C       Surgery/Wound/Pain    Chronic pain syndrome    She is controlled on her current dose of Cymbalta  at bedtime  Continue as prescribed          Relevant Orders    Lipid panel    Comprehensive metabolic panel    Hemoglobin A1C       Other    Dyslipidemia - Primary    Continue rosuvastatin therapy as prescribed         Relevant Orders    Lipid panel    Comprehensive metabolic panel    Hemoglobin A1C     Other Visit Diagnoses         Abnormal finding of blood chemistry, unspecified        Relevant Orders    Hemoglobin A1C      Soft tissue mass        Relevant Orders    Ambulatory referral to General Surgery            Follow up in 3-4 months for BP recheck         Future Appointments   Date Time Provider Department Center   5/19/2025 10:15 AM Margarita Valladares, PT [facility]   5/27/2025  9:30 AM Margarita Valladares PT [facility]   7/10/2025  3:30 PM Petty Kim MD NE[facility] [place]          SUBJECTIVE  CC: Follow-up (4 month check )      HPI:  Bozena Armstrong is a 74 y.o. female who presents for check up  She had her brain surgery which has improved a number of things  Reports balance is better but she is still working with PT  Incontinence is better as well      HPI    Review of Systems   Constitutional:  Negative for chills, fatigue and  fever.   HENT:  Negative for congestion, postnasal drip, rhinorrhea and sinus pressure.    Eyes:  Negative for photophobia and visual disturbance.   Respiratory:  Negative for cough and shortness of breath.    Cardiovascular:  Negative for chest pain, palpitations and leg swelling.   Gastrointestinal:  Negative for abdominal pain, constipation, diarrhea, nausea and vomiting.   Genitourinary:  Negative for difficulty urinating and dysuria.   Musculoskeletal:  Negative for arthralgias and myalgias.   Skin:  Negative for color change and rash.   Neurological:  Negative for dizziness, weakness, light-headedness and headaches.       Historical Information   The patient history was reviewed as follows:    Past Medical History:   Diagnosis Date    Arthritis     Balance disorder     GERD (gastroesophageal reflux disease)     Hyperlipidemia     Hypertension     Peripheral neuropathy     Vertigo      Past Surgical History:   Procedure Laterality Date     SECTION      COLONOSCOPY      FL LUMBAR PUNCTURE THERAPEUTIC  2024    HYSTERECTOMY      HYSTEROSCOPY W/ ENDOMETRIAL ABLATION      JOINT REPLACEMENT      KNEE SURGERY      [place] ARTHRP ACETBLR/PROX FEM PROSTC AGRFT/ALGRFT Right 2022    Procedure: ARTHROPLASTY HIP TOTAL ANTERIOR and all associated procedures;  Surgeon: Trisha Alonso MD;  Location: [facility];  Service: Orthopedics     Family History   Problem Relation Age of Onset    Arthritis Mother     Cancer Father     Heart disease Father     No Known Problems Sister     No Known Problems Daughter     No Known Problems Daughter     No Known Problems Daughter     No Known Problems Maternal Grandmother     No Known Problems Maternal Grandfather     No Known Problems Paternal Grandmother     No Known Problems Paternal Grandfather     Anemia Brother     No Known Problems Maternal Aunt     No Known Problems Maternal Aunt       Social History   Social History     Substance and Sexual Activity   Alcohol Use  Never     Social History     Substance and Sexual Activity   Drug Use Never     Social History     Tobacco Use   Smoking Status Never    Passive exposure: Past   Smokeless Tobacco Never       Medications:     Current Outpatient Medications:     ascorbic acid (VITAMIN C) 500 MG tablet, Take 1 tablet (500 mg total) by mouth 2 (two) times a day, Disp: 60 tablet, Rfl: 0    celecoxib (CeleBREX) 100 mg capsule, TAKE ONE CAPSULE BY MOUTH TWICE A DAY, Disp: 60 capsule, Rfl: 5    cholecalciferol (VITAMIN D3) 1,000 units tablet, Take 1,000 Units by mouth daily, Disp: , Rfl:     Doxylamine Succinate, Sleep, (SLEEP AID PO), Take 25 mg by mouth, Disp: , Rfl:     DULoxetine (CYMBALTA) 60 mg delayed release capsule, TAKE ONE CAPSULE BY MOUTH AT BEDTIME, Disp: 30 capsule, Rfl: 5    Multiple Vitamins-Minerals (CENTRUM SILVER PO), Take by mouth, Disp: , Rfl:     potassium chloride (Klor-Con M20) 20 mEq tablet, Take 1 tablet (20 mEq total) by mouth daily, Disp: 30 tablet, Rfl: 1    rosuvastatin (CRESTOR) 40 MG tablet, Take 1 tablet (40 mg total) by mouth daily, Disp: 90 tablet, Rfl: 3    vitamin B-12 (VITAMIN B-12) 1,000 mcg tablet, Take 1 tablet (1,000 mcg total) by mouth daily, Disp: 90 tablet, Rfl: 3    VITAMIN E PO, Take by mouth, Disp: , Rfl:     benzonatate (TESSALON) 200 MG capsule, Take 1 capsule (200 mg total) by mouth 3 (three) times a day as needed for cough (Patient not taking: Reported on 5/13/2025), Disp: 20 capsule, Rfl: 0    guaiFENesin (MUCINEX) 600 mg 12 hr tablet, Take 2 tablets (1,200 mg total) by mouth every 12 (twelve) hours (Patient not taking: Reported on 5/13/2025), Disp: 120 tablet, Rfl: 0    predniSONE 10 mg tablet, Take 1 tablet (10 mg total) by mouth daily On day one take 6 tablets, day two take 5 tablets, day three take 4 tablets, day four take 3 tablets, day five take 2 tablets, day six take 1 tablet (Patient not taking: Reported on 1/9/2025), Disp: 21 tablet, Rfl: 0  No Known  "Allergies    OBJECTIVE    Vitals:   Vitals:    05/13/25 1055 05/13/25 1143   BP: 138/94 118/80   BP Location: Left arm    Patient Position: Sitting    Cuff Size: Large    Pulse: 89    Resp: 20    Temp: 98.9 °F (37.2 °C)    TempSrc: Tympanic    SpO2: 99%    Weight: 115 kg (253 lb 3.2 oz)    Height: 5' 4\" (1.626 m)            Physical Exam  Constitutional:       Appearance: She is well-developed.   HENT:      Head: Normocephalic and atraumatic.   Eyes:      Pupils: Pupils are equal, round, and reactive to light.   Cardiovascular:      Rate and Rhythm: Normal rate and regular rhythm.      Heart sounds: Normal heart sounds.   Pulmonary:      Effort: Pulmonary effort is normal. No respiratory distress.      Breath sounds: Normal breath sounds. No wheezing.   Abdominal:      General: Bowel sounds are normal. There is no distension.      Palpations: Abdomen is soft.      Tenderness: There is no abdominal tenderness.   Musculoskeletal:         General: No tenderness. Normal range of motion.      Cervical back: Normal range of motion and neck supple.   Skin:     General: Skin is warm and dry.   Neurological:      Mental Status: She is alert and oriented to person, place, and time.   Psychiatric:         Behavior: Behavior normal.            Labs:        Hillary Grimm DO    5/13/2025      "

## 2025-05-19 ENCOUNTER — CONSULT (OUTPATIENT)
Dept: SURGERY | Facility: HOSPITAL | Age: 75
End: 2025-05-19
Attending: FAMILY MEDICINE
Payer: MEDICARE

## 2025-05-19 ENCOUNTER — OFFICE VISIT (OUTPATIENT)
Facility: CLINIC | Age: 75
End: 2025-05-19
Attending: NEUROLOGICAL SURGERY
Payer: MEDICARE

## 2025-05-19 VITALS
OXYGEN SATURATION: 99 % | TEMPERATURE: 97 F | WEIGHT: 253 LBS | HEIGHT: 64 IN | BODY MASS INDEX: 43.19 KG/M2 | DIASTOLIC BLOOD PRESSURE: 91 MMHG | SYSTOLIC BLOOD PRESSURE: 145 MMHG | HEART RATE: 88 BPM

## 2025-05-19 DIAGNOSIS — R22.2 MASS OF SKIN OF BACK: Primary | ICD-10-CM

## 2025-05-19 DIAGNOSIS — Z98.2 S/P VENTRICULOPERITONEAL SHUNT: Primary | ICD-10-CM

## 2025-05-19 DIAGNOSIS — G91.2 NPH (NORMAL PRESSURE HYDROCEPHALUS) (HCC): ICD-10-CM

## 2025-05-19 PROCEDURE — 97112 NEUROMUSCULAR REEDUCATION: CPT

## 2025-05-19 PROCEDURE — 97110 THERAPEUTIC EXERCISES: CPT

## 2025-05-19 PROCEDURE — 99203 OFFICE O/P NEW LOW 30 MIN: CPT | Performed by: SURGERY

## 2025-05-19 NOTE — PROGRESS NOTES
Daily Note     Today's date: 2025  Patient name: Bozena Armstrong  : 1950  MRN: [id number]  Referring provider: Bobby Crooks MD  Dx:   Encounter Diagnosis     ICD-10-CM    1. S/P ventriculoperitoneal shunt  Z98.2       2. NPH (normal pressure hydrocephalus) ([facility])  G91.2           Start Time: 933  Stop Time: 1015  Total time in clinic (min): 42 minutes    Subjective: Pt arrived a few minutes late and offered no new complaints and is agreeable to PT session.       Objective: See treatment diary below      Assessment: Pt demos appropriate muscle fatigue by end of session. Continued Vcs for upright posture throughout session, however noted increased self correction this session. Pt continues to demo difficulty ambulating over complaint surfaces evident by requiring occ Sheila to facilitate upright posture and steadying of trunk due to lateral trunk sway. Pt will benefit from continued skilled PT in order to maximize safety and independence with overall functional mobility.       Plan: Continue per plan of care.      Daily Treatment Diary     Precautions: Fall risk  CO-MORBIDITIES:  HEP ACCESS CODE:   FOTO Completed On:     POC Expires Reeval for Medicare to be completed  Unit Limit Auth Expiration Date PT/OT/STVisit Limit   25 By visit 10 BOMN   NA BOMN    Completed on visit                  Auth Status DATE    NA Visit # 8 9 10 11 12 13    Remaining         FOM 5STS: 10.09s    FGA:      ABC: 88.13        MANUAL THERAPY                                                               THERAPEUTIC EXERCISE HEP          STS, semitandem EO 30s, kettlebell lifts         STS          Lunges          Step ups          TM           Nu step       L5 x8 min SPM goal >80  L5 x8 min SPM goal >80                                                                                              NEUROMUSCULAR REEDUCATION           Static balance          Dynamic balance    SOLO 4 pods  "arranged on uneven surface with foam pads underneath occ Sheila [id number] x2 SOLO 4 pods arranged on uneven surface with foam pads underneath occ Sheila [id number] x2     1 round over even ground SOLO fwd/bwd walking over series of foam pads 1 min 30s color catch game  SOLO amb over uneven mat with foam beams underneats x4 laps fwd and lateral ea     Foam beam          Gait +HT     Bwd walking + HT SOLO 20ft x3 +dual task listing colors/stores      Blaze pods   SOLO Over ground 1 min x2 tapping pods arranged in semicircle    SOLO FT tapping pod with same colored tri color hand eye coordination  1 min x3 and listing items of same color  SOLO 6 pods over 20ft 3# aw b/l focusing on quick turns   2 min x3 6 pods on n 6\" step SOLO standing on foam        Tandem walk          Hurdles  SOLO 6 middle setting hurdles alt over foam fwd and lateral x3 laps ea   SOLO 6 middle setting hurdles alt over foam fwd  and lateral x4 ea   : progressed to step over step with fwd     Step ups          Resisted walking          Self ball toss          Uneven surfaces   SOLO fwd resisted amb w/ maroon TB over uneven mat with hidden obstacles x4     Lateral x2        4 square          Med ball slams           TM   SOLO 1.0mph UUE/ no UE support 5 min total        [employer] weaving   SOLO 20ft x3 w/ 3# aw b/l       Bwd walking     SOLO bwd amb over series of foam pads x5 laps     THERAPEUTIC ACTIVITY          Floor transfer      UUE support on chair with 2 foam pads underneath LLE x15 min total                                   GAIT TRAINING                                                  MODALITIES                                    "

## 2025-05-22 DIAGNOSIS — E87.6 HYPOKALEMIA: ICD-10-CM

## 2025-05-22 DIAGNOSIS — G89.4 CHRONIC PAIN SYNDROME: ICD-10-CM

## 2025-05-22 DIAGNOSIS — M16.11 PRIMARY OSTEOARTHRITIS OF RIGHT HIP: ICD-10-CM

## 2025-05-22 NOTE — TELEPHONE ENCOUNTER
Patient called to request a refill for their duloxetine advised a refill was requested on 5-22-25 and is pending approval. Patient verbalized understanding and is in agreement.     Does the patient have enough for 3 days?   [x] Yes   [] No - Send as HP to POD

## 2025-05-22 NOTE — TELEPHONE ENCOUNTER
Patient called to request a refill for their potassium chloride advised a refill was requested on 5-22-25 and is pending approval. Patient verbalized understanding and is in agreement.     Does the patient have enough for 3 days?   [x] Yes   [] No - Send as HP to POD

## 2025-05-23 RX ORDER — POTASSIUM CHLORIDE 1500 MG/1
20 TABLET, EXTENDED RELEASE ORAL DAILY
Qty: 30 TABLET | Refills: 1 | Status: SHIPPED | OUTPATIENT
Start: 2025-05-23

## 2025-05-23 RX ORDER — DULOXETIN HYDROCHLORIDE 60 MG/1
60 CAPSULE, DELAYED RELEASE ORAL
Qty: 30 CAPSULE | Refills: 5 | Status: SHIPPED | OUTPATIENT
Start: 2025-05-23

## 2025-05-27 ENCOUNTER — APPOINTMENT (OUTPATIENT)
Facility: CLINIC | Age: 75
End: 2025-05-27
Attending: NEUROLOGICAL SURGERY
Payer: MEDICARE

## 2025-05-29 ENCOUNTER — HOSPITAL ENCOUNTER (OUTPATIENT)
Dept: ULTRASOUND IMAGING | Facility: CLINIC | Age: 75
Discharge: HOME | End: 2025-05-29
Attending: SURGERY
Payer: MEDICARE

## 2025-05-29 DIAGNOSIS — R22.2 MASS OF SKIN OF BACK: ICD-10-CM

## 2025-05-29 PROCEDURE — 76705 ECHO EXAM OF ABDOMEN: CPT

## 2025-06-03 ENCOUNTER — TELEPHONE (OUTPATIENT)
Age: 75
End: 2025-06-03

## 2025-06-03 NOTE — TELEPHONE ENCOUNTER
Pt calling in because she received a call from the office. I did not see any notes in chart and pt just scheduled an appt for Friday.    Please advise and contact pt back if anything.

## 2025-06-05 PROBLEM — R22.2 MASS OF SKIN OF BACK: Status: ACTIVE | Noted: 2025-06-05

## 2025-06-05 NOTE — PROGRESS NOTES
Assessment/Plan:    Mass of skin of back  Back mass - this appears to be hematoma given exam and h/o of trauma but has been there for quite a while, will obtain u/s to evaluate mass      Preoperative Clearance: None    HPI:  Bozena Armstrong is a 74 y.o.female who was referred for evaluation of Mass (Bozena Armstrong is a 74 year old patient that presents a mass on back (lower) pt states they noticed mass months ago pt hit back and pt claimed does not cause pain and has not grown since then pt denied swelling )  .    Currently back mass.     ROS:  General ROS: negative  negative for - chills, fatigue, fever or night sweats, weight loss  Respiratory ROS: no cough, shortness of breath, or wheezing  Cardiovascular ROS: no chest pain or dyspnea on exertion  Genito-Urinary ROS: no dysuria, trouble voiding, or hematuria  Musculoskeletal ROS: negative for - gait disturbance, joint pain or muscle pain  Neurological ROS: no TIA or stroke symptoms  Back mass    [unfilled]  Patient has no known allergies.  Current Medications[1]  Past Medical History[2]  Past Surgical History[3]  Family History[4]   reports that she has never smoked. She has been exposed to tobacco smoke. She has never used smokeless tobacco. She reports that she does not drink alcohol and does not use drugs.    Labs:   Lab Results   Component Value Date    WBC 6.98 01/06/2025    WBC 6.19 10/07/2024    WBC 6.38 01/11/2024    HGB 14.0 01/06/2025    HGB 14.2 10/07/2024    HGB 13.3 01/11/2024     01/06/2025     10/07/2024     07/12/2024     Lab Results   Component Value Date    ALT 19 01/06/2025    ALT 21 10/07/2024    ALT 16 01/11/2024    ALT 14 04/06/2022    ALT 16 09/29/2021    ALT 14 02/06/2021    AST 16 01/06/2025    AST 17 10/07/2024    AST 13 01/11/2024    AST 15 04/06/2022    AST 14 (L) 09/29/2021    AST 13 02/06/2021     This SmartLink has not been configured with any valid records.       PHYSICAL EXAM  General Appearance:    Alert,  "cooperative, no distress,    Head:    Normocephalic without obvious abnormality   Eyes:    PERRL, conjunctiva/corneas clear, EOM's intact        Neck:   Supple, no adenopathy, no JVD   Back:     Symmetric, no spinal or CVA tenderness   Lungs:     Clear to auscultation bilaterally, no wheezing or rhonchi   Heart:    Regular rate and rhythm, S1 and S2 normal, no murmur   Abdomen:        Extremities:   Extremities normal. No clubbing, cyanosis or edema   Psych:   Normal Affect, AOx3.    Neurologic:  Skin:   CNII-XII intact. Strength symmetric, speech intact    Warm, dry, intact, back mass consistent with hematoma         Physical Exam              Some portions of this record may have been generated with voice recognition software. There may be translation, syntax,  or grammatical errors. Occasional wrong word or \"sound-a-like\" substitutions may have occurred due to the inherent limitations of the voice recognition software. Read the chart carefully and recognize, using context, where substitutions may have occurred. If you have any questions, please contact the dictating provider for clarification or correction, as needed. This encounter has been coded by a non-certified coder.        [1]   Current Outpatient Medications:     ascorbic acid (VITAMIN C) 500 MG tablet, Take 1 tablet (500 mg total) by mouth 2 (two) times a day, Disp: 60 tablet, Rfl: 0    benzonatate (TESSALON) 200 MG capsule, Take 1 capsule (200 mg total) by mouth 3 (three) times a day as needed for cough (Patient not taking: Reported on 5/13/2025), Disp: 20 capsule, Rfl: 0    celecoxib (CeleBREX) 100 mg capsule, TAKE ONE CAPSULE BY MOUTH TWICE A DAY, Disp: 60 capsule, Rfl: 5    cholecalciferol (VITAMIN D3) 1,000 units tablet, Take 1,000 Units by mouth daily, Disp: , Rfl:     Doxylamine Succinate, Sleep, (SLEEP AID PO), Take 25 mg by mouth, Disp: , Rfl:     DULoxetine (CYMBALTA) 60 mg delayed release capsule, TAKE ONE CAPSULE BY MOUTH AT BEDTIME, Disp: 30 " capsule, Rfl: 5    guaiFENesin (MUCINEX) 600 mg 12 hr tablet, Take 2 tablets (1,200 mg total) by mouth every 12 (twelve) hours (Patient not taking: Reported on 2025), Disp: 120 tablet, Rfl: 0    Multiple Vitamins-Minerals (CENTRUM SILVER PO), Take by mouth, Disp: , Rfl:     potassium chloride (Klor-Con M20) 20 mEq tablet, TAKE ONE TABLET BY MOUTH EVERY DAY, Disp: 30 tablet, Rfl: 1    predniSONE 10 mg tablet, Take 1 tablet (10 mg total) by mouth daily On day one take 6 tablets, day two take 5 tablets, day three take 4 tablets, day four take 3 tablets, day five take 2 tablets, day six take 1 tablet (Patient not taking: Reported on 2025), Disp: 21 tablet, Rfl: 0    rosuvastatin (CRESTOR) 40 MG tablet, Take 1 tablet (40 mg total) by mouth daily, Disp: 90 tablet, Rfl: 3    vitamin B-12 (VITAMIN B-12) 1,000 mcg tablet, Take 1 tablet (1,000 mcg total) by mouth daily, Disp: 90 tablet, Rfl: 3    VITAMIN E PO, Take by mouth, Disp: , Rfl:   [2]   Past Medical History:  Diagnosis Date    Arthritis     Balance disorder     GERD (gastroesophageal reflux disease)     Hyperlipidemia     Hypertension     Peripheral neuropathy     Vertigo    [3]   Past Surgical History:  Procedure Laterality Date     SECTION      COLONOSCOPY      FL LUMBAR PUNCTURE THERAPEUTIC  2024    HYSTERECTOMY      HYSTEROSCOPY W/ ENDOMETRIAL ABLATION      JOINT REPLACEMENT      KNEE SURGERY      MI ARTHRP ACETBLR/PROX FEM PROSTC AGRFT/ALGRFT Right 2022    Procedure: ARTHROPLASTY HIP TOTAL ANTERIOR and all associated procedures;  Surgeon: Trisha Alonso MD;  Location: [facility];  Service: Orthopedics   [4]   Family History  Problem Relation Name Age of Onset    Arthritis Mother      Cancer Father      Heart disease Father      No Known Problems Sister      No Known Problems Daughter      No Known Problems Daughter      No Known Problems Daughter      No Known Problems Maternal Grandmother      No Known Problems Maternal  Grandfather      No Known Problems Paternal Grandmother      No Known Problems Paternal Grandfather      Anemia Brother      No Known Problems Maternal Aunt      No Known Problems Maternal Aunt

## 2025-06-05 NOTE — ASSESSMENT & PLAN NOTE
Back mass - this appears to be hematoma given exam and h/o of trauma but has been there for quite a while, will obtain u/s to evaluate mass

## 2025-06-06 ENCOUNTER — OFFICE VISIT (OUTPATIENT)
Dept: FAMILY MEDICINE CLINIC | Facility: CLINIC | Age: 75
End: 2025-06-06
Payer: MEDICARE

## 2025-06-06 VITALS
OXYGEN SATURATION: 97 % | BODY MASS INDEX: 43.36 KG/M2 | WEIGHT: 254 LBS | HEIGHT: 64 IN | SYSTOLIC BLOOD PRESSURE: 130 MMHG | RESPIRATION RATE: 18 BRPM | DIASTOLIC BLOOD PRESSURE: 92 MMHG | TEMPERATURE: 98.6 F | HEART RATE: 104 BPM

## 2025-06-06 DIAGNOSIS — F32.1 MODERATE MAJOR DEPRESSION, SINGLE EPISODE (HCC): Primary | ICD-10-CM

## 2025-06-06 DIAGNOSIS — G60.9 NEUROPATHY, IDIOPATHIC: ICD-10-CM

## 2025-06-06 DIAGNOSIS — E78.5 DYSLIPIDEMIA: ICD-10-CM

## 2025-06-06 DIAGNOSIS — G91.2 NPH (NORMAL PRESSURE HYDROCEPHALUS) (HCC): ICD-10-CM

## 2025-06-06 PROCEDURE — 99214 OFFICE O/P EST MOD 30 MIN: CPT | Performed by: STUDENT IN AN ORGANIZED HEALTH CARE EDUCATION/TRAINING PROGRAM

## 2025-06-06 PROCEDURE — G2211 COMPLEX E/M VISIT ADD ON: HCPCS | Performed by: STUDENT IN AN ORGANIZED HEALTH CARE EDUCATION/TRAINING PROGRAM

## 2025-06-06 RX ORDER — AMITRIPTYLINE HYDROCHLORIDE 10 MG/1
10 TABLET ORAL
Qty: 90 TABLET | Refills: 1 | Status: SHIPPED | OUTPATIENT
Start: 2025-06-06

## 2025-06-06 NOTE — PATIENT INSTRUCTIONS
"Patient Education     Depression in adults   The Basics   Written by the doctors and editors at [facility]   What is depression? -- Depression is a disorder that makes you sad, but it is different from normal sadness. Depression can make it hard for you to work, study, or do everyday tasks.  What causes depression? -- Depression is caused by problems with chemicals in the brain called \"neurotransmitters.\" Some people might be more likely to have depression if it runs in their family. Other things might also play a role, including hormones, certain health problems, medicines, stress, being mistreated as a child, family problems, and problems with friends or at school or work.  How do I know if I am depressed? -- People with depression feel down most of the time for at least 2 weeks. They also have at least 1 of these 2 symptoms:   They no longer enjoy or care about doing the things that they used to like to do.   They feel sad, down, hopeless, or cranky most of the day, almost every day.  People with depression can also have other symptoms. Examples include:   Changes in your appetite or weight. You might eat too little or too much, or gain or lose weight without trying.   Sleeping too much or too little   Feeling tired or like you have no energy   Feeling guilty, helpless, or like you are worth nothing   Trouble with concentration or memory   Acting restless or have trouble staying still, or moving or speaking more slowly than normal   Repeated thoughts of death or killing yourself  If you think that you might be depressed, see your doctor or nurse. Only someone trained in mental health can tell for sure if you are depressed.  How is depression diagnosed? -- Your doctor or nurse will do a physical exam, ask you questions, and might order tests. Depression can have a big impact on your life. Luckily, depression can be treated, and the sooner treatment is started, the better it works.  Get help right away if you are " "thinking of hurting or killing yourself! -- If you ever feel like you might hurt yourself or someone else, help is available:   In the [place], contact the 227 Suicide & Crisis Lifeline:   To speak to someone, call or text 915.   To talk to someone online, go to www.[email or web address]/chat.   Call your doctor or nurse, and tell them it is urgent.   Call for an ambulance (in the [place] and [place], call 9-1-1).   Go to the emergency department at the nearest hospital.  What are the treatments for depression? -- Your doctor or nurse will work with you to make a treatment plan. Treatment can include:   Helping you learn more about depression   Counseling (with a psychiatrist, psychologist, nurse, or )   Medicines that relieve depression   Creating a plan to limit access to items that you might use to harm yourself   Other treatments that pass magnetic waves or electricity into the brain  In addition to treatment, getting regular physical activity can also help you feel better.  People with depression that is not too severe can get better by taking medicines or talking with a counselor. People with severe depression usually need medicines to get better, and might also need to see a counselor.  Another treatment involves placing a device against the scalp to pass magnetic waves into the brain. This is called \"transcranial magnetic stimulation\" (\"TMS\"). Doctors might suggest TMS if medicines and counseling have not helped.  Some people with severe depression might need a treatment called \"electroconvulsive therapy\" (\"ECT\"). During ECT, doctors pass an electric current through a person's brain in a safe way.  When will I feel better? -- Most treatment options take a little while to start working.   Many people who take medicines start to feel better within 2 weeks, but it might be 4 to 8 weeks before the medicine has its full effect.   Many people who see a counselor start to feel better within a few weeks, but it might " take 8 to 10 weeks to get the greatest benefit.  If the first treatment you try does not help you, tell your doctor or nurse, but do not give up. Some people need to try different treatments or combinations of treatments before they find an approach that works. Your doctor, nurse, or counselor can work with you to find the treatment that is right for you. They can also help you figure out how to cope while you search for the right treatment or are waiting for your treatment to start working.  How do I decide which treatment to have? -- You and your doctor or nurse will need to work together to choose a treatment for you. Medicines might work a little faster than counseling. But medicines can also cause side effects. Plus, some people do not like the idea of taking medicine.  Seeing a counselor involves talking about your feelings. That is also hard for some people.  What if I take medicine for depression and I want to have a baby? -- Some depression medicines can cause problems for an unborn baby. But having untreated depression during pregnancy can also cause problems. If you want to get pregnant, tell your doctor but do not stop taking your medicines. Together, you can plan the safest way for you to have your baby.  It's also important to talk with your doctor if you want to breastfeed after your baby is born. Breastfeeding has lots of benefits for both mother and baby. Some depression medicines are safer than others to use while breastfeeding. But having untreated depression after giving birth can also cause problems, so do not stop taking your medicines. Your doctor can work with you to plan the safest way for you to feed your baby.  All topics are updated as new evidence becomes available and our peer review process is complete.  This topic retrieved from [employer] on: Mar 06, 2024.  Topic 00655 Version 22.0  Release: 32.2.4 - C32.64  © 2024 UpToDate, Inc. and/or its affiliates. All rights reserved.  figure 1:  "Mood disorders caused by problems in the brain     Mooddisorders, such as depression and bipolar disorder, are caused by problems with\"neurotransmitters.\" These are chemicals in the brain that can affect your emotions.Treatments for mood disorders seem to work by changing the levels of certainneurotransmitters.  Graphic 38302 Version 4.0  Consumer Information Use and Disclaimer   Disclaimer: This generalized information is a limited summary of diagnosis, treatment, and/or medication information. It is not meant to be comprehensive and should be used as a tool to help the user understand and/or assess potential diagnostic and treatment options. It does NOT include all information about conditions, treatments, medications, side effects, or risks that may apply to a specific patient. It is not intended to be medical advice or a substitute for the medical advice, diagnosis, or treatment of a health care provider based on the health care provider's examination and assessment of a patient's specific and unique circumstances. Patients must speak with a health care provider for complete information about their health, medical questions, and treatment options, including any risks or benefits regarding use of medications. This information does not endorse any treatments or medications as safe, effective, or approved for treating a specific patient. UpToDate, Inc. and its affiliates disclaim any warranty or liability relating to this information or the use thereof.The use of this information is governed by the Terms of Use, available at https://www.inmoblyuwer.com/en/know/clinical-effectiveness-terms. 2024© UpToDate, Inc. and its affiliates and/or licensors. All rights reserved.  Copyright   © 2024 UpToDate, Inc. and/or its affiliates. All rights reserved.    "

## 2025-06-06 NOTE — PROGRESS NOTES
Name: Bozena Armstrong      : 1950      MRN: [id number]  Encounter Provider: Dilcia Saha MD  Encounter Date: 2025   Encounter department: [facility]  :  Assessment & Plan  Moderate major depression, single episode (HCC)  Depression Screening Follow-up Plan: Patient's depression screening was positive with a PHQ-2 score of 4. Their PHQ-9 score was 13. Patient assessed for underlying major depression. They have no active suicidal ideations. Brief counseling provided and recommend additional follow-up/re-evaluation next office visit.  MDD action plan discussed; no SI or HI  Currently on duloxetine 60 mg daily   Discussed TCA for benefit of back pain as well as MDD- pt agreed   AE of elavil discussed and pt v/u     RTC in 3 months to assess response; earlier if any AE develop   Orders:    amitriptyline (ELAVIL) 10 mg tablet; Take 1 tablet (10 mg total) by mouth daily at bedtime    NPH (normal pressure hydrocephalus) (HCC)  Notes  shunt placement has improved her symptoms significantly; c/w NSGY follow up       Dyslipidemia  Currently on crestor 40 mg daily; tolerating well          Depression Screening Follow-up Plan: Patient's depression screening was positive with a PHQ-2 score of 4. Their PHQ-9 score was 13. Patient assessed for underlying major depression. They have no active suicidal ideations. Brief counseling provided and recommend additional follow-up/re-evaluation next office visit.    Depression Screening and Follow-up Plan: Patient's depression screening was positive with a PHQ-2 score of 4. Their PHQ-9 score was 13.   Patient assessed for underlying major depression. Brief counseling provided and recommend additional follow-up/re-evaluation next office visit.       History of Present Illness   75 yo F w/ dmitriy of HTN, NPH, GERD, anemia, HLD, preDM presenting for depression concerns. Notes no desire to do anything, feels down. Notes depression. Notes feeling  "trapped taking care of her spouse. Notes feeling like a lot of challenges in her life. Notes multiple stressors with multiple family members. Notes multiple financial stressors with visiting family         Depression  Pertinent negatives include no abdominal pain, chest pain, chills, coughing, fever, joint swelling or rash.     Review of Systems   Constitutional:  Negative for chills and fever.   HENT:  Negative for hearing loss and trouble swallowing.    Eyes:  Negative for visual disturbance.   Respiratory:  Negative for cough and shortness of breath.    Cardiovascular:  Negative for chest pain and palpitations.   Gastrointestinal:  Negative for abdominal pain.   Genitourinary:  Negative for difficulty urinating and dysuria.   Musculoskeletal:  Negative for gait problem and joint swelling.   Skin:  Negative for color change and rash.   Neurological:  Negative for syncope and light-headedness.   Psychiatric/Behavioral:  Positive for depression. Negative for self-injury and suicidal ideas.    All other systems reviewed and are negative.      Objective   /92 (BP Location: Right arm, Patient Position: Sitting, Cuff Size: Large)   Pulse 104   Temp 98.6 °F (37 °C) (Tympanic)   Resp 18   Ht 5' 4\" (1.626 m)   Wt 115 kg (254 lb)   SpO2 97%   BMI 43.60 kg/m²      Physical Exam  Vitals and nursing note reviewed.   Constitutional:       General: She is not in acute distress.     Appearance: Normal appearance. She is well-developed.   HENT:      Head: Normocephalic and atraumatic.     Eyes:      Conjunctiva/sclera: Conjunctivae normal.       Cardiovascular:      Rate and Rhythm: Normal rate and regular rhythm.      Pulses: Normal pulses.      Heart sounds: Normal heart sounds. No murmur heard.  Pulmonary:      Effort: Pulmonary effort is normal. No respiratory distress.      Breath sounds: Normal breath sounds. No wheezing or rales.   Abdominal:      Palpations: Abdomen is soft.      Tenderness: There is no " abdominal tenderness.     Musculoskeletal:         General: No swelling. Normal range of motion.      Cervical back: Neck supple.     Skin:     General: Skin is warm and dry.      Capillary Refill: Capillary refill takes less than 2 seconds.     Neurological:      Mental Status: She is alert.      Motor: No weakness.      Gait: Gait normal.     Psychiatric:         Mood and Affect: Mood normal.         Behavior: Behavior normal.         Thought Content: Thought content normal.         Judgment: Judgment normal.       Administrative Statements   I have spent a total time of 30 minutes in caring for this patient on the day of the visit/encounter including Prognosis, Risks and benefits of tx options, Instructions for management, Patient and family education, Importance of tx compliance, Risk factor reductions, Impressions, Counseling / Coordination of care, Documenting in the medical record, and Reviewing/placing orders in the medical record (including tests, medications, and/or procedures).

## 2025-06-09 ENCOUNTER — TELEPHONE (OUTPATIENT)
Age: 75
End: 2025-06-09

## 2025-06-09 ENCOUNTER — TELEPHONE (OUTPATIENT)
Dept: FAMILY MEDICINE CLINIC | Facility: CLINIC | Age: 75
End: 2025-06-09

## 2025-06-09 RX ORDER — LANOLIN ALCOHOL/MO/W.PET/CERES
1000 CREAM (GRAM) TOPICAL DAILY
Qty: 90 TABLET | Refills: 3 | Status: SHIPPED | OUTPATIENT
Start: 2025-06-09

## 2025-06-09 NOTE — TELEPHONE ENCOUNTER
Spoke to patient  , she has not been on any of the medications above.  She is going to call the pharmacy and see how much it will be out of pocket.    Patient said at her appointment on 06/06/25 she told you that she has been having lower back pain and not getting any better, wanted to know if you would order an x-ray?

## 2025-06-09 NOTE — TELEPHONE ENCOUNTER
PA for Amitriptyline (ELAVIL) 10 mg tablet SUBMITTED to [facility]    via    []CMM-KEY:   [x]Surescripts-Case ID #: [id number]   []Availity-Auth ID #   []Faxed to plan   []Other website   []Phone call Case ID #     [x]PA sent as URGENT    All office notes, labs and other pertaining documents and studies sent. Clinical questions answered. Awaiting determination from insurance company.     Turnaround time for your insurance to make a decision on your Prior Authorization can take 7-21 business days.

## 2025-06-09 NOTE — TELEPHONE ENCOUNTER
PA for Amitriptyline (ELAVIL) 10 mg tablet DENIED    Reason:(Screenshot if applicable)        Message sent to office clinical pool Yes    Denial letter scanned into Media Yes    We can gladly do an appeal but the process can take about 30-60 days to provide determination. Please have the office staff schedule a Peer to Peer at phone [phone]. If an appeal is truly warranted please have Provider send clinical documentation to the PA department to support the appeal.     **Please follow up with your patient regarding denial and next steps**

## 2025-06-10 NOTE — TELEPHONE ENCOUNTER
Duplicate encounter created, please see telephone encounter from 06/09/25 regarding Amitriptyline 10 mg PA status. Please review patient's chart to see if there is already an encounter regarding the medication in question and to document anything regarding this medication in regards to anything regarding the authorization process etc before creating another encounter Thank You.

## 2025-06-19 ENCOUNTER — OFFICE VISIT (OUTPATIENT)
Dept: SURGERY | Facility: HOSPITAL | Age: 75
End: 2025-06-19
Payer: MEDICARE

## 2025-06-19 VITALS
TEMPERATURE: 97.9 F | OXYGEN SATURATION: 97 % | BODY MASS INDEX: 43.36 KG/M2 | HEIGHT: 64 IN | DIASTOLIC BLOOD PRESSURE: 87 MMHG | WEIGHT: 254 LBS | SYSTOLIC BLOOD PRESSURE: 128 MMHG | HEART RATE: 88 BPM

## 2025-06-19 DIAGNOSIS — R22.2 MASS OF SKIN OF BACK: Primary | ICD-10-CM

## 2025-06-19 PROCEDURE — 99213 OFFICE O/P EST LOW 20 MIN: CPT | Performed by: SURGERY

## 2025-07-02 NOTE — ASSESSMENT & PLAN NOTE
Back mass - discussed operative vs conservative mgt, surgical approaches, risks and benefits and patient understands given that mass does not currently cause her pain she would like to proceed with an ultrasound to ensure this a lipoma with no suspicious features

## 2025-07-02 NOTE — PROGRESS NOTES
Assessment/Plan:    Mass of skin of back  Back mass - discussed operative vs conservative mgt, surgical approaches, risks and benefits and patient understands given that mass does not currently cause her pain she would like to proceed with an ultrasound to ensure this a lipoma with no suspicious features       Preoperative Clearance: None    HPI:  Bozena Armstrong is a 74 y.o.female who was referred for evaluation of Follow-up (Pt F/U U/S lump on back pt states no changes since last visit )  .    Currently back mass no changes no pain.     ROS:  General ROS: negative  negative for - chills, fatigue, fever or night sweats, weight loss  Respiratory ROS: no cough, shortness of breath, or wheezing  Cardiovascular ROS: no chest pain or dyspnea on exertion  Genito-Urinary ROS: no dysuria, trouble voiding, or hematuria  Musculoskeletal ROS: negative for - gait disturbance, joint pain or muscle pain  Neurological ROS: no TIA or stroke symptoms  Back mass    [unfilled]  Patient has no known allergies.  Current Medications[1]  Past Medical History[2]  Past Surgical History[3]  Family History[4]   reports that she has never smoked. She has been exposed to tobacco smoke. She has never used smokeless tobacco. She reports that she does not drink alcohol and does not use drugs.    Labs:   Lab Results   Component Value Date    WBC 6.98 01/06/2025    WBC 6.19 10/07/2024    WBC 6.38 01/11/2024    HGB 14.0 01/06/2025    HGB 14.2 10/07/2024    HGB 13.3 01/11/2024     01/06/2025     10/07/2024     07/12/2024     Lab Results   Component Value Date    ALT 19 01/06/2025    ALT 21 10/07/2024    ALT 16 01/11/2024    ALT 14 04/06/2022    ALT 16 09/29/2021    ALT 14 02/06/2021    AST 16 01/06/2025    AST 17 10/07/2024    AST 13 01/11/2024    AST 15 04/06/2022    AST 14 (L) 09/29/2021    AST 13 02/06/2021     This SmartLink has not been configured with any valid records.       PHYSICAL EXAM  General Appearance:    Alert,  "cooperative, no distress,    Head:    Normocephalic without obvious abnormality   Eyes:    PERRL, conjunctiva/corneas clear, EOM's intact        Neck:   Supple, no adenopathy, no JVD   Back:     Symmetric, no spinal or CVA tenderness   Lungs:     Clear to auscultation bilaterally, no wheezing or rhonchi   Heart:    Regular rate and rhythm, S1 and S2 normal, no murmur   Abdomen:        Extremities:   Extremities normal. No clubbing, cyanosis or edema   Psych:   Normal Affect, AOx3.    Neurologic:  Skin:   CNII-XII intact. Strength symmetric, speech intact    Warm, dry, intact, no visible rashes or lesions 6cm soft mass of back likely lipoma         Physical Exam              Some portions of this record may have been generated with voice recognition software. There may be translation, syntax,  or grammatical errors. Occasional wrong word or \"sound-a-like\" substitutions may have occurred due to the inherent limitations of the voice recognition software. Read the chart carefully and recognize, using context, where substitutions may have occurred. If you have any questions, please contact the dictating provider for clarification or correction, as needed. This encounter has been coded by a non-certified coder.          [1]   Current Outpatient Medications:     amitriptyline (ELAVIL) 10 mg tablet, Take 1 tablet (10 mg total) by mouth daily at bedtime, Disp: 90 tablet, Rfl: 1    ascorbic acid (VITAMIN C) 500 MG tablet, Take 1 tablet (500 mg total) by mouth 2 (two) times a day, Disp: 60 tablet, Rfl: 0    benzonatate (TESSALON) 200 MG capsule, Take 1 capsule (200 mg total) by mouth 3 (three) times a day as needed for cough (Patient not taking: Reported on 5/13/2025), Disp: 20 capsule, Rfl: 0    celecoxib (CeleBREX) 100 mg capsule, TAKE ONE CAPSULE BY MOUTH TWICE A DAY, Disp: 60 capsule, Rfl: 5    cholecalciferol (VITAMIN D3) 1,000 units tablet, Take 1,000 Units by mouth in the morning., Disp: , Rfl:     Doxylamine Succinate, " Sleep, (SLEEP AID PO), Take 25 mg by mouth, Disp: , Rfl:     DULoxetine (CYMBALTA) 60 mg delayed release capsule, TAKE ONE CAPSULE BY MOUTH AT BEDTIME, Disp: 30 capsule, Rfl: 5    guaiFENesin (MUCINEX) 600 mg 12 hr tablet, Take 2 tablets (1,200 mg total) by mouth every 12 (twelve) hours (Patient not taking: Reported on 2025), Disp: 120 tablet, Rfl: 0    Multiple Vitamins-Minerals (CENTRUM SILVER PO), Take by mouth, Disp: , Rfl:     potassium chloride (Klor-Con M20) 20 mEq tablet, TAKE ONE TABLET BY MOUTH EVERY DAY, Disp: 30 tablet, Rfl: 1    predniSONE 10 mg tablet, Take 1 tablet (10 mg total) by mouth daily On day one take 6 tablets, day two take 5 tablets, day three take 4 tablets, day four take 3 tablets, day five take 2 tablets, day six take 1 tablet (Patient not taking: Reported on 2025), Disp: 21 tablet, Rfl: 0    rosuvastatin (CRESTOR) 40 MG tablet, Take 1 tablet (40 mg total) by mouth daily, Disp: 90 tablet, Rfl: 3    vitamin B-12 (VITAMIN B-12) 1,000 mcg tablet, TAKE ONE TABLET BY MOUTH EVERY DAY, Disp: 90 tablet, Rfl: 3    VITAMIN E PO, Take by mouth, Disp: , Rfl:   [2]   Past Medical History:  Diagnosis Date    Arthritis     Balance disorder     GERD (gastroesophageal reflux disease)     Hyperlipidemia     Hypertension     Peripheral neuropathy     Vertigo    [3]   Past Surgical History:  Procedure Laterality Date     SECTION      COLONOSCOPY      FL LUMBAR PUNCTURE THERAPEUTIC  2024    HYSTERECTOMY      HYSTEROSCOPY W/ ENDOMETRIAL ABLATION      JOINT REPLACEMENT      KNEE SURGERY      MO ARTHRP ACETBLR/PROX FEM PROSTC AGRFT/ALGRFT Right 2022    Procedure: ARTHROPLASTY HIP TOTAL ANTERIOR and all associated procedures;  Surgeon: Trisha Alonso MD;  Location:  MAIN OR;  Service: Orthopedics   [4]   Family History  Problem Relation Name Age of Onset    Arthritis Mother      Cancer Father      Heart disease Father      No Known Problems Sister      No Known Problems Daughter       No Known Problems Daughter      No Known Problems Daughter      No Known Problems Maternal Grandmother      No Known Problems Maternal Grandfather      No Known Problems Paternal Grandmother      No Known Problems Paternal Grandfather      Anemia Brother      No Known Problems Maternal Aunt      No Known Problems Maternal Aunt

## 2025-07-10 ENCOUNTER — TELEPHONE (OUTPATIENT)
Age: 75
End: 2025-07-10

## 2025-07-10 ENCOUNTER — OFFICE VISIT (OUTPATIENT)
Dept: NEUROLOGY | Facility: CLINIC | Age: 75
End: 2025-07-10
Payer: MEDICARE

## 2025-07-10 VITALS
DIASTOLIC BLOOD PRESSURE: 88 MMHG | WEIGHT: 253 LBS | HEIGHT: 64 IN | HEART RATE: 102 BPM | BODY MASS INDEX: 43.19 KG/M2 | SYSTOLIC BLOOD PRESSURE: 120 MMHG

## 2025-07-10 DIAGNOSIS — G31.84 MCI (MILD COGNITIVE IMPAIRMENT): Primary | ICD-10-CM

## 2025-07-10 DIAGNOSIS — G91.2 NPH (NORMAL PRESSURE HYDROCEPHALUS) (HCC): ICD-10-CM

## 2025-07-10 DIAGNOSIS — F32.A DEPRESSION: ICD-10-CM

## 2025-07-10 PROCEDURE — G2211 COMPLEX E/M VISIT ADD ON: HCPCS | Performed by: STUDENT IN AN ORGANIZED HEALTH CARE EDUCATION/TRAINING PROGRAM

## 2025-07-10 PROCEDURE — 99214 OFFICE O/P EST MOD 30 MIN: CPT | Performed by: STUDENT IN AN ORGANIZED HEALTH CARE EDUCATION/TRAINING PROGRAM

## 2025-07-10 NOTE — TELEPHONE ENCOUNTER
Bozena needs a formal note stating she is totally incontinent so that medicare will reimburse her for script.    Bozena is happy to come  the letter when completed.

## 2025-07-10 NOTE — PROGRESS NOTES
Name: Bozena Armstrong      : 1950      MRN: [id number]  Encounter Provider: Petty Kim MD  Encounter Date: 7/10/2025   Encounter department: [facility] [place]  :  Assessment & Plan  NPH (normal pressure hydrocephalus) (HCC)  Now s/p VPS placement in 2025 at [place]. Significant improvement in gait, no significant change in cognition after her surgery. F/u with neurosurgery as recommended        MCI (mild cognitive impairment)  MOCA today , previous MOCA 25 in 2025. 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 Overall clinical picture c/w mild cognitive impairment. Recommended continuing 1000 mcg daily B12 supplementation.        Depression  Currently on amitriptyline 10 mg daily and cymbalta 60 mg daily. Currently endorses symptoms of depression which are not controlled on her current medications, no SI. Recommended f/u with her PCP to discuss adjustments in medications. Pt remained hopeful today, stating that she has considered some lifestyle changes such as recreational activities at her nearby senior center and possibly adopting a dog.            CC:   Multiple complaints    History of Present Illness:     73 y/o female with h/o arthritis, nonspecific balance disorder, GERD, HTN, and HLD who arrives for evaluation of frequent falls.     Interval Hx  Pt now s/p VPS placement in 2025. She has had significant improvement in her gait after the procedure, but continues to experience similar mild cognitive impairment. She continues to be independent in all activities of daily living and cares for her  who is suffering from dementia. She endorses depression. States that she is not motivated to go out and socialize. She has no family in the area, and feels that her friends are usually unavailable. She does not use a computer.     Last visit  Since her last visit, she has been evaluated  through NPH clinic at [facility] and also had a second opinion at [place]. She endorsed significant improvement in her gait after LP. VPS was discussed at her appointment with neurosurgery in August. She presented for a second opinion at [place], and they had recommended proceeding with VPS. Per review of the records she is scheduled for VPS placement 1/23/25 at [place]. She does not notice a significant difference in her cognitive function after LP was performed.     Previous Hx  MRI brain neuroquant showed no evidence of neurodegenerative changes. However, did show evidence of NPH with mild ventricular prominence with narrowing of the callosal angle and disproportionate enlargement of the sylvian fissures. She was previouisly referred to NPH clinic and is awaiting LP on 7/12. Neuropathy symptoms overall unchanged.     Patient states that she is currently falling approximately 2x/week which often follows some lightheadedness that occurs after standing up from a seated position. Patient denies room spinning sensation or dizziness with rapid head movements. Patient was seen in the ED 1/11/2023 after having 3 falls in one day. Patient was evaluated, discharged and prescribed meclizine which the patient states has been helping by decreasing dizzy spells and falls. Patient states that she consumes approximately 30 oz of water daily. Patient states that she has been evaluated by ENT and ophthalmology but has not received a diagnosis. Patient has also worked with PT for balance and states that she was recently discharged from their services. She also complains of some memory loss which began 2-3 years ago and describes forgetting doctors appointments and forgetting to get certain items when grocery shopping. MoCA on 9/27/2023 was 27/30. She also reports some urinary incontinence that began 1 year ago. Patient denies seizures, tremors, numbness, tingling, vision changes, ear fullness, or changes to hearing. She drinks  about 30 oz water daily.     Past Medical History:     Past Medical History:   Diagnosis Date    Arthritis     Balance disorder     GERD (gastroesophageal reflux disease)     Hyperlipidemia     Hypertension     Peripheral neuropathy     Vertigo        Patient Active Problem List   Diagnosis    Anemia    Constipation    Dyslipidemia    Essential hypertension    History of total knee arthroplasty, left    Osteoarthritis of knee, unilateral    Prediabetes    Dystonia    Chronic right hip pain    Primary osteoarthritis of right hip    Chronic bilateral low back pain without sciatica    Chronic pain syndrome    Obesity (BMI 35.0-39.9 without comorbidity)    Status post total hip replacement, right    Chronic pain of both shoulders    Obesity, morbid (HCC)    Sciatica    Neuropathy    Balance disorder    Memory deficit    Urinary incontinence    GERD (gastroesophageal reflux disease)    NPH (normal pressure hydrocephalus) ([facility])    Pre-op examination    Mass of skin of back       Medications:      Current Outpatient Medications   Medication Sig Dispense Refill    amitriptyline (ELAVIL) 10 mg tablet Take 1 tablet (10 mg total) by mouth daily at bedtime 90 tablet 1    ascorbic acid (VITAMIN C) 500 MG tablet Take 1 tablet (500 mg total) by mouth 2 (two) times a day 60 tablet 0    celecoxib (CeleBREX) 100 mg capsule TAKE ONE CAPSULE BY MOUTH TWICE A DAY 60 capsule 5    cholecalciferol (VITAMIN D3) 1,000 units tablet Take 1,000 Units by mouth in the morning.      Doxylamine Succinate, Sleep, (SLEEP AID PO) Take 25 mg by mouth      DULoxetine (CYMBALTA) 60 mg delayed release capsule TAKE ONE CAPSULE BY MOUTH AT BEDTIME 30 capsule 5    Multiple Vitamins-Minerals (CENTRUM SILVER PO) Take by mouth      potassium chloride (Klor-Con M20) 20 mEq tablet TAKE ONE TABLET BY MOUTH EVERY DAY 30 tablet 1    rosuvastatin (CRESTOR) 40 MG tablet Take 1 tablet (40 mg total) by mouth daily 90 tablet 3    vitamin B-12 (VITAMIN B-12) 1,000 mcg  tablet TAKE ONE TABLET BY MOUTH EVERY DAY 90 tablet 3    VITAMIN E PO Take by mouth      benzonatate (TESSALON) 200 MG capsule Take 1 capsule (200 mg total) by mouth 3 (three) times a day as needed for cough (Patient not taking: Reported on 5/13/2025) 20 capsule 0    guaiFENesin (MUCINEX) 600 mg 12 hr tablet Take 2 tablets (1,200 mg total) by mouth every 12 (twelve) hours (Patient not taking: Reported on 5/13/2025) 120 tablet 0    predniSONE 10 mg tablet Take 1 tablet (10 mg total) by mouth daily On day one take 6 tablets, day two take 5 tablets, day three take 4 tablets, day four take 3 tablets, day five take 2 tablets, day six take 1 tablet (Patient not taking: Reported on 1/9/2025) 21 tablet 0     No current facility-administered medications for this visit.        Allergies:    No Known Allergies    Family History:     Family History   Problem Relation Name Age of Onset    Arthritis Mother      Cancer Father      Heart disease Father      No Known Problems Sister      No Known Problems Daughter      No Known Problems Daughter      No Known Problems Daughter      No Known Problems Maternal Grandmother      No Known Problems Maternal Grandfather      No Known Problems Paternal Grandmother      No Known Problems Paternal Grandfather      Anemia Brother      No Known Problems Maternal Aunt      No Known Problems Maternal Aunt         Social History:       Social History     Socioeconomic History    Marital status: /Civil Union     Spouse name: Not on file    Number of children: Not on file    Years of education: Not on file    Highest education level: Not on file   Occupational History    Not on file   Tobacco Use    Smoking status: Never     Passive exposure: Past    Smokeless tobacco: Never   Vaping Use    Vaping status: Never Used   Substance and Sexual Activity    Alcohol use: Never    Drug use: Never    Sexual activity: Not Currently   Other Topics Concern    Not on file   Social History Narrative    Not  "on file     Social Drivers of Health     Financial Resource Strain: Not At Risk (1/23/2025)    Received from [facility]    Financial Resource Strain     In the last 12 months did you skip medications to save money?: No     In the last 12 months, was there a time when you needed to see a doctor but could not because of cost?: No   Food Insecurity: Not At Risk (1/23/2025)    Received from [facility]    Food Insecurity     In the last 12 months did you ever eat less than you felt you should because there wasn't enough money for food?: No   Transportation Needs: Not At Risk (1/23/2025)    Received from [facility]    Transporation     In the last 12 months, have you ever had to go without healthcare because you didn't have a way to get there?: No   Physical Activity: Not on file   Stress: Not on file   Social Connections: Not At Risk (1/23/2025)    Received from [facility]    Social Connections     Do you often feel lonely?: No   Intimate Partner Violence: Not At Risk (12/19/2023)    Humiliation, Afraid, Rape, and Kick questionnaire     Fear of Current or Ex-Partner: No     Emotionally Abused: No     Physically Abused: No     Sexually Abused: No   Housing Stability: Not At Risk (1/23/2025)    Received from [facility]    Housing Stability     Are you worried that in the next 2 months you may not have stable housing?: No         Objective:   /88 (BP Location: Left arm, Patient Position: Sitting, Cuff Size: Large)   Pulse 102   Ht 5' 4\" (1.626 m)   Wt 115 kg (253 lb)   BMI 43.43 kg/m²     General: Patient is not in any acute/apparent distress, well nourished, well developed and cooperative.   HEENT: normocephalic, atraumatic, moist membranes  Neck: supple  Extremities: no edema noted   Skin: no lesions or rash  Musculosketal: no bony abnormalities    Neurologic Examination:   Mental " status: MOCA 24/30    Speech/Language: Speech is fluent without any dysarthria, no aphasia noted, can name, repeat, read and write and comprehension intact    Cranial Nerves:   CN I: smell not tested  CN II: Visual fields full to confrontation  CN III, IV, VI: Extraocular movements intact bilaterally. Pupils equal round and reactive to light bilaterally.  CN V: Facial sensation is normal.  CN VII: Full and symmetric facial movement.  CN VIII: Hearing is normal.  CN IX, X: Palate elevates symmetrically.   CN XI: Shoulder shrug strength is normal.  CN XII: Tongue midline without atrophy or fasciculations.    Motor:   Strength 5/5 in all 4 extremities. Bulk/tone - normal.  Fasiculations - none    Sensory:   Sensation intact to soft touch, vibration and pinprick in BUE.   Decreased LT/pinprick sensation to right first toe, normal proprioception bilaterally,   Vibration sensation is decreased in feet as compared to lower leg.    Cerebellar:   Finger-to-nose intact, normal heel to shin.    Reflexes: 2+ in all 4 extremities     Review of Systems:     Review of Systems   Constitutional:  Negative for appetite change, fatigue and fever.   HENT: Negative.  Negative for hearing loss, tinnitus, trouble swallowing and voice change.    Eyes: Negative.  Negative for photophobia, pain and visual disturbance.   Respiratory: Negative.  Negative for shortness of breath.    Cardiovascular: Negative.  Negative for palpitations.   Gastrointestinal: Negative.  Negative for nausea and vomiting.   Endocrine: Negative.  Negative for cold intolerance.   Genitourinary: Negative.  Negative for dysuria, frequency and urgency.   Musculoskeletal:  Negative for back pain, gait problem, myalgias, neck pain and neck stiffness.   Skin: Negative.  Negative for rash.   Allergic/Immunologic: Negative.    Neurological:  Negative for dizziness, tremors, seizures, syncope, facial asymmetry, speech difficulty, weakness, light-headedness, numbness and  headaches.        Pt still having problem getting stuck on words.    Hematological: Negative.  Does not bruise/bleed easily.   Psychiatric/Behavioral: Negative.  Negative for confusion, hallucinations and sleep disturbance.     I have personally reviewed the MA's review of systems and made changes as necessary.    I have spent a total time of 30 minutes in caring for this patient on the day of the visit/encounter including Risks and benefits of tx options, Instructions for management, Patient and family education, Risk factor reductions, Impressions, Documenting in the medical record, Reviewing / ordering tests, medicine, procedures  , and Obtaining or reviewing history  .

## 2025-07-11 ENCOUNTER — TELEPHONE (OUTPATIENT)
Dept: FAMILY MEDICINE CLINIC | Facility: CLINIC | Age: 75
End: 2025-07-11

## 2025-07-11 NOTE — ASSESSMENT & PLAN NOTE
Now s/p VPS placement in January 2025 at [place]. Significant improvement in gait, no significant change in cognition after her surgery. F/u with neurosurgery as recommended

## 2025-07-11 NOTE — TELEPHONE ENCOUNTER
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       Wants to know if there is anything more you can do for her.

## 2025-07-22 DIAGNOSIS — E87.6 HYPOKALEMIA: ICD-10-CM

## 2025-07-22 NOTE — TELEPHONE ENCOUNTER
Patient called to request a refill for their potassium chloride advised a refill was requested on 7-22-25 and is pending approval. Patient verbalized understanding and is in agreement.     Does the patient have enough for 3 days?   [x] Yes   [] No - Send as HP to POD

## 2025-07-23 ENCOUNTER — OFFICE VISIT (OUTPATIENT)
Dept: FAMILY MEDICINE CLINIC | Facility: CLINIC | Age: 75
End: 2025-07-23
Payer: MEDICARE

## 2025-07-23 VITALS
HEIGHT: 64 IN | TEMPERATURE: 99.1 F | WEIGHT: 258.6 LBS | HEART RATE: 107 BPM | RESPIRATION RATE: 16 BRPM | DIASTOLIC BLOOD PRESSURE: 78 MMHG | SYSTOLIC BLOOD PRESSURE: 126 MMHG | OXYGEN SATURATION: 98 % | BODY MASS INDEX: 44.15 KG/M2

## 2025-07-23 DIAGNOSIS — G89.29 CHRONIC BILATERAL LOW BACK PAIN WITHOUT SCIATICA: Primary | ICD-10-CM

## 2025-07-23 DIAGNOSIS — M54.50 CHRONIC BILATERAL LOW BACK PAIN WITHOUT SCIATICA: Primary | ICD-10-CM

## 2025-07-23 DIAGNOSIS — N64.4 PAIN OF LEFT BREAST: ICD-10-CM

## 2025-07-23 PROCEDURE — G2211 COMPLEX E/M VISIT ADD ON: HCPCS | Performed by: FAMILY MEDICINE

## 2025-07-23 PROCEDURE — 99214 OFFICE O/P EST MOD 30 MIN: CPT | Performed by: FAMILY MEDICINE

## 2025-07-23 RX ORDER — POTASSIUM CHLORIDE 1500 MG/1
20 TABLET, EXTENDED RELEASE ORAL DAILY
Qty: 30 TABLET | Refills: 5 | Status: SHIPPED | OUTPATIENT
Start: 2025-07-23

## 2025-07-23 NOTE — ASSESSMENT & PLAN NOTE
The patient has chronic ongoing low back pain near the L5-S1 area with paravertebral muscle spasm.  We are going to refer her for a course of physical therapy as indicated.  She can alternate between ice and heat to the back.  She will continue with her Celebrex as needed and use Tylenol extra strength 500 mg 2 tablets 3 times a day as needed for breakthrough pain-no more than 6 pills/day.  We will monitor her closely.  She has no evidence of any red flag symptoms on exam.  We will follow along accordingly.  Orders:  •  Ambulatory Referral to Physical Therapy; Future

## 2025-07-23 NOTE — PROGRESS NOTES
Name: Bozena Armstrong      : 1950      MRN: [id number]  Encounter Provider: Sara Major DO  Encounter Date: 2025   Encounter department: [facility]  :  Assessment & Plan  Chronic bilateral low back pain without sciatica  The patient has chronic ongoing low back pain near the L5-S1 area with paravertebral muscle spasm.  We are going to refer her for a course of physical therapy as indicated.  She can alternate between ice and heat to the back.  She will continue with her Celebrex as needed and use Tylenol extra strength 500 mg 2 tablets 3 times a day as needed for breakthrough pain-no more than 6 pills/day.  We will monitor her closely.  She has no evidence of any red flag symptoms on exam.  We will follow along accordingly.  Orders:  •  Ambulatory Referral to Physical Therapy; Future    Pain of left breast  Patient is complaining of increasing pain in her left breast and has fibrocystic changes palpated in the area.  We will send her for the diagnostic mammogram of the left breast as well as the diagnostic mammogram for further evaluation.  Will follow closely with the results.  Orders:  •  Mammo diagnostic left w 3d and cad; Future  •  US breast left limited (diagnostic); Future          Depression Screening and Follow-up Plan: Patient was screened for depression during today's encounter. They screened negative with a PHQ-2 score of 0.      Urinary Incontinence Plan of Care: counseling topics discussed: practice Kegel (pelvic floor strengthening) exercises, limit alcohol, caffeine, spicy foods, and acidic foods and limiting fluid intake 3-4 hours before bed.       Chief Complaint   Patient presents with   • other     Severe low back pain       History of Present Illness   Bozena Armstrong is a 74 y.o. female with a history of hypertension, normal pressure hydrocephalus, chronic low back pain, dyslipidemia, obesity, and prediabetes who presents today with severe low  back pain.  She did have a recent x-ray of her lumbar spine performed in January 2025 which demonstrated spondylolisthesis of L5 on S1 as well as degenerative disc disease.  She states it has been severe like this for a while- there is pain with standing and bending.  The pain is in the low back pain and there is pain in the left side.  There is no numbness or tingling in the legs.  There is no loss off bowel or bladder function. She feels this has been going on since January 2025- it has been getting worse- she can't stand or do chores.  She takes Tylenol Extra strength with relief.    There is pain behind the left breast on and off for 2 weeks in the evening.    There was no trauma. There were no palpable lumps. It last a few minutes a time.  It is a sharp pain- it happens with rest and activity.  Normal diagnostic mammogram bilateral in May 2025.  She states that the pain has worsened since then.  There is no discharge from the nipple area and there is no redness.      Back Pain  This is a chronic problem. The current episode started more than 1 month ago. The problem occurs constantly. The problem has been gradually worsening since onset. The pain is present in the lumbar spine. The quality of the pain is described as burning. The pain does not radiate. The pain is at a severity of 7/10. The pain is moderate. The symptoms are aggravated by standing and bending. Pertinent negatives include no abdominal pain, bladder incontinence, bowel incontinence, chest pain, dysuria, fever, headaches, leg pain, numbness, paresis, paresthesias, pelvic pain, perianal numbness, tingling, weakness or weight loss.     Review of Systems   Constitutional: Negative.  Negative for fever and weight loss.   HENT: Negative.     Eyes: Negative.    Respiratory: Negative.     Cardiovascular: Negative.  Negative for chest pain.   Gastrointestinal: Negative.  Negative for abdominal pain and bowel incontinence.   Endocrine: Negative.   "  Genitourinary: Negative.  Negative for bladder incontinence, dysuria and pelvic pain.   Musculoskeletal:  Positive for back pain.   Skin: Negative.    Allergic/Immunologic: Negative.    Neurological: Negative.  Negative for tingling, weakness, numbness, headaches and paresthesias.   Hematological: Negative.    Psychiatric/Behavioral: Negative.         Objective   /78 (BP Location: Left arm, Patient Position: Sitting, Cuff Size: Large)   Pulse (!) 107   Temp 99.1 °F (37.3 °C) (Tympanic)   Resp 16   Ht 5' 4\" (1.626 m)   Wt 117 kg (258 lb 9.6 oz)   SpO2 98%   BMI 44.39 kg/m²      Physical Exam  Constitutional:       General: She is not in acute distress.     Appearance: Normal appearance. She is well-developed. She is not diaphoretic.   HENT:      Head: Normocephalic and atraumatic.      Right Ear: Tympanic membrane, ear canal and external ear normal.      Left Ear: Tympanic membrane, ear canal and external ear normal.      Nose: Nose normal.      Mouth/Throat:      Mouth: Mucous membranes are moist.      Pharynx: Oropharynx is clear. No oropharyngeal exudate.     Eyes:      General: No scleral icterus.        Right eye: No discharge.         Left eye: No discharge.      Extraocular Movements: Extraocular movements intact.      Pupils: Pupils are equal, round, and reactive to light.     Neck:      Thyroid: No thyromegaly.      Vascular: No JVD.      Trachea: No tracheal deviation.     Cardiovascular:      Rate and Rhythm: Normal rate and regular rhythm.      Heart sounds: Normal heart sounds. No murmur heard.     No friction rub. No gallop.   Pulmonary:      Effort: Pulmonary effort is normal. No respiratory distress.      Breath sounds: Normal breath sounds. No wheezing or rales.   Chest:      Chest wall: No tenderness.   Breasts:     Left: Tenderness present. No swelling, bleeding, inverted nipple, mass, nipple discharge or skin change.      Comments: There are fibrocystic changes palpated on exam of " the left breast.  Abdominal:      General: Bowel sounds are normal. There is no distension.      Palpations: Abdomen is soft. There is no mass.      Tenderness: There is no abdominal tenderness. There is no guarding or rebound.      Hernia: No hernia is present.     Musculoskeletal:         General: Tenderness present. No deformity.      Cervical back: Normal range of motion and neck supple.      Lumbar back: Spasms, tenderness and bony tenderness present. No swelling, edema, deformity, signs of trauma or lacerations. Decreased range of motion. Negative right straight leg raise test and negative left straight leg raise test. No scoliosis.   Lymphadenopathy:      Cervical: No cervical adenopathy.     Skin:     General: Skin is warm and dry.      Coloration: Skin is not pale.      Findings: No erythema or rash.     Neurological:      Mental Status: She is alert and oriented to person, place, and time.      Cranial Nerves: No cranial nerve deficit.      Sensory: No sensory deficit.      Motor: No abnormal muscle tone.      Coordination: Coordination normal.      Deep Tendon Reflexes: Reflexes normal.     Psychiatric:         Mood and Affect: Mood normal.         Behavior: Behavior normal.         Thought Content: Thought content normal.         Judgment: Judgment normal.       Administrative Statements   I have spent a total time of 15 minutes in caring for this patient on the day of the visit/encounter including Prognosis, Risks and benefits of tx options, Instructions for management, Patient and family education, Importance of tx compliance, Risk factor reductions, Impressions, Counseling / Coordination of care, Documenting in the medical record, Reviewing/placing orders in the medical record (including tests, medications, and/or procedures), and Obtaining or reviewing history  .

## 2025-08-05 ENCOUNTER — EVALUATION (OUTPATIENT)
Dept: PHYSICAL THERAPY | Facility: CLINIC | Age: 75
End: 2025-08-05
Attending: FAMILY MEDICINE
Payer: MEDICARE

## 2025-08-05 DIAGNOSIS — G89.29 CHRONIC BILATERAL LOW BACK PAIN WITHOUT SCIATICA: ICD-10-CM

## 2025-08-05 DIAGNOSIS — M54.50 CHRONIC BILATERAL LOW BACK PAIN WITHOUT SCIATICA: ICD-10-CM

## 2025-08-05 PROCEDURE — 97110 THERAPEUTIC EXERCISES: CPT | Performed by: PHYSICAL THERAPIST

## 2025-08-05 PROCEDURE — 97162 PT EVAL MOD COMPLEX 30 MIN: CPT | Performed by: PHYSICAL THERAPIST

## 2025-08-07 ENCOUNTER — OFFICE VISIT (OUTPATIENT)
Dept: PHYSICAL THERAPY | Facility: CLINIC | Age: 75
End: 2025-08-07
Attending: FAMILY MEDICINE
Payer: MEDICARE

## 2025-08-07 DIAGNOSIS — M54.50 CHRONIC BILATERAL LOW BACK PAIN WITHOUT SCIATICA: Primary | ICD-10-CM

## 2025-08-07 DIAGNOSIS — G89.29 CHRONIC BILATERAL LOW BACK PAIN WITHOUT SCIATICA: Primary | ICD-10-CM

## 2025-08-07 PROCEDURE — 97110 THERAPEUTIC EXERCISES: CPT | Performed by: PHYSICAL THERAPIST

## 2025-08-07 PROCEDURE — 97140 MANUAL THERAPY 1/> REGIONS: CPT | Performed by: PHYSICAL THERAPIST

## 2025-08-07 PROCEDURE — 97112 NEUROMUSCULAR REEDUCATION: CPT | Performed by: PHYSICAL THERAPIST

## 2025-08-12 ENCOUNTER — OFFICE VISIT (OUTPATIENT)
Dept: PHYSICAL THERAPY | Facility: CLINIC | Age: 75
End: 2025-08-12
Attending: FAMILY MEDICINE
Payer: MEDICARE

## 2025-08-14 ENCOUNTER — OFFICE VISIT (OUTPATIENT)
Dept: PHYSICAL THERAPY | Facility: CLINIC | Age: 75
End: 2025-08-14
Attending: FAMILY MEDICINE
Payer: MEDICARE

## 2025-08-18 ENCOUNTER — APPOINTMENT (OUTPATIENT)
Dept: RADIOLOGY | Facility: HOSPITAL | Age: 75
End: 2025-08-18
Payer: MEDICARE

## 2025-08-18 ENCOUNTER — APPOINTMENT (EMERGENCY)
Dept: CT IMAGING | Facility: HOSPITAL | Age: 75
End: 2025-08-18
Payer: MEDICARE

## 2025-08-18 ENCOUNTER — HOSPITAL ENCOUNTER (EMERGENCY)
Facility: HOSPITAL | Age: 75
Discharge: HOME | End: 2025-08-18
Attending: EMERGENCY MEDICINE | Admitting: EMERGENCY MEDICINE
Payer: MEDICARE

## 2025-08-18 ENCOUNTER — NURSE TRIAGE (OUTPATIENT)
Age: 75
End: 2025-08-18

## 2025-08-18 ENCOUNTER — APPOINTMENT (EMERGENCY)
Dept: RADIOLOGY | Facility: HOSPITAL | Age: 75
End: 2025-08-18
Payer: MEDICARE

## 2025-08-18 VITALS
HEART RATE: 92 BPM | RESPIRATION RATE: 18 BRPM | DIASTOLIC BLOOD PRESSURE: 80 MMHG | OXYGEN SATURATION: 96 % | SYSTOLIC BLOOD PRESSURE: 121 MMHG | BODY MASS INDEX: 44.29 KG/M2 | TEMPERATURE: 98 F | WEIGHT: 258 LBS

## 2025-08-18 DIAGNOSIS — G91.2 NPH (NORMAL PRESSURE HYDROCEPHALUS) (HCC): Primary | ICD-10-CM

## 2025-08-18 LAB
ALBUMIN SERPL BCG-MCNC: 4.1 G/DL (ref 3.5–5)
ALP SERPL-CCNC: 94 U/L (ref 34–104)
ALT SERPL W P-5'-P-CCNC: 16 U/L (ref 7–52)
ANION GAP SERPL CALCULATED.3IONS-SCNC: 7 MMOL/L (ref 4–13)
AST SERPL W P-5'-P-CCNC: 16 U/L (ref 13–39)
ATRIAL RATE: 107 BPM
BASOPHILS # BLD AUTO: 0.04 THOUSANDS/ÂΜL (ref 0–0.1)
BASOPHILS NFR BLD AUTO: 1 % (ref 0–1)
BILIRUB SERPL-MCNC: 0.84 MG/DL (ref 0.2–1)
BUN SERPL-MCNC: 14 MG/DL (ref 5–25)
CALCIUM SERPL-MCNC: 9.3 MG/DL (ref 8.4–10.2)
CARDIAC TROPONIN I PNL SERPL HS: 3 NG/L (ref ?–50)
CHLORIDE SERPL-SCNC: 103 MMOL/L (ref 96–108)
CO2 SERPL-SCNC: 28 MMOL/L (ref 21–32)
CREAT SERPL-MCNC: 0.52 MG/DL (ref 0.6–1.3)
EOSINOPHIL # BLD AUTO: 0.09 THOUSAND/ÂΜL (ref 0–0.61)
EOSINOPHIL NFR BLD AUTO: 1 % (ref 0–6)
ERYTHROCYTE [DISTWIDTH] IN BLOOD BY AUTOMATED COUNT: 13.7 % (ref 11.6–15.1)
GFR SERPL CREATININE-BSD FRML MDRD: 95 ML/MIN/1.73SQ M
GLUCOSE SERPL-MCNC: 125 MG/DL (ref 65–140)
HCT VFR BLD AUTO: 44.5 % (ref 34.8–46.1)
HGB BLD-MCNC: 14.2 G/DL (ref 11.5–15.4)
IMM GRANULOCYTES # BLD AUTO: 0.02 THOUSAND/UL (ref 0–0.2)
IMM GRANULOCYTES NFR BLD AUTO: 0 % (ref 0–2)
LYMPHOCYTES # BLD AUTO: 2.58 THOUSANDS/ÂΜL (ref 0.6–4.47)
LYMPHOCYTES NFR BLD AUTO: 35 % (ref 14–44)
MCH RBC QN AUTO: 29.8 PG (ref 26.8–34.3)
MCHC RBC AUTO-ENTMCNC: 31.9 G/DL (ref 31.4–37.4)
MCV RBC AUTO: 94 FL (ref 82–98)
MONOCYTES # BLD AUTO: 0.6 THOUSAND/ÂΜL (ref 0.17–1.22)
MONOCYTES NFR BLD AUTO: 8 % (ref 4–12)
NEUTROPHILS # BLD AUTO: 4.09 THOUSANDS/ÂΜL (ref 1.85–7.62)
NEUTS SEG NFR BLD AUTO: 55 % (ref 43–75)
NRBC BLD AUTO-RTO: 0 /100 WBCS
P AXIS: 55 DEGREES
PLATELET # BLD AUTO: 232 THOUSANDS/UL (ref 149–390)
PMV BLD AUTO: 9.6 FL (ref 8.9–12.7)
POTASSIUM SERPL-SCNC: 4 MMOL/L (ref 3.5–5.3)
PR INTERVAL: 174 MS
PROT SERPL-MCNC: 6.8 G/DL (ref 6.4–8.4)
QRS AXIS: 26 DEGREES
QRSD INTERVAL: 60 MS
QT INTERVAL: 304 MS
QTC INTERVAL: 406 MS
RBC # BLD AUTO: 4.76 MILLION/UL (ref 3.81–5.12)
SODIUM SERPL-SCNC: 138 MMOL/L (ref 135–147)
T WAVE AXIS: -13 DEGREES
VENTRICULAR RATE: 107 BPM
WBC # BLD AUTO: 7.42 THOUSAND/UL (ref 4.31–10.16)

## 2025-08-18 PROCEDURE — 71046 X-RAY EXAM CHEST 2 VIEWS: CPT

## 2025-08-18 PROCEDURE — 74018 RADEX ABDOMEN 1 VIEW: CPT

## 2025-08-18 PROCEDURE — 99285 EMERGENCY DEPT VISIT HI MDM: CPT

## 2025-08-18 PROCEDURE — 70450 CT HEAD/BRAIN W/O DYE: CPT

## 2025-08-18 PROCEDURE — 84484 ASSAY OF TROPONIN QUANT: CPT

## 2025-08-18 PROCEDURE — 93005 ELECTROCARDIOGRAM TRACING: CPT

## 2025-08-18 PROCEDURE — 85025 COMPLETE CBC W/AUTO DIFF WBC: CPT

## 2025-08-18 PROCEDURE — 70250 X-RAY EXAM OF SKULL: CPT

## 2025-08-18 PROCEDURE — 36415 COLL VENOUS BLD VENIPUNCTURE: CPT

## 2025-08-18 PROCEDURE — 80053 COMPREHEN METABOLIC PANEL: CPT

## 2025-08-19 ENCOUNTER — OFFICE VISIT (OUTPATIENT)
Dept: PHYSICAL THERAPY | Facility: CLINIC | Age: 75
End: 2025-08-19
Attending: FAMILY MEDICINE
Payer: MEDICARE

## 2025-08-19 ENCOUNTER — TELEPHONE (OUTPATIENT)
Dept: NEUROSURGERY | Facility: CLINIC | Age: 75
End: 2025-08-19

## 2025-08-19 DIAGNOSIS — M54.50 CHRONIC BILATERAL LOW BACK PAIN WITHOUT SCIATICA: Primary | ICD-10-CM

## 2025-08-19 DIAGNOSIS — G89.29 CHRONIC BILATERAL LOW BACK PAIN WITHOUT SCIATICA: Primary | ICD-10-CM

## 2025-08-19 PROCEDURE — 97110 THERAPEUTIC EXERCISES: CPT | Performed by: PHYSICAL THERAPIST

## 2025-08-19 PROCEDURE — 97112 NEUROMUSCULAR REEDUCATION: CPT | Performed by: PHYSICAL THERAPIST

## 2025-08-19 PROCEDURE — 97140 MANUAL THERAPY 1/> REGIONS: CPT | Performed by: PHYSICAL THERAPIST

## 2025-08-21 ENCOUNTER — OFFICE VISIT (OUTPATIENT)
Dept: PHYSICAL THERAPY | Facility: CLINIC | Age: 75
End: 2025-08-21
Attending: FAMILY MEDICINE
Payer: MEDICARE

## 2025-08-21 VITALS — DIASTOLIC BLOOD PRESSURE: 82 MMHG | OXYGEN SATURATION: 96 % | HEART RATE: 82 BPM | SYSTOLIC BLOOD PRESSURE: 134 MMHG

## 2025-08-21 DIAGNOSIS — G89.29 CHRONIC BILATERAL LOW BACK PAIN WITHOUT SCIATICA: Primary | ICD-10-CM

## 2025-08-21 DIAGNOSIS — M54.50 CHRONIC BILATERAL LOW BACK PAIN WITHOUT SCIATICA: Primary | ICD-10-CM

## 2025-08-21 PROCEDURE — 97110 THERAPEUTIC EXERCISES: CPT | Performed by: PHYSICAL THERAPIST

## 2025-08-21 PROCEDURE — 97140 MANUAL THERAPY 1/> REGIONS: CPT | Performed by: PHYSICAL THERAPIST

## (undated) DEVICE — 3M™ TEGADERM™ TRANSPARENT FILM DRESSING FRAME STYLE, 1628, 6 IN X 8 IN (15 CM X 20 CM), 10/CT 8CT/CASE: Brand: 3M™ TEGADERM™

## (undated) DEVICE — SUT MONOCRYL 3-0 PS-2 18 IN Y497G

## (undated) DEVICE — GLOVE SRG BIOGEL 8.5

## (undated) DEVICE — DRAPE CAMERA/LASER

## (undated) DEVICE — PAD GROUNDING ADULT

## (undated) DEVICE — BASIC DOUBLE BASIN 2-LF: Brand: MEDLINE INDUSTRIES, INC.

## (undated) DEVICE — GLOVE SRG BIOGEL 8

## (undated) DEVICE — OSCILLATING TIP SAW CARTRIDGE: Brand: PRECISION FALCON

## (undated) DEVICE — TOWEL SET X-RAY

## (undated) DEVICE — COBAN 4 IN STERILE

## (undated) DEVICE — CAPIT HIP UPCHRG  GRIPTION CUP

## (undated) DEVICE — HOOD: Brand: FLYTE, SURGICOOL

## (undated) DEVICE — CHLORAPREP HI-LITE 26ML ORANGE

## (undated) DEVICE — NEPTUNE E-SEP SMOKE EVACUATION PENCIL, COATED, 70MM BLADE, PUSH BUTTON SWITCH: Brand: NEPTUNE E-SEP

## (undated) DEVICE — SUT STRATAFIX SPIRAL PDS PLUS 1 CTX 18 IN SXPP1A400

## (undated) DEVICE — CAPIT HIP COP - ACTIS ONLY

## (undated) DEVICE — 3M™ IOBAN™ 2 ANTIMICROBIAL INCISE DRAPE 6650EZ: Brand: IOBAN™ 2

## (undated) DEVICE — 40601 PROLONGED POSITIONING SYSTEM: Brand: 40601 PROLONGED POSITIONING SYSTEM

## (undated) DEVICE — IMPERVIOUS STOCKINETTE: Brand: DEROYAL

## (undated) DEVICE — DRAPE SHEET THREE QUARTER

## (undated) DEVICE — ELECTRODE BLADE E-Z CLEAN 4IN -0014A

## (undated) DEVICE — DRAPE C-ARM X-RAY

## (undated) DEVICE — LIGHT HANDLE COVER SLEEVE DISP BLUE STELLAR

## (undated) DEVICE — THE SIMPULSE SOLO SYSTEM WITH ULTREX RETRACTABLE SPLASH SHIELD TIP: Brand: SIMPULSE SOLO

## (undated) DEVICE — SUT VICRYL 2-0 CT-1 27 IN J259H

## (undated) DEVICE — HEAVY DUTY TABLE COVER: Brand: CONVERTORS

## (undated) DEVICE — GLOVE INDICATOR PI UNDERGLOVE SZ 8.5 BLUE

## (undated) DEVICE — SYRINGE 20ML LL

## (undated) DEVICE — ADHESIVE SKIN HIGH VISCOSITY EXOFIN 1ML

## (undated) DEVICE — NEEDLE 22 G X 1 1/2 SAFETY

## (undated) DEVICE — SPONGE PVP SCRUB WING STERILE

## (undated) DEVICE — DISPOSABLE EQUIPMENT COVER: Brand: SMALL TOWEL DRAPE

## (undated) DEVICE — BETHLEHEM UNIV MAJOR ORTHO,KIT: Brand: CARDINAL HEALTH